# Patient Record
Sex: FEMALE | Race: WHITE | NOT HISPANIC OR LATINO | Employment: OTHER | URBAN - METROPOLITAN AREA
[De-identification: names, ages, dates, MRNs, and addresses within clinical notes are randomized per-mention and may not be internally consistent; named-entity substitution may affect disease eponyms.]

---

## 2017-01-24 ENCOUNTER — APPOINTMENT (OUTPATIENT)
Dept: WOMENS IMAGING | Facility: HOSPITAL | Age: 67
End: 2017-01-24

## 2017-01-24 PROCEDURE — MDREVIEWSP: Performed by: RADIOLOGY

## 2017-01-24 PROCEDURE — G0206 DX MAMMO INCL CAD UNI: HCPCS | Performed by: RADIOLOGY

## 2017-01-24 PROCEDURE — G0279 TOMOSYNTHESIS, MAMMO: HCPCS | Performed by: RADIOLOGY

## 2017-01-24 PROCEDURE — 76641 ULTRASOUND BREAST COMPLETE: CPT | Performed by: RADIOLOGY

## 2017-02-13 ENCOUNTER — OFFICE VISIT (OUTPATIENT)
Dept: INTERNAL MEDICINE | Facility: CLINIC | Age: 67
End: 2017-02-13

## 2017-02-13 VITALS
HEART RATE: 65 BPM | WEIGHT: 245.4 LBS | BODY MASS INDEX: 43.48 KG/M2 | SYSTOLIC BLOOD PRESSURE: 160 MMHG | DIASTOLIC BLOOD PRESSURE: 70 MMHG | HEIGHT: 63 IN | OXYGEN SATURATION: 96 % | TEMPERATURE: 98.1 F

## 2017-02-13 DIAGNOSIS — H66.91 OTITIS, RIGHT: ICD-10-CM

## 2017-02-13 DIAGNOSIS — J01.00 ACUTE NON-RECURRENT MAXILLARY SINUSITIS: Primary | ICD-10-CM

## 2017-02-13 PROBLEM — H66.90 OTITIS: Status: ACTIVE | Noted: 2017-02-13

## 2017-02-13 PROCEDURE — 99213 OFFICE O/P EST LOW 20 MIN: CPT | Performed by: FAMILY MEDICINE

## 2017-02-13 RX ORDER — CEFUROXIME AXETIL 500 MG/1
500 TABLET ORAL 2 TIMES DAILY
Qty: 20 TABLET | Refills: 0 | Status: SHIPPED | OUTPATIENT
Start: 2017-02-13 | End: 2017-02-27

## 2017-02-13 NOTE — PROGRESS NOTES
Subjective   Chastity Salgado is a 67 y.o. female.     Chief Complaint   Patient presents with   • patient has congestion chest/sinus     for about 10 days   • patient is to have her left knee replace   • right ear pain and dizziness when she turns on right side in         History of Present Illness   Patient has had head congestion for 10 days low-grade fever started with clear runny nose, more thickened or face tenderness also left ear painful.  She has not had any benefit with over-the-counter remedies she also felt some dizziness when looking to the left vision changes  The following portions of the patient's history were reviewed and updated as appropriate: allergies, current medications, past family history, past medical history, past social history, past surgical history and problem list.    Review of Systems   Constitutional: Positive for fatigue. Negative for activity change and unexpected weight change.   HENT: Positive for congestion and postnasal drip. Negative for ear pain and sore throat.    Eyes: Negative for pain, discharge and redness.   Respiratory: Positive for cough. Negative for chest tightness, shortness of breath and wheezing.    Cardiovascular: Negative for chest pain and palpitations.   Gastrointestinal: Negative for abdominal pain, diarrhea and vomiting.   Endocrine: Negative.    Genitourinary: Negative.    Musculoskeletal: Negative for joint swelling.   Skin: Negative for color change, rash and wound.   Allergic/Immunologic: Negative.    Neurological: Negative for seizures and syncope.   Psychiatric/Behavioral: Negative.        Objective   Physical Exam   Constitutional: She is oriented to person, place, and time. She appears well-developed and well-nourished.  Non-toxic appearance. No distress.   HENT:   Head: Normocephalic and atraumatic. Hair is normal.   Right Ear: Hearing normal. No drainage, swelling or tenderness. Tympanic membrane is injected and retracted.   Left Ear: Hearing and  external ear normal. No drainage, swelling or tenderness. Tympanic membrane is not retracted.   Nose: Mucosal edema present. No epistaxis. Right sinus exhibits maxillary sinus tenderness. Right sinus exhibits no frontal sinus tenderness. Left sinus exhibits maxillary sinus tenderness. Left sinus exhibits no frontal sinus tenderness.   Mouth/Throat: Uvula is midline and mucous membranes are normal. No oral lesions. No uvula swelling. Posterior oropharyngeal erythema present. No oropharyngeal exudate.   Eyes: Conjunctivae and EOM are normal. Pupils are equal, round, and reactive to light. Right eye exhibits no discharge. Left eye exhibits no discharge. No scleral icterus.   Neck: Normal range of motion. Neck supple.   Cardiovascular: Normal rate, regular rhythm and normal heart sounds.  Exam reveals no gallop.    No murmur heard.  Pulmonary/Chest: Breath sounds normal. No stridor. No respiratory distress. She has no wheezes. She has no rales. She exhibits no tenderness.   Abdominal: Soft. There is no tenderness.   Lymphadenopathy:     She has cervical adenopathy.   Neurological: She is alert and oriented to person, place, and time. She exhibits normal muscle tone.   Skin: Skin is warm and dry. No rash noted. She is not diaphoretic.   Psychiatric: She has a normal mood and affect. Her behavior is normal. Judgment and thought content normal.   Nursing note and vitals reviewed.      Assessment/Plan   Chastity was seen today for patient has congestion chest/sinus, patient is to have her left knee replace and right ear pain and dizziness when she turns on right side in.    Diagnoses and all orders for this visit:    Acute non-recurrent maxillary sinusitis    Otitis, right    Other orders  -     cefuroxime (CEFTIN) 500 MG tablet; Take 1 tablet by mouth 2 (Two) Times a Day.        Follow-up if symptoms persist or as needed

## 2017-02-27 ENCOUNTER — OFFICE VISIT (OUTPATIENT)
Dept: INTERNAL MEDICINE | Facility: CLINIC | Age: 67
End: 2017-02-27

## 2017-02-27 VITALS
HEART RATE: 84 BPM | WEIGHT: 242.6 LBS | SYSTOLIC BLOOD PRESSURE: 128 MMHG | BODY MASS INDEX: 42.98 KG/M2 | TEMPERATURE: 98.5 F | HEIGHT: 63 IN | OXYGEN SATURATION: 95 % | DIASTOLIC BLOOD PRESSURE: 74 MMHG

## 2017-02-27 DIAGNOSIS — E66.9 ADIPOSITY: ICD-10-CM

## 2017-02-27 DIAGNOSIS — R73.9 ELEVATED BLOOD SUGAR: ICD-10-CM

## 2017-02-27 DIAGNOSIS — F33.0 MILD EPISODE OF RECURRENT MAJOR DEPRESSIVE DISORDER (HCC): ICD-10-CM

## 2017-02-27 DIAGNOSIS — I10 BENIGN ESSENTIAL HTN: Primary | ICD-10-CM

## 2017-02-27 PROBLEM — H66.90 OTITIS: Status: RESOLVED | Noted: 2017-02-13 | Resolved: 2017-02-27

## 2017-02-27 PROCEDURE — 99214 OFFICE O/P EST MOD 30 MIN: CPT | Performed by: FAMILY MEDICINE

## 2017-02-27 NOTE — PROGRESS NOTES
Subjective   Chastity Salgado is a 67 y.o. female.     Chief Complaint   Patient presents with   • surgery clearance    she is going to undergo left total knee replacement      History of Present Illness   Comes in for preoperative evaluation to have left knee replaced records were reviewed labs from preoperative blood work CBC Chem-7 normal UA revealed mixed denzel EKG sinus bradycardia  Patient has no acute concerns and she has no acute infection she's feeling fine no fever chills or sweats no urine symptoms's history of hyperglycemia A1c 3 months ago was 6.9  The following portions of the patient's history were reviewed and updated as appropriate: allergies, current medications, past family history, past medical history, past social history, past surgical history and problem list.    Review of Systems   Constitutional: Negative for activity change, appetite change and unexpected weight change.   HENT: Negative for nosebleeds and trouble swallowing.    Eyes: Negative for pain and visual disturbance.   Respiratory: Negative for chest tightness, shortness of breath and wheezing.    Cardiovascular: Negative for chest pain and palpitations.   Gastrointestinal: Negative for abdominal pain and blood in stool.   Endocrine: Negative.    Genitourinary: Negative for difficulty urinating and hematuria.   Musculoskeletal: Negative for joint swelling.   Skin: Negative for color change and rash.   Allergic/Immunologic: Negative.    Neurological: Negative for syncope and speech difficulty.   Hematological: Negative for adenopathy.   Psychiatric/Behavioral: Negative for agitation and confusion.   All other systems reviewed and are negative.      Objective   Physical Exam   Constitutional: She is oriented to person, place, and time. She appears well-developed and well-nourished. No distress.   HENT:   Head: Normocephalic and atraumatic.   Right Ear: External ear normal.   Left Ear: External ear normal.   Eyes: Conjunctivae and EOM  are normal. Pupils are equal, round, and reactive to light. Right eye exhibits no discharge. Left eye exhibits no discharge. No scleral icterus.   Neck: Normal range of motion. Neck supple. No tracheal deviation present. No thyromegaly present.   Cardiovascular: Normal rate, regular rhythm, normal heart sounds, intact distal pulses and normal pulses.  Exam reveals no gallop.    No murmur heard.  Pulmonary/Chest: Effort normal and breath sounds normal. No respiratory distress. She has no wheezes. She has no rales.   Musculoskeletal: Normal range of motion.   Limited range of motion of right shoulder with scar   Neurological: She is alert and oriented to person, place, and time. She exhibits normal muscle tone. Coordination normal.   Skin: Skin is warm. No rash noted. No erythema. No pallor.   Psychiatric: She has a normal mood and affect. Her behavior is normal. Judgment and thought content normal.   Nursing note and vitals reviewed.      Assessment/Plan   Chastity was seen today for surgery clearance.    Diagnoses and all orders for this visit:    Benign essential HTN    Adiposity    Elevated blood sugar  -     Hemoglobin A1c    Mild episode of recurrent major depressive disorder      Follow-up results of A1c presently patient seems to be medically optimized to undergo right knee replacement

## 2017-02-28 LAB — HBA1C MFR BLD: 6.34 % (ref 4.8–5.6)

## 2017-05-23 RX ORDER — LISINOPRIL 30 MG/1
TABLET ORAL
Qty: 30 TABLET | Refills: 2 | Status: SHIPPED | OUTPATIENT
Start: 2017-05-23 | End: 2017-10-11 | Stop reason: SDUPTHER

## 2017-06-12 ENCOUNTER — APPOINTMENT (OUTPATIENT)
Dept: GENERAL RADIOLOGY | Facility: HOSPITAL | Age: 67
End: 2017-06-12

## 2017-06-12 ENCOUNTER — HOSPITAL ENCOUNTER (EMERGENCY)
Facility: HOSPITAL | Age: 67
Discharge: HOME | End: 2017-06-12
Attending: EMERGENCY MEDICINE | Admitting: EMERGENCY MEDICINE

## 2017-06-12 ENCOUNTER — APPOINTMENT (OUTPATIENT)
Dept: CT IMAGING | Facility: HOSPITAL | Age: 67
End: 2017-06-12

## 2017-06-12 VITALS
HEART RATE: 88 BPM | RESPIRATION RATE: 18 BRPM | HEIGHT: 63 IN | DIASTOLIC BLOOD PRESSURE: 97 MMHG | SYSTOLIC BLOOD PRESSURE: 190 MMHG | TEMPERATURE: 99 F | WEIGHT: 240 LBS | OXYGEN SATURATION: 98 % | BODY MASS INDEX: 42.52 KG/M2

## 2017-06-12 DIAGNOSIS — S09.90XA CLOSED HEAD INJURY, INITIAL ENCOUNTER: ICD-10-CM

## 2017-06-12 DIAGNOSIS — S30.0XXA CONTUSION OF SACRUM, INITIAL ENCOUNTER: Primary | ICD-10-CM
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
## 2017-06-12 PROCEDURE — 72220 X-RAY EXAM SACRUM TAILBONE: CPT

## 2017-06-12 PROCEDURE — 99283 EMERGENCY DEPT VISIT LOW MDM: CPT

## 2017-06-12 PROCEDURE — 70450 CT HEAD/BRAIN W/O DYE: CPT

## 2017-06-12 RX ORDER — METAXALONE 800 MG/1
800 TABLET ORAL 3 TIMES DAILY PRN
COMMUNITY
End: 2018-05-07

## 2017-06-12 RX ORDER — HYDROCODONE BITARTRATE AND ACETAMINOPHEN 10; 325 MG/1; MG/1
1 TABLET ORAL EVERY 6 HOURS PRN
Qty: 20 TABLET | Refills: 0 | Status: SHIPPED | OUTPATIENT
Start: 2017-06-12 | End: 2018-08-01 | Stop reason: SDUPTHER

## 2017-06-12 NOTE — DISCHARGE INSTRUCTIONS
Take stool softeners while you are taking pain medication to prevent constipation. Ice your sacrum as needed for pain.

## 2017-06-12 NOTE — ED PROVIDER NOTES
" EMERGENCY DEPARTMENT ENCOUNTER    CHIEF COMPLAINT  Chief Complaint: Fall  History given by: Patient  History limited by: None  Room Number: 27/27  PMD: Seven Dolan MD      HPI:  Pt is a 67 y.o. female who presents with sacral pain and blow to the head status post mechanical fall while sleep walking last night. Pt states that she woke up from sleep walking while falling backwards and that she landed on her buttocks. Pt reports nausea, headache, left ear discomfort, and increased sacral pain while sitting. Pt denies LOC. Pt states she intermittently sleep walks and she states there is a correlation with increases of her chronic pain. Pt states that she took skelaxin x2 yesterday, which is a new medication.    Duration:  1 day  Onset: Sudden  Timing: Intermittent  Location: sacrum  Radiation: none  Quality: \"pain\"  Intensity/Severity: Moderate  Progression: Staying the same  Associated Symptoms: Left ear pain, nausea, HA  Aggravating Factors: Sitting  Alleviating Factors: Standing up  Previous Episodes: Pt reports previous episodes of sleep walking but no other falls.  Treatment before arrival: None    PAST MEDICAL HISTORY  Active Ambulatory Problems     Diagnosis Date Noted   • Allergic rhinitis 04/11/2016   • Benign essential HTN 04/11/2016   • Arthritis of shoulder region, degenerative 04/11/2016   • Mild episode of recurrent major depressive disorder 04/11/2016   • Elevated blood sugar 04/11/2016   • Fatigue 04/11/2016   • Fibrositis 04/11/2016   • Generalized osteoarthritis of hand 04/11/2016   • Cannot sleep 04/11/2016   • Pain in shoulder 04/11/2016   • Adiposity 04/11/2016   • Arthritis or polyarthritis, rheumatoid 04/11/2016   • FOM (frequency of micturition) 04/11/2016   • Avitaminosis D 04/11/2016   • Screening for colon cancer 04/11/2016   • Pharyngoesophageal dysphagia 08/23/2016   • Hyperglycemia 02/27/2017     Resolved Ambulatory Problems     Diagnosis Date Noted   • Lower abdominal pain 04/11/2016 "   • Acute maxillary sinusitis 04/11/2016   • Acute mucoid otitis media of right ear 05/23/2016   • Bronchitis 12/12/2016   • Otitis 02/13/2017     Past Medical History:   Diagnosis Date   • Abscess    • History of dysphagia    • History of dysuria    • History of epistaxis    • Intertrigo    • Nose mucous membrane dryness        PAST SURGICAL HISTORY  Past Surgical History:   Procedure Laterality Date   • HIP BIPOLAR REPLACEMENT     • REPLACEMENT TOTAL KNEE     • SHOULDER SURGERY         FAMILY HISTORY  Family History   Problem Relation Age of Onset   • Arthritis Other    • Hypertension Other    • Heart disease Other        SOCIAL HISTORY  Social History     Social History   • Marital status: Single     Spouse name: N/A   • Number of children: 3   • Years of education: N/A     Occupational History   • disabled      Social History Main Topics   • Smoking status: Never Smoker   • Smokeless tobacco: Never Used   • Alcohol use Yes      Comment: social alcohol use   • Drug use: Not on file   • Sexual activity: Not on file     Other Topics Concern   • Not on file     Social History Narrative       ALLERGIES  Adhesive tape; Diphenhydramine; Latex; Metformin and related; and Shellfish-derived products    REVIEW OF SYSTEMS  Review of Systems   Constitutional: Negative for fever.   HENT: Positive for ear pain (Left). Negative for sore throat.    Eyes: Negative.    Respiratory: Negative for cough and shortness of breath.    Cardiovascular: Negative for chest pain.   Gastrointestinal: Positive for nausea. Negative for abdominal pain, diarrhea and vomiting.   Genitourinary: Negative for dysuria.   Musculoskeletal: Positive for arthralgias (Sacral). Negative for neck pain.   Skin: Negative for rash.   Allergic/Immunologic: Negative.    Neurological: Positive for headaches. Negative for weakness and numbness.   Hematological: Negative.    Psychiatric/Behavioral: Negative.    All other systems reviewed and are  negative.      PHYSICAL EXAM  ED Triage Vitals   Temp Heart Rate Resp BP SpO2   06/12/17 1123 06/12/17 1123 06/12/17 1123 06/12/17 1146 06/12/17 1123   99 °F (37.2 °C) 85 16 165/80 97 %      Temp src Heart Rate Source Patient Position BP Location FiO2 (%)   -- -- 06/12/17 1146 06/12/17 1146 --     Sitting Left arm        Physical Exam   Constitutional: She is oriented to person, place, and time and well-developed, well-nourished, and in no distress. No distress.   HENT:   Head: Normocephalic and atraumatic.   Right Ear: Tympanic membrane normal.   Left Ear: Tympanic membrane normal.   Erythema and induration just anterior to the left ear.   Eyes: EOM are normal. Pupils are equal, round, and reactive to light.   Neck: Normal range of motion. Neck supple.   Cardiovascular: Normal rate, regular rhythm and normal heart sounds.    Pulmonary/Chest: Effort normal and breath sounds normal. No respiratory distress.   Abdominal: Soft. There is no tenderness. There is no rebound and no guarding.   Musculoskeletal: She exhibits no edema.        Right shoulder: She exhibits decreased range of motion (Chronic).        Lumbar back: She exhibits tenderness (Mid line, lower sacrum).   Well healed surgical scar over the anterior left knee.   Neurological: She is alert and oriented to person, place, and time. She has normal sensation and normal strength.   Skin: Skin is warm and dry. No rash noted.   Psychiatric: Mood and affect normal.   Nursing note and vitals reviewed.    RADIOLOGY  XR Sacrum & Coccyx   Final Result      CT Head Without Contrast   Final Result   Unremarkable CT scan of the head except for minimal   bilateral basal ganglia calcification, which is typically of no clinical   significance.       This report was finalized on 6/12/2017 2:44 PM by Dr. Juan Soria MD.             1445 CT head is negative.    1515 Reviewed XR sacrum which showed nothing acute. Independently viewed by me. Interpreted by radiologist.    I  ordered the above noted radiological studies. Interpreted by radiologist.  Reviewed by me in PACS.       PROCEDURES  Procedures      PROGRESS AND CONSULTS  ED Course   1456- Notified pt of her unremarkable CT Head and that I am waiting on the pt's official read of the pt's sacrum XR. Pt agrees with the plan and all questions were addressed.    1513 Rechecked pt. Patient was resting comfortably. Discussed the unremarkable sacrum XR results. Discussed the plan for discharge with the pt. Pt agrees with the plan and all questions were answered.      MEDICAL DECISION MAKING  Results were reviewed/discussed with the patient and they were also made aware of online access. Pt also made aware that follow up with PMD is necessary.     MDM  Number of Diagnoses or Management Options  Closed head injury, initial encounter:   Contusion of sacrum, initial encounter:   Fall, initial encounter:      Amount and/or Complexity of Data Reviewed  Tests in the radiology section of CPT®: ordered and reviewed (CT Head and sacrum XR show nothing acute)  Independent visualization of images, tracings, or specimens: yes    Patient Progress  Patient progress: stable         DIAGNOSIS  Final diagnoses:   Contusion of sacrum, initial encounter   Closed head injury, initial encounter   Fall, initial encounter       DISPOSITION  DISCHARGE    Patient discharged in stable condition.    Reviewed implications of results, diagnosis, meds, responsibility to follow up, warning signs and symptoms of possible worsening, potential complications and reasons to return to ER, including fever, worsening pain or other concerns.    Patient/Family voiced understanding of above instructions.    Discussed plan for discharge, as there is no emergent indication for admission.  Pt/family is agreeable and understands need for follow up and repeat testing.  Pt is aware that discharge does not mean that nothing is wrong but it indicates no emergency is present that requires  admission and they must continue care with follow-up as given below or physician of their choice.     FOLLOW-UP  Seven Dolan MD  [id number] [id number]  [phone]    Call  As needed, If symptoms worsen         Medication List      Changed          HYDROcodone-acetaminophen  MG per tablet   Commonly known as:  NORCO   Take 1 tablet by mouth Every 6 (Six) Hours As Needed for Moderate Pain   (4-6).   What changed:    - how much to take  - how to take this  - when to take this  - reasons to take this         Stop          cyclobenzaprine 10 MG tablet   Commonly known as:  FLEXERIL       gabapentin 300 MG capsule   Commonly known as:  NEURONTIN       tiZANidine 4 MG tablet   Commonly known as:  ZANAFLEX               Latest Documented Vital Signs:  As of 3:44 PM  BP- (!) 190/97 HR- 88 Temp- 99 °F (37.2 °C) (Tympanic) O2 sat- 98%    --  Documentation assistance provided by chris Helms and Walker Love for Dr. Gavin.  Information recorded by the scribe was done at my direction and has been verified and validated by me.       Walker Love  06/12/17 1558       Kelsi Helms  06/12/17 1649       Gregory Gavin MD  06/12/17 1474

## 2017-06-12 NOTE — ED TRIAGE NOTES
Patient reports sleepwalking last night.  Awoke this morning around 0600 and states that she fell backwards in dining room.  States she fell on her coccyx and struck head on dining room table.  No LOC.  Reports nausea and dizziness.

## 2017-06-13 ENCOUNTER — TELEPHONE (OUTPATIENT)
Dept: SOCIAL WORK | Facility: HOSPITAL | Age: 67
End: 2017-06-13

## 2017-08-07 ENCOUNTER — TELEPHONE (OUTPATIENT)
Dept: INTERNAL MEDICINE | Facility: CLINIC | Age: 67
End: 2017-08-07

## 2017-08-07 NOTE — TELEPHONE ENCOUNTER
Patient called stating that she thinks that she had a slight stroke around 3:00am Saturday morning.  She said that her left side went numb.  She took a Neurontin and felt better.  She said that her left side felt funny before she went to bed that night but really didn't think much about this.  She said that she felt tired yesterday and the numbness is gone today.  She wanted to know what she should do.  Please advise.  Patient did not go to the ER because she was babysitting her grandson.

## 2017-08-14 ENCOUNTER — OFFICE VISIT (OUTPATIENT)
Dept: INTERNAL MEDICINE | Facility: CLINIC | Age: 67
End: 2017-08-14

## 2017-08-14 ENCOUNTER — TELEPHONE (OUTPATIENT)
Dept: INTERNAL MEDICINE | Facility: CLINIC | Age: 67
End: 2017-08-14

## 2017-08-14 VITALS
TEMPERATURE: 98.6 F | WEIGHT: 245.1 LBS | OXYGEN SATURATION: 97 % | HEIGHT: 63 IN | BODY MASS INDEX: 43.43 KG/M2 | SYSTOLIC BLOOD PRESSURE: 130 MMHG | DIASTOLIC BLOOD PRESSURE: 72 MMHG | HEART RATE: 96 BPM

## 2017-08-14 DIAGNOSIS — E66.9 ADIPOSITY: Primary | ICD-10-CM

## 2017-08-14 DIAGNOSIS — R42 DIZZINESS: ICD-10-CM

## 2017-08-14 DIAGNOSIS — I10 BENIGN ESSENTIAL HTN: ICD-10-CM

## 2017-08-14 DIAGNOSIS — M79.7 FIBROSITIS: ICD-10-CM

## 2017-08-14 DIAGNOSIS — E11.42 TYPE 2 DIABETES MELLITUS WITH DIABETIC POLYNEUROPATHY, WITHOUT LONG-TERM CURRENT USE OF INSULIN (HCC): ICD-10-CM

## 2017-08-14 DIAGNOSIS — Z00.00 MEDICARE ANNUAL WELLNESS VISIT, SUBSEQUENT: ICD-10-CM

## 2017-08-14 DIAGNOSIS — F41.9 ANXIETY: ICD-10-CM

## 2017-08-14 DIAGNOSIS — E55.9 AVITAMINOSIS D: ICD-10-CM

## 2017-08-14 PROBLEM — E11.49 TYPE 2 DIABETES MELLITUS WITH NEUROLOGIC COMPLICATION: Status: ACTIVE | Noted: 2017-08-14

## 2017-08-14 PROCEDURE — 99214 OFFICE O/P EST MOD 30 MIN: CPT | Performed by: FAMILY MEDICINE

## 2017-08-14 PROCEDURE — G0439 PPPS, SUBSEQ VISIT: HCPCS | Performed by: FAMILY MEDICINE

## 2017-08-14 RX ORDER — DOXYCYCLINE HYCLATE 50 MG/1
1 TABLET, FILM COATED ORAL 2 TIMES DAILY PRN
COMMUNITY
End: 2020-01-28

## 2017-08-14 RX ORDER — AZELAIC ACID 0.15 G/G
1 AEROSOL, FOAM TOPICAL 2 TIMES DAILY
Refills: 6 | COMMUNITY
Start: 2017-07-20 | End: 2019-09-13

## 2017-08-14 RX ORDER — FLUOXETINE HYDROCHLORIDE 20 MG/1
20 CAPSULE ORAL DAILY
Qty: 30 CAPSULE | Refills: 6 | Status: SHIPPED | OUTPATIENT
Start: 2017-08-14 | End: 2018-05-07 | Stop reason: SDUPTHER

## 2017-08-14 RX ORDER — MECLIZINE HYDROCHLORIDE 25 MG/1
25 TABLET ORAL DAILY PRN
Qty: 30 TABLET | Refills: 1 | Status: SHIPPED | OUTPATIENT
Start: 2017-08-14 | End: 2017-10-19 | Stop reason: SDUPTHER

## 2017-08-14 NOTE — PROGRESS NOTES
QUICK REFERENCE INFORMATION:  The ABCs of the Annual Wellness Visit    Subsequent Medicare Wellness Visit    HEALTH RISK ASSESSMENT    1950    Recent Hospitalizations:  Recently treated at the following:  Other: [facility].        Current Medical Providers:  Patient Care Team:  Seven Dolan MD as PCP - General (Family Medicine)  Seven Dolan MD as PCP - Claims Attributed  Lino Cullen MD as Consulting Physician (General Surgery)        Smoking Status:  History   Smoking Status   • Never Smoker   Smokeless Tobacco   • Never Used       Alcohol Consumption:  History   Alcohol Use   • Yes     Comment: social alcohol use       Depression Screen:   PHQ-9 Depression Screening 8/14/2017   Little interest or pleasure in doing things 0   Feeling down, depressed, or hopeless 3   Trouble falling or staying asleep, or sleeping too much 3   Feeling tired or having little energy 3   Poor appetite or overeating 2   Feeling bad about yourself - or that you are a failure or have let yourself or your family down 2   Trouble concentrating on things, such as reading the newspaper or watching television 0   Moving or speaking so slowly that other people could have noticed. Or the opposite - being so fidgety or restless that you have been moving around a lot more than usual 0   Thoughts that you would be better off dead, or of hurting yourself in some way 0   PHQ-9 Total Score 13   If you checked off any problems, how difficult have these problems made it for you to do your work, take care of things at home, or get along with other people? Not difficult at all       Health Habits and Functional and Cognitive Screening:  Functional & Cognitive Status 8/14/2017   Do you have difficulty preparing food and eating? Yes   Do you have difficulty bathing yourself? No   Do you have difficulty getting dressed? Yes   Do you have difficulty using the toilet? No   Do you have difficulty moving around from place to place? Yes   In the  past year have you fallen or experienced a near fall? Yes   Do you need help using the phone?  No   Are you deaf or do you have serious difficulty hearing?  No   Do you need help with transportation? Yes   Do you need help shopping? Yes   Do you need help preparing meals?  Yes   Do you need help with housework?  Yes   Do you need help with laundry? No   Do you need help taking your medications? No   Do you need help managing money? No       Health Habits  Current Diet: Limited Junk Food  Dental Exam: Not up to date  Eye Exam: Not up to date  Exercise (times per week): 7 times per week  Current Exercise Activities Include: Walking      Does the patient have evidence of cognitive impairment? No    Aspirin use counseling: Start ASA 81 mg daily       Recent Lab Results:  CMP:  Lab Results   Component Value Date     (H) 08/23/2016    BUN 15 08/23/2016    CREATININE 0.79 08/23/2016    EGFRIFNONA 73 08/23/2016    EGFRIFAFRI 89 08/23/2016    BCR 19.0 08/23/2016     08/23/2016    K 4.6 08/23/2016    CO2 25.5 08/23/2016    CALCIUM 9.7 08/23/2016    PROTENTOTREF 7.0 04/11/2016    ALBUMIN 4.00 04/11/2016    LABGLOBREF 3.0 04/11/2016    LABIL2 1.3 04/11/2016    BILITOT 0.2 04/11/2016    ALKPHOS 65 04/11/2016    AST 20 04/11/2016    ALT 14 04/11/2016     Lipid Panel:  Lab Results   Component Value Date    TRIG 108 04/11/2016    HDL 54 04/11/2016    VLDL 21.6 04/11/2016     HbA1c:  Lab Results   Component Value Date    HGBA1C 6.34 (H) 02/27/2017       Visual Acuity:  No exam data present    Age-appropriate Screening Schedule:  Refer to the list below for future screening recommendations based on patient's age, sex and/or medical conditions. Orders for these recommended tests are listed in the plan section. The patient has been provided with a written plan.    Health Maintenance   Topic Date Due   • TDAP/TD VACCINES (1 - Tdap) 02/01/1970   • ZOSTER VACCINE  03/28/2016   • PNEUMOCOCCAL VACCINES (65+ LOW/MEDIUM RISK) (1  of 2 - PCV13) 06/05/2016   • DIABETIC FOOT EXAM  08/14/2017   • DIABETIC EYE EXAM  08/14/2017   • URINE MICROALBUMIN  08/14/2017   • HEMOGLOBIN A1C  08/27/2017   • INFLUENZA VACCINE  09/01/2017   • MAMMOGRAM  01/24/2019   • COLONOSCOPY  Excluded        Subjective   History of Present Illness    Chastity Salgado is a 67 y.o. female who presents for an Subsequent Wellness Visit.    The following portions of the patient's history were reviewed and updated as appropriate: allergies, current medications, past family history, past medical history, past social history, past surgical history and problem list.    Outpatient Medications Prior to Visit   Medication Sig Dispense Refill   • celecoxib (CeleBREX) 200 MG capsule Take 1 capsule by mouth 2 (Two) Times a Day.     • cetirizine (ZyrTEC) 10 MG tablet Take 1 tablet by mouth daily as needed.     • cyclobenzaprine (FLEXERIL) 10 MG tablet Take 10 mg by mouth 3 (Three) Times a Day As Needed.     • gabapentin (NEURONTIN) 300 MG capsule Take 600 mg by mouth 3 (Three) Times a Day.     • hydrochlorothiazide (HYDRODIURIL) 12.5 MG tablet Take 1 tablet by mouth daily. 30 tablet 6   • HYDROcodone-acetaminophen (NORCO)  MG per tablet Take 1 tablet by mouth Every 6 (Six) Hours As Needed for Moderate Pain (4-6). 20 tablet 0   • lidocaine (XYLOCAINE) 5 % ointment 1 application 4 (four) times a day as needed for mild pain (1-3).     • lisinopril (PRINIVIL,ZESTRIL) 30 MG tablet TAKE ONE TABLET BY MOUTH DAILY 30 tablet 2   • metaxalone (SKELAXIN) 800 MG tablet Take 800 mg by mouth 3 (Three) Times a Day As Needed for Muscle Spasms.     • Probiotic capsule Take 1 capsule by mouth daily.     • sennosides-docusate sodium (SENOKOT-S) 8.6-50 MG tablet Take 2 tablets by mouth daily.     • meclizine (ANTIVERT) 25 MG tablet Take 25 mg by mouth Daily As Needed.     • FLUoxetine (PROzac) 40 MG capsule TAKE ONE CAPSULE BY MOUTH DAILY 90 capsule 0   • tiZANidine (ZANAFLEX) 4 MG tablet Take 4 mg by  "mouth every 8 (eight) hours as needed.       No facility-administered medications prior to visit.        Patient Active Problem List   Diagnosis   • Allergic rhinitis   • Benign essential HTN   • Arthritis of shoulder region, degenerative   • Mild episode of recurrent major depressive disorder   • Fatigue   • Fibrositis   • Generalized osteoarthritis of hand   • Cannot sleep   • Pain in shoulder   • Adiposity   • Arthritis or polyarthritis, rheumatoid   • FOM (frequency of micturition)   • Avitaminosis D   • Screening for colon cancer   • Pharyngoesophageal dysphagia   • Hyperglycemia   • Type 2 diabetes mellitus with neurologic complication   • Anxiety   • Dizziness       Advance Care Planning:  power of  for healthcare on file, has an advance directive - a copy HAS NOT been provided. Have asked the patient to send this to us to add to record.    Identification of Risk Factors:  Risk factors include: weight , cardiovascular risk, inactivity, chronic pain and depression.    Review of Systems    Compared to one year ago, the patient feels her physical health is the same.  Compared to one year ago, the patient feels her mental health is the same.    Objective     Physical Exam    Vitals:    08/14/17 1030   BP: 130/72   BP Location: Left arm   Patient Position: Sitting   Cuff Size: Large Adult   Pulse: 96   Temp: 98.6 °F (37 °C)   TempSrc: Oral   SpO2: 97%   Weight: 245 lb 1.6 oz (111 kg)   Height: 63\" (160 cm)   PainSc:   8       Body mass index is 43.42 kg/(m^2).  Discussed the patient's BMI with her. The BMI is above average; BMI management plan is completed.    Assessment/Plan   Patient Self-Management and Personalized Health Advice  The patient has been provided with information about: diet, exercise, weight management, prevention of cardiac or vascular disease, fall prevention and mental health concerns and preventive services including:   · Diabetes screening, see lab orders, Fall Risk assessment done, " Pneumococcal vaccine , TdaP vaccine.    Visit Diagnoses:    ICD-10-CM ICD-9-CM   1. Adiposity E66.9 278.02   2. Avitaminosis D E55.9 268.9   3. Type 2 diabetes mellitus with diabetic polyneuropathy, without long-term current use of insulin E11.42 250.60     357.2   4. Benign essential HTN I10 401.1   5. Fibrositis M79.7 729.0   6. Anxiety F41.9 300.00   7. Dizziness R42 780.4   8. Medicare annual wellness visit, subsequent Z00.00 V70.0       Orders Placed This Encounter   Procedures   • Lipid Panel   • TSH   • Comprehensive Metabolic Panel   • Microalbumin / Creatinine Urine Ratio   • Hepatitis C Antibody   • Vitamin D 25 Hydroxy   • Ambulatory Referral to Ophthalmology     Referral Priority:   Routine     Referral Type:   Consultation     Referral Reason:   Specialty Services Required     Requested Specialty:   Ophthalmology     Number of Visits Requested:   1       Outpatient Encounter Prescriptions as of 8/14/2017   Medication Sig Dispense Refill   • celecoxib (CeleBREX) 200 MG capsule Take 1 capsule by mouth 2 (Two) Times a Day.     • cetirizine (ZyrTEC) 10 MG tablet Take 1 tablet by mouth daily as needed.     • cyclobenzaprine (FLEXERIL) 10 MG tablet Take 10 mg by mouth 3 (Three) Times a Day As Needed.     • Doxycycline Hyclate 50 MG tablet Take 1 tablet by mouth 2 (Two) Times a Day.     • gabapentin (NEURONTIN) 300 MG capsule Take 600 mg by mouth 3 (Three) Times a Day.     • hydrochlorothiazide (HYDRODIURIL) 12.5 MG tablet Take 1 tablet by mouth daily. 30 tablet 6   • HYDROcodone-acetaminophen (NORCO)  MG per tablet Take 1 tablet by mouth Every 6 (Six) Hours As Needed for Moderate Pain (4-6). 20 tablet 0   • lidocaine (XYLOCAINE) 5 % ointment 1 application 4 (four) times a day as needed for mild pain (1-3).     • lisinopril (PRINIVIL,ZESTRIL) 30 MG tablet TAKE ONE TABLET BY MOUTH DAILY 30 tablet 2   • meclizine (ANTIVERT) 25 MG tablet Take 1 tablet by mouth Daily As Needed for dizziness. 30 tablet 1   •  metaxalone (SKELAXIN) 800 MG tablet Take 800 mg by mouth 3 (Three) Times a Day As Needed for Muscle Spasms.     • Probiotic capsule Take 1 capsule by mouth daily.     • sennosides-docusate sodium (SENOKOT-S) 8.6-50 MG tablet Take 2 tablets by mouth daily.     • [DISCONTINUED] meclizine (ANTIVERT) 25 MG tablet Take 25 mg by mouth Daily As Needed.     • FINACEA 15 % foam Apply 1 application topically 2 (Two) Times a Day.  6   • FLUoxetine (PROzac) 20 MG capsule Take 1 capsule by mouth Daily. 30 capsule 6   • [DISCONTINUED] FLUoxetine (PROzac) 40 MG capsule TAKE ONE CAPSULE BY MOUTH DAILY 90 capsule 0   • [DISCONTINUED] tiZANidine (ZANAFLEX) 4 MG tablet Take 4 mg by mouth every 8 (eight) hours as needed.       No facility-administered encounter medications on file as of 8/14/2017.        Reviewed use of high risk medication in the elderly: yes  Reviewed for potential of harmful drug interactions in the elderly: yes    Follow Up:  Pharmacy for immunizations follow-up chronic medical problems    An After Visit Summary and PPPS with all of these plans were given to the patient.

## 2017-08-14 NOTE — TELEPHONE ENCOUNTER
Patient forgot to ask at her appointment today if there is anything else that she can try instead of the Senokot.  Please advise.

## 2017-08-15 LAB
25(OH)D3+25(OH)D2 SERPL-MCNC: 14.5 NG/ML (ref 30–100)
ALBUMIN SERPL-MCNC: 3.8 G/DL (ref 3.5–5.2)
ALBUMIN/CREAT UR: 310 MG/G CREAT (ref 0–30)
ALBUMIN/GLOB SERPL: 1.1 G/DL
ALP SERPL-CCNC: 83 U/L (ref 39–117)
ALT SERPL-CCNC: 20 U/L (ref 1–33)
AST SERPL-CCNC: 26 U/L (ref 1–32)
BILIRUB SERPL-MCNC: 0.3 MG/DL (ref 0.1–1.2)
BUN SERPL-MCNC: 11 MG/DL (ref 8–23)
BUN/CREAT SERPL: 17.7 (ref 7–25)
CALCIUM SERPL-MCNC: 9.4 MG/DL (ref 8.6–10.5)
CHLORIDE SERPL-SCNC: 103 MMOL/L (ref 98–107)
CHOLEST SERPL-MCNC: 209 MG/DL (ref 0–200)
CO2 SERPL-SCNC: 24.9 MMOL/L (ref 22–29)
CREAT SERPL-MCNC: 0.62 MG/DL (ref 0.57–1)
CREAT UR-MCNC: 153.5 MG/DL
GLOBULIN SER CALC-MCNC: 3.4 GM/DL
GLUCOSE SERPL-MCNC: 124 MG/DL (ref 65–99)
HCV AB S/CO SERPL IA: <0.1 S/CO RATIO (ref 0–0.9)
HDLC SERPL-MCNC: 56 MG/DL (ref 40–60)
LDLC SERPL CALC-MCNC: 125 MG/DL (ref 0–100)
MICROALBUMIN UR-MCNC: 475.8 UG/ML
POTASSIUM SERPL-SCNC: 4.4 MMOL/L (ref 3.5–5.2)
PROT SERPL-MCNC: 7.2 G/DL (ref 6–8.5)
SODIUM SERPL-SCNC: 142 MMOL/L (ref 136–145)
TRIGL SERPL-MCNC: 139 MG/DL (ref 0–150)
TSH SERPL DL<=0.005 MIU/L-ACNC: 1.85 MIU/ML (ref 0.27–4.2)
VLDLC SERPL CALC-MCNC: 27.8 MG/DL (ref 5–40)

## 2017-08-17 ENCOUNTER — TELEPHONE (OUTPATIENT)
Dept: INTERNAL MEDICINE | Facility: CLINIC | Age: 67
End: 2017-08-17

## 2017-08-17 DIAGNOSIS — E11.42 TYPE 2 DIABETES MELLITUS WITH DIABETIC POLYNEUROPATHY, WITHOUT LONG-TERM CURRENT USE OF INSULIN (HCC): ICD-10-CM

## 2017-08-17 DIAGNOSIS — E78.5 HYPERLIPIDEMIA, UNSPECIFIED HYPERLIPIDEMIA TYPE: Primary | ICD-10-CM

## 2017-08-17 DIAGNOSIS — R80.9 MICROALBUMINURIA: ICD-10-CM

## 2017-08-17 RX ORDER — ATORVASTATIN CALCIUM 10 MG/1
10 TABLET, FILM COATED ORAL DAILY
Qty: 90 TABLET | Refills: 0 | Status: SHIPPED | OUTPATIENT
Start: 2017-08-17 | End: 2019-01-15 | Stop reason: SDDI

## 2017-08-17 NOTE — TELEPHONE ENCOUNTER
----- Message from Seven Dolan MD sent at 8/16/2017  1:36 PM EDT -----  Vitamin D3 5000 units daily.  Lipitor 10 mg daily follow-up fasting lipid profile 3 months with an A1c and 24-hour urine for protein

## 2017-10-11 RX ORDER — HYDROCHLOROTHIAZIDE 12.5 MG/1
TABLET ORAL
Qty: 30 TABLET | Refills: 2 | Status: SHIPPED | OUTPATIENT
Start: 2017-10-11 | End: 2018-11-02 | Stop reason: SDUPTHER

## 2017-10-11 RX ORDER — LISINOPRIL 30 MG/1
TABLET ORAL
Qty: 30 TABLET | Refills: 2 | Status: SHIPPED | OUTPATIENT
Start: 2017-10-11 | End: 2018-01-21 | Stop reason: SDUPTHER

## 2017-10-19 RX ORDER — MECLIZINE HYDROCHLORIDE 25 MG/1
TABLET ORAL
Qty: 30 TABLET | Refills: 0 | Status: SHIPPED | OUTPATIENT
Start: 2017-10-19 | End: 2017-12-27 | Stop reason: SDUPTHER

## 2017-11-10 ENCOUNTER — RESULTS ENCOUNTER (OUTPATIENT)
Dept: INTERNAL MEDICINE | Facility: CLINIC | Age: 67
End: 2017-11-10

## 2017-11-10 DIAGNOSIS — E78.5 HYPERLIPIDEMIA, UNSPECIFIED HYPERLIPIDEMIA TYPE: ICD-10-CM

## 2017-11-10 DIAGNOSIS — R80.9 MICROALBUMINURIA: ICD-10-CM

## 2017-11-10 DIAGNOSIS — E11.42 TYPE 2 DIABETES MELLITUS WITH DIABETIC POLYNEUROPATHY, WITHOUT LONG-TERM CURRENT USE OF INSULIN (HCC): ICD-10-CM

## 2017-11-20 ENCOUNTER — RESULTS ENCOUNTER (OUTPATIENT)
Dept: INTERNAL MEDICINE | Facility: CLINIC | Age: 67
End: 2017-11-20

## 2017-11-20 ENCOUNTER — TELEPHONE (OUTPATIENT)
Dept: INTERNAL MEDICINE | Facility: CLINIC | Age: 67
End: 2017-11-20

## 2017-11-20 DIAGNOSIS — R80.9 PROTEINURIA, UNSPECIFIED TYPE: ICD-10-CM

## 2017-11-20 DIAGNOSIS — E11.42 TYPE 2 DIABETES MELLITUS WITH DIABETIC POLYNEUROPATHY, WITHOUT LONG-TERM CURRENT USE OF INSULIN (HCC): Primary | ICD-10-CM

## 2017-11-20 DIAGNOSIS — E11.42 TYPE 2 DIABETES MELLITUS WITH DIABETIC POLYNEUROPATHY, WITHOUT LONG-TERM CURRENT USE OF INSULIN (HCC): ICD-10-CM

## 2017-11-20 LAB
CHOLEST SERPL-MCNC: 181 MG/DL (ref 0–200)
HBA1C MFR BLD: 6.29 % (ref 4.8–5.6)
HDLC SERPL-MCNC: 59 MG/DL (ref 40–60)
LDLC SERPL CALC-MCNC: 103 MG/DL (ref 0–100)
TRIGL SERPL-MCNC: 93 MG/DL (ref 0–150)
VLDLC SERPL CALC-MCNC: 18.6 MG/DL (ref 5–40)

## 2017-11-20 NOTE — TELEPHONE ENCOUNTER
Patient called stating that her 4 year old grandson possibly has the chicken pox (has an appointment with the pediatrician this afternoon).  Patient wanted to know if she can still get a shingles vaccination - she watches her grandson most of the time). Patient thought that she had the shingles vaccination in 2013 when she had cancer but is not really sure.  Please advise.

## 2017-11-29 ENCOUNTER — TELEPHONE (OUTPATIENT)
Dept: INTERNAL MEDICINE | Facility: CLINIC | Age: 67
End: 2017-11-29

## 2017-11-29 NOTE — TELEPHONE ENCOUNTER
----- Message from Seven Dolan MD sent at 11/28/2017  8:13 AM EST -----  Labs ok cholesterol is improved, A1c is stable, continue present medications

## 2017-11-29 NOTE — TELEPHONE ENCOUNTER
Patient notified and voiced understanding. Patient advised to contact office if they have any questions.

## 2017-12-04 ENCOUNTER — APPOINTMENT (OUTPATIENT)
Dept: WOMENS IMAGING | Facility: HOSPITAL | Age: 67
End: 2017-12-04

## 2017-12-04 PROCEDURE — 77063 BREAST TOMOSYNTHESIS BI: CPT | Performed by: RADIOLOGY

## 2017-12-04 PROCEDURE — G0202 SCR MAMMO BI INCL CAD: HCPCS | Performed by: RADIOLOGY

## 2017-12-04 PROCEDURE — MDREVIEWSP: Performed by: RADIOLOGY

## 2017-12-27 RX ORDER — MECLIZINE HYDROCHLORIDE 25 MG/1
TABLET ORAL
Qty: 30 TABLET | Refills: 0 | Status: SHIPPED | OUTPATIENT
Start: 2017-12-27 | End: 2018-02-06 | Stop reason: SDUPTHER

## 2018-01-22 RX ORDER — LISINOPRIL 30 MG/1
TABLET ORAL
Qty: 30 TABLET | Refills: 0 | Status: SHIPPED | OUTPATIENT
Start: 2018-01-22 | End: 2018-02-26 | Stop reason: SDUPTHER

## 2018-02-06 RX ORDER — MECLIZINE HYDROCHLORIDE 25 MG/1
TABLET ORAL
Qty: 30 TABLET | Refills: 0 | Status: SHIPPED | OUTPATIENT
Start: 2018-02-06 | End: 2018-04-18 | Stop reason: SDUPTHER

## 2018-02-26 RX ORDER — LISINOPRIL 30 MG/1
TABLET ORAL
Qty: 30 TABLET | Refills: 0 | Status: SHIPPED | OUTPATIENT
Start: 2018-02-26 | End: 2018-04-18 | Stop reason: SDUPTHER

## 2018-04-18 RX ORDER — MECLIZINE HYDROCHLORIDE 25 MG/1
TABLET ORAL
Qty: 30 TABLET | Refills: 0 | Status: SHIPPED | OUTPATIENT
Start: 2018-04-18 | End: 2018-05-21 | Stop reason: SDUPTHER

## 2018-04-18 RX ORDER — LISINOPRIL 30 MG/1
TABLET ORAL
Qty: 30 TABLET | Refills: 0 | Status: SHIPPED | OUTPATIENT
Start: 2018-04-18 | End: 2018-05-21 | Stop reason: SDUPTHER

## 2018-05-07 ENCOUNTER — OFFICE VISIT (OUTPATIENT)
Dept: INTERNAL MEDICINE | Facility: CLINIC | Age: 68
End: 2018-05-07

## 2018-05-07 VITALS
WEIGHT: 245 LBS | SYSTOLIC BLOOD PRESSURE: 135 MMHG | HEIGHT: 63 IN | TEMPERATURE: 97.9 F | HEART RATE: 65 BPM | BODY MASS INDEX: 43.41 KG/M2 | OXYGEN SATURATION: 94 % | DIASTOLIC BLOOD PRESSURE: 89 MMHG | RESPIRATION RATE: 18 BRPM

## 2018-05-07 DIAGNOSIS — G89.4 CHRONIC PAIN SYNDROME: ICD-10-CM

## 2018-05-07 DIAGNOSIS — E55.9 AVITAMINOSIS D: ICD-10-CM

## 2018-05-07 DIAGNOSIS — E78.2 MIXED HYPERLIPIDEMIA: ICD-10-CM

## 2018-05-07 DIAGNOSIS — G89.29 CHRONIC RIGHT SHOULDER PAIN: ICD-10-CM

## 2018-05-07 DIAGNOSIS — M25.511 CHRONIC RIGHT SHOULDER PAIN: ICD-10-CM

## 2018-05-07 DIAGNOSIS — Z12.11 SCREENING FOR COLON CANCER: ICD-10-CM

## 2018-05-07 DIAGNOSIS — E11.42 TYPE 2 DIABETES MELLITUS WITH DIABETIC POLYNEUROPATHY, WITHOUT LONG-TERM CURRENT USE OF INSULIN (HCC): Primary | ICD-10-CM

## 2018-05-07 DIAGNOSIS — K59.03 DRUG-INDUCED CONSTIPATION: ICD-10-CM

## 2018-05-07 DIAGNOSIS — I10 BENIGN ESSENTIAL HTN: ICD-10-CM

## 2018-05-07 DIAGNOSIS — IMO0001 CLASS 3 OBESITY DUE TO EXCESS CALORIES WITH SERIOUS COMORBIDITY AND BODY MASS INDEX (BMI) OF 40.0 TO 44.9 IN ADULT: ICD-10-CM

## 2018-05-07 PROBLEM — M17.12 LOCALIZED PRIMARY OSTEOARTHRITIS OF LEFT LOWER LEG: Status: ACTIVE | Noted: 2017-03-07

## 2018-05-07 PROBLEM — E87.8 DISORDER OF ELECTROLYTES: Status: ACTIVE | Noted: 2018-05-07

## 2018-05-07 PROBLEM — F34.1 DYSTHYMIA: Status: ACTIVE | Noted: 2018-05-07

## 2018-05-07 PROBLEM — N39.41 URGE INCONTINENCE: Status: ACTIVE | Noted: 2017-12-04

## 2018-05-07 PROCEDURE — 99215 OFFICE O/P EST HI 40 MIN: CPT | Performed by: FAMILY MEDICINE

## 2018-05-07 RX ORDER — LUBIPROSTONE 24 UG/1
24 CAPSULE ORAL 2 TIMES DAILY WITH MEALS
Qty: 60 CAPSULE | Refills: 3 | Status: SHIPPED | OUTPATIENT
Start: 2018-05-07 | End: 2019-01-15 | Stop reason: SDUPTHER

## 2018-05-07 RX ORDER — TOLTERODINE 4 MG/1
4 CAPSULE, EXTENDED RELEASE ORAL DAILY
COMMUNITY
Start: 2017-12-04 | End: 2019-07-16

## 2018-05-07 RX ORDER — BUPROPION HYDROCHLORIDE 150 MG/1
150 TABLET ORAL DAILY
Qty: 30 TABLET | Refills: 5 | Status: SHIPPED | OUTPATIENT
Start: 2018-05-07 | End: 2019-01-15 | Stop reason: SDUPTHER

## 2018-05-07 RX ORDER — FLUOXETINE 10 MG/1
10 CAPSULE ORAL DAILY
Qty: 30 CAPSULE | Refills: 1 | Status: SHIPPED | OUTPATIENT
Start: 2018-05-07 | End: 2018-06-12 | Stop reason: SDUPTHER

## 2018-05-07 NOTE — PROGRESS NOTES
Chastity Salgado is a 68 y.o. female.      Assessment/Plan   Problem List Items Addressed This Visit        Cardiovascular and Mediastinum    Benign essential HTN    Relevant Orders    Comprehensive Metabolic Panel    Mixed hyperlipidemia    Relevant Orders    Lipid Panel       Digestive    Adiposity    Vitamin D deficiency    Drug-induced constipation    Relevant Orders    Ambulatory Referral to Gastroenterology       Endocrine    Type 2 diabetes mellitus with neurologic complication - Primary    Relevant Orders    Hemoglobin A1c       Nervous and Auditory    Pain in shoulder       Other    Screening for colon cancer    Relevant Orders    Ambulatory Referral For Screening Colonoscopy    Chronic pain    Relevant Orders    Sedimentation rate, automated      Other Visit Diagnoses    None.        is followed by pain management  Follow-up results of blood work and recommended have screening colonoscopy she may improve with the Amitiza for her opioid-induced constipation which hopefully will alleviate her right lower quadrant pain 45 minutes was spent face-to-face with patient with greater than 50% of the time discussing chronic medical problems and coordinating care  Decrease Prozac initiate Wellbutrin    Chief Complaint   Patient presents with   • follow up for hypertension   • follow up for cholesterol   • follow up for diabetes   • follow up for depression     medication not helping any longer   • Abdominal Pain     right lower area, started 7 months ago, worse now     Social History   Substance Use Topics   • Smoking status: Never Smoker   • Smokeless tobacco: Never Used   • Alcohol use Yes      Comment: social alcohol use       History of Present Illness   Symptoms in for follow-up of chronic medical problems of hypertension hyperlipidemia diabetes depression and abdominal pain that has been long-standing for several months of intermittent right lower quadrant she does have history of constipation related to opioids  "her she's never had a colonoscopy there is no black or bloody stool is no fever.  Says long-standing depression and has had increased episodes of events that were creating her to be more depressed mostly deaths in family she is able to have sleep however she is moving his depressed  The following portions of the patient's history were reviewed and updated as appropriate:PMHroutine: Social history , Past Medical History, Surgical history , Allergies, Current Medications, Active Problem List, Family History and Health Maintenance    Review of Systems   Constitutional: Negative.  Negative for activity change, appetite change and unexpected weight change.   HENT: Negative.  Negative for nosebleeds and trouble swallowing.    Eyes: Negative.  Negative for pain and visual disturbance.   Respiratory: Negative for chest tightness, shortness of breath and wheezing.    Cardiovascular: Negative for chest pain and palpitations.   Gastrointestinal: Negative for abdominal pain and blood in stool.   Endocrine: Negative.    Genitourinary: Negative.  Negative for difficulty urinating and hematuria.   Musculoskeletal: Positive for arthralgias, joint swelling and myalgias.   Skin: Negative.  Negative for color change and rash.   Allergic/Immunologic: Negative.    Neurological: Negative.  Negative for syncope and speech difficulty.   Hematological: Negative for adenopathy.   Psychiatric/Behavioral: Positive for dysphoric mood. Negative for agitation and confusion.   All other systems reviewed and are negative.      Objective   Vitals:    05/07/18 1007   BP: 135/89   BP Location: Left arm   Patient Position: Sitting   Cuff Size: Large Adult   Pulse: 65   Resp: 18   Temp: 97.9 °F (36.6 °C)   TempSrc: Oral   SpO2: 94%   Weight: 111 kg (245 lb)   Height: 160 cm (63\")     Body mass index is 43.4 kg/m².  Physical Exam  Reviewed Data:  No visits with results within 1 Month(s) from this visit.   Latest known visit with results is:   Results " Encounter on 11/10/2017   Component Date Value Ref Range Status   • Total Cholesterol 11/20/2017 181  0 - 200 mg/dL Final   • Triglycerides 11/20/2017 93  0 - 150 mg/dL Final   • HDL Cholesterol 11/20/2017 59  40 - 60 mg/dL Final   • VLDL Cholesterol 11/20/2017 18.6  5 - 40 mg/dL Final   • LDL Cholesterol  11/20/2017 103* 0 - 100 mg/dL Final   • Hemoglobin A1C 11/20/2017 6.29* 4.80 - 5.60 % Final    Comment: Hemoglobin A1C Ranges:  Increased Risk for Diabetes  5.7% to 6.4%  Diabetes                     >= 6.5%  Diabetic Goal                < 7.0%

## 2018-05-10 LAB
25(OH)D3+25(OH)D2 SERPL-MCNC: 13.6 NG/ML (ref 30–100)
ALBUMIN SERPL-MCNC: 4.1 G/DL (ref 3.5–5.2)
ALBUMIN/GLOB SERPL: 1.3 G/DL
ALP SERPL-CCNC: 79 U/L (ref 39–117)
ALT SERPL-CCNC: 14 U/L (ref 1–33)
AST SERPL-CCNC: 18 U/L (ref 1–32)
BILIRUB SERPL-MCNC: 0.4 MG/DL (ref 0.1–1.2)
BUN SERPL-MCNC: 24 MG/DL (ref 8–23)
BUN/CREAT SERPL: 30.8 (ref 7–25)
CALCIUM SERPL-MCNC: 9.5 MG/DL (ref 8.6–10.5)
CHLORIDE SERPL-SCNC: 102 MMOL/L (ref 98–107)
CHOLEST SERPL-MCNC: 213 MG/DL (ref 0–200)
CO2 SERPL-SCNC: 26.5 MMOL/L (ref 22–29)
CREAT SERPL-MCNC: 0.78 MG/DL (ref 0.57–1)
ERYTHROCYTE [SEDIMENTATION RATE] IN BLOOD BY WESTERGREN METHOD: 37 MM/HR (ref 0–30)
GFR SERPLBLD CREATININE-BSD FMLA CKD-EPI: 74 ML/MIN/1.73
GFR SERPLBLD CREATININE-BSD FMLA CKD-EPI: 89 ML/MIN/1.73
GLOBULIN SER CALC-MCNC: 3.2 GM/DL
GLUCOSE SERPL-MCNC: 114 MG/DL (ref 65–99)
HBA1C MFR BLD: 6.2 % (ref 4.8–5.6)
HDLC SERPL-MCNC: 66 MG/DL (ref 40–60)
LDLC SERPL CALC-MCNC: 128 MG/DL (ref 0–100)
POTASSIUM SERPL-SCNC: 4.3 MMOL/L (ref 3.5–5.2)
PROT SERPL-MCNC: 7.3 G/DL (ref 6–8.5)
SODIUM SERPL-SCNC: 142 MMOL/L (ref 136–145)
TRIGL SERPL-MCNC: 93 MG/DL (ref 0–150)
VLDLC SERPL CALC-MCNC: 18.6 MG/DL (ref 5–40)

## 2018-05-11 ENCOUNTER — TELEPHONE (OUTPATIENT)
Dept: INTERNAL MEDICINE | Facility: CLINIC | Age: 68
End: 2018-05-11

## 2018-05-11 DIAGNOSIS — R70.0 ELEVATED SEDIMENTATION RATE: Primary | ICD-10-CM

## 2018-05-11 NOTE — TELEPHONE ENCOUNTER
----- Message from Seven Dolan MD sent at 5/11/2018  2:43 PM EDT -----  Start vitamin D 3 5000 units daily recommend consultation with rheumatology with since has arthralgias and elevated sedimentation rate

## 2018-05-21 RX ORDER — MECLIZINE HYDROCHLORIDE 25 MG/1
TABLET ORAL
Qty: 30 TABLET | Refills: 0 | Status: SHIPPED | OUTPATIENT
Start: 2018-05-21 | End: 2018-06-22 | Stop reason: SDUPTHER

## 2018-05-21 RX ORDER — LISINOPRIL 30 MG/1
TABLET ORAL
Qty: 90 TABLET | Refills: 0 | Status: SHIPPED | OUTPATIENT
Start: 2018-05-21 | End: 2018-09-17 | Stop reason: SDUPTHER

## 2018-06-12 ENCOUNTER — OFFICE VISIT (OUTPATIENT)
Dept: INTERNAL MEDICINE | Facility: CLINIC | Age: 68
End: 2018-06-12

## 2018-06-12 VITALS
WEIGHT: 244.4 LBS | DIASTOLIC BLOOD PRESSURE: 80 MMHG | TEMPERATURE: 98.8 F | HEART RATE: 61 BPM | HEIGHT: 63 IN | OXYGEN SATURATION: 97 % | SYSTOLIC BLOOD PRESSURE: 138 MMHG | BODY MASS INDEX: 43.3 KG/M2

## 2018-06-12 DIAGNOSIS — F41.9 ANXIETY: ICD-10-CM

## 2018-06-12 DIAGNOSIS — F34.1 DYSTHYMIA: ICD-10-CM

## 2018-06-12 DIAGNOSIS — I10 BENIGN ESSENTIAL HTN: Primary | ICD-10-CM

## 2018-06-12 PROCEDURE — 99213 OFFICE O/P EST LOW 20 MIN: CPT | Performed by: FAMILY MEDICINE

## 2018-06-12 RX ORDER — FLUOXETINE HYDROCHLORIDE 20 MG/1
20 CAPSULE ORAL DAILY
Qty: 30 CAPSULE | Refills: 6 | Status: SHIPPED | OUTPATIENT
Start: 2018-06-12 | End: 2019-01-15 | Stop reason: SDUPTHER

## 2018-06-12 RX ORDER — CEPHALEXIN 500 MG/1
500 CAPSULE ORAL 4 TIMES DAILY
COMMUNITY
End: 2018-08-01

## 2018-06-12 NOTE — PROGRESS NOTES
Chastity Salgado is a 68 y.o. female.      Assessment/Plan   Problem List Items Addressed This Visit        Cardiovascular and Mediastinum    Benign essential HTN - Primary       Other    Anxiety    Dysthymia    Relevant Medications    FLUoxetine (PROzac) 20 MG capsule           REStart Prozac 20 mg daily monitor blood pressure goals less than 140/90 follow up otherwise as needed or in 6 months      Chief Complaint   Patient presents with   • follow up to constipation   • follow up to depression   Hypertension and anxiety  Social History   Substance Use Topics   • Smoking status: Never Smoker   • Smokeless tobacco: Never Used   • Alcohol use Yes      Comment: social alcohol use       History of Present Illness   Patient follow-up after starting Wellbutrin and weaned off Prozac she likes the effect his gait however she still has some depression but she is not as tired her constipation is much improved with Amitiza and try to continue the same.  Her blood pressure is well-controlled and his been no adverse effects since initiating the bupropion  The following portions of the patient's history were reviewed and updated as appropriate:PMHroutine: Social history , Past Medical History, Surgical history , Allergies, Current Medications, Active Problem List and Health Maintenance    Review of Systems   Constitutional: Negative.  Negative for activity change, appetite change and unexpected weight change.   HENT: Negative.  Negative for nosebleeds and trouble swallowing.    Eyes: Negative.  Negative for pain and visual disturbance.   Respiratory: Negative for chest tightness, shortness of breath and wheezing.    Cardiovascular: Negative for chest pain and palpitations.   Gastrointestinal: Negative for abdominal pain and blood in stool.   Endocrine: Negative.    Genitourinary: Negative.  Negative for difficulty urinating and hematuria.   Musculoskeletal: Positive for arthralgias, joint swelling and myalgias.   Skin: Negative.   "Negative for color change and rash.   Allergic/Immunologic: Negative.    Neurological: Negative.  Negative for syncope and speech difficulty.   Hematological: Negative for adenopathy.   Psychiatric/Behavioral: Positive for dysphoric mood. Negative for agitation and confusion.   All other systems reviewed and are negative.      Objective   Vitals:    06/12/18 1238   BP: 138/80   BP Location: Left arm   Patient Position: Sitting   Cuff Size: Large Adult   Pulse: 61   Temp: 98.8 °F (37.1 °C)   TempSrc: Oral   SpO2: 97%   Weight: 111 kg (244 lb 6.4 oz)   Height: 160 cm (63\")     Body mass index is 43.29 kg/m².  Physical Exam   Constitutional: She is oriented to person, place, and time. She appears well-developed and well-nourished. No distress.   HENT:   Head: Normocephalic and atraumatic.   Right Ear: External ear normal.   Left Ear: External ear normal.   Eyes: Conjunctivae and EOM are normal. Pupils are equal, round, and reactive to light. Right eye exhibits no discharge. Left eye exhibits no discharge. No scleral icterus.   Neck: Normal range of motion. Neck supple. No tracheal deviation present. No thyromegaly present.   Cardiovascular: Normal rate, regular rhythm, normal heart sounds, intact distal pulses and normal pulses.  Exam reveals no gallop.    No murmur heard.  Pulmonary/Chest: Effort normal and breath sounds normal. No respiratory distress. She has no wheezes. She has no rales.   Musculoskeletal: Normal range of motion.   Limited range of motion of right shoulder with scar   Neurological: She is alert and oriented to person, place, and time. She exhibits normal muscle tone. Coordination normal.   Skin: Skin is warm. No rash noted. No erythema. No pallor.   Psychiatric: She has a normal mood and affect. Her behavior is normal. Judgment and thought content normal.   Nursing note and vitals reviewed.    Reviewed Data:  No visits with results within 1 Month(s) from this visit.   Latest known visit with " results is:   Office Visit on 05/07/2018   Component Date Value Ref Range Status   • Total Cholesterol 05/10/2018 213* 0 - 200 mg/dL Final   • Triglycerides 05/10/2018 93  0 - 150 mg/dL Final   • HDL Cholesterol 05/10/2018 66* 40 - 60 mg/dL Final   • VLDL Cholesterol 05/10/2018 18.6  5 - 40 mg/dL Final   • LDL Cholesterol  05/10/2018 128* 0 - 100 mg/dL Final   • Glucose 05/10/2018 114* 65 - 99 mg/dL Final   • BUN 05/10/2018 24* 8 - 23 mg/dL Final   • Creatinine 05/10/2018 0.78  0.57 - 1.00 mg/dL Final   • eGFR Non African Am 05/10/2018 74  >60 mL/min/1.73 Final   • eGFR African Am 05/10/2018 89  >60 mL/min/1.73 Final   • BUN/Creatinine Ratio 05/10/2018 30.8* 7.0 - 25.0 Final   • Sodium 05/10/2018 142  136 - 145 mmol/L Final   • Potassium 05/10/2018 4.3  3.5 - 5.2 mmol/L Final   • Chloride 05/10/2018 102  98 - 107 mmol/L Final   • Total CO2 05/10/2018 26.5  22.0 - 29.0 mmol/L Final   • Calcium 05/10/2018 9.5  8.6 - 10.5 mg/dL Final   • Total Protein 05/10/2018 7.3  6.0 - 8.5 g/dL Final   • Albumin 05/10/2018 4.10  3.50 - 5.20 g/dL Final   • Globulin 05/10/2018 3.2  gm/dL Final   • A/G Ratio 05/10/2018 1.3  g/dL Final   • Total Bilirubin 05/10/2018 0.4  0.1 - 1.2 mg/dL Final   • Alkaline Phosphatase 05/10/2018 79  39 - 117 U/L Final   • AST (SGOT) 05/10/2018 18  1 - 32 U/L Final   • ALT (SGPT) 05/10/2018 14  1 - 33 U/L Final   • Hemoglobin A1C 05/10/2018 6.20* 4.80 - 5.60 % Final    Comment: Hemoglobin A1C Ranges:  Increased Risk for Diabetes  5.7% to 6.4%  Diabetes                     >= 6.5%  Diabetic Goal                < 7.0%     • 25 Hydroxy, Vitamin D 05/10/2018 13.6* 30.0 - 100.0 ng/ml Final    Comment: Reference Range for Total Vitamin D 25(OH)  Deficiency    <20.0 ng/mL  Insufficiency 21-29 ng/mL  Sufficiency    ng/mL  Toxicity      >100 ng/ml        • Sed Rate 05/10/2018 37* 0 - 30 mm/hr Final

## 2018-06-22 RX ORDER — MECLIZINE HYDROCHLORIDE 25 MG/1
TABLET ORAL
Qty: 30 TABLET | Refills: 0 | Status: SHIPPED | OUTPATIENT
Start: 2018-06-22 | End: 2018-07-31 | Stop reason: SDUPTHER

## 2018-06-26 ENCOUNTER — TELEPHONE (OUTPATIENT)
Dept: INTERNAL MEDICINE | Facility: CLINIC | Age: 68
End: 2018-06-26

## 2018-06-26 DIAGNOSIS — M79.642 BILATERAL HAND PAIN: ICD-10-CM

## 2018-06-26 DIAGNOSIS — G89.29 CHRONIC PAIN OF BOTH SHOULDERS: Primary | ICD-10-CM

## 2018-06-26 DIAGNOSIS — M79.641 BILATERAL HAND PAIN: ICD-10-CM

## 2018-06-26 DIAGNOSIS — M25.512 CHRONIC PAIN OF BOTH SHOULDERS: Primary | ICD-10-CM

## 2018-06-26 DIAGNOSIS — M25.551 RIGHT HIP PAIN: ICD-10-CM

## 2018-06-26 DIAGNOSIS — M25.511 CHRONIC PAIN OF BOTH SHOULDERS: Primary | ICD-10-CM

## 2018-06-26 NOTE — TELEPHONE ENCOUNTER
Patient called stating that she is unable to have any pharmacy fill a prescription from [facility].  She did speak to her ortho and she was told that they prefer for her to get her pain medication prescription from her primary care or pain management.  Please advise.  She has left messages for [facility] to call her but they have not responded to her requests.

## 2018-06-26 NOTE — TELEPHONE ENCOUNTER
Patient was being treated at [facility] pain clinic and is needing referral for new pain management.  Also is afraid she is going to go into withdrawl from [facility], can you write until she gets established with pain clinic.

## 2018-06-26 NOTE — TELEPHONE ENCOUNTER
I prefer her to have her pain prescription filled by her orthopedist or pain management recommend consult with [facility]

## 2018-06-26 NOTE — TELEPHONE ENCOUNTER
Patient notified.  Patient is now stating that her ortho will not prescribe her pain medication for her.  Referral put in EPIC for pain management.  Patient wanted to know if Dr. Dolan would prescribe her medication just until she sees pain management.  Please advise.

## 2018-06-27 NOTE — TELEPHONE ENCOUNTER
Patient notified that [facility] is still open for business and she will need to get in touch with them for refills of her pain medication.  She understood and will do this.

## 2018-06-29 ENCOUNTER — TELEPHONE (OUTPATIENT)
Dept: INTERNAL MEDICINE | Facility: CLINIC | Age: 68
End: 2018-06-29

## 2018-07-03 NOTE — TELEPHONE ENCOUNTER
Patient notified.  Patient stated that she has an appointment to see a rheumatologist in August but that she has been seeing an orthopedic for her arthritis but they will not prescribe anything for her pain.  She has not been able to find a pharmacy to fill her prescription from [facility].

## 2018-07-09 NOTE — TELEPHONE ENCOUNTER
Patient called again to let Dr. Dolan know that she has an appointment to see [facility] on 8/6/18.  She has been off of her pain medication for 2 weeks.  She would like to again ask Dr. Dolan to consider prescribing this for her until she can see [facility].  Please advise.

## 2018-07-31 RX ORDER — MECLIZINE HYDROCHLORIDE 25 MG/1
TABLET ORAL
Qty: 30 TABLET | Refills: 0 | Status: SHIPPED | OUTPATIENT
Start: 2018-07-31 | End: 2018-09-25 | Stop reason: SDUPTHER

## 2018-08-01 ENCOUNTER — OFFICE VISIT (OUTPATIENT)
Dept: PAIN MEDICINE | Facility: CLINIC | Age: 68
End: 2018-08-01

## 2018-08-01 VITALS
HEIGHT: 63 IN | DIASTOLIC BLOOD PRESSURE: 99 MMHG | SYSTOLIC BLOOD PRESSURE: 154 MMHG | HEART RATE: 107 BPM | BODY MASS INDEX: 42.38 KG/M2 | TEMPERATURE: 99 F | RESPIRATION RATE: 15 BRPM | WEIGHT: 239.2 LBS | OXYGEN SATURATION: 96 %

## 2018-08-01 DIAGNOSIS — M15.9 GENERALIZED OSTEOARTHRITIS OF HAND: ICD-10-CM

## 2018-08-01 DIAGNOSIS — M06.9 RHEUMATOID ARTHRITIS, INVOLVING UNSPECIFIED SITE, UNSPECIFIED RHEUMATOID FACTOR PRESENCE: ICD-10-CM

## 2018-08-01 DIAGNOSIS — M19.012 OSTEOARTHRITIS OF LEFT SHOULDER, UNSPECIFIED OSTEOARTHRITIS TYPE: ICD-10-CM

## 2018-08-01 DIAGNOSIS — G89.4 CHRONIC PAIN SYNDROME: Primary | ICD-10-CM

## 2018-08-01 PROCEDURE — 80305 DRUG TEST PRSMV DIR OPT OBS: CPT | Performed by: ANESTHESIOLOGY

## 2018-08-01 PROCEDURE — 99203 OFFICE O/P NEW LOW 30 MIN: CPT | Performed by: ANESTHESIOLOGY

## 2018-08-01 RX ORDER — TIZANIDINE 4 MG/1
4 TABLET ORAL NIGHTLY PRN
COMMUNITY
End: 2019-01-15

## 2018-08-01 RX ORDER — ONDANSETRON 4 MG/1
4 TABLET, FILM COATED ORAL EVERY 8 HOURS PRN
COMMUNITY
End: 2019-01-15

## 2018-08-01 RX ORDER — HYDROCODONE BITARTRATE AND ACETAMINOPHEN 10; 325 MG/1; MG/1
1 TABLET ORAL
Qty: 150 TABLET | Refills: 0 | Status: SHIPPED | OUTPATIENT
Start: 2018-08-01 | End: 2018-08-31 | Stop reason: SDUPTHER

## 2018-08-01 NOTE — PROGRESS NOTES
"CHIEF COMPLAINT    New pt c/o chronic pain from osteoarthritis. States without pain medication she is unable to do daily functions, difficulty dressing, and difficulty participating with friends and family. Pain is worse with rain and winter months. States this past winter was very bad. She had inflammation tests and it was elevated and she will be seeing a rheumatologist next week. She was diagnosed with osteoarthritis around 1998. She states he went in and scraped her left hip to see if that would help. It kept getting worse quickly so she had to get a left hip replacement. Since then she has had \"one replacement after another, especially after 2000.\" States her left shoulder needs to be replaced right now but they are putting it off because her other hand is handicapped and she wouldn't be able to take care of herself. She is afraid she will end up with complications like she had after her right shoulder surgery. 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    She started seeing [facility] in September of 2015. They mostly did pain medicine. When her knee was bothering her, she has Synvisc injection. States they did 3 shots in her left knee and \"it was messed up\" and she could hardly walk after that. States they did not document how much Synvisc they put in her knee. She saw them in May and discussed doing some injections in neck but they never did it because they shut down.     Worst area of pain is left shoulder. She can't reach as well and worse sleeping at night, constant pain. Since January she has developed bone spurs rubbing against her clavicle bone. She has been getting cortisone shots in there and her shoulder (missed her last one since she was on abx for her ear. She has been on hydrocodone ever since [facility] but she has been without it since end of May. Has had increased pain since " then.     Subjective   Chastity Salgado is a 68 y.o. female.   She presents to the office for evaluation of osteoarthritis pain. She was referred here by Dr. Seven Dolan.         Arthritis   Presents for initial visit. The disease course has been stable. She complains of pain, stiffness and joint swelling (knees, right hip bursitis). Affected locations include the left shoulder, right shoulder, right wrist, left wrist, right knee, left knee, right ankle, left ankle, right foot, left foot, right toes and left toes (hands). Associated symptoms include diarrhea, fatigue, pain at night and pain while resting. Pertinent negatives include no fever. Her past medical history is significant for rheumatoid arthritis. (No family history of alcoholism or drug abuse) Past treatments include acetaminophen, activity, corticosteroids, exercise, aspirin, an opioid, heat, NSAIDs, rest, OTC med and glucosamine. The treatment provided mild relief. Factors aggravating her arthritis include activity. Compliance with prior treatments has been good. Past compliance problems: none.        PEG Assessment   What number best describes your pain on average in the past week?8  What number best describes how, during the past week, pain has interfered with your enjoyment of life?9  What number best describes how, during the past week, pain has interfered with your general activity?  9    --  The aforementioned information the Chief Complaint section and above subjective data including any HPI data has been personally reviewed and affirmed.  --        Current Outpatient Prescriptions:   •  atorvastatin (LIPITOR) 10 MG tablet, Take 1 tablet by mouth Daily., Disp: 90 tablet, Rfl: 0  •  buPROPion XL (WELLBUTRIN XL) 150 MG 24 hr tablet, Take 1 tablet by mouth Daily., Disp: 30 tablet, Rfl: 5  •  celecoxib (CeleBREX) 200 MG capsule, Take 1 capsule by mouth 2 (Two) Times a Day., Disp: , Rfl:   •  cetirizine (ZyrTEC) 10 MG tablet, Take 1 tablet by mouth  daily as needed., Disp: , Rfl:   •  Cholecalciferol (VITAMIN D3) 5000 units tablet tablet, Take 1 tablet by mouth Daily., Disp: 30 tablet, Rfl:   •  Doxycycline Hyclate 50 MG tablet, Take 1 tablet by mouth 2 (Two) Times a Day As Needed., Disp: , Rfl:   •  FINACEA 15 % foam, Apply 1 application topically 2 (Two) Times a Day., Disp: , Rfl: 6  •  FLUoxetine (PROzac) 20 MG capsule, Take 1 capsule by mouth Daily., Disp: 30 capsule, Rfl: 6  •  hydrochlorothiazide (HYDRODIURIL) 12.5 MG tablet, TAKE ONE TABLET BY MOUTH DAILY, Disp: 30 tablet, Rfl: 2  •  lisinopril (PRINIVIL,ZESTRIL) 30 MG tablet, TAKE ONE TABLET BY MOUTH DAILY, Disp: 90 tablet, Rfl: 0  •  lubiprostone (AMITIZA) 24 MCG capsule, Take 1 capsule by mouth 2 (Two) Times a Day With Meals., Disp: 60 capsule, Rfl: 3  •  meclizine (ANTIVERT) 25 MG tablet, TAKE ONE TABLET BY MOUTH DAILY AS NEEDED FOR DIZZINESS, Disp: 30 tablet, Rfl: 0  •  ondansetron (ZOFRAN) 4 MG tablet, Take 4 mg by mouth Every 8 (Eight) Hours As Needed for Nausea or Vomiting., Disp: , Rfl:   •  Probiotic capsule, Take 1 capsule by mouth daily., Disp: , Rfl:   •  sennosides-docusate sodium (SENOKOT-S) 8.6-50 MG tablet, Take 2 tablets by mouth daily., Disp: , Rfl:   •  tiZANidine (ZANAFLEX) 4 MG tablet, Take 4 mg by mouth At Night As Needed for Muscle Spasms., Disp: , Rfl:   •  tolterodine LA (DETROL LA) 4 MG 24 hr capsule, Take 4 mg by mouth Daily., Disp: , Rfl:   •  HYDROcodone-acetaminophen (NORCO)  MG per tablet, Take 1 tablet by mouth 5 (Five) Times a Day As Needed for Severe Pain ., Disp: 150 tablet, Rfl: 0    The following portions of the patient's history were reviewed and updated as appropriate: allergies, current medications, past family history, past medical history, past social history, past surgical history and problem list.    -------    The following portions of the patient's history were reviewed and updated as appropriate: allergies, current medications, past family history,  past medical history, past social history, past surgical history and problem list.    Allergies   Allergen Reactions   • Adhesive Tape Hallucinations     REDNESS   • Metformin And Related GI Intolerance and Nausea And Vomiting     Loose stool  Loose stool   • Shellfish-Derived Products    • Latex Rash       Current Outpatient Prescriptions on File Prior to Visit   Medication Sig Dispense Refill   • atorvastatin (LIPITOR) 10 MG tablet Take 1 tablet by mouth Daily. 90 tablet 0   • buPROPion XL (WELLBUTRIN XL) 150 MG 24 hr tablet Take 1 tablet by mouth Daily. 30 tablet 5   • celecoxib (CeleBREX) 200 MG capsule Take 1 capsule by mouth 2 (Two) Times a Day.     • cetirizine (ZyrTEC) 10 MG tablet Take 1 tablet by mouth daily as needed.     • Cholecalciferol (VITAMIN D3) 5000 units tablet tablet Take 1 tablet by mouth Daily. 30 tablet    • Doxycycline Hyclate 50 MG tablet Take 1 tablet by mouth 2 (Two) Times a Day As Needed.     • FINACEA 15 % foam Apply 1 application topically 2 (Two) Times a Day.  6   • FLUoxetine (PROzac) 20 MG capsule Take 1 capsule by mouth Daily. 30 capsule 6   • hydrochlorothiazide (HYDRODIURIL) 12.5 MG tablet TAKE ONE TABLET BY MOUTH DAILY 30 tablet 2   • lisinopril (PRINIVIL,ZESTRIL) 30 MG tablet TAKE ONE TABLET BY MOUTH DAILY 90 tablet 0   • lubiprostone (AMITIZA) 24 MCG capsule Take 1 capsule by mouth 2 (Two) Times a Day With Meals. 60 capsule 3   • meclizine (ANTIVERT) 25 MG tablet TAKE ONE TABLET BY MOUTH DAILY AS NEEDED FOR DIZZINESS 30 tablet 0   • Probiotic capsule Take 1 capsule by mouth daily.     • sennosides-docusate sodium (SENOKOT-S) 8.6-50 MG tablet Take 2 tablets by mouth daily.     • tolterodine LA (DETROL LA) 4 MG 24 hr capsule Take 4 mg by mouth Daily.       No current facility-administered medications on file prior to visit.        Patient Active Problem List   Diagnosis   • Allergic rhinitis   • Benign essential HTN   • Arthritis of shoulder region, degenerative   • Mild episode  of recurrent major depressive disorder (CMS/HCC)   • Fatigue   • Fibrositis   • Generalized osteoarthritis of hand   • Cannot sleep   • Pain in shoulder   • Adiposity   • Arthritis or polyarthritis, rheumatoid (CMS/HCC)   • FOM (frequency of micturition)   • Vitamin D deficiency   • Screening for colon cancer   • Pharyngoesophageal dysphagia   • Hyperglycemia   • Type 2 diabetes mellitus with neurologic complication (CMS/HCC)   • Anxiety   • Dizziness   • Abnormal mammogram of left breast   • Breast cancer, right (CMS/[facility])   • Chronic pain   • Disorder of electrolytes   • Overdose of drug   • Sepsis (CMS/HCC)   • Urge incontinence   • Localized primary osteoarthritis of left lower leg   • Mixed hyperlipidemia   • Dysthymia   • Drug-induced constipation       Past Medical History:   Diagnosis Date   • Abscess    • Anxiety    • Cancer (CMS/[facility]) 2013   • Degenerative disc disease, lumbar    • Depression    • DM (diabetes mellitus), type 2 (CMS/HCC)    • Fibromyalgia, primary    • History of dysphagia    • History of dysuria    • History of epistaxis    • Hypertension    • Intertrigo    • MRSA (methicillin resistant Staphylococcus aureus)    • Nose mucous membrane dryness    • Osteoarthritis    • Rosacea        Past Surgical History:   Procedure Laterality Date   • BREAST LUMPECTOMY Right 2013   • HIP BIPOLAR REPLACEMENT Bilateral     left 1998 and right 2007   • REPLACEMENT TOTAL KNEE  2005   • SHOULDER SURGERY Right     2000, 2002, 2010 X2, 2012       Family History   Problem Relation Age of Onset   • Arthritis Other    • Hypertension Other    • Heart disease Other    • Stroke Mother    • Hypertension Mother    • Diabetes Mother    • Arthritis Mother    • Arthritis Father    • Heart disease Sister    • Diabetes Sister    • Parkinsonism Sister    • COPD Brother        Social History     Social History   • Marital status: Single     Spouse name: N/A   • Number of children: 3   • Years of education: N/A     Occupational  History   • disabled      Social History Main Topics   • Smoking status: Never Smoker   • Smokeless tobacco: Never Used   • Alcohol use Yes      Comment: social alcohol use   • Drug use: No   • Sexual activity: Defer     Other Topics Concern   • Not on file     Social History Narrative   • No narrative on file       -------        REVIEW OF PERTINENT MEDICAL DATA    Her prelim UDS is c/w her eKasper...                   -------    Prelim UDS Immunoassay:    THC  (marijuana)  negative  CECIL (cocaine) negative  OPI (opiate) negative  AMP (amphet) negative  MET (methamph) negative  PCP (pcp)  negative  MDMA (ecstacy) negative  BAR (barbituate) negative  BZO (benzodiaz) negative  MTD (methadone) negative  TCA (tricyclic) positive  OXY (oxycodone) negative    GreenTemp warm  Appearance WNL    -------    Jia D.I.R.E. Score: Patient Selection for Chronic Opioid Analgesia   For each factor, rate the patient’s score from 1-3 based on the explanations in the right hand column.       Diagnosis:    3 1 = Benign chronic condition with minimal objective findings or no definite medical diagnosis. Examples: fibromyalgia, migraine headaches, nonspecific back pain.   2 = Slowly progressive condition concordant with moderate pain, or fixed condition with moderate objective findings. Examples: failed back surgery syndrome, back pain with moderate degenerative changes, neuropathic pain.   3 = Advanced condition concordant with severe pain with objective findings. Examples: severe ischemic vascular disease, advanced neuropathy, severe spinal stenosis.    Intractability:    3 1 = Few therapies have been tried and the patient takes a passive role in his/her pain management process.   2 = Most customary treatments have been tried but the patient is not fully engaged in the pain management process, or barriers prevent (insurance, transportation, medical illness).   3 = Patient fully engaged in a spectrum of appropriate treatments but  with inadequate response.    Risk:  (R = Total of P + C + R + S below)    Psychological:     3 1 = Serious personality dysfunction or mental illness interfering with care.   Example: personality disorder, severe affective disorder, significant personality issues.   2 = Personality or mental health interferes moderately. Example: depression or anxiety disorder.   3 = Good communication with clinic. No significant personality dysfunction or mental illness.    Chemical Health:     3 1 = Active or very recent use of illicit drugs, excessive alcohol, or prescription drug abuse.   2 = Chemical coper (uses medications to cope with stress) or history of CD in remission.   3 = No CD history. Not drug-focused or chemically reliant.      Reliability:     3 1 = History of numerous problems: medication misuse, missed appointments, rarely follows through.   2 = Occasional difficulties with compliance, but generally reliable.   3 = Highly reliable patient with meds, appointments & treatment.    Social Support:     2 1 = Life in chaos. Little family support and few close relationships. Loss of most normal life roles.   2 = Reduction in some relationships and life roles.   3 = Supportive family/close relationships. Involved in work or school and no social isolation.    Efficacy score:    3 1 = Poor function or minimal pain relief despite moderate to high doses.   2 = Moderate benefit with function improved in a number of ways (or insufficient info - hasn’t tried opioid yet or very low doses or too short of a trial).   3 = Good improvement in pain and function and quality of life with stable doses over time.           20  Total score = D + I + R + E     Score 7-13: Not a suitable candidate for long-term opioid analgesia   Score 14-21: May be a candidate for long-term opioid analgesia     (Source: [facility] © 2005.)        Review of Systems   Constitutional: Positive for activity change and  "fatigue. Negative for chills and fever.   HENT: Positive for ear pain and sinus pressure.    Eyes: Positive for visual disturbance.   Respiratory: Negative for apnea, cough and shortness of breath.    Gastrointestinal: Positive for constipation and diarrhea. Negative for abdominal pain (after being off norco), nausea and vomiting.   Genitourinary: Negative for difficulty urinating (incontinence).   Musculoskeletal: Positive for arthralgias, arthritis, joint swelling (knees, right hip bursitis) and stiffness.   Neurological: Positive for dizziness (bilateral ear infection), weakness and numbness (left hand pinky and ring finger). Negative for light-headedness.   Psychiatric/Behavioral: Positive for sleep disturbance. Negative for agitation, confusion, hallucinations and suicidal ideas. The patient is nervous/anxious.          Vitals:    08/01/18 1437   BP: 154/99   Pulse: 107   Resp: 15   Temp: 99 °F (37.2 °C)   SpO2: 96%   Weight: 109 kg (239 lb 3.2 oz)   Height: 160 cm (62.99\")   PainSc:   8   PainLoc: Generalized         Objective   Physical Exam   Constitutional: She is oriented to person, place, and time. Vital signs are normal. She appears well-developed and well-nourished.  Non-toxic appearance. No distress.   HENT:   Head: Normocephalic and atraumatic.   Right Ear: Hearing and external ear normal.   Left Ear: Hearing and external ear normal.   Nose: Nose normal.   Eyes: Pupils are equal, round, and reactive to light. Conjunctivae and lids are normal.   Pulmonary/Chest: Effort normal. No respiratory distress.   Abdominal: Normal appearance.   Neurological: She is alert and oriented to person, place, and time. No cranial nerve deficit.   Psychiatric: She has a normal mood and affect. Her behavior is normal.   Nursing note and vitals reviewed.  Severe stigmata of arthritis.  Her shoulder is very tender.  Hands and feet with ulnar/fibular deviation, along with severe degeneration.      Assessment/Plan   Chastity " was seen today for pain.    Diagnoses and all orders for this visit:    Chronic pain syndrome  -     Ambulatory Referral to Psychology  -     POC Urine Drug Screen, Triage  -     Urine Drug Screen Confirmation - Urine, Clean Catch; Future    Osteoarthritis of left shoulder, unspecified osteoarthritis type    Generalized osteoarthritis of hand    Rheumatoid arthritis, involving unspecified site, unspecified rheumatoid factor presence (CMS/[facility])    Other orders  -     HYDROcodone-acetaminophen (NORCO)  MG per tablet; Take 1 tablet by mouth 5 (Five) Times a Day As Needed for Severe Pain .        --- Follow-up 1 month  -- referral to Dr. Hogan for opioid risk assessment   -- UDS per routine      A medication management agreement is signed by the patient and the provider today.  Reviewed with the patient that this contract is specific to controlled substances, specifically pain medication.  Reviewed core of pain medicine is to decrease pain and increase functional level.  Reviewed the medication must be picked up as a written prescription from this office and will not be called into any pharmacy.  Reviewed the use of Sukhdeep within the office setting.  Reviewed causes for potential of discontinuation of opiates,  including but not limited to consideration for diversion, obtaining other controlled substances from other license practitioners, or  inappropriate office behavior.  Patient understands to use a controlled substance as prescribed by physician and to avoid improper use of controlled substances.  Patient will also verbalized understanding that prescriptions to be filled at the same pharmacy  unless office staff is made aware of this.  Patient understands they can be submitted to random urine drug screens and/or random pill counts on any request.  Patient understands they are not to receive early refills.  The patient must produce an official police report for any effort to replace controlled substances that  are lost or stolen.  No use of illegal street drugs while receiving controlled substances from this prescribing provider.  The patient is to take medication as prescribed  and not deviate from the normal schedule without consultation from the provider.  Patient is not to share the medication with others or to take medication with alcohol or other sedatives.  Use caution when driving or operating machinery.  Alert office if the patient becomes pregnant or begins nursing a child.  Reviewed the use of controlled substances recreates a risk for respiratory depression, which may result in serious harm or even death.  Must always keep the prescription in the original container.  Patient is to store controlled substances in a locked cabinet or other secure storage unit that is cool, dry and out of sunlight.  Patient must immediately notify office if any controlled substances is stolen or improperly taken by another individual.  Reviewed risk for physical dependence, tolerance and addiction.    she does have a significant history of significant stigmata of arthritis and demonstrates moderate improvement with multimodal therapy including opioid therapy, which allows her to engage in ADLs and family activities. The continuation of opioid therapy is therefore not contraindicated. We will however respect the March 2016 CDC Guidelines and will plan to avoid overexposure to opioids and avoid dose escalation.    -------  Opioid Risk Assessment / Formalized Substance Abuse Risk Stratification:  Why is a consultation with the pain psychologist needed?    The medical decision making when deciding whether or not to treat chronic painful conditions with prescription pain medications is a complex process.  The decisions are even more complex when considering whether or not there is a medical need for the use of a controlled substance.  Not only is there consideration of the medical or anatomic pathology, but also safety, other medical  "conditions, other medications or supplements used, social factors, psychological factors, and the multimodal or holistic treatment plan that is most reasonable for a given condition.      In our practice, we adhere to the most fundamental value of modern medicine... \"First Do No Harm.\"  There can be significant risks to the use of prescribed controlled substances, including opioid pain medications.  Medications are tools.  All tools can be used properly or improperly.  There are risks to the use of controlled substances.  We will follow best medical practices to the best of our ability, including observance of the [place] statutes as well as national & [facility] guidelines.    The risks and benefits of prescription pain medications must be weighed.  Many factors can put a patient at risk for misuse of pain medication.  If we feel that there are concerns, then we will seek the opinion of our colleague, the pain psychologist, to assist in assist in risk stratification and development of a plan to decrease risk.   (For more information, see Section 4 of [address] 9:260, which is [place] statute.)    If a patient has demonstrated elements of noncompliance, then we are required to seek the opinion of a mental health professional for a substance abuse risk assessment if we want to even consider the continuation of prescription pain medication.  Otherwise, we are required by this law to stop prescribing pain medication altogether, and we will need to discharge the patient from our practice.  (See Section 5 of [address] 9:260 for more information.)    The pain psychologist is a valuable member of our multidisciplinary pain management practice model.  Patients should expect a long discussion with the psychologist to help with understanding about the patient's condition and current treatment.  Also, there may be some psychological testing assessments given.  The information gathered is important to all of us.  Not only do we feel it " is necessary as part of your treatment plan, we also feel that the evaluation is required by our best practice guidelines and by the statutes of the [facility].   -------             GAYE REPORT    As part of the patient's treatment plan, I am prescribing controlled substances. The patient has been made aware of appropriate use of such medications, including potential risk of somnolence, limited ability to drive and/or work safely, and the potential for dependence or overdose. It has also bee made clear that these medications are for use by this patient only, without concomitant use of alcohol or other substances unless prescribed.     Patient has completed prescribing agreement detailing terms of continued prescribing of controlled substances, including monitoring GAYE reports, urine drug screening, and pill counts if necessary. The patient is aware that inappropriate use will results in cessation of prescribing such medications.    GAYE report has been reviewed and scanned into the patient's chart.    As the clinician, I personally reviewed the GAYE from as above while the patient was in the office today.    History and physical exam exhibit continued safe and appropriate use of controlled substances.         EMR Dragon/Transcription disclaimer:   Much of this encounter note is an electronic transcription/translation of spoken language to printed text. The electronic translation of spoken language may permit erroneous, or at times, nonsensical words or phrases to be inadvertently transcribed; Although I have reviewed the note for such errors, some may still exist.

## 2018-08-01 NOTE — PATIENT INSTRUCTIONS
A medication management agreement is signed by the patient and the provider today.  Reviewed with the patient that this contract is specific to controlled substances, specifically pain medication.  Reviewed core of pain medicine is to decrease pain and increase functional level.  Reviewed the medication must be picked up as a written prescription from this office and will not be called into any pharmacy.  Reviewed the use of Sukhdeep within the office setting.  Reviewed causes for potential of discontinuation of opiates,  including but not limited to consideration for diversion, obtaining other controlled substances from other license practitioners, or  inappropriate office behavior.  Patient understands to use a controlled substance as prescribed by physician and to avoid improper use of controlled substances.  Patient will also verbalized understanding that prescriptions to be filled at the same pharmacy  unless office staff is made aware of this.  Patient understands they can be submitted to random urine drug screens and/or random pill counts on any request.  Patient understands they are not to receive early refills.  The patient must produce an official police report for any effort to replace controlled substances that are lost or stolen.  No use of illegal street drugs while receiving controlled substances from this prescribing provider.  The patient is to take medication as prescribed  and not deviate from the normal schedule without consultation from the provider.  Patient is not to share the medication with others or to take medication with alcohol or other sedatives.  Use caution when driving or operating machinery.  Alert office if the patient becomes pregnant or begins nursing a child.  Reviewed the use of controlled substances recreates a risk for respiratory depression, which may result in serious harm or even death.  Must always keep the prescription in the original container.  Patient is to store  "controlled substances in a locked cabinet or other secure storage unit that is cool, dry and out of sunlight.  Patient must immediately notify office if any controlled substances is stolen or improperly taken by another individual.  Reviewed risk for physical dependence, tolerance and addiction.    -------  Opioid Risk Assessment / Formalized Substance Abuse Risk Stratification:  Why is a consultation with the pain psychologist needed?    The medical decision making when deciding whether or not to treat chronic painful conditions with prescription pain medications is a complex process.  The decisions are even more complex when considering whether or not there is a medical need for the use of a controlled substance.  Not only is there consideration of the medical or anatomic pathology, but also safety, other medical conditions, other medications or supplements used, social factors, psychological factors, and the multimodal or holistic treatment plan that is most reasonable for a given condition.      In our practice, we adhere to the most fundamental value of modern medicine... \"First Do No Harm.\"  There can be significant risks to the use of prescribed controlled substances, including opioid pain medications.  Medications are tools.  All tools can be used properly or improperly.  There are risks to the use of controlled substances.  We will follow best medical practices to the best of our ability, including observance of the [place] statutes as well as national & CDC guidelines.    The risks and benefits of prescription pain medications must be weighed.  Many factors can put a patient at risk for misuse of pain medication.  If we feel that there are concerns, then we will seek the opinion of our colleague, the pain psychologist, to assist in assist in risk stratification and development of a plan to decrease risk.   (For more information, see Section 4 of [address] 9:260, which is [place] statute.)    If a patient " has demonstrated elements of noncompliance, then we are required to seek the opinion of a mental health professional for a substance abuse risk assessment if we want to even consider the continuation of prescription pain medication.  Otherwise, we are required by this law to stop prescribing pain medication altogether, and we will need to discharge the patient from our practice.  (See Section 5 of [address] 9:260 for more information.)    The pain psychologist is a valuable member of our multidisciplinary pain management practice model.  Patients should expect a long discussion with the psychologist to help with understanding about the patient's condition and current treatment.  Also, there may be some psychological testing assessments given.  The information gathered is important to all of us.  Not only do we feel it is necessary as part of your treatment plan, we also feel that the evaluation is required by our best practice guidelines and by the statutes of the [facility].   -------

## 2018-08-02 LAB
POC AMPHETAMINES: NEGATIVE
POC BARBITURATES: NEGATIVE
POC BENZODIAZEPHINES: NEGATIVE
POC COCAINE: NEGATIVE
POC METHADONE: NEGATIVE
POC METHAMPHETAMINE SCREEN URINE: NEGATIVE
POC OPIATES: NEGATIVE
POC OXYCODONE: NEGATIVE
POC PHENCYCLIDINE: NEGATIVE
POC PROPOXYPHENE: NEGATIVE
POC THC: NEGATIVE
POC TRICYCLIC ANTIDEPRESSANTS: POSITIVE

## 2018-08-07 ENCOUNTER — RESULTS ENCOUNTER (OUTPATIENT)
Dept: PAIN MEDICINE | Facility: CLINIC | Age: 68
End: 2018-08-07

## 2018-08-07 DIAGNOSIS — G89.4 CHRONIC PAIN SYNDROME: ICD-10-CM

## 2018-08-31 ENCOUNTER — OFFICE VISIT (OUTPATIENT)
Dept: PAIN MEDICINE | Facility: CLINIC | Age: 68
End: 2018-08-31

## 2018-08-31 VITALS
HEIGHT: 63 IN | BODY MASS INDEX: 43.59 KG/M2 | WEIGHT: 246 LBS | TEMPERATURE: 97.9 F | OXYGEN SATURATION: 97 % | HEART RATE: 88 BPM | RESPIRATION RATE: 16 BRPM | SYSTOLIC BLOOD PRESSURE: 163 MMHG | DIASTOLIC BLOOD PRESSURE: 84 MMHG

## 2018-08-31 DIAGNOSIS — Z51.81 ENCOUNTER FOR MONITORING OPIOID MAINTENANCE THERAPY: ICD-10-CM

## 2018-08-31 DIAGNOSIS — M06.9 RHEUMATOID ARTHRITIS INVOLVING MULTIPLE SITES, UNSPECIFIED RHEUMATOID FACTOR PRESENCE: ICD-10-CM

## 2018-08-31 DIAGNOSIS — M19.011 OSTEOARTHRITIS OF BOTH SHOULDERS, UNSPECIFIED OSTEOARTHRITIS TYPE: ICD-10-CM

## 2018-08-31 DIAGNOSIS — Z79.891 ENCOUNTER FOR MONITORING OPIOID MAINTENANCE THERAPY: ICD-10-CM

## 2018-08-31 DIAGNOSIS — G89.4 CHRONIC PAIN SYNDROME: Primary | ICD-10-CM

## 2018-08-31 DIAGNOSIS — M19.012 OSTEOARTHRITIS OF BOTH SHOULDERS, UNSPECIFIED OSTEOARTHRITIS TYPE: ICD-10-CM

## 2018-08-31 DIAGNOSIS — M15.9 GENERALIZED OSTEOARTHRITIS OF HAND: ICD-10-CM

## 2018-08-31 PROCEDURE — 99214 OFFICE O/P EST MOD 30 MIN: CPT | Performed by: NURSE PRACTITIONER

## 2018-08-31 RX ORDER — HYDROCODONE BITARTRATE AND ACETAMINOPHEN 10; 325 MG/1; MG/1
1 TABLET ORAL
Qty: 150 TABLET | Refills: 0 | Status: SHIPPED | OUTPATIENT
Start: 2018-08-31 | End: 2018-09-27 | Stop reason: SDUPTHER

## 2018-09-17 RX ORDER — LISINOPRIL 30 MG/1
TABLET ORAL
Qty: 30 TABLET | Refills: 0 | Status: SHIPPED | OUTPATIENT
Start: 2018-09-17 | End: 2018-11-02 | Stop reason: SDUPTHER

## 2018-09-25 RX ORDER — MECLIZINE HYDROCHLORIDE 25 MG/1
TABLET ORAL
Qty: 30 TABLET | Refills: 0 | Status: SHIPPED | OUTPATIENT
Start: 2018-09-25 | End: 2018-11-02 | Stop reason: SDUPTHER

## 2018-09-27 ENCOUNTER — OFFICE VISIT (OUTPATIENT)
Dept: PAIN MEDICINE | Facility: CLINIC | Age: 68
End: 2018-09-27

## 2018-09-27 VITALS
SYSTOLIC BLOOD PRESSURE: 180 MMHG | RESPIRATION RATE: 16 BRPM | WEIGHT: 235 LBS | DIASTOLIC BLOOD PRESSURE: 94 MMHG | HEIGHT: 63 IN | OXYGEN SATURATION: 97 % | HEART RATE: 80 BPM | TEMPERATURE: 97.9 F | BODY MASS INDEX: 41.64 KG/M2

## 2018-09-27 DIAGNOSIS — M19.012 OSTEOARTHRITIS OF BOTH SHOULDERS, UNSPECIFIED OSTEOARTHRITIS TYPE: ICD-10-CM

## 2018-09-27 DIAGNOSIS — M06.9 RHEUMATOID ARTHRITIS INVOLVING MULTIPLE SITES, UNSPECIFIED RHEUMATOID FACTOR PRESENCE: ICD-10-CM

## 2018-09-27 DIAGNOSIS — Z51.81 ENCOUNTER FOR MONITORING OPIOID MAINTENANCE THERAPY: ICD-10-CM

## 2018-09-27 DIAGNOSIS — G89.4 CHRONIC PAIN SYNDROME: Primary | ICD-10-CM

## 2018-09-27 DIAGNOSIS — M19.011 OSTEOARTHRITIS OF BOTH SHOULDERS, UNSPECIFIED OSTEOARTHRITIS TYPE: ICD-10-CM

## 2018-09-27 DIAGNOSIS — Z79.891 ENCOUNTER FOR MONITORING OPIOID MAINTENANCE THERAPY: ICD-10-CM

## 2018-09-27 PROCEDURE — 99213 OFFICE O/P EST LOW 20 MIN: CPT | Performed by: NURSE PRACTITIONER

## 2018-09-27 RX ORDER — HYDROCODONE BITARTRATE AND ACETAMINOPHEN 10; 325 MG/1; MG/1
1 TABLET ORAL
Qty: 150 TABLET | Refills: 0 | Status: SHIPPED | OUTPATIENT
Start: 2018-09-27 | End: 2018-10-22 | Stop reason: SDUPTHER

## 2018-09-27 NOTE — PROGRESS NOTES
CHIEF COMPLAINT  Pt states her L shoulder is basically unchanged since 8/1/18 office visit,being a consistent 7/10 .  Pt has not had a  L shoulder cortisone injection since 5/18 (per Tu Vásquez) due to having had cellulitis.    Subjective   Chastity Salgado is a 68 y.o. female  who presents to the office for follow-up.She has a history of chronic diffuse joint pain.    Left shoulder acutely flared. It has been over 4 months since last steroid injection with ortho.  Had to resolve cellulitis infection before repeating.  Sees  Monday.      C/o diffuse joint pain (pain worse in bilateral shoulders).  Pain today 7/10 in severity.  Taking Hydrocodone 10/325 5/day, Celebrex 200 mg daily, Tizanidine PRN.  Denies adverse reactions.  Taking Amitiza for constipation.  Reports at least moderate pain reduction with regimen reports that she would not be able to function without it.      Dr. Hogan evaluation 9/5/18      Saw rheumatologist recently, Dr. Lucero. Had ultrasounds of her hands - shows osteoarthritis and molecular arthritis.  Going back in a bout 6 weeks for more lab work.  Switched from Celebrex to Naproxen 500 mg BID.     Patient plans to continue counseling with Dr. Hogan.      Joint Pain   This is a chronic problem. The current episode started more than 1 year ago. The problem occurs daily. The problem has been waxing and waning. Associated symptoms include arthralgias, fatigue and numbness (left hand pinky and ring finger). Pertinent negatives include no abdominal pain (after being off norco), chills, coughing, fever, joint swelling (knees, right hip bursitis), nausea, vomiting or weakness. Exacerbated by: activity. She has tried oral narcotics, acetaminophen, heat and NSAIDs for the symptoms. The treatment provided moderate relief.      PEG Assessment   What number best describes your pain on average in the past week?7  What number best describes how, during the past week, pain has interfered with your  "enjoyment of life?7  What number best describes how, during the past week, pain has interfered with your general activity?  7    The following portions of the patient's history were reviewed and updated as appropriate: allergies, current medications, past family history, past medical history, past social history, past surgical history and problem list.    Review of Systems   Constitutional: Positive for fatigue. Negative for activity change, chills and fever.   HENT: Negative for ear pain (seen by an ENT Dr recent ear infection) and sinus pressure.    Eyes: Negative for visual disturbance.   Respiratory: Negative for apnea, cough and shortness of breath.    Gastrointestinal: Positive for constipation. Negative for abdominal pain (after being off norco), diarrhea, nausea and vomiting.   Genitourinary: Negative for difficulty urinating (incontinence).   Musculoskeletal: Positive for arthralgias. Negative for joint swelling (knees, right hip bursitis).   Neurological: Positive for numbness (left hand pinky and ring finger). Negative for dizziness (bilateral ear infection), weakness and light-headedness.   Psychiatric/Behavioral: Positive for sleep disturbance. Negative for agitation, confusion, hallucinations and suicidal ideas. The patient is nervous/anxious.      Vitals:    09/27/18 1125   BP: 180/94   Pulse: 80   Resp: 16   Temp: 97.9 °F (36.6 °C)   SpO2: 97%   Weight: 107 kg (235 lb)   Height: 160 cm (62.99\")   PainSc: 7  Comment: L shoulder consistent at a\"7\"/10.   PainLoc: Shoulder     Objective   Physical Exam   Constitutional: She is oriented to person, place, and time. She appears well-developed and well-nourished. No distress.   HENT:   Head: Normocephalic and atraumatic.   Right Ear: External ear normal.   Eyes: Conjunctivae and EOM are normal.   Neck: Neck supple.   Cardiovascular: Normal rate.    Pulmonary/Chest: Effort normal. No respiratory distress.   Musculoskeletal:        Right shoulder: She exhibits " decreased range of motion, tenderness and deformity.        Left shoulder: She exhibits decreased range of motion and tenderness.        Right knee: Tenderness found.        Left knee: Tenderness found.        Right hand: She exhibits tenderness and swelling.        Left hand: She exhibits tenderness and swelling.   Neurological: She is alert and oriented to person, place, and time.   Skin: Skin is warm and dry.   Psychiatric: She has a normal mood and affect. Her behavior is normal.   Nursing note and vitals reviewed.    Assessment/Plan   Chastity was seen today for shoulder pain.    Diagnoses and all orders for this visit:    Chronic pain syndrome    Rheumatoid arthritis involving multiple sites, unspecified rheumatoid factor presence (CMS/[facility])    Osteoarthritis of both shoulders, unspecified osteoarthritis type    Encounter for monitoring opioid maintenance therapy    Other orders  -     HYDROcodone-acetaminophen (NORCO)  MG per tablet; Take 1 tablet by mouth 5 (Five) Times a Day As Needed for Severe Pain .      --- Refill Hydrocodone. Patient appears stable with current regimen. No adverse effects. Regarding continuation of opioids, there is no evidence of aberrant behavior or any red flags.  The patient continues with appropriate response to opioid therapy. ADL's remain intact by self.   --- The urine drug screen confirmation from 8/3/18 has been reviewed and the result is appropriate based on patient history and GAYE report   --- Follow up with specialists as scheduled   --- Follow-up 2 months        GAYE REPORT  As part of the patient's treatment plan, I am prescribing controlled substances. The patient has been made aware of appropriate use of such medications, including potential risk of somnolence, limited ability to drive and/or work safely, and the potential for dependence or overdose. It has also bee made clear that these medications are for use by this patient only, without concomitant use of  alcohol or other substances unless prescribed.     Patient has completed prescribing agreement detailing terms of continued prescribing of controlled substances, including monitoring GAYE reports, urine drug screening, and pill counts if necessary. The patient is aware that inappropriate use will results in cessation of prescribing such medications.    GAYE report has been reviewed and scanned into the patient's chart.    As the clinician, I personally reviewed the GAYE from 9/26/2018 while the patient was in the office today.    History and physical exam exhibit continued safe and appropriate use of controlled substances.    EMR Dragon/Transcription disclaimer:   Much of this encounter note is an electronic transcription/translation of spoken language to printed text. The electronic translation of spoken language may permit erroneous, or at times, nonsensical words or phrases to be inadvertently transcribed; Although I have reviewed the note for such errors, some may still exist.

## 2018-10-22 NOTE — TELEPHONE ENCOUNTER
Medication Refill Request    Date of phone call: 10/22/18    Medication being requested: hydro/apap  mg si tab 5 times a day  Qty: 150    Date of last visit: 18    Date of last refill: 18    GAYE up to date?: yes    Next Follow up?: 18    Any new pertinent information? (i.e, new medication allergies, new use of medications, change in patient's health or condition, non-compliance or inconsistency with prescribing agreement?):

## 2018-10-23 RX ORDER — HYDROCODONE BITARTRATE AND ACETAMINOPHEN 10; 325 MG/1; MG/1
1 TABLET ORAL
Qty: 150 TABLET | Refills: 0 | Status: SHIPPED | OUTPATIENT
Start: 2018-10-23 | End: 2018-11-27 | Stop reason: SDUPTHER

## 2018-11-02 RX ORDER — HYDROCHLOROTHIAZIDE 12.5 MG/1
TABLET ORAL
Qty: 30 TABLET | Refills: 0 | Status: SHIPPED | OUTPATIENT
Start: 2018-11-02 | End: 2019-01-15 | Stop reason: SDUPTHER

## 2018-11-02 RX ORDER — MECLIZINE HYDROCHLORIDE 25 MG/1
TABLET ORAL
Qty: 30 TABLET | Refills: 0 | OUTPATIENT
Start: 2018-11-02

## 2018-11-02 RX ORDER — LISINOPRIL 30 MG/1
TABLET ORAL
Qty: 30 TABLET | Refills: 0 | Status: SHIPPED | OUTPATIENT
Start: 2018-11-02 | End: 2018-12-20 | Stop reason: SDUPTHER

## 2018-11-02 RX ORDER — MECLIZINE HYDROCHLORIDE 25 MG/1
TABLET ORAL
Qty: 30 TABLET | Refills: 0 | Status: SHIPPED | OUTPATIENT
Start: 2018-11-02 | End: 2019-01-15 | Stop reason: SDUPTHER

## 2018-11-27 ENCOUNTER — OFFICE VISIT (OUTPATIENT)
Dept: PAIN MEDICINE | Facility: CLINIC | Age: 68
End: 2018-11-27

## 2018-11-27 VITALS
HEIGHT: 63 IN | RESPIRATION RATE: 15 BRPM | HEART RATE: 89 BPM | OXYGEN SATURATION: 92 % | DIASTOLIC BLOOD PRESSURE: 91 MMHG | BODY MASS INDEX: 41.82 KG/M2 | TEMPERATURE: 98.9 F | WEIGHT: 236 LBS | SYSTOLIC BLOOD PRESSURE: 151 MMHG

## 2018-11-27 DIAGNOSIS — Z79.891 ENCOUNTER FOR MONITORING OPIOID MAINTENANCE THERAPY: ICD-10-CM

## 2018-11-27 DIAGNOSIS — G89.4 CHRONIC PAIN SYNDROME: Primary | ICD-10-CM

## 2018-11-27 DIAGNOSIS — Z51.81 ENCOUNTER FOR MONITORING OPIOID MAINTENANCE THERAPY: ICD-10-CM

## 2018-11-27 DIAGNOSIS — M25.50 ARTHRALGIA, UNSPECIFIED JOINT: ICD-10-CM

## 2018-11-27 PROCEDURE — 96372 THER/PROPH/DIAG INJ SC/IM: CPT | Performed by: NURSE PRACTITIONER

## 2018-11-27 PROCEDURE — 99213 OFFICE O/P EST LOW 20 MIN: CPT | Performed by: NURSE PRACTITIONER

## 2018-11-27 RX ORDER — HYDROCODONE BITARTRATE AND ACETAMINOPHEN 10; 325 MG/1; MG/1
1 TABLET ORAL
Qty: 150 TABLET | Refills: 0 | Status: SHIPPED | OUTPATIENT
Start: 2018-11-27 | End: 2018-12-24 | Stop reason: SDUPTHER

## 2018-11-27 RX ORDER — KETOROLAC TROMETHAMINE 30 MG/ML
30 INJECTION, SOLUTION INTRAMUSCULAR; INTRAVENOUS ONCE
Status: COMPLETED | OUTPATIENT
Start: 2018-11-27 | End: 2018-11-27

## 2018-11-27 RX ADMIN — KETOROLAC TROMETHAMINE 30 MG: 30 INJECTION, SOLUTION INTRAMUSCULAR; INTRAVENOUS at 10:54

## 2018-11-27 NOTE — PROGRESS NOTES
CHIEF COMPLAINT  F/U left shoulder pain. Pt states her left shoulder pain has increased in the last week, and she has been doing things that she knows irritates it (such as cooking for thanksgiving).     Subjective   Chastity Salgado is a 68 y.o. female  who presents to the office for follow-up.She has a history of joint pain, rheumatoid arthritis.    C/o diffuse joint pain (pain worse in left shoulders).  Pain today 7/10 in severity.  Taking Hydrocodone 10/325 5/day, Celebrex 200 mg daily, Tizanidine PRN.  Denies adverse reactions.  Taking Amitiza for constipation.  Reports at least moderate pain reduction with regimen reports that she would not be able to function without it.      Rheumatologist - Dr. Lucero.  Saw recently, told not rheumatoid.  All OA, she has been released.       Counseling - Dr. Edison Vásquez - ortho.  Left shoulder injections every 90 days.  History of multiple right shoulder surgeries.      Joint Pain   This is a chronic problem. The current episode started more than 1 year ago. The problem occurs daily. The problem has been waxing and waning. Associated symptoms include arthralgias, fatigue and numbness (left hand pinky and ring finger). Pertinent negatives include no abdominal pain (after being off norco), chills, coughing, fever, headaches, joint swelling (knees, right hip bursitis), nausea, vomiting or weakness. Exacerbated by: activity. She has tried oral narcotics, acetaminophen, heat and NSAIDs for the symptoms. The treatment provided moderate relief.      PEG Assessment   What number best describes your pain on average in the past week?8  What number best describes how, during the past week, pain has interfered with your enjoyment of life?7  What number best describes how, during the past week, pain has interfered with your general activity?  7    The following portions of the patient's history were reviewed and updated as appropriate: allergies, current medications, past family  "history, past medical history, past social history, past surgical history and problem list.    Review of Systems   Constitutional: Positive for fatigue. Negative for activity change, chills and fever.   HENT: Negative for ear pain (seen by an ENT Dr recent ear infection) and sinus pressure.    Eyes: Negative for visual disturbance.   Respiratory: Negative for apnea, cough and shortness of breath.    Gastrointestinal: Positive for constipation. Negative for abdominal pain (after being off norco), diarrhea, nausea and vomiting.   Genitourinary: Negative for difficulty urinating (incontinence).   Musculoskeletal: Positive for arthralgias. Negative for joint swelling (knees, right hip bursitis).   Neurological: Positive for numbness (left hand pinky and ring finger). Negative for dizziness (bilateral ear infection), weakness, light-headedness and headaches.   Psychiatric/Behavioral: Positive for sleep disturbance. Negative for agitation, confusion, hallucinations and suicidal ideas. The patient is nervous/anxious.      Vitals:    11/27/18 1025   BP: 151/91   Pulse: 89   Resp: 15   Temp: 98.9 °F (37.2 °C)   SpO2: 92%   Weight: 107 kg (236 lb)   Height: 160 cm (62.99\")   PainSc:   7   PainLoc: Shoulder     Objective   Physical Exam   Constitutional: She is oriented to person, place, and time. She appears well-developed and well-nourished. No distress.   HENT:   Head: Normocephalic and atraumatic.   Right Ear: External ear normal.   Eyes: Conjunctivae and EOM are normal.   Neck: Neck supple.   Cardiovascular: Normal rate.   Pulmonary/Chest: Effort normal. No respiratory distress.   Musculoskeletal:        Right shoulder: She exhibits decreased range of motion, tenderness and deformity.        Left shoulder: She exhibits decreased range of motion and tenderness.        Right knee: Tenderness found.        Left knee: Tenderness found.        Right hand: She exhibits tenderness and swelling.        Left hand: She exhibits " tenderness and swelling.   Neurological: She is alert and oriented to person, place, and time. She has normal strength. No sensory deficit. Coordination and gait normal.   Skin: Skin is warm and dry.   Psychiatric: She has a normal mood and affect. Her behavior is normal.   Nursing note and vitals reviewed.    Assessment/Plan   Chastity was seen today for shoulder pain.    Diagnoses and all orders for this visit:    Chronic pain syndrome  -     ketorolac (TORADOL) injection 30 mg; Inject 1 mL into the appropriate muscle as directed by prescriber 1 (One) Time.    Arthralgia, unspecified joint  -     ketorolac (TORADOL) injection 30 mg; Inject 1 mL into the appropriate muscle as directed by prescriber 1 (One) Time.    Encounter for monitoring opioid maintenance therapy    Other orders  -     HYDROcodone-acetaminophen (NORCO)  MG per tablet; Take 1 tablet by mouth 5 (Five) Times a Day As Needed for Severe Pain .      --- Refill Hydrocodone. Patient appears stable with current regimen. No adverse effects. Regarding continuation of opioids, there is no evidence of aberrant behavior or any red flags.  The patient continues with appropriate response to opioid therapy. ADL's remain intact by self.   --- The urine drug screen confirmation from 8/3/18 has been reviewed and the result is appropriate based on patient history and GAYE report  --- Toradol 30 mg IM. Hold oral NSAID X 24 hours.    --- Dispensed samples of Lidocaine patches and Flector patches. She will let me know what helps more.    --- Follow-up 2 months          GAYE REPORT  As part of the patient's treatment plan, I am prescribing controlled substances. The patient has been made aware of appropriate use of such medications, including potential risk of somnolence, limited ability to drive and/or work safely, and the potential for dependence or overdose. It has also bee made clear that these medications are for use by this patient only, without  concomitant use of alcohol or other substances unless prescribed.     Patient has completed prescribing agreement detailing terms of continued prescribing of controlled substances, including monitoring GAYE reports, urine drug screening, and pill counts if necessary. The patient is aware that inappropriate use will results in cessation of prescribing such medications.    GAYE report has been reviewed and scanned into the patient's chart.    As the clinician, I personally reviewed the GAYE from 11/26/2018 while the patient was in the office today.    History and physical exam exhibit continued safe and appropriate use of controlled substances.    EMR Dragon/Transcription disclaimer:   Much of this encounter note is an electronic transcription/translation of spoken language to printed text. The electronic translation of spoken language may permit erroneous, or at times, nonsensical words or phrases to be inadvertently transcribed; Although I have reviewed the note for such errors, some may still exist.

## 2018-11-28 ENCOUNTER — TELEPHONE (OUTPATIENT)
Dept: PAIN MEDICINE | Facility: CLINIC | Age: 68
End: 2018-11-28

## 2018-11-28 NOTE — TELEPHONE ENCOUNTER
Pt called and wanted to thank you for ordering the toradol injection yesterday in office. She states it has helped so much in relieving her pain and she appreciates the care you gave her.

## 2018-12-10 ENCOUNTER — APPOINTMENT (OUTPATIENT)
Dept: WOMENS IMAGING | Facility: HOSPITAL | Age: 68
End: 2018-12-10

## 2018-12-10 PROCEDURE — 77063 BREAST TOMOSYNTHESIS BI: CPT | Performed by: RADIOLOGY

## 2018-12-10 PROCEDURE — 77067 SCR MAMMO BI INCL CAD: CPT | Performed by: RADIOLOGY

## 2018-12-20 RX ORDER — LISINOPRIL 30 MG/1
30 TABLET ORAL DAILY
Qty: 30 TABLET | Refills: 0 | Status: SHIPPED | OUTPATIENT
Start: 2018-12-20 | End: 2019-01-15 | Stop reason: SDUPTHER

## 2018-12-24 NOTE — TELEPHONE ENCOUNTER
Medication Refill Request    Date of phone call: 18    Medication being requested: Hydrocodone-apap 10 sixday  Qty: 150    Date of last visit: 18    Date of last refill: 18    GAYE up to date?: 18    Next Follow up?: 19    Any new pertinent information? (i.e, new medication allergies, new use of medications, change in patient's health or condition, non-compliance or inconsistency with prescribing agreement?): n/a

## 2018-12-27 RX ORDER — HYDROCODONE BITARTRATE AND ACETAMINOPHEN 10; 325 MG/1; MG/1
1 TABLET ORAL
Qty: 150 TABLET | Refills: 0 | Status: SHIPPED | OUTPATIENT
Start: 2018-12-27 | End: 2019-01-24 | Stop reason: SDUPTHER

## 2019-01-15 ENCOUNTER — OFFICE VISIT (OUTPATIENT)
Dept: INTERNAL MEDICINE | Facility: CLINIC | Age: 69
End: 2019-01-15

## 2019-01-15 VITALS
SYSTOLIC BLOOD PRESSURE: 150 MMHG | HEART RATE: 64 BPM | WEIGHT: 237.5 LBS | HEIGHT: 63 IN | RESPIRATION RATE: 18 BRPM | OXYGEN SATURATION: 98 % | DIASTOLIC BLOOD PRESSURE: 72 MMHG | BODY MASS INDEX: 42.08 KG/M2

## 2019-01-15 DIAGNOSIS — E78.2 MIXED HYPERLIPIDEMIA: ICD-10-CM

## 2019-01-15 DIAGNOSIS — R42 DIZZINESS: ICD-10-CM

## 2019-01-15 DIAGNOSIS — E11.59 TYPE 2 DIABETES MELLITUS WITH OTHER CIRCULATORY COMPLICATION, WITHOUT LONG-TERM CURRENT USE OF INSULIN (HCC): ICD-10-CM

## 2019-01-15 DIAGNOSIS — I10 BENIGN ESSENTIAL HTN: Primary | ICD-10-CM

## 2019-01-15 DIAGNOSIS — K59.03 DRUG-INDUCED CONSTIPATION: ICD-10-CM

## 2019-01-15 DIAGNOSIS — F41.9 ANXIETY: ICD-10-CM

## 2019-01-15 DIAGNOSIS — E66.01 CLASS 3 SEVERE OBESITY DUE TO EXCESS CALORIES WITH SERIOUS COMORBIDITY AND BODY MASS INDEX (BMI) OF 40.0 TO 44.9 IN ADULT (HCC): ICD-10-CM

## 2019-01-15 PROCEDURE — 99214 OFFICE O/P EST MOD 30 MIN: CPT | Performed by: FAMILY MEDICINE

## 2019-01-15 RX ORDER — HYDROCHLOROTHIAZIDE 12.5 MG/1
12.5 TABLET ORAL DAILY
Qty: 90 TABLET | Refills: 2 | Status: SHIPPED | OUTPATIENT
Start: 2019-01-15 | End: 2021-07-30

## 2019-01-15 RX ORDER — BUPROPION HYDROCHLORIDE 150 MG/1
150 TABLET ORAL DAILY
Qty: 90 TABLET | Refills: 2 | Status: SHIPPED | OUTPATIENT
Start: 2019-01-15 | End: 2019-09-13

## 2019-01-15 RX ORDER — METAXALONE 800 MG/1
800 TABLET ORAL 3 TIMES DAILY
COMMUNITY
End: 2019-01-15

## 2019-01-15 RX ORDER — MECLIZINE HYDROCHLORIDE 25 MG/1
25 TABLET ORAL DAILY PRN
Qty: 30 TABLET | Refills: 0 | Status: SHIPPED | OUTPATIENT
Start: 2019-01-15 | End: 2019-11-06

## 2019-01-15 RX ORDER — LUBIPROSTONE 24 UG/1
24 CAPSULE ORAL 2 TIMES DAILY WITH MEALS
Qty: 180 CAPSULE | Refills: 3 | Status: SHIPPED | OUTPATIENT
Start: 2019-01-15 | End: 2019-09-10 | Stop reason: SDUPTHER

## 2019-01-15 RX ORDER — LISINOPRIL 30 MG/1
30 TABLET ORAL DAILY
Qty: 90 TABLET | Refills: 2 | Status: SHIPPED | OUTPATIENT
Start: 2019-01-15 | End: 2019-11-25 | Stop reason: SDUPTHER

## 2019-01-15 RX ORDER — FLUOXETINE HYDROCHLORIDE 20 MG/1
20 CAPSULE ORAL DAILY
Qty: 90 CAPSULE | Refills: 2 | Status: SHIPPED | OUTPATIENT
Start: 2019-01-15 | End: 2019-09-13

## 2019-01-15 NOTE — PROGRESS NOTES
Chastity Salgado is a 68 y.o. female.      Assessment/Plan   Problem List Items Addressed This Visit        Cardiovascular and Mediastinum    Benign essential HTN - Primary    Relevant Medications    hydrochlorothiazide (HYDRODIURIL) 12.5 MG tablet    lisinopril (PRINIVIL,ZESTRIL) 30 MG tablet    Other Relevant Orders    Comprehensive Metabolic Panel    Lipid Panel    Microalbumin / Creatinine Urine Ratio - Urine, Clean Catch    Hemoglobin A1c    Mixed hyperlipidemia    Relevant Orders    Lipid Panel       Digestive    Adiposity    Drug-induced constipation       Other    Anxiety    Dizziness      Other Visit Diagnoses     Type 2 diabetes mellitus with other circulatory complication, without long-term current use of insulin (CMS/Formerly [facility])        Relevant Orders    Microalbumin / Creatinine Urine Ratio - Urine, Clean Catch    Hemoglobin A1c           Follow-up results of blood work from them and her chronic medical problems recommend weight loss with calorie reduction diet  Volatile otherwise as needed or in 6 months sooner if elevated A1c.  Immunizations recommended for Prevnar well as influenza vaccine she was vaccine through her local pharmacy    Chief Complaint   Patient presents with   • Follow-up     for anxiety   • Follow-up     for hypertension   Hyperlipidemia obesity drug-induced constipation anxiety dizziness  Social History     Tobacco Use   • Smoking status: Never Smoker   • Smokeless tobacco: Never Used   Substance Use Topics   • Alcohol use: Yes     Comment: social alcohol use   • Drug use: No       History of Present Illness   Patient follows up for chronic medical problems hypertension hyperlipidemia obesity constipation anxiety dizziness she's been doing fairly well with present treatment plan she has no unwanted side effects other than difficulty raising her arms which are chronic in nature and is followed by orthopedics intermittently like refill on her medications he's been working well for her her  "blood pressure is well-controlled no chest pain tenderness about increased fatigue she's having difficult losing weight and has not had a recent A1c regarding her hyperglycemia  The following portions of the patient's history were reviewed and updated as appropriate:PMHroutine: Social history , Past Medical History, Surgical history , Allergies, Current Medications, Active Problem List, Family History and Health Maintenance    Review of Systems   Constitutional: Negative.  Negative for activity change, appetite change and unexpected weight change.   HENT: Negative.  Negative for nosebleeds and trouble swallowing.    Eyes: Negative.  Negative for pain and visual disturbance.   Respiratory: Negative for chest tightness, shortness of breath and wheezing.    Cardiovascular: Negative for chest pain and palpitations.   Gastrointestinal: Negative for abdominal pain and blood in stool.   Endocrine: Negative.    Genitourinary: Negative.  Negative for difficulty urinating and hematuria.   Musculoskeletal: Positive for arthralgias, joint swelling and myalgias.   Skin: Negative.  Negative for color change and rash.   Allergic/Immunologic: Negative.    Neurological: Negative.  Negative for syncope and speech difficulty.   Hematological: Negative for adenopathy.   Psychiatric/Behavioral: Positive for dysphoric mood. Negative for agitation and confusion.   All other systems reviewed and are negative.      Objective   Vitals:    01/15/19 1540   BP: 150/72   BP Location: Left arm   Patient Position: Sitting   Cuff Size: Large Adult   Pulse: 64   Resp: 18   SpO2: 98%   Weight: 108 kg (237 lb 8 oz)   Height: 160 cm (62.99\")     Body mass index is 42.08 kg/m².  Physical Exam   Constitutional: She is oriented to person, place, and time. She appears well-developed and well-nourished. No distress.   HENT:   Head: Normocephalic and atraumatic.   Right Ear: External ear normal.   Left Ear: External ear normal.   Eyes: Conjunctivae and EOM " are normal. Pupils are equal, round, and reactive to light. Right eye exhibits no discharge. Left eye exhibits no discharge. No scleral icterus.   Neck: Normal range of motion. Neck supple. No tracheal deviation present. No thyromegaly present.   Cardiovascular: Normal rate, regular rhythm, normal heart sounds, intact distal pulses and normal pulses. Exam reveals no gallop.   No murmur heard.  Pulmonary/Chest: Effort normal and breath sounds normal. No respiratory distress. She has no wheezes. She has no rales.   Musculoskeletal: Normal range of motion.   Limited range of motion of right shoulder with scar    Chastity had a diabetic foot exam performed today.   During the foot exam she had a monofilament test performed.    Neurological Sensory Findings -  Unaltered sharp/dull right ankle/foot discrimination and unaltered sharp/dull left ankle/foot discrimination. No right ankle/foot altered proprioception and no left ankle/foot altered proprioception  Vascular Status -  Her right foot exhibits normal foot vasculature  and no edema. Her left foot exhibits normal foot vasculature  and no edema.  Skin Integrity  -  Her right foot skin is intact.Her left foot skin is intact..  Neurological: She is alert and oriented to person, place, and time. She exhibits normal muscle tone. Coordination normal.   Skin: Skin is warm. No rash noted. No erythema. No pallor.   Psychiatric: She has a normal mood and affect. Her behavior is normal. Judgment and thought content normal.   Nursing note and vitals reviewed.    Reviewed Data:  No visits with results within 1 Month(s) from this visit.   Latest known visit with results is:   Office Visit on 08/01/2018   Component Date Value Ref Range Status   • Methamphetaine Screen, Urine 08/02/2018 Negative  Negative Final   • POC Amphetamines 08/02/2018 Negative  Negative Final   • Barbiturates Screen 08/02/2018 Negative  Negative Final   • Benzodiazepine Screen 08/02/2018 Negative  Negative  Final   • Cocaine Screen 08/02/2018 Negative  Negative Final   • Methadone Screen 08/02/2018 Negative  Negative Final   • Opiate Screen 08/02/2018 Negative  Negative Final   • Oxycodone, Screen 08/02/2018 Negative  Negative Final   • Phencyclidine (PCP) Screen 08/02/2018 Negative  Negative Final   • Propoxyphene Screen 08/02/2018 Negative  Negative Final   • THC, Screen 08/02/2018 Negative  Negative Final   • Tricyclic Antidepressants Screen 08/02/2018 Positive* Negative Final

## 2019-01-24 ENCOUNTER — OFFICE VISIT (OUTPATIENT)
Dept: PAIN MEDICINE | Facility: CLINIC | Age: 69
End: 2019-01-24

## 2019-01-24 ENCOUNTER — CLINICAL SUPPORT (OUTPATIENT)
Dept: INTERNAL MEDICINE | Facility: CLINIC | Age: 69
End: 2019-01-24

## 2019-01-24 VITALS
OXYGEN SATURATION: 100 % | RESPIRATION RATE: 16 BRPM | SYSTOLIC BLOOD PRESSURE: 139 MMHG | HEART RATE: 103 BPM | WEIGHT: 230 LBS | HEIGHT: 63 IN | BODY MASS INDEX: 40.75 KG/M2 | DIASTOLIC BLOOD PRESSURE: 90 MMHG

## 2019-01-24 DIAGNOSIS — M12.9 ARTHRITIS, MULTIPLE JOINT INVOLVEMENT: ICD-10-CM

## 2019-01-24 DIAGNOSIS — Z79.891 ENCOUNTER FOR MONITORING OPIOID MAINTENANCE THERAPY: ICD-10-CM

## 2019-01-24 DIAGNOSIS — Z23 NEED FOR INFLUENZA VACCINATION: Primary | ICD-10-CM

## 2019-01-24 DIAGNOSIS — G89.4 CHRONIC PAIN SYNDROME: Primary | ICD-10-CM

## 2019-01-24 DIAGNOSIS — M54.2 CERVICALGIA: ICD-10-CM

## 2019-01-24 DIAGNOSIS — Z51.81 ENCOUNTER FOR MONITORING OPIOID MAINTENANCE THERAPY: ICD-10-CM

## 2019-01-24 PROCEDURE — 99214 OFFICE O/P EST MOD 30 MIN: CPT | Performed by: NURSE PRACTITIONER

## 2019-01-24 PROCEDURE — 90662 IIV NO PRSV INCREASED AG IM: CPT | Performed by: FAMILY MEDICINE

## 2019-01-24 PROCEDURE — G0008 ADMIN INFLUENZA VIRUS VAC: HCPCS | Performed by: FAMILY MEDICINE

## 2019-01-24 RX ORDER — HYDROCODONE BITARTRATE AND ACETAMINOPHEN 10; 325 MG/1; MG/1
1 TABLET ORAL
Qty: 150 TABLET | Refills: 0 | Status: SHIPPED | OUTPATIENT
Start: 2019-01-24 | End: 2019-02-20 | Stop reason: SDUPTHER

## 2019-01-24 NOTE — PROGRESS NOTES
"CHIEF COMPLAINT  Pt had 1 week of \"good relief\" of L shoulder pain following 11/27/18 toradol I.M. 30 mg.    Subjective   Chastity Salgado is a 68 y.o. female  who presents to the office for follow-up.She has a history of chronic diffuse joint pain.    C/o diffuse joint pain (pain worse in left shoulder).  Pain today 7/10 in severity.  Taking Hydrocodone 10/325 5/day, Celebrex 200 mg daily, Tizanidine PRN.  Denies adverse reactions.  Taking Amitiza for constipation.  Reports at least moderate pain reduction with regimen reports that she would not be able to function without it.       Rheumatologist - Dr. Lucero.  Saw recently, told not rheumatoid.  All OA, she has been released.  Switched from Celebrex to Naproxen without benefit.       Counseling - Dr. Edison Vásquez - ortho.  Left shoulder injections every 90 days.  History of multiple right shoulder surgeries.  Last injection 1/14/19 - this has helped.      Neck pain worsening.  Numbness left hand.      Joint Pain   This is a chronic problem. The current episode started more than 1 year ago. The problem occurs daily. The problem has been waxing and waning. Associated symptoms include arthralgias, fatigue, neck pain and numbness (left hand pinky and ring finger). Pertinent negatives include no abdominal pain (after being off norco), chills, coughing, fever, headaches, joint swelling (knees, right hip bursitis), nausea, vomiting or weakness. Exacerbated by: activity. She has tried oral narcotics, acetaminophen, heat and NSAIDs for the symptoms. The treatment provided moderate relief.   Neck Pain    This is a chronic problem. The problem occurs daily. The problem has been gradually worsening. The pain is present in the anterior neck, left side and right side. The quality of the pain is described as aching and burning. The pain is at a severity of 7/10. The pain is moderate. Exacerbated by: movement of neck, use of arms/shoulders. Associated symptoms include " "numbness (left hand pinky and ring finger). Pertinent negatives include no fever, headaches or weakness. She has tried NSAIDs, oral narcotics, muscle relaxants and neck support for the symptoms. The treatment provided moderate relief.      PEG Assessment   What number best describes your pain on average in the past week?7  What number best describes how, during the past week, pain has interfered with your enjoyment of life?8  What number best describes how, during the past week, pain has interfered with your general activity?  7    The following portions of the patient's history were reviewed and updated as appropriate: allergies, current medications, past family history, past medical history, past social history, past surgical history and problem list.    Review of Systems   Constitutional: Positive for fatigue. Negative for activity change, chills and fever.   HENT: Negative for ear pain (seen by an ENT Dr recent ear infection) and sinus pressure.    Eyes: Negative for visual disturbance.   Respiratory: Negative for apnea, cough and shortness of breath.    Gastrointestinal: Positive for constipation (improved). Negative for abdominal pain (after being off norco), diarrhea, nausea and vomiting.   Genitourinary: Negative for difficulty urinating (incontinence).   Musculoskeletal: Positive for arthralgias and neck pain. Negative for joint swelling (knees, right hip bursitis).   Neurological: Positive for numbness (left hand pinky and ring finger). Negative for dizziness (bilateral ear infection), weakness, light-headedness and headaches.   Psychiatric/Behavioral: Positive for sleep disturbance. Negative for agitation, confusion, hallucinations and suicidal ideas. The patient is nervous/anxious.      Vitals:    01/24/19 1027   BP: 139/90   Pulse: 103   Resp: 16   SpO2: 100%   Weight: 104 kg (230 lb)   Height: 160 cm (62.99\")   PainSc:   7   PainLoc: Shoulder  Comment: L shoulder ranges from 7-9/10     Objective "   Physical Exam   Constitutional: She is oriented to person, place, and time. She appears well-developed and well-nourished. No distress.   HENT:   Head: Normocephalic and atraumatic.   Eyes: Conjunctivae and EOM are normal.   Neck: Neck supple.   Cardiovascular: Normal rate.   Pulmonary/Chest: Effort normal. No respiratory distress.   Musculoskeletal:        Right shoulder: She exhibits decreased range of motion, tenderness and deformity.        Left shoulder: She exhibits decreased range of motion and tenderness.        Right knee: Tenderness found.        Left knee: Tenderness found.        Cervical back: She exhibits tenderness and spasm.        Right hand: She exhibits tenderness and swelling.        Left hand: She exhibits tenderness and swelling.   Neurological: She is alert and oriented to person, place, and time. She has normal strength. No sensory deficit. Coordination and gait normal.   Skin: Skin is warm and dry.   Psychiatric: She has a normal mood and affect. Her behavior is normal.   Nursing note and vitals reviewed.    Assessment/Plan   Chastity was seen today for shoulder pain.    Diagnoses and all orders for this visit:    Chronic pain syndrome    Arthritis, multiple joint involvement    Encounter for monitoring opioid maintenance therapy    Cervicalgia  -     MRI Cervical Spine Without Contrast; Future    Other orders  -     diclofenac (VOLTAREN) 50 MG EC tablet; Take 1 tablet by mouth 2 (Two) Times a Day.  -     HYDROcodone-acetaminophen (NORCO)  MG per tablet; Take 1 tablet by mouth 5 (Five) Times a Day As Needed for Severe Pain .      --- Refill Hydrocodone. Patient appears stable with current regimen. No adverse effects. Regarding continuation of opioids, there is no evidence of aberrant behavior or any red flags.  The patient continues with appropriate response to opioid therapy. ADL's remain intact by self.   --- The urine drug screen confirmation from 8/3/18 has been reviewed and the  result is appropriate based on patient history and GAYE report  --- Switch NSAID's, trial Diclofenac. Discussed medication with the patient.  Included in this discussion was the potential for side effects and adverse events.  Patient verbalized understanding and wished to proceed.  Prescription will be sent to pharmacy.  --- Trial compounded pain cream   --- MRI cervical spine - worsening neck pain, interested in interventional options   --- Follow-up 2 months          GAYE REPORT  As part of the patient's treatment plan, I am prescribing controlled substances. The patient has been made aware of appropriate use of such medications, including potential risk of somnolence, limited ability to drive and/or work safely, and the potential for dependence or overdose. It has also bee made clear that these medications are for use by this patient only, without concomitant use of alcohol or other substances unless prescribed.     Patient has completed prescribing agreement detailing terms of continued prescribing of controlled substances, including monitoring GAYE reports, urine drug screening, and pill counts if necessary. The patient is aware that inappropriate use will results in cessation of prescribing such medications.    GAYE report has been reviewed and scanned into the patient's chart.    As the clinician, I personally reviewed the GAYE from 1/18/2019 while the patient was in the office today.    History and physical exam exhibit continued safe and appropriate use of controlled substances.    EMR Dragon/Transcription disclaimer:   Much of this encounter note is an electronic transcription/translation of spoken language to printed text. The electronic translation of spoken language may permit erroneous, or at times, nonsensical words or phrases to be inadvertently transcribed; Although I have reviewed the note for such errors, some may still exist.

## 2019-01-30 LAB
ALBUMIN SERPL-MCNC: 3.8 G/DL (ref 3.6–4.8)
ALBUMIN/CREAT UR: 373 MG/G CREAT (ref 0–30)
ALBUMIN/GLOB SERPL: 1.2 {RATIO} (ref 1.2–2.2)
ALP SERPL-CCNC: 69 IU/L (ref 39–117)
ALT SERPL-CCNC: 12 IU/L (ref 0–32)
AST SERPL-CCNC: 13 IU/L (ref 0–40)
BILIRUB SERPL-MCNC: <0.2 MG/DL (ref 0–1.2)
BUN SERPL-MCNC: 13 MG/DL (ref 8–27)
BUN/CREAT SERPL: 20 (ref 12–28)
CALCIUM SERPL-MCNC: 8.9 MG/DL (ref 8.7–10.3)
CHLORIDE SERPL-SCNC: 102 MMOL/L (ref 96–106)
CHOLEST SERPL-MCNC: 171 MG/DL (ref 100–199)
CO2 SERPL-SCNC: 27 MMOL/L (ref 20–29)
CREAT SERPL-MCNC: 0.66 MG/DL (ref 0.57–1)
CREAT UR-MCNC: 168.2 MG/DL
GLOBULIN SER CALC-MCNC: 3.3 G/DL (ref 1.5–4.5)
GLUCOSE SERPL-MCNC: 117 MG/DL (ref 65–99)
HBA1C MFR BLD: 6.4 % (ref 4.8–5.6)
HDLC SERPL-MCNC: 48 MG/DL
LDLC SERPL CALC-MCNC: 99 MG/DL (ref 0–99)
MICROALBUMIN UR-MCNC: 627.4 UG/ML
POTASSIUM SERPL-SCNC: 5 MMOL/L (ref 3.5–5.2)
PROT SERPL-MCNC: 7.1 G/DL (ref 6–8.5)
SODIUM SERPL-SCNC: 141 MMOL/L (ref 134–144)
TRIGL SERPL-MCNC: 122 MG/DL (ref 0–149)
VLDLC SERPL CALC-MCNC: 24 MG/DL (ref 5–40)

## 2019-02-01 ENCOUNTER — TELEPHONE (OUTPATIENT)
Dept: INTERNAL MEDICINE | Facility: CLINIC | Age: 69
End: 2019-02-01

## 2019-02-01 DIAGNOSIS — R80.9 PROTEINURIA, UNSPECIFIED TYPE: Primary | ICD-10-CM

## 2019-02-20 NOTE — TELEPHONE ENCOUNTER
Medication Refill Request    Date of phone call: 2-15-19    Medication being requested: Hydrocodone-apap  mg sig: Take 1 tablet by mouth 5 times a day as needed for severe pain  Qty: 150 tablets    Date of last visit: 1-24-19    Date of last refill: 1-26-19    GAYE up to date?: 1-18-19    Next Follow up?: 3-21-19    Any new pertinent information? (i.e, new medication allergies, new use of medications, change in patient's health or condition, non-compliance or inconsistency with prescribing agreement?): n/a

## 2019-02-21 RX ORDER — HYDROCODONE BITARTRATE AND ACETAMINOPHEN 10; 325 MG/1; MG/1
1 TABLET ORAL
Qty: 150 TABLET | Refills: 0 | Status: SHIPPED | OUTPATIENT
Start: 2019-02-21 | End: 2019-03-21 | Stop reason: SDUPTHER

## 2019-03-21 ENCOUNTER — OFFICE VISIT (OUTPATIENT)
Dept: PAIN MEDICINE | Facility: CLINIC | Age: 69
End: 2019-03-21

## 2019-03-21 ENCOUNTER — RESULTS ENCOUNTER (OUTPATIENT)
Dept: PAIN MEDICINE | Facility: CLINIC | Age: 69
End: 2019-03-21

## 2019-03-21 VITALS
WEIGHT: 236.4 LBS | SYSTOLIC BLOOD PRESSURE: 160 MMHG | TEMPERATURE: 98.2 F | BODY MASS INDEX: 41.89 KG/M2 | HEART RATE: 76 BPM | OXYGEN SATURATION: 95 % | DIASTOLIC BLOOD PRESSURE: 85 MMHG | HEIGHT: 63 IN | RESPIRATION RATE: 16 BRPM

## 2019-03-21 DIAGNOSIS — M54.2 CERVICALGIA: ICD-10-CM

## 2019-03-21 DIAGNOSIS — Z79.891 ENCOUNTER FOR MONITORING OPIOID MAINTENANCE THERAPY: ICD-10-CM

## 2019-03-21 DIAGNOSIS — Z51.81 ENCOUNTER FOR MONITORING OPIOID MAINTENANCE THERAPY: ICD-10-CM

## 2019-03-21 DIAGNOSIS — M12.9 ARTHRITIS, MULTIPLE JOINT INVOLVEMENT: ICD-10-CM

## 2019-03-21 DIAGNOSIS — G89.29 OTHER CHRONIC PAIN: Primary | ICD-10-CM

## 2019-03-21 DIAGNOSIS — G89.29 OTHER CHRONIC PAIN: ICD-10-CM

## 2019-03-21 LAB
POC AMPHETAMINES: NEGATIVE
POC BARBITURATES: NEGATIVE
POC BENZODIAZEPHINES: NEGATIVE
POC COCAINE: NEGATIVE
POC METHADONE: NEGATIVE
POC METHAMPHETAMINE SCREEN URINE: NEGATIVE
POC OPIATES: POSITIVE
POC OXYCODONE: NEGATIVE
POC PHENCYCLIDINE: NEGATIVE
POC PROPOXYPHENE: NEGATIVE
POC THC: NEGATIVE
POC TRICYCLIC ANTIDEPRESSANTS: NEGATIVE

## 2019-03-21 PROCEDURE — 80305 DRUG TEST PRSMV DIR OPT OBS: CPT | Performed by: NURSE PRACTITIONER

## 2019-03-21 PROCEDURE — 96372 THER/PROPH/DIAG INJ SC/IM: CPT | Performed by: NURSE PRACTITIONER

## 2019-03-21 PROCEDURE — 99214 OFFICE O/P EST MOD 30 MIN: CPT | Performed by: NURSE PRACTITIONER

## 2019-03-21 RX ORDER — KETOROLAC TROMETHAMINE 30 MG/ML
30 INJECTION, SOLUTION INTRAMUSCULAR; INTRAVENOUS ONCE
Status: COMPLETED | OUTPATIENT
Start: 2019-03-21 | End: 2019-03-21

## 2019-03-21 RX ORDER — HYDROCODONE BITARTRATE AND ACETAMINOPHEN 10; 325 MG/1; MG/1
1 TABLET ORAL
Qty: 150 TABLET | Refills: 0 | Status: SHIPPED | OUTPATIENT
Start: 2019-03-21 | End: 2019-04-16 | Stop reason: SDUPTHER

## 2019-03-21 RX ORDER — DIAZEPAM 5 MG/1
TABLET ORAL
Qty: 2 TABLET | Refills: 0 | Status: SHIPPED | OUTPATIENT
Start: 2019-03-21 | End: 2019-09-13

## 2019-03-21 RX ADMIN — KETOROLAC TROMETHAMINE 30 MG: 30 INJECTION, SOLUTION INTRAMUSCULAR; INTRAVENOUS at 11:13

## 2019-03-21 NOTE — PROGRESS NOTES
CHIEF COMPLAINT  F/U shoulder pain. Patient states that continues to have bilateral shoulder pain. She is also c/o left hand 2nd digit pain and right knee pain.     Subjective   Chastity Salgado is a 69 y.o. female  who presents to the office for follow-up.She has a history of chronic joint pain as well as neck pain.    C/o diffuse joint pain (pain worse in left shoulder).  Pain today 7/10 in severity.  Taking Hydrocodone 10/325 5/day, Celebrex 200 mg daily, Tizanidine PRN.  Denies adverse reactions.  Taking Amitiza for constipation.  Reports at least moderate pain reduction with regimen reports that she would not be able to function without it.       Rheumatologist - Dr. Lucero.  Saw recently, told not rheumatoid.  All OA, she has been released.  Switched from Celebrex to Naproxen without benefit.       Counseling - Dr. Edison Vásquez - ortho.  Left shoulder injections every 90 days.  History of multiple right shoulder surgeries.  Last injection 1/14/19 - this has helped.       Neck pain worsening.  Numbness left hand. Declined MRI once it was authorized --- says she does not know if she can do it, asks about a CT scan.  Says she is claustrophobic.      Joint Pain   This is a chronic problem. The current episode started more than 1 year ago. The problem occurs daily. The problem has been waxing and waning. Associated symptoms include arthralgias, chest pain, fatigue, neck pain, numbness (left hand pinky and ring finger) and weakness. Pertinent negatives include no abdominal pain (after being off norco), chills, coughing, fever, headaches, joint swelling (knees, right hip bursitis), nausea or vomiting. Exacerbated by: activity. She has tried oral narcotics, acetaminophen, heat and NSAIDs for the symptoms. The treatment provided moderate relief.   Neck Pain    This is a chronic problem. The problem occurs daily. The problem has been gradually worsening. The pain is present in the anterior neck, left side and  right side. The quality of the pain is described as aching and burning. The pain is at a severity of 7/10. The pain is moderate. Exacerbated by: movement of neck, use of arms/shoulders. Associated symptoms include chest pain, numbness (left hand pinky and ring finger) and weakness. Pertinent negatives include no fever or headaches. She has tried NSAIDs, oral narcotics, muscle relaxants and neck support for the symptoms. The treatment provided moderate relief.     PEG Assessment   What number best describes your pain on average in the past week?8  What number best describes how, during the past week, pain has interfered with your enjoyment of life?9  What number best describes how, during the past week, pain has interfered with your general activity?  9    The following portions of the patient's history were reviewed and updated as appropriate: allergies, current medications, past family history, past medical history, past social history, past surgical history and problem list.    Review of Systems   Constitutional: Positive for activity change (decreased) and fatigue. Negative for chills and fever.   HENT: Negative for ear pain (seen by an ENT Dr recent ear infection) and sinus pressure.    Eyes: Negative for visual disturbance.   Respiratory: Negative for apnea, cough and shortness of breath.    Cardiovascular: Positive for chest pain.   Gastrointestinal: Positive for constipation (improved). Negative for abdominal pain (after being off norco), diarrhea, nausea and vomiting.   Genitourinary: Negative for difficulty urinating (incontinence).   Musculoskeletal: Positive for arthralgias and neck pain. Negative for joint swelling (knees, right hip bursitis).   Neurological: Positive for dizziness (bilateral ear infection), weakness and numbness (left hand pinky and ring finger). Negative for light-headedness and headaches.   Psychiatric/Behavioral: Positive for sleep disturbance. Negative for agitation, confusion,  "hallucinations and suicidal ideas. The patient is nervous/anxious.      Vitals:    03/21/19 1034   BP: 160/85   Pulse: 76   Resp: 16   Temp: 98.2 °F (36.8 °C)   SpO2: 95%   Weight: 107 kg (236 lb 6.4 oz)   Height: 160 cm (62.99\")   PainSc:   7   PainLoc: Shoulder  Comment: bilateral     Objective   Physical Exam   Constitutional: She is oriented to person, place, and time. She appears well-developed and well-nourished. No distress.   HENT:   Head: Normocephalic and atraumatic.   Eyes: Conjunctivae and EOM are normal.   Neck: Neck supple.   Cardiovascular: Normal rate.   Pulmonary/Chest: Effort normal. No respiratory distress.   Musculoskeletal:        Right shoulder: She exhibits decreased range of motion, tenderness and deformity.        Left shoulder: She exhibits decreased range of motion and tenderness.        Right knee: Tenderness found.        Left knee: Tenderness found.        Cervical back: She exhibits tenderness and spasm.        Right hand: She exhibits tenderness and swelling.        Left hand: She exhibits tenderness and swelling.   Neurological: She is alert and oriented to person, place, and time. She has normal strength. No sensory deficit. Coordination and gait normal.   Skin: Skin is warm and dry.   Psychiatric: She has a normal mood and affect. Her behavior is normal.   Nursing note and vitals reviewed.    Assessment/Plan   Chastity was seen today for shoulder pain.    Diagnoses and all orders for this visit:    Other chronic pain  -     ketorolac (TORADOL) injection 30 mg    Arthritis, multiple joint involvement  -     ketorolac (TORADOL) injection 30 mg    Cervicalgia    Encounter for monitoring opioid maintenance therapy    Other orders  -     diazePAM (VALIUM) 5 MG tablet; Take one 30-45 minutes before MRI, can repeat X 1 for anxiety for MRI  -     HYDROcodone-acetaminophen (NORCO)  MG per tablet; Take 1 tablet by mouth 5 (Five) Times a Day As Needed for Severe Pain .      --- Refill " Hydrocodone. Patient appears stable with current regimen. No adverse effects. Regarding continuation of opioids, there is no evidence of aberrant behavior or any red flags.  The patient continues with appropriate response to opioid therapy. ADL's remain intact by self.   --- Routine UDS in office today as part of monitoring requirements for controlled substances.  The specimen was viewed and the immunoassay result reviewed and is +OPI.  This specimen will be sent to [facility] for confirmation.     --- Needs to re-schedule cervical MRI - will send over valium to take for procedure  --- Toradol 30 mg IM today  --- F/u with ortho as scheduled regarding shoulders/knees   --- Follow-up 2 months          GAYE REPORT    As part of the patient's treatment plan, I am prescribing controlled substances. The patient has been made aware of appropriate use of such medications, including potential risk of somnolence, limited ability to drive and/or work safely, and the potential for dependence or overdose. It has also bee made clear that these medications are for use by this patient only, without concomitant use of alcohol or other substances unless prescribed.     Patient has completed prescribing agreement detailing terms of continued prescribing of controlled substances, including monitoring GAYE reports, urine drug screening, and pill counts if necessary. The patient is aware that inappropriate use will results in cessation of prescribing such medications.    GAYE report has been reviewed and scanned into the patient's chart.    As the clinician, I personally reviewed the GAYE from 3/18/19 while the patient was in the office today.    History and physical exam exhibit continued safe and appropriate use of controlled substances.    EMR Dragon/Transcription disclaimer:   Much of this encounter note is an electronic transcription/translation of spoken language to printed text. The electronic translation of  spoken language may permit erroneous, or at times, nonsensical words or phrases to be inadvertently transcribed; Although I have reviewed the note for such errors, some may still exist.

## 2019-04-16 NOTE — TELEPHONE ENCOUNTER
Medication Refill Request    Date of phone call: 19    Medication being requested: norco 10/325 mg si tab po 5 times a day prn   Qty: 150    Date of last visit: 3/21/19    Date of last refill: 3/26/19    GAYE up to date?: yes    Next Follow up?: 19    Any new pertinent information? (i.e, new medication allergies, new use of medications, change in patient's health or condition, non-compliance or inconsistency with prescribing agreement?):

## 2019-04-17 RX ORDER — HYDROCODONE BITARTRATE AND ACETAMINOPHEN 10; 325 MG/1; MG/1
1 TABLET ORAL
Qty: 150 TABLET | Refills: 0 | Status: SHIPPED | OUTPATIENT
Start: 2019-04-17 | End: 2019-05-21 | Stop reason: SDUPTHER

## 2019-05-21 ENCOUNTER — OFFICE VISIT (OUTPATIENT)
Dept: PAIN MEDICINE | Facility: CLINIC | Age: 69
End: 2019-05-21

## 2019-05-21 VITALS
SYSTOLIC BLOOD PRESSURE: 165 MMHG | HEART RATE: 81 BPM | BODY MASS INDEX: 42.52 KG/M2 | OXYGEN SATURATION: 96 % | DIASTOLIC BLOOD PRESSURE: 94 MMHG | HEIGHT: 63 IN | TEMPERATURE: 99.4 F | WEIGHT: 240 LBS | RESPIRATION RATE: 20 BRPM

## 2019-05-21 DIAGNOSIS — M54.2 NECK PAIN: ICD-10-CM

## 2019-05-21 DIAGNOSIS — Z79.891 ENCOUNTER FOR MONITORING OPIOID MAINTENANCE THERAPY: ICD-10-CM

## 2019-05-21 DIAGNOSIS — G89.4 CHRONIC PAIN SYNDROME: Primary | ICD-10-CM

## 2019-05-21 DIAGNOSIS — Z51.81 ENCOUNTER FOR MONITORING OPIOID MAINTENANCE THERAPY: ICD-10-CM

## 2019-05-21 DIAGNOSIS — M12.9 ARTHRITIS, MULTIPLE JOINT INVOLVEMENT: ICD-10-CM

## 2019-05-21 PROCEDURE — 99214 OFFICE O/P EST MOD 30 MIN: CPT | Performed by: NURSE PRACTITIONER

## 2019-05-21 RX ORDER — CELECOXIB 200 MG/1
200 CAPSULE ORAL DAILY
COMMUNITY
Start: 2019-04-24 | End: 2022-03-02

## 2019-05-21 RX ORDER — TOLTERODINE 4 MG/1
CAPSULE, EXTENDED RELEASE ORAL
COMMUNITY
Start: 2019-05-01 | End: 2019-07-16

## 2019-05-21 RX ORDER — HYDROCODONE BITARTRATE AND ACETAMINOPHEN 10; 325 MG/1; MG/1
1 TABLET ORAL
Qty: 150 TABLET | Refills: 0 | Status: SHIPPED | OUTPATIENT
Start: 2019-05-21 | End: 2019-06-14 | Stop reason: SDUPTHER

## 2019-05-21 RX ORDER — DOXYCYCLINE 100 MG/1
100 TABLET ORAL
COMMUNITY
Start: 2019-05-01 | End: 2019-07-16

## 2019-05-21 NOTE — PROGRESS NOTES
CHIEF COMPLAINT  PAIN    Subjective   Chastity Salgado is a 69 y.o. female  who presents to the office for follow-up.She has a history of diffuse joint pain, neck pain.    C/o diffuse joint pain (pain worse in left shoulder).  Pain today 7/10 in severity.  Taking Hydrocodone 10/325 5/day, Celebrex 200 mg daily, Tizanidine PRN.  Denies adverse reactions.  Taking Amitiza for constipation.  Reports at least moderate pain reduction with regimen reports that she would not be able to function without it.       Rheumatologist - Dr. Lucero.  Saw recently, told not rheumatoid.  All OA, she has been released.  Switched from Celebrex to Naproxen without benefit.       Counseling - Dr. Edison Vásquez - ortho.  Left shoulder injections every 90 days.  History of multiple right shoulder surgeries.  Last injection 1/14/19 - this has helped.       Neck pain worsening.  Numbness left hand. Declined MRI once it was authorized --- says she does not know if she can do it, asks about a CT scan.  Says she is claustrophobic.  Sent in Valium but has still not done. Says she is working with Dr. Hogan on her panic attacks, has a goal to proceed with MRI by August.      Joint Pain   This is a chronic problem. The current episode started more than 1 year ago. The problem occurs daily. The problem has been waxing and waning. Associated symptoms include arthralgias, chest pain, fatigue, neck pain, numbness (left hand pinky and ring finger) and weakness. Pertinent negatives include no abdominal pain (after being off norco), chills, coughing, fever, headaches, joint swelling (knees, right hip bursitis), nausea or vomiting. Exacerbated by: activity. She has tried oral narcotics, acetaminophen, heat and NSAIDs for the symptoms. The treatment provided moderate relief.   Neck Pain    This is a chronic problem. The problem occurs daily. The problem has been gradually worsening. The pain is present in the anterior neck, left side and right  "side. The quality of the pain is described as aching and burning. The pain is at a severity of 7/10. The pain is moderate. Exacerbated by: movement of neck, use of arms/shoulders. Associated symptoms include chest pain, numbness (left hand pinky and ring finger) and weakness. Pertinent negatives include no fever or headaches. She has tried NSAIDs, oral narcotics, muscle relaxants and neck support for the symptoms. The treatment provided moderate relief.      The following portions of the patient's history were reviewed and updated as appropriate: allergies, current medications, past family history, past medical history, past social history, past surgical history and problem list.    Review of Systems   Constitutional: Positive for fatigue. Negative for chills and fever.   Respiratory: Negative for cough.    Cardiovascular: Positive for chest pain.   Gastrointestinal: Negative for abdominal pain (after being off norco), nausea and vomiting.   Musculoskeletal: Positive for arthralgias and neck pain. Negative for joint swelling (knees, right hip bursitis).   Neurological: Positive for weakness and numbness (left hand pinky and ring finger). Negative for headaches.     Vitals:    05/21/19 1105   BP: 165/94  Comment: pt forgot to take bp med last night   Pulse: 81   Resp: 20   Temp: 99.4 °F (37.4 °C)   SpO2: 96%   Weight: 109 kg (240 lb)   Height: 160 cm (62.99\")   PainSc:   7   PainLoc: Shoulder  Comment: bilat     Objective   Physical Exam   Constitutional: She is oriented to person, place, and time. She appears well-developed and well-nourished. No distress.   HENT:   Head: Normocephalic and atraumatic.   Eyes: Conjunctivae and EOM are normal.   Neck: Neck supple.   Cardiovascular: Normal rate.   Pulmonary/Chest: Effort normal. No respiratory distress.   Musculoskeletal:        Right shoulder: She exhibits decreased range of motion, tenderness and deformity.        Left shoulder: She exhibits decreased range of motion " and tenderness.        Right knee: Tenderness found.        Left knee: Tenderness found.        Cervical back: She exhibits tenderness and spasm.        Right hand: She exhibits tenderness and swelling.        Left hand: She exhibits tenderness and swelling.   Neurological: She is alert and oriented to person, place, and time. She has normal strength. No sensory deficit. Coordination and gait normal.   Skin: Skin is warm and dry.   Psychiatric: She has a normal mood and affect. Her behavior is normal.   Nursing note and vitals reviewed.    Assessment/Plan   Chastity was seen today for shoulder pain.    Diagnoses and all orders for this visit:    Chronic pain syndrome    Arthritis, multiple joint involvement    Neck pain    Encounter for monitoring opioid maintenance therapy      --- Refill Hydrocodone. Patient appears stable with current regimen. No adverse effects. Regarding continuation of opioids, there is no evidence of aberrant behavior or any red flags.  The patient continues with appropriate response to opioid therapy. ADL's remain intact by self.   --- The urine drug screen confirmation from 3/21/19 has been reviewed and the result is appropriate based on patient history and GAYE report  --- Follow-up 2 months          GAYE REPORT    As part of the patient's treatment plan, I am prescribing controlled substances. The patient has been made aware of appropriate use of such medications, including potential risk of somnolence, limited ability to drive and/or work safely, and the potential for dependence or overdose. It has also bee made clear that these medications are for use by this patient only, without concomitant use of alcohol or other substances unless prescribed.     Patient has completed prescribing agreement detailing terms of continued prescribing of controlled substances, including monitoring GAYE reports, urine drug screening, and pill counts if necessary. The patient is aware that  inappropriate use will results in cessation of prescribing such medications.    GAYE report has been reviewed and scanned into the patient's chart.    As the clinician, I personally reviewed the GAYE from 5/20/19 while the patient was in the office today.    History and physical exam exhibit continued safe and appropriate use of controlled substances.      EMR Dragon/Transcription disclaimer:   Much of this encounter note is an electronic transcription/translation of spoken language to printed text. The electronic translation of spoken language may permit erroneous, or at times, nonsensical words or phrases to be inadvertently transcribed; Although I have reviewed the note for such errors, some may still exist.

## 2019-05-21 NOTE — PROGRESS NOTES
"CHIEF COMPLAINT    F/u bilat shoulder pain. A couple weeks ago got steroid injection from Dr. Tu Vásquez for shoulder pain, helped for 2 weeks then pain returned.     Subjective   Chastity Salgado is a 69 y.o. female  who presents to the office for follow-up.She has a history of back pain .      History of Present Illness     PEG Assessment   What number best describes your pain on average in the past week?7  What number best describes how, during the past week, pain has interfered with your enjoyment of life?8  What number best describes how, during the past week, pain has interfered with your general activity?  7      {Common H&P Review Areas:43266}    Review of Systems   Constitutional: Positive for activity change (decreased) and fatigue. Negative for chills and fever.   HENT: Ear pain: seen by an ENT Dr recent ear infection.    Eyes: Negative for visual disturbance.   Respiratory: Negative for apnea, cough and shortness of breath.    Cardiovascular: Negative for chest pain.   Gastrointestinal: Positive for constipation (improved). Negative for abdominal pain (after being off norco), diarrhea, nausea and vomiting.   Genitourinary: Negative for difficulty urinating (incontinence).   Musculoskeletal: Positive for arthralgias (bilat shoulders) and neck pain (occ). Negative for joint swelling (knees, right hip bursitis).   Neurological: Positive for dizziness (bilateral ear infection), weakness and numbness (left hand pinky and ring finger). Negative for light-headedness and headaches.   Psychiatric/Behavioral: Positive for sleep disturbance. Negative for agitation, confusion, hallucinations and suicidal ideas. The patient is nervous/anxious.        Vitals:    05/21/19 1105   BP: 165/94  Comment: pt forgot to take bp med last night   Pulse: 81   Resp: 20   Temp: 99.4 °F (37.4 °C)   SpO2: 96%   Weight: 109 kg (240 lb)   Height: 160 cm (62.99\")   PainSc:   7   PainLoc: Shoulder  Comment: bilat         Objective "   Physical Exam        Assessment/Plan   {Assess/PlanSmartLinks:10058}    --- Follow-up ***               GAYE REPORT    As part of the patient's treatment plan, I am prescribing controlled substances. The patient has been made aware of appropriate use of such medications, including potential risk of somnolence, limited ability to drive and/or work safely, and the potential for dependence or overdose. It has also bee made clear that these medications are for use by this patient only, without concomitant use of alcohol or other substances unless prescribed.     Patient has completed prescribing agreement detailing terms of continued prescribing of controlled substances, including monitoring GAYE reports, urine drug screening, and pill counts if necessary. The patient is aware that inappropriate use will results in cessation of prescribing such medications.    GAYE report has been reviewed and scanned into the patient's chart.    As the clinician, I personally reviewed the GAYE from *** while the patient was in the office today.    History and physical exam exhibit continued safe and appropriate use of controlled substances.      EMR Dragon/Transcription disclaimer:   Much of this encounter note is an electronic transcription/translation of spoken language to printed text. The electronic translation of spoken language may permit erroneous, or at times, nonsensical words or phrases to be inadvertently transcribed; Although I have reviewed the note for such errors, some may still exist.

## 2019-06-06 ENCOUNTER — CLINICAL SUPPORT (OUTPATIENT)
Dept: PAIN MEDICINE | Facility: CLINIC | Age: 69
End: 2019-06-06

## 2019-06-06 DIAGNOSIS — G89.29 OTHER CHRONIC PAIN: Primary | ICD-10-CM

## 2019-06-06 PROCEDURE — 96372 THER/PROPH/DIAG INJ SC/IM: CPT | Performed by: NURSE PRACTITIONER

## 2019-06-06 RX ORDER — KETOROLAC TROMETHAMINE 30 MG/ML
30 INJECTION, SOLUTION INTRAMUSCULAR; INTRAVENOUS ONCE
Status: COMPLETED | OUTPATIENT
Start: 2019-06-06 | End: 2019-06-06

## 2019-06-06 RX ADMIN — KETOROLAC TROMETHAMINE 30 MG: 30 INJECTION, SOLUTION INTRAMUSCULAR; INTRAVENOUS at 11:11

## 2019-06-14 RX ORDER — HYDROCODONE BITARTRATE AND ACETAMINOPHEN 10; 325 MG/1; MG/1
1 TABLET ORAL
Qty: 150 TABLET | Refills: 0 | Status: SHIPPED | OUTPATIENT
Start: 2019-06-14 | End: 2019-07-16 | Stop reason: SDUPTHER

## 2019-06-14 NOTE — TELEPHONE ENCOUNTER
Medication Refill Request    Date of phone call: 6/13/2019    Medication being requested:Norco 10-325mg  sig: Take 1 tablet by mouth 5 (five) times a day as needed for Severe pain  Qty: 150    Date of last visit:5/21/2019    Date of last refill:5/21/2019    GAYE up to date?: yes    Next Follow up?:7/16/2019     Any new pertinent information? (i.e, new medication allergies, new use of medications, change in patient's health or condition, non-compliance or inconsistency with prescribing agreement?):

## 2019-07-16 ENCOUNTER — OFFICE VISIT (OUTPATIENT)
Dept: PAIN MEDICINE | Facility: CLINIC | Age: 69
End: 2019-07-16

## 2019-07-16 VITALS
WEIGHT: 237 LBS | DIASTOLIC BLOOD PRESSURE: 72 MMHG | BODY MASS INDEX: 41.99 KG/M2 | OXYGEN SATURATION: 100 % | HEART RATE: 99 BPM | TEMPERATURE: 97.9 F | RESPIRATION RATE: 18 BRPM | SYSTOLIC BLOOD PRESSURE: 103 MMHG | HEIGHT: 63 IN

## 2019-07-16 DIAGNOSIS — G89.4 CHRONIC PAIN SYNDROME: Primary | ICD-10-CM

## 2019-07-16 DIAGNOSIS — Z79.891 ENCOUNTER FOR MONITORING OPIOID MAINTENANCE THERAPY: ICD-10-CM

## 2019-07-16 DIAGNOSIS — M12.9 ARTHRITIS, MULTIPLE JOINT INVOLVEMENT: ICD-10-CM

## 2019-07-16 DIAGNOSIS — M54.2 NECK PAIN: ICD-10-CM

## 2019-07-16 DIAGNOSIS — Z51.81 ENCOUNTER FOR MONITORING OPIOID MAINTENANCE THERAPY: ICD-10-CM

## 2019-07-16 PROCEDURE — 96372 THER/PROPH/DIAG INJ SC/IM: CPT | Performed by: NURSE PRACTITIONER

## 2019-07-16 PROCEDURE — 99214 OFFICE O/P EST MOD 30 MIN: CPT | Performed by: NURSE PRACTITIONER

## 2019-07-16 RX ORDER — KETOROLAC TROMETHAMINE 30 MG/ML
30 INJECTION, SOLUTION INTRAMUSCULAR; INTRAVENOUS ONCE
Status: COMPLETED | OUTPATIENT
Start: 2019-07-16 | End: 2019-07-16

## 2019-07-16 RX ORDER — HYDROCODONE BITARTRATE AND ACETAMINOPHEN 10; 325 MG/1; MG/1
1 TABLET ORAL
Qty: 150 TABLET | Refills: 0 | Status: SHIPPED | OUTPATIENT
Start: 2019-07-16 | End: 2019-08-14 | Stop reason: SDUPTHER

## 2019-07-16 RX ORDER — MIRABEGRON 25 MG/1
25 TABLET, FILM COATED, EXTENDED RELEASE ORAL DAILY
COMMUNITY
Start: 2019-07-15 | End: 2019-11-06

## 2019-07-16 RX ADMIN — KETOROLAC TROMETHAMINE 30 MG: 30 INJECTION, SOLUTION INTRAMUSCULAR; INTRAVENOUS at 11:23

## 2019-07-16 NOTE — PROGRESS NOTES
CHIEF COMPLAINT  Follow-up for shoulder pain. Ms. Salgado states that her pain is unchanged.    Subjective   Chastity Salgado is a 69 y.o. female  who presents to the office for follow-up.She has a history of neck pain, joint pain.    C/o diffuse joint pain (pain worse in left shoulder).  Pain today 7/10 in severity.  Taking Hydrocodone 10/325 5/day, Celebrex 200 mg daily, Tizanidine PRN.  Denies adverse reactions.  Taking Amitiza for constipation.  Reports at least moderate pain reduction with regimen reports that she would not be able to function without it.       Rheumatologist - Dr. Lucero.  Saw recently, told not rheumatoid.  All OA, she has been released.  Switched from Celebrex to Naproxen without benefit.       Counseling - Dr. Edison Vásquez - ortho.  Left shoulder injections every 90 days.  History of multiple right shoulder surgeries.  Also has given injections in right foot and right hip which she hurt after a fall, also came in to office for Toradol shot 6 weeks ago which she said also helped.       Neck pain worsening.  Numbness left hand. Declined MRI once it was authorized --- says she does not know if she can do it, asks about a CT scan.  Says she is claustrophobic.  Sent in Valium but has still not done. Says she is working with Dr. Hogan on her panic attacks, has a goal to proceed with MRI by August.     Joint Pain   This is a chronic problem. The current episode started more than 1 year ago. The problem occurs daily. The problem has been waxing and waning. Associated symptoms include arthralgias, fatigue, neck pain and numbness (2 fingers on left hand). Pertinent negatives include no abdominal pain (after being off norco), chest pain, chills, congestion, coughing, fever, headaches, joint swelling (knees, right hip bursitis), nausea, vomiting or weakness. Exacerbated by: activity. She has tried oral narcotics, acetaminophen, heat and NSAIDs for the symptoms. The treatment provided moderate  relief.   Neck Pain    This is a chronic problem. The problem occurs daily. The problem has been waxing and waning. The pain is present in the anterior neck, left side and right side. The quality of the pain is described as aching and burning. The pain is moderate. Exacerbated by: movement of neck, use of arms/shoulders. Associated symptoms include numbness (2 fingers on left hand). Pertinent negatives include no chest pain, fever, headaches or weakness. She has tried NSAIDs, oral narcotics, muscle relaxants and neck support for the symptoms. The treatment provided moderate relief.     PEG Assessment   What number best describes your pain on average in the past week?7  What number best describes how, during the past week, pain has interfered with your enjoyment of life?7  What number best describes how, during the past week, pain has interfered with your general activity?  8    The following portions of the patient's history were reviewed and updated as appropriate: allergies, current medications, past family history, past medical history, past social history, past surgical history and problem list.    Review of Systems   Constitutional: Positive for fatigue. Negative for chills and fever.   HENT: Negative for congestion.    Eyes: Negative for visual disturbance.   Respiratory: Negative for cough, shortness of breath and wheezing.    Cardiovascular: Positive for leg swelling. Negative for chest pain and palpitations.   Gastrointestinal: Positive for constipation. Negative for abdominal pain (after being off norco), diarrhea, nausea and vomiting.   Genitourinary: Negative for difficulty urinating.   Musculoskeletal: Positive for arthralgias and neck pain. Negative for joint swelling (knees, right hip bursitis).   Neurological: Positive for numbness (2 fingers on left hand). Negative for weakness and headaches.   Psychiatric/Behavioral: Positive for sleep disturbance. Negative for suicidal ideas. The patient is  "nervous/anxious.      Vitals:    07/16/19 1054   BP: 103/72   Pulse: 99   Resp: 18   Temp: 97.9 °F (36.6 °C)   SpO2: 100%   Weight: 108 kg (237 lb)   Height: 160 cm (62.99\")      Objective   Physical Exam   Constitutional: She is oriented to person, place, and time. She appears well-developed and well-nourished. No distress.   HENT:   Head: Normocephalic and atraumatic.   Eyes: Conjunctivae and EOM are normal.   Neck: Neck supple.   Cardiovascular: Normal rate.   Pulmonary/Chest: Effort normal. No respiratory distress.   Musculoskeletal:        Right shoulder: She exhibits decreased range of motion, tenderness and deformity.        Left shoulder: She exhibits decreased range of motion and tenderness.        Right knee: Tenderness found.        Left knee: Tenderness found.        Cervical back: She exhibits tenderness and spasm.        Right hand: She exhibits tenderness and swelling.        Left hand: She exhibits tenderness and swelling.   Neurological: She is alert and oriented to person, place, and time. She has normal strength. No sensory deficit. Coordination and gait normal.   Skin: Skin is warm and dry.   Psychiatric: She has a normal mood and affect. Her behavior is normal.   Nursing note and vitals reviewed.    Assessment/Plan   Chastity was seen today for shoulder pain.    Diagnoses and all orders for this visit:    Chronic pain syndrome  -     ketorolac (TORADOL) injection 30 mg    Arthritis, multiple joint involvement    Neck pain    Encounter for monitoring opioid maintenance therapy    Other orders  -     HYDROcodone-acetaminophen (NORCO)  MG per tablet; Take 1 tablet by mouth 5 (Five) Times a Day As Needed for Severe Pain . dnf 7/22/19      --- Refill Hydrocodone. Patient appears stable with current regimen. No adverse effects. Regarding continuation of opioids, there is no evidence of aberrant behavior or any red flags.  The patient continues with appropriate response to opioid therapy. ADL's " remain intact by self.   --- The urine drug screen confirmation from 3/21/19 has been reviewed and the result is appropriate based on patient history and GAYE report  --- Toradol 30 mg IM   --- Follow-up 2 months          GAYE REPORT  As part of the patient's treatment plan, I am prescribing controlled substances. The patient has been made aware of appropriate use of such medications, including potential risk of somnolence, limited ability to drive and/or work safely, and the potential for dependence or overdose. It has also bee made clear that these medications are for use by this patient only, without concomitant use of alcohol or other substances unless prescribed.     Patient has completed prescribing agreement detailing terms of continued prescribing of controlled substances, including monitoring GAYE reports, urine drug screening, and pill counts if necessary. The patient is aware that inappropriate use will results in cessation of prescribing such medications.    GAYE report has been reviewed and scanned into the patient's chart.    As the clinician, I personally reviewed the GAYE from 7/16/19 while the patient was in the office today.    History and physical exam exhibit continued safe and appropriate use of controlled substances.    EMR Dragon/Transcription disclaimer:   Much of this encounter note is an electronic transcription/translation of spoken language to printed text. The electronic translation of spoken language may permit erroneous, or at times, nonsensical words or phrases to be inadvertently transcribed; Although I have reviewed the note for such errors, some may still exist.

## 2019-08-14 RX ORDER — HYDROCODONE BITARTRATE AND ACETAMINOPHEN 10; 325 MG/1; MG/1
1 TABLET ORAL
Qty: 150 TABLET | Refills: 0 | Status: SHIPPED | OUTPATIENT
Start: 2019-08-14 | End: 2019-09-10 | Stop reason: SDUPTHER

## 2019-08-14 NOTE — TELEPHONE ENCOUNTER
Medication Refill Request    Date of phone call: 19    Medication being requested: norco 10/325mg si tab po 5 times a day prn   Qty: 150    Date of last visit: 19    Date of last refill: 19    GAYE up to date?: yes    Next Follow up?: 9/10/19    Any new pertinent information? (i.e, new medication allergies, new use of medications, change in patient's health or condition, non-compliance or inconsistency with prescribing agreement?):

## 2019-09-10 ENCOUNTER — OFFICE VISIT (OUTPATIENT)
Dept: PAIN MEDICINE | Facility: CLINIC | Age: 69
End: 2019-09-10

## 2019-09-10 ENCOUNTER — TELEPHONE (OUTPATIENT)
Dept: PAIN MEDICINE | Facility: CLINIC | Age: 69
End: 2019-09-10

## 2019-09-10 VITALS
TEMPERATURE: 99 F | HEIGHT: 63 IN | BODY MASS INDEX: 44.16 KG/M2 | WEIGHT: 249.2 LBS | HEART RATE: 63 BPM | SYSTOLIC BLOOD PRESSURE: 142 MMHG | OXYGEN SATURATION: 98 % | DIASTOLIC BLOOD PRESSURE: 68 MMHG | RESPIRATION RATE: 18 BRPM

## 2019-09-10 DIAGNOSIS — Z51.81 ENCOUNTER FOR MONITORING OPIOID MAINTENANCE THERAPY: ICD-10-CM

## 2019-09-10 DIAGNOSIS — Z79.891 ENCOUNTER FOR MONITORING OPIOID MAINTENANCE THERAPY: ICD-10-CM

## 2019-09-10 DIAGNOSIS — M54.2 NECK PAIN: ICD-10-CM

## 2019-09-10 DIAGNOSIS — G89.4 CHRONIC PAIN SYNDROME: Primary | ICD-10-CM

## 2019-09-10 DIAGNOSIS — M12.9 ARTHRITIS, MULTIPLE JOINT INVOLVEMENT: ICD-10-CM

## 2019-09-10 LAB
POC AMPHETAMINES: NEGATIVE
POC BARBITURATES: NEGATIVE
POC BENZODIAZEPHINES: NEGATIVE
POC COCAINE: NEGATIVE
POC METHADONE: NEGATIVE
POC METHAMPHETAMINE SCREEN URINE: NEGATIVE
POC OPIATES: POSITIVE
POC OXYCODONE: POSITIVE
POC PHENCYCLIDINE: NEGATIVE
POC PROPOXYPHENE: NEGATIVE
POC THC: NEGATIVE
POC TRICYCLIC ANTIDEPRESSANTS: NEGATIVE

## 2019-09-10 PROCEDURE — 99214 OFFICE O/P EST MOD 30 MIN: CPT | Performed by: NURSE PRACTITIONER

## 2019-09-10 PROCEDURE — 80305 DRUG TEST PRSMV DIR OPT OBS: CPT | Performed by: NURSE PRACTITIONER

## 2019-09-10 RX ORDER — CLINDAMYCIN HYDROCHLORIDE 300 MG/1
300 CAPSULE ORAL EVERY 6 HOURS
COMMUNITY
Start: 2019-09-05 | End: 2019-09-15

## 2019-09-10 RX ORDER — HYDROCODONE BITARTRATE AND ACETAMINOPHEN 10; 325 MG/1; MG/1
1 TABLET ORAL
Qty: 150 TABLET | Refills: 0 | Status: SHIPPED | OUTPATIENT
Start: 2019-09-10 | End: 2019-10-14 | Stop reason: SDUPTHER

## 2019-09-10 RX ORDER — LUBIPROSTONE 24 UG/1
24 CAPSULE ORAL 2 TIMES DAILY WITH MEALS
Qty: 180 CAPSULE | Refills: 3 | Status: SHIPPED | OUTPATIENT
Start: 2019-09-10 | End: 2019-09-13

## 2019-09-10 NOTE — TELEPHONE ENCOUNTER
Patient called and stated that when she got home she realized that her insurance  denied the amitiza initially and after a prior authorization the co pay was too expensive. She asked if there was a different kind of medication.

## 2019-09-13 ENCOUNTER — TELEPHONE (OUTPATIENT)
Dept: PAIN MEDICINE | Facility: CLINIC | Age: 69
End: 2019-09-13

## 2019-09-13 ENCOUNTER — APPOINTMENT (OUTPATIENT)
Dept: LAB | Facility: HOSPITAL | Age: 69
End: 2019-09-13

## 2019-09-13 ENCOUNTER — OFFICE VISIT (OUTPATIENT)
Dept: INTERNAL MEDICINE | Facility: CLINIC | Age: 69
End: 2019-09-13

## 2019-09-13 VITALS
DIASTOLIC BLOOD PRESSURE: 66 MMHG | SYSTOLIC BLOOD PRESSURE: 106 MMHG | BODY MASS INDEX: 43.61 KG/M2 | WEIGHT: 246.1 LBS | HEART RATE: 73 BPM | OXYGEN SATURATION: 94 % | TEMPERATURE: 98.5 F

## 2019-09-13 DIAGNOSIS — I10 BENIGN ESSENTIAL HTN: ICD-10-CM

## 2019-09-13 DIAGNOSIS — N30.01 ACUTE CYSTITIS WITH HEMATURIA: ICD-10-CM

## 2019-09-13 DIAGNOSIS — R35.0 URINARY FREQUENCY: Primary | ICD-10-CM

## 2019-09-13 DIAGNOSIS — N95.1 MENOPAUSAL SYMPTOMS: ICD-10-CM

## 2019-09-13 DIAGNOSIS — E66.01 CLASS 3 SEVERE OBESITY DUE TO EXCESS CALORIES WITH SERIOUS COMORBIDITY AND BODY MASS INDEX (BMI) OF 40.0 TO 44.9 IN ADULT (HCC): ICD-10-CM

## 2019-09-13 DIAGNOSIS — R50.9 FEVER, UNSPECIFIED FEVER CAUSE: ICD-10-CM

## 2019-09-13 LAB
ALBUMIN SERPL-MCNC: 4.2 G/DL (ref 3.5–5.2)
ALBUMIN/GLOB SERPL: 1.1 G/DL
ALP SERPL-CCNC: 73 U/L (ref 39–117)
ALT SERPL W P-5'-P-CCNC: 14 U/L (ref 1–33)
ANION GAP SERPL CALCULATED.3IONS-SCNC: 12.3 MMOL/L (ref 5–15)
AST SERPL-CCNC: 20 U/L (ref 1–32)
BASOPHILS # BLD AUTO: 0.03 10*3/MM3 (ref 0–0.2)
BASOPHILS NFR BLD AUTO: 0.5 % (ref 0–1.5)
BILIRUB BLD-MCNC: NEGATIVE MG/DL
BILIRUB SERPL-MCNC: 0.3 MG/DL (ref 0.2–1.2)
BUN BLD-MCNC: 13 MG/DL (ref 8–23)
BUN/CREAT SERPL: 20 (ref 7–25)
CALCIUM SPEC-SCNC: 9.6 MG/DL (ref 8.6–10.5)
CHLORIDE SERPL-SCNC: 99 MMOL/L (ref 98–107)
CLARITY, POC: CLEAR
CO2 SERPL-SCNC: 26.7 MMOL/L (ref 22–29)
COLOR UR: YELLOW
CREAT BLD-MCNC: 0.65 MG/DL (ref 0.57–1)
DEPRECATED RDW RBC AUTO: 46.9 FL (ref 37–54)
EOSINOPHIL # BLD AUTO: 0.2 10*3/MM3 (ref 0–0.4)
EOSINOPHIL NFR BLD AUTO: 3.2 % (ref 0.3–6.2)
ERYTHROCYTE [DISTWIDTH] IN BLOOD BY AUTOMATED COUNT: 13.1 % (ref 12.3–15.4)
GFR SERPL CREATININE-BSD FRML MDRD: 91 ML/MIN/1.73
GLOBULIN UR ELPH-MCNC: 3.8 GM/DL
GLUCOSE BLD-MCNC: 96 MG/DL (ref 65–99)
GLUCOSE UR STRIP-MCNC: NEGATIVE MG/DL
HCT VFR BLD AUTO: 39.9 % (ref 34–46.6)
HGB BLD-MCNC: 12.7 G/DL (ref 12–15.9)
IMM GRANULOCYTES # BLD AUTO: 0.01 10*3/MM3 (ref 0–0.05)
IMM GRANULOCYTES NFR BLD AUTO: 0.2 % (ref 0–0.5)
KETONES UR QL: NEGATIVE
LEUKOCYTE EST, POC: NEGATIVE
LYMPHOCYTES # BLD AUTO: 1.92 10*3/MM3 (ref 0.7–3.1)
LYMPHOCYTES NFR BLD AUTO: 31 % (ref 19.6–45.3)
MCH RBC QN AUTO: 31 PG (ref 26.6–33)
MCHC RBC AUTO-ENTMCNC: 31.8 G/DL (ref 31.5–35.7)
MCV RBC AUTO: 97.3 FL (ref 79–97)
MONOCYTES # BLD AUTO: 0.4 10*3/MM3 (ref 0.1–0.9)
MONOCYTES NFR BLD AUTO: 6.5 % (ref 5–12)
NEUTROPHILS # BLD AUTO: 3.63 10*3/MM3 (ref 1.7–7)
NEUTROPHILS NFR BLD AUTO: 58.6 % (ref 42.7–76)
NITRITE UR-MCNC: NEGATIVE MG/ML
NRBC BLD AUTO-RTO: 0 /100 WBC (ref 0–0.2)
PH UR: 6 [PH] (ref 5–8)
PLATELET # BLD AUTO: 218 10*3/MM3 (ref 140–450)
PMV BLD AUTO: 9.3 FL (ref 6–12)
POTASSIUM BLD-SCNC: 3.9 MMOL/L (ref 3.5–5.2)
PROT SERPL-MCNC: 8 G/DL (ref 6–8.5)
PROT UR STRIP-MCNC: ABNORMAL MG/DL
RBC # BLD AUTO: 4.1 10*6/MM3 (ref 3.77–5.28)
RBC # UR STRIP: ABNORMAL /UL
SODIUM BLD-SCNC: 138 MMOL/L (ref 136–145)
SP GR UR: 1.03 (ref 1–1.03)
UROBILINOGEN UR QL: NORMAL
WBC NRBC COR # BLD: 6.19 10*3/MM3 (ref 3.4–10.8)

## 2019-09-13 PROCEDURE — 36415 COLL VENOUS BLD VENIPUNCTURE: CPT | Performed by: FAMILY MEDICINE

## 2019-09-13 PROCEDURE — G0439 PPPS, SUBSEQ VISIT: HCPCS | Performed by: FAMILY MEDICINE

## 2019-09-13 PROCEDURE — 96160 PT-FOCUSED HLTH RISK ASSMT: CPT | Performed by: FAMILY MEDICINE

## 2019-09-13 PROCEDURE — 81003 URINALYSIS AUTO W/O SCOPE: CPT | Performed by: FAMILY MEDICINE

## 2019-09-13 PROCEDURE — 85025 COMPLETE CBC W/AUTO DIFF WBC: CPT | Performed by: FAMILY MEDICINE

## 2019-09-13 PROCEDURE — 80053 COMPREHEN METABOLIC PANEL: CPT | Performed by: FAMILY MEDICINE

## 2019-09-13 PROCEDURE — 99214 OFFICE O/P EST MOD 30 MIN: CPT | Performed by: FAMILY MEDICINE

## 2019-09-13 RX ORDER — VENLAFAXINE HYDROCHLORIDE 75 MG/1
75 CAPSULE, EXTENDED RELEASE ORAL DAILY
Qty: 30 CAPSULE | Refills: 6 | Status: SHIPPED | OUTPATIENT
Start: 2019-09-20 | End: 2020-08-25

## 2019-09-13 RX ORDER — ONDANSETRON 4 MG/1
4 TABLET, FILM COATED ORAL EVERY 8 HOURS PRN
Qty: 21 TABLET | Refills: 0 | Status: SHIPPED | OUTPATIENT
Start: 2019-09-13 | End: 2019-11-06

## 2019-09-13 RX ORDER — VENLAFAXINE HYDROCHLORIDE 37.5 MG/1
37.5 CAPSULE, EXTENDED RELEASE ORAL DAILY
Qty: 7 CAPSULE | Refills: 0 | Status: SHIPPED | OUTPATIENT
Start: 2019-09-13 | End: 2019-11-06

## 2019-09-15 ENCOUNTER — RESULTS ENCOUNTER (OUTPATIENT)
Dept: PAIN MEDICINE | Facility: CLINIC | Age: 69
End: 2019-09-15

## 2019-09-15 DIAGNOSIS — M54.2 NECK PAIN: ICD-10-CM

## 2019-09-15 DIAGNOSIS — M12.9 ARTHRITIS, MULTIPLE JOINT INVOLVEMENT: ICD-10-CM

## 2019-09-15 DIAGNOSIS — Z51.81 ENCOUNTER FOR MONITORING OPIOID MAINTENANCE THERAPY: ICD-10-CM

## 2019-09-15 DIAGNOSIS — Z79.891 ENCOUNTER FOR MONITORING OPIOID MAINTENANCE THERAPY: ICD-10-CM

## 2019-09-15 DIAGNOSIS — G89.4 CHRONIC PAIN SYNDROME: ICD-10-CM

## 2019-09-15 LAB
BACTERIA UR CULT: NORMAL
BACTERIA UR CULT: NORMAL

## 2019-09-24 PROBLEM — N95.1 MENOPAUSAL SYMPTOMS: Status: ACTIVE | Noted: 2019-09-24

## 2019-10-08 ENCOUNTER — TELEPHONE (OUTPATIENT)
Dept: PAIN MEDICINE | Facility: CLINIC | Age: 69
End: 2019-10-08

## 2019-10-08 NOTE — TELEPHONE ENCOUNTER
Pt called in today c/o increased left shoulder pain and stiffness, she would like a toradol injection tomorrow. Her last toradol inj was 7/16/19. Please advise. Thank you.

## 2019-10-09 ENCOUNTER — CLINICAL SUPPORT (OUTPATIENT)
Dept: PAIN MEDICINE | Facility: CLINIC | Age: 69
End: 2019-10-09

## 2019-10-09 DIAGNOSIS — G89.29 OTHER CHRONIC PAIN: Primary | ICD-10-CM

## 2019-10-09 PROCEDURE — 96372 THER/PROPH/DIAG INJ SC/IM: CPT | Performed by: NURSE PRACTITIONER

## 2019-10-09 RX ORDER — KETOROLAC TROMETHAMINE 30 MG/ML
30 INJECTION, SOLUTION INTRAMUSCULAR; INTRAVENOUS ONCE
Status: COMPLETED | OUTPATIENT
Start: 2019-10-09 | End: 2019-10-09

## 2019-10-09 RX ADMIN — KETOROLAC TROMETHAMINE 30 MG: 30 INJECTION, SOLUTION INTRAMUSCULAR; INTRAVENOUS at 13:21

## 2019-10-14 NOTE — TELEPHONE ENCOUNTER
Medication Refill Request    Date of phone call: 10/14/19    Medication being requested: Norco  mg   si tab po five times a day prn  Qty: 150    Date of last visit: 9/10/19    Date of last refill: 19    GAYE up to date?: yes    Next Follow up?: 19    Any new pertinent information? (i.e, new medication allergies, new use of medications, change in patient's health or condition, non-compliance or inconsistency with prescribing agreement?):

## 2019-10-15 RX ORDER — HYDROCODONE BITARTRATE AND ACETAMINOPHEN 10; 325 MG/1; MG/1
1 TABLET ORAL
Qty: 150 TABLET | Refills: 0 | Status: SHIPPED | OUTPATIENT
Start: 2019-10-15 | End: 2019-11-14 | Stop reason: SDUPTHER

## 2019-11-06 ENCOUNTER — OFFICE VISIT (OUTPATIENT)
Dept: PAIN MEDICINE | Facility: CLINIC | Age: 69
End: 2019-11-06

## 2019-11-06 VITALS
WEIGHT: 248.8 LBS | HEIGHT: 63 IN | DIASTOLIC BLOOD PRESSURE: 83 MMHG | TEMPERATURE: 97.9 F | HEART RATE: 87 BPM | BODY MASS INDEX: 44.08 KG/M2 | OXYGEN SATURATION: 96 % | SYSTOLIC BLOOD PRESSURE: 128 MMHG | RESPIRATION RATE: 18 BRPM

## 2019-11-06 DIAGNOSIS — G89.4 CHRONIC PAIN SYNDROME: Primary | ICD-10-CM

## 2019-11-06 DIAGNOSIS — M54.2 NECK PAIN: ICD-10-CM

## 2019-11-06 DIAGNOSIS — Z51.81 ENCOUNTER FOR MONITORING OPIOID MAINTENANCE THERAPY: ICD-10-CM

## 2019-11-06 DIAGNOSIS — Z79.891 ENCOUNTER FOR MONITORING OPIOID MAINTENANCE THERAPY: ICD-10-CM

## 2019-11-06 DIAGNOSIS — M12.9 ARTHRITIS, MULTIPLE JOINT INVOLVEMENT: ICD-10-CM

## 2019-11-06 PROCEDURE — 99214 OFFICE O/P EST MOD 30 MIN: CPT | Performed by: NURSE PRACTITIONER

## 2019-11-06 PROCEDURE — 96372 THER/PROPH/DIAG INJ SC/IM: CPT | Performed by: NURSE PRACTITIONER

## 2019-11-06 RX ORDER — CYCLOBENZAPRINE HCL 10 MG
10 TABLET ORAL NIGHTLY PRN
Qty: 30 TABLET | Refills: 1 | Status: SHIPPED | OUTPATIENT
Start: 2019-11-06 | End: 2020-01-15 | Stop reason: SDUPTHER

## 2019-11-06 RX ORDER — KETOROLAC TROMETHAMINE 30 MG/ML
30 INJECTION, SOLUTION INTRAMUSCULAR; INTRAVENOUS ONCE
Status: COMPLETED | OUTPATIENT
Start: 2019-11-06 | End: 2019-11-06

## 2019-11-06 RX ORDER — FLUTICASONE PROPIONATE 50 MCG
2 SPRAY, SUSPENSION (ML) NASAL DAILY
COMMUNITY
Start: 2019-10-15 | End: 2019-11-12

## 2019-11-06 RX ADMIN — KETOROLAC TROMETHAMINE 30 MG: 30 INJECTION, SOLUTION INTRAMUSCULAR; INTRAVENOUS at 13:49

## 2019-11-06 NOTE — PROGRESS NOTES
CHIEF COMPLAINT  Follow-up for shoulder pain. Ms. Salgado states that her shoulder pain has gotten worse since her last appt.    Subjective   Chastity Salgado is a 69 y.o. female  who presents to the office for follow-up.She has a history of joint pain.    C/o diffuse joint pain (pain worse in left shoulder).  Pain today 8/10 in severity.  Taking Hydrocodone 10/325 5/day, Celebrex 200 mg daily.  Denies adverse reactions.  Taking Amitiza for constipation.  Reports at least moderate pain reduction with regimen reports that she would not be able to function without it.  Worsening muscle spasms at night, asks about hs muscle relaxant.       Rheumatologist - Dr. Lucero.  Saw recently, told not rheumatoid.  All OA, she has been released.      Counseling - Dr. Edison Vásquez - ortho.  Left shoulder injections every 90 days.  History of multiple right shoulder surgeries.  Also has given injections in right foot and right hip which she hurt after a fall, also came in to office for Toradol shot 6 weeks ago which she said also helped.       Neck pain worsening.  Numbness left hand. Declined MRI once it was authorized --- says she does not know if she can do it, asks about a CT scan.  Says she is claustrophobic.  Sent in Valium but has still not done. Says she is working with Dr. Hogan on her panic attacks.     Toradol IM every few months really help her overall inflammation.      Joint Pain   This is a chronic problem. The current episode started more than 1 year ago. The problem occurs daily. The problem has been waxing and waning. Associated symptoms include arthralgias (left shoulder), fatigue, neck pain and numbness (left hand). Pertinent negatives include no abdominal pain (after being off norco), chest pain, chills, congestion, coughing, fever, headaches, joint swelling (knees, right hip bursitis), nausea, vomiting or weakness. Exacerbated by: activity. She has tried oral narcotics, acetaminophen, heat and  NSAIDs for the symptoms. The treatment provided moderate relief.   Neck Pain    This is a chronic problem. The problem occurs daily. The problem has been waxing and waning. The pain is present in the anterior neck, left side and right side. The quality of the pain is described as aching and burning. The pain is at a severity of 8/10. The pain is moderate. Exacerbated by: movement of neck, use of arms/shoulders. Associated symptoms include numbness (left hand). Pertinent negatives include no chest pain, fever, headaches or weakness. She has tried NSAIDs, oral narcotics, muscle relaxants and neck support for the symptoms. The treatment provided moderate relief.      PEG Assessment   What number best describes your pain on average in the past week?8  What number best describes how, during the past week, pain has interfered with your enjoyment of life?9  What number best describes how, during the past week, pain has interfered with your general activity?  9    The following portions of the patient's history were reviewed and updated as appropriate: allergies, current medications, past family history, past medical history, past social history, past surgical history and problem list.    Review of Systems   Constitutional: Positive for fatigue. Negative for chills and fever.   HENT: Negative for congestion.    Eyes: Negative for visual disturbance.   Respiratory: Negative for cough, shortness of breath and wheezing.    Cardiovascular: Negative.  Negative for chest pain.   Gastrointestinal: Negative for abdominal pain (after being off norco), constipation, diarrhea, nausea and vomiting.   Genitourinary: Negative for difficulty urinating.   Musculoskeletal: Positive for arthralgias (left shoulder) and neck pain. Negative for back pain and joint swelling (knees, right hip bursitis).   Neurological: Positive for numbness (left hand). Negative for weakness and headaches.   Psychiatric/Behavioral: Positive for sleep disturbance.  "Negative for suicidal ideas. The patient is not nervous/anxious.      Vitals:    11/06/19 1309   BP: 128/83   Pulse: 87   Resp: 18   Temp: 97.9 °F (36.6 °C)   SpO2: 96%   Weight: 113 kg (248 lb 12.8 oz)   Height: 160 cm (62.99\")   PainSc:   8   PainLoc: Shoulder     Objective   Physical Exam   Constitutional: She is oriented to person, place, and time. She appears well-developed and well-nourished. No distress.   HENT:   Head: Normocephalic and atraumatic.   Eyes: Conjunctivae and EOM are normal.   Neck: Neck supple.   Cardiovascular: Normal rate.   Pulmonary/Chest: Effort normal. No respiratory distress.   Musculoskeletal:        Right shoulder: She exhibits decreased range of motion, tenderness and deformity.        Left shoulder: She exhibits decreased range of motion and tenderness.        Right knee: Tenderness found.        Left knee: Tenderness found.        Cervical back: She exhibits tenderness and spasm.        Right hand: She exhibits tenderness and swelling.        Left hand: She exhibits tenderness and swelling.   Neurological: She is alert and oriented to person, place, and time. She has normal strength. No sensory deficit. Coordination and gait normal.   Skin: Skin is warm and dry.   Psychiatric: She has a normal mood and affect. Her behavior is normal.   Nursing note and vitals reviewed.    Assessment/Plan   Chastity was seen today for shoulder pain.    Diagnoses and all orders for this visit:    Chronic pain syndrome  -     ketorolac (TORADOL) injection 30 mg    Arthritis, multiple joint involvement    Neck pain  -     TENS (Transcutaneous Electrical Nerve Stimulator)    Encounter for monitoring opioid maintenance therapy    Other orders  -     cyclobenzaprine (FLEXERIL) 10 MG tablet; Take 1 tablet by mouth At Night As Needed for Muscle Spasms.      --- Refill Hydrocodone. Patient appears stable with current regimen. No adverse effects. Regarding continuation of opioids, there is no evidence of " aberrant behavior or any red flags.  The patient continues with appropriate response to opioid therapy. ADL's remain intact by self.   --- The urine drug screen confirmation from 9/10/19 has been reviewed and the result is appropriate based on patient history and GAYE report  --- Trial Flexeril hs. Discussed medication with the patient.  Included in this discussion was the potential for side effects and adverse events.  Patient verbalized understanding and wished to proceed.  Prescription will be sent to pharmacy.  --- TENS unit   --- Follow-up 2 months          GAYE REPORT  As part of the patient's treatment plan, I am prescribing controlled substances. The patient has been made aware of appropriate use of such medications, including potential risk of somnolence, limited ability to drive and/or work safely, and the potential for dependence or overdose. It has also bee made clear that these medications are for use by this patient only, without concomitant use of alcohol or other substances unless prescribed.     Patient has completed prescribing agreement detailing terms of continued prescribing of controlled substances, including monitoring GAYE reports, urine drug screening, and pill counts if necessary. The patient is aware that inappropriate use will results in cessation of prescribing such medications.    GAYE report has been reviewed and scanned into the patient's chart.    As the clinician, I personally reviewed the GAYE from 11/6/19 while the patient was in the office today.    History and physical exam exhibit continued safe and appropriate use of controlled substances.    EMR Dragon/Transcription disclaimer:   Much of this encounter note is an electronic transcription/translation of spoken language to printed text. The electronic translation of spoken language may permit erroneous, or at times, nonsensical words or phrases to be inadvertently transcribed; Although I have reviewed the note for  such errors, some may still exist.

## 2019-11-14 RX ORDER — HYDROCODONE BITARTRATE AND ACETAMINOPHEN 10; 325 MG/1; MG/1
1 TABLET ORAL
Qty: 150 TABLET | Refills: 0 | Status: SHIPPED | OUTPATIENT
Start: 2019-11-14 | End: 2019-12-12 | Stop reason: SDUPTHER

## 2019-11-14 NOTE — TELEPHONE ENCOUNTER
Medication Refill Request    Date of phone call: 19    Medication being requested: Norco  mg   si tab po five times a day  Qty: 150    Date of last visit: 19    Date of last refill: 10/20/19    GAYE up to date?: yes    Next Follow up?: 1/3/20    Any new pertinent information? (i.e, new medication allergies, new use of medications, change in patient's health or condition, non-compliance or inconsistency with prescribing agreement?):

## 2019-11-25 RX ORDER — LISINOPRIL 30 MG/1
TABLET ORAL
Qty: 90 TABLET | Refills: 1 | Status: SHIPPED | OUTPATIENT
Start: 2019-11-25 | End: 2020-05-04

## 2019-12-12 RX ORDER — HYDROCODONE BITARTRATE AND ACETAMINOPHEN 10; 325 MG/1; MG/1
1 TABLET ORAL
Qty: 150 TABLET | Refills: 0 | Status: SHIPPED | OUTPATIENT
Start: 2019-12-12 | End: 2020-01-15 | Stop reason: SDUPTHER

## 2019-12-12 NOTE — TELEPHONE ENCOUNTER
Medication Refill Request    Date of phone call: 19    Medication being requested: Norco  mg   si tab po Five times a day Prn   Qty: 150    Date of last visit: 19    Date of last refill: 19    GAYE up to date?: yes    Next Follow up?: 11/3/20    Any new pertinent information? (i.e, new medication allergies, new use of medications, change in patient's health or condition, non-compliance or inconsistency with prescribing agreement?):

## 2019-12-20 ENCOUNTER — TELEPHONE (OUTPATIENT)
Dept: INTERNAL MEDICINE | Facility: CLINIC | Age: 69
End: 2019-12-20

## 2019-12-20 RX ORDER — ONDANSETRON 4 MG/1
4 TABLET, FILM COATED ORAL EVERY 8 HOURS PRN
Qty: 10 TABLET | Refills: 0 | Status: SHIPPED | OUTPATIENT
Start: 2019-12-20 | End: 2020-01-28

## 2019-12-20 NOTE — TELEPHONE ENCOUNTER
Patient called stating that she has some kind of viral infection that has been going through her house.  Where her stomach is very upset, she is nauseated.  She feels like she will throw up.She is unable to eat anything.  She does not have a temperature and has a really bad headache.  She said that her daughter had the same thing a few days ago. She wanted to know since she has tried over the counter medication for her nausea that hasn't helped - if Dr. Dolan would prescribe something for her nausea.  Please send prescription to [facility]. Please advise.

## 2019-12-20 NOTE — TELEPHONE ENCOUNTER
Stop eating clear liquid diet 24 hours no milk no dairy 1 week Zofran 4 mg 1 every 8 hours for nausea #10

## 2020-01-15 ENCOUNTER — OFFICE VISIT (OUTPATIENT)
Dept: PAIN MEDICINE | Facility: CLINIC | Age: 70
End: 2020-01-15

## 2020-01-15 VITALS
DIASTOLIC BLOOD PRESSURE: 79 MMHG | HEART RATE: 81 BPM | SYSTOLIC BLOOD PRESSURE: 123 MMHG | OXYGEN SATURATION: 98 % | RESPIRATION RATE: 18 BRPM | BODY MASS INDEX: 42.59 KG/M2 | HEIGHT: 63 IN | WEIGHT: 240.4 LBS | TEMPERATURE: 98.6 F

## 2020-01-15 DIAGNOSIS — M19.011 OSTEOARTHRITIS OF BOTH SHOULDERS, UNSPECIFIED OSTEOARTHRITIS TYPE: ICD-10-CM

## 2020-01-15 DIAGNOSIS — Z51.81 ENCOUNTER FOR MONITORING OPIOID MAINTENANCE THERAPY: ICD-10-CM

## 2020-01-15 DIAGNOSIS — G89.4 CHRONIC PAIN SYNDROME: Primary | ICD-10-CM

## 2020-01-15 DIAGNOSIS — M54.2 NECK PAIN: ICD-10-CM

## 2020-01-15 DIAGNOSIS — M19.012 OSTEOARTHRITIS OF BOTH SHOULDERS, UNSPECIFIED OSTEOARTHRITIS TYPE: ICD-10-CM

## 2020-01-15 DIAGNOSIS — Z79.891 ENCOUNTER FOR MONITORING OPIOID MAINTENANCE THERAPY: ICD-10-CM

## 2020-01-15 PROCEDURE — 96372 THER/PROPH/DIAG INJ SC/IM: CPT | Performed by: NURSE PRACTITIONER

## 2020-01-15 PROCEDURE — 99214 OFFICE O/P EST MOD 30 MIN: CPT | Performed by: NURSE PRACTITIONER

## 2020-01-15 RX ORDER — HYDROCODONE BITARTRATE AND ACETAMINOPHEN 10; 325 MG/1; MG/1
1 TABLET ORAL
Qty: 150 TABLET | Refills: 0 | Status: SHIPPED | OUTPATIENT
Start: 2020-01-15 | End: 2020-02-10 | Stop reason: SDUPTHER

## 2020-01-15 RX ORDER — KETOROLAC TROMETHAMINE 30 MG/ML
30 INJECTION, SOLUTION INTRAMUSCULAR; INTRAVENOUS ONCE
Status: COMPLETED | OUTPATIENT
Start: 2020-01-15 | End: 2020-01-15

## 2020-01-15 RX ORDER — CYCLOBENZAPRINE HCL 10 MG
10 TABLET ORAL NIGHTLY PRN
Qty: 30 TABLET | Refills: 1 | Status: SHIPPED | OUTPATIENT
Start: 2020-01-15 | End: 2020-03-25

## 2020-01-15 RX ADMIN — KETOROLAC TROMETHAMINE 30 MG: 30 INJECTION, SOLUTION INTRAMUSCULAR; INTRAVENOUS at 15:11

## 2020-01-15 NOTE — PROGRESS NOTES
CHIEF COMPLAINT  Follow-up for shoulder pain. Ms. Salgado states that her pain is unchanged.    Subjective   Chastity Salgado is a 69 y.o. female  who presents to the office for follow-up.She has a history of shoulder pain, neck pain.    C/o diffuse joint pain (pain worse in left shoulder).  Pain today 8/10 in severity.  Taking Hydrocodone 10/325 5/day, Celebrex 200 mg daily.  Denies adverse reactions.  Taking Amitiza for constipation.  Reports at least moderate pain reduction with regimen reports that she would not be able to function without it.  Worsening muscle spasms at night, asks about hs muscle relaxant.       Rheumatologist - Dr. Lucero.  Saw recently, told not rheumatoid.  All OA, she has been released.      Counseling - Dr. Edison Vásquez - ortho.  Left shoulder injections every 90 days.  History of multiple right shoulder surgeries.  Also has given injections in right foot and right hip which she hurt after a fall, also came in to office for Toradol shot 6 weeks ago which she said also helped.       Neck pain worsening.  Numbness left hand. Declined MRI once it was authorized --- says she does not know if she can do it, asks about a CT scan.  Says she is claustrophobic.  Sent in Valium but has still not done. Says she is working with Dr. Hogan on her panic attacks.      Toradol IM every few months really help her overall inflammation.  reporting acute flare today.      Scheduled for complete hysterectomy and bladder sling 2/27/2020.     Joint Pain   This is a chronic problem. The current episode started more than 1 year ago. The problem occurs daily. The problem has been waxing and waning. Associated symptoms include arthralgias (bilateral shoulder), fatigue, neck pain, numbness and weakness (right arm). Pertinent negatives include no abdominal pain (after being off norco), chest pain, chills, congestion, coughing, fever, headaches, joint swelling (knees, right hip bursitis), nausea or  vomiting. Exacerbated by: activity. She has tried oral narcotics, acetaminophen, heat and NSAIDs for the symptoms. The treatment provided moderate relief.   Neck Pain    This is a chronic problem. The problem occurs daily. The problem has been waxing and waning. The pain is present in the anterior neck, left side and right side. The quality of the pain is described as aching and burning. The pain is at a severity of 8/10. The pain is moderate. Exacerbated by: movement of neck, use of arms/shoulders. Associated symptoms include numbness and weakness (right arm). Pertinent negatives include no chest pain, fever or headaches. She has tried NSAIDs, oral narcotics, muscle relaxants and neck support for the symptoms. The treatment provided moderate relief.     PEG Assessment   What number best describes your pain on average in the past week?8  What number best describes how, during the past week, pain has interfered with your enjoyment of life?8  What number best describes how, during the past week, pain has interfered with your general activity?  8    The following portions of the patient's history were reviewed and updated as appropriate: allergies, current medications, past family history, past medical history, past social history, past surgical history and problem list.    Review of Systems   Constitutional: Positive for fatigue. Negative for chills and fever.   HENT: Negative for congestion.    Eyes: Positive for visual disturbance.   Respiratory: Negative for cough, shortness of breath and wheezing.    Cardiovascular: Positive for leg swelling. Negative for chest pain and palpitations.   Gastrointestinal: Positive for constipation. Negative for abdominal pain (after being off norco), diarrhea, nausea and vomiting.   Genitourinary: Negative for difficulty urinating.   Musculoskeletal: Positive for arthralgias (bilateral shoulder) and neck pain. Negative for joint swelling (knees, right hip bursitis).   Neurological:  "Positive for weakness (right arm) and numbness. Negative for headaches.   Psychiatric/Behavioral: Positive for sleep disturbance. Negative for suicidal ideas. The patient is nervous/anxious.      Vitals:    01/15/20 1430   BP: 123/79   Pulse: 81   Resp: 18   Temp: 98.6 °F (37 °C)   SpO2: 98%   Weight: 109 kg (240 lb 6.4 oz)   Height: 160 cm (62.99\")   PainSc:   8   PainLoc: Shoulder     Objective   Physical Exam   Constitutional: She is oriented to person, place, and time. She appears well-developed and well-nourished. No distress.   HENT:   Head: Normocephalic and atraumatic.   Eyes: Conjunctivae and EOM are normal.   Neck: Neck supple.   Cardiovascular: Normal rate.   Pulmonary/Chest: Effort normal. No respiratory distress.   Musculoskeletal:        Right shoulder: She exhibits decreased range of motion, tenderness and deformity.        Left shoulder: She exhibits decreased range of motion and tenderness.        Right knee: Tenderness found.        Left knee: Tenderness found.        Cervical back: She exhibits tenderness and spasm.        Right hand: She exhibits tenderness and swelling.        Left hand: She exhibits tenderness and swelling.   Neurological: She is alert and oriented to person, place, and time. She has normal strength. No sensory deficit. Coordination and gait normal.   Skin: Skin is warm and dry.   Psychiatric: She has a normal mood and affect. Her behavior is normal.   Nursing note and vitals reviewed.    Assessment/Plan   Chastity was seen today for shoulder pain.    Diagnoses and all orders for this visit:    Chronic pain syndrome  -     ketorolac (TORADOL) injection 30 mg    Osteoarthritis of both shoulders, unspecified osteoarthritis type    Neck pain    Encounter for monitoring opioid maintenance therapy    Other orders  -     HYDROcodone-acetaminophen (NORCO)  MG per tablet; Take 1 tablet by mouth 5 (Five) Times a Day As Needed for Severe Pain . dnf 1/17/2020  -     cyclobenzaprine " (FLEXERIL) 10 MG tablet; Take 1 tablet by mouth At Night As Needed for Muscle Spasms.      --- Ok for acute post op pain management per surgeon  --- Refill Hydrocodone. Patient appears stable with current regimen. No adverse effects. Regarding continuation of opioids, there is no evidence of aberrant behavior or any red flags.  The patient continues with appropriate response to opioid therapy. ADL's remain intact by self.   --- The urine drug screen confirmation from 9/10/19 has been reviewed and the result is appropriate based on patient history and GAYE report  --- Compounded pain cream  --- Toradol IM today   --- Follow-up 2 months        GAYE REPORT  As part of the patient's treatment plan, I am prescribing controlled substances. The patient has been made aware of appropriate use of such medications, including potential risk of somnolence, limited ability to drive and/or work safely, and the potential for dependence or overdose. It has also bee made clear that these medications are for use by this patient only, without concomitant use of alcohol or other substances unless prescribed.     Patient has completed prescribing agreement detailing terms of continued prescribing of controlled substances, including monitoring GAYE reports, urine drug screening, and pill counts if necessary. The patient is aware that inappropriate use will results in cessation of prescribing such medications.    GAYE report has been reviewed and scanned into the patient's chart.    As the clinician, I personally reviewed the GAYE from 1/15/2020 while the patient was in the office today.    History and physical exam exhibit continued safe and appropriate use of controlled substances.    EMR Dragon/Transcription disclaimer:   Much of this encounter note is an electronic transcription/translation of spoken language to printed text. The electronic translation of spoken language may permit erroneous, or at times, nonsensical words or  phrases to be inadvertently transcribed; Although I have reviewed the note for such errors, some may still exist.

## 2020-01-15 NOTE — PATIENT INSTRUCTIONS
Cubital Tunnel Syndrome    Cubital tunnel syndrome is a condition that causes pain and weakness of the forearm and hand. It happens when one of the nerves that runs along the inside of the elbow joint (ulnar nerve) becomes irritated. This condition is usually caused by repeated arm motions that are done during sports or work-related activities.  What are the causes?  This condition may be caused by:  · Increased pressure on the ulnar nerve at the elbow, arm, or forearm. This can result from:  ? Irritation caused by repeated elbow bending.  ? Poorly healed elbow fractures.  ? Tumors in the elbow. These are usually noncancerous (benign).  ? Scar tissue that develops in the elbow after an injury.  ? Bony growths (spurs) near the ulnar nerve.  · Stretching of the nerve due to loose elbow ligaments.  · Trauma to the nerve at the elbow.  What increases the risk?  The following factors may make you more likely to develop this condition:  · Doing manual labor that requires frequent bending of the elbow.  · Playing sports that include repeated or strenuous throwing motions, such as baseball.  · Playing contact sports, such as football or lacrosse.  · Not warming up properly before activities.  · Having diabetes.  · Having an underactive thyroid (hypothyroidism).  What are the signs or symptoms?  Symptoms of this condition include:  · Clumsiness or weakness of the hand.  · Tenderness of the inner elbow.  · Aching or soreness of the inner elbow, forearm, or fingers, especially the little finger or the ring finger.  · Increased pain when forcing the elbow to bend.  · Reduced control when throwing objects.  · Tingling, numbness, or a burning feeling inside the forearm or in part of the hand or fingers, especially the little finger or the ring finger.  · Sharp pains that shoot from the elbow down to the wrist and hand.  · The inability to  or pinch hard.  How is this diagnosed?  This condition is diagnosed based on:  · Your  symptoms and medical history. Your health care provider will also ask for details about any injury.  · A physical exam.  You may also have tests, including:  · Electromyogram (EMG). This test measures electrical signals sent by your nerves into the muscles.  · Nerve conduction study. This test measures how well electrical signals pass through your nerves.  · Imaging tests, such as X-rays, ultrasound, and MRI. These tests check for possible causes of your condition.  How is this treated?  This condition may be treated by:  · Stopping the activities that are causing your symptoms to get worse.  · Icing and taking medicines to reduce pain and swelling.  · Wearing a splint to prevent your elbow from bending, or wearing an elbow pad where the ulnar nerve is closest to the skin.  · Working with a physical therapist in less severe cases. This may help to:  ? Decrease your symptoms.  ? Improve the strength and range of motion of your elbow, forearm, and hand.  If these treatments do not help, surgery may be needed.  Follow these instructions at home:  If you have a splint:  · Wear the splint as told by your health care provider. Remove it only as told by your health care provider.  · Loosen the splint if your fingers tingle, become numb, or turn cold and blue.  · Keep the splint clean.  · If the splint is not waterproof:  ? Do not let it get wet.  ? Cover it with a watertight covering when you take a bath or shower.  Managing pain, stiffness, and swelling    · If directed, put ice on the injured area:  ? Put ice in a plastic bag.  ? Place a towel between your skin and the bag.  ? Leave the ice on for 20 minutes, 2-3 times a day.  · Move your fingers often to avoid stiffness and to lessen swelling.  · Raise (elevate) the injured area above the level of your heart while you are sitting or lying down.  General instructions  · Take over-the-counter and prescription medicines only as told by your health care provider.  · Do any  exercise or physical therapy as told by your health care provider.  · Do not drive or use heavy machinery while taking prescription pain medicine.  · If you were given an elbow pad, wear it as told by your health care provider.  · Keep all follow-up visits as told by your health care provider. This is important.  Contact a health care provider if:  · Your symptoms get worse.  · Your symptoms do not get better with treatment.  · You have new pain.  · Your hand on the injured side feels numb or cold.  Summary  · Cubital tunnel syndrome is a condition that causes pain and weakness of the forearm and hand.  · You are more likely to develop this condition if you do work or play sports that involve repeated arm movements.  · This condition is often treated by stopping repetitive activities, applying ice, and using anti-inflammatory medicines.  · In rare cases, surgery may be needed.  This information is not intended to replace advice given to you by your health care provider. Make sure you discuss any questions you have with your health care provider.  Document Released: 12/18/2006 Document Revised: 05/06/2019 Document Reviewed: 05/06/2019  [employer] Interactive Patient Education © 2019 [employer]

## 2020-01-21 ENCOUNTER — PRIOR AUTHORIZATION (OUTPATIENT)
Dept: PAIN MEDICINE | Facility: CLINIC | Age: 70
End: 2020-01-21

## 2020-01-21 NOTE — TELEPHONE ENCOUNTER
Chastity Salgado (Milian: [id number]) - [id number]  Cyclobenzaprine HCl 10MG tablets  Status: PA Response - Approved  Created: January 15th, 2020 [phone]  Sent: January 17th, 2020  Open  Archive

## 2020-01-28 ENCOUNTER — OFFICE VISIT (OUTPATIENT)
Dept: INTERNAL MEDICINE | Facility: CLINIC | Age: 70
End: 2020-01-28

## 2020-01-28 VITALS
HEART RATE: 90 BPM | SYSTOLIC BLOOD PRESSURE: 122 MMHG | DIASTOLIC BLOOD PRESSURE: 76 MMHG | TEMPERATURE: 98.4 F | WEIGHT: 242.6 LBS | HEIGHT: 63 IN | OXYGEN SATURATION: 98 % | BODY MASS INDEX: 42.98 KG/M2

## 2020-01-28 DIAGNOSIS — H53.9 VISION CHANGES: Primary | ICD-10-CM

## 2020-01-28 DIAGNOSIS — L03.115 CELLULITIS OF RIGHT LOWER EXTREMITY: ICD-10-CM

## 2020-01-28 PROCEDURE — 99213 OFFICE O/P EST LOW 20 MIN: CPT | Performed by: FAMILY MEDICINE

## 2020-01-28 RX ORDER — METRONIDAZOLE 500 MG/1
TABLET ORAL
COMMUNITY
Start: 2020-01-16 | End: 2020-08-25

## 2020-01-28 RX ORDER — CEPHALEXIN 500 MG/1
500 CAPSULE ORAL 2 TIMES DAILY
Qty: 20 CAPSULE | Refills: 0 | Status: SHIPPED | OUTPATIENT
Start: 2020-01-28 | End: 2020-08-25

## 2020-01-28 NOTE — PROGRESS NOTES
"Chastity Salgado is a 69 y.o. female.      Assessment/Plan   Problem List Items Addressed This Visit        Other    Vision changes - Primary    Relevant Orders    Ambulatory Referral to Ophthalmology    Cellulitis of right lower extremity         Keflex    Return if symptoms worsen or fail to improve, for Recheck.      Chief Complaint   Patient presents with   • bilateral leg swelling/reddness     Social History     Tobacco Use   • Smoking status: Never Smoker   • Smokeless tobacco: Never Used   Substance Use Topics   • Alcohol use: Yes     Comment: social alcohol use   • Drug use: No       History of Present Illness   Acute problem visit patient developed some redness in the anterior right lower leg for the last 2 days no specific trauma or injury no fever some chronic intermittent low back pain with some radiating quality also into the right leg  The following portions of the patient's history were reviewed and updated as appropriate:PMHroutine: Social history , Allergies, Current Medications, Active Problem List and Health Maintenance    Review of Systems   Constitutional: Negative.    HENT: Negative.    Eyes: Positive for visual disturbance.   Respiratory: Negative.    Gastrointestinal: Negative.    Musculoskeletal: Positive for arthralgias.   Hematological: Negative.        Objective   Vitals:    01/28/20 1437   BP: 122/76   BP Location: Left arm   Patient Position: Sitting   Cuff Size: Large Adult   Pulse: 90   Temp: 98.4 °F (36.9 °C)   TempSrc: Oral   SpO2: 98%   Weight: 110 kg (242 lb 9.6 oz)   Height: 160 cm (62.99\")     Body mass index is 42.99 kg/m².  Physical Exam   Constitutional: She appears well-developed and well-nourished.   HENT:   Head: Normocephalic and atraumatic.   Cardiovascular: Normal rate.   Pulmonary/Chest: Effort normal and breath sounds normal.   Abdominal: Soft. There is no tenderness.   Musculoskeletal: She exhibits tenderness. She exhibits no edema.   Anterior lower leg erythema no " specific open wound size approximately 8 x 8 cm   Psychiatric: She has a normal mood and affect. Her behavior is normal. Judgment and thought content normal.   Nursing note and vitals reviewed.    Reviewed Data:  No visits with results within 1 Month(s) from this visit.   Latest known visit with results is:   Office Visit on 09/13/2019   Component Date Value Ref Range Status   • Color 09/13/2019 Yellow  Yellow, Straw, Dark Yellow, Sheyla Final   • Clarity, UA 09/13/2019 Clear  Clear Final   • Specific Gravity  09/13/2019 1.030  1.005 - 1.030 Final   • pH, Urine 09/13/2019 6.0  5.0 - 8.0 Final   • Leukocytes 09/13/2019 Negative  Negative Final   • Nitrite, UA 09/13/2019 Negative  Negative Final   • Protein, POC 09/13/2019 3+* Negative mg/dL Final   • Glucose, UA 09/13/2019 Negative  Negative, 1000 mg/dL (3+) mg/dL Final   • Ketones, UA 09/13/2019 Negative  Negative Final   • Urobilinogen, UA 09/13/2019 Normal  Normal Final   • Bilirubin 09/13/2019 Negative  Negative Final   • Blood, UA 09/13/2019 3+* Negative Final   • Glucose 09/13/2019 96  65 - 99 mg/dL Final   • BUN 09/13/2019 13  8 - 23 mg/dL Final   • Creatinine 09/13/2019 0.65  0.57 - 1.00 mg/dL Final   • Sodium 09/13/2019 138  136 - 145 mmol/L Final   • Potassium 09/13/2019 3.9  3.5 - 5.2 mmol/L Final   • Chloride 09/13/2019 99  98 - 107 mmol/L Final   • CO2 09/13/2019 26.7  22.0 - 29.0 mmol/L Final   • Calcium 09/13/2019 9.6  8.6 - 10.5 mg/dL Final   • Total Protein 09/13/2019 8.0  6.0 - 8.5 g/dL Final   • Albumin 09/13/2019 4.20  3.50 - 5.20 g/dL Final   • ALT (SGPT) 09/13/2019 14  1 - 33 U/L Final   • AST (SGOT) 09/13/2019 20  1 - 32 U/L Final   • Alkaline Phosphatase 09/13/2019 73  39 - 117 U/L Final   • Total Bilirubin 09/13/2019 0.3  0.2 - 1.2 mg/dL Final   • eGFR Non African Amer 09/13/2019 91  >60 mL/min/1.73 Final   • Globulin 09/13/2019 3.8  gm/dL Final   • A/G Ratio 09/13/2019 1.1  g/dL Final   • BUN/Creatinine Ratio 09/13/2019 20.0  7.0 - 25.0 Final    • Anion Gap 09/13/2019 12.3  5.0 - 15.0 mmol/L Final   • WBC 09/13/2019 6.19  3.40 - 10.80 10*3/mm3 Final   • RBC 09/13/2019 4.10  3.77 - 5.28 10*6/mm3 Final   • Hemoglobin 09/13/2019 12.7  12.0 - 15.9 g/dL Final   • Hematocrit 09/13/2019 39.9  34.0 - 46.6 % Final   • MCV 09/13/2019 97.3* 79.0 - 97.0 fL Final   • MCH 09/13/2019 31.0  26.6 - 33.0 pg Final   • MCHC 09/13/2019 31.8  31.5 - 35.7 g/dL Final   • RDW 09/13/2019 13.1  12.3 - 15.4 % Final   • RDW-SD 09/13/2019 46.9  37.0 - 54.0 fl Final   • MPV 09/13/2019 9.3  6.0 - 12.0 fL Final   • Platelets 09/13/2019 218  140 - 450 10*3/mm3 Final   • Neutrophil % 09/13/2019 58.6  42.7 - 76.0 % Final   • Lymphocyte % 09/13/2019 31.0  19.6 - 45.3 % Final   • Monocyte % 09/13/2019 6.5  5.0 - 12.0 % Final   • Eosinophil % 09/13/2019 3.2  0.3 - 6.2 % Final   • Basophil % 09/13/2019 0.5  0.0 - 1.5 % Final   • Immature Grans % 09/13/2019 0.2  0.0 - 0.5 % Final   • Neutrophils, Absolute 09/13/2019 3.63  1.70 - 7.00 10*3/mm3 Final   • Lymphocytes, Absolute 09/13/2019 1.92  0.70 - 3.10 10*3/mm3 Final   • Monocytes, Absolute 09/13/2019 0.40  0.10 - 0.90 10*3/mm3 Final   • Eosinophils, Absolute 09/13/2019 0.20  0.00 - 0.40 10*3/mm3 Final   • Basophils, Absolute 09/13/2019 0.03  0.00 - 0.20 10*3/mm3 Final   • Immature Grans, Absolute 09/13/2019 0.01  0.00 - 0.05 10*3/mm3 Final   • nRBC 09/13/2019 0.0  0.0 - 0.2 /100 WBC Final   • Urine Culture 09/13/2019 Final report   Final   • Result 1 09/13/2019 Comment   Final    Comment: Culture shows less than 10,000 colony forming units of bacteria per  milliliter of urine. This colony count is not generally considered  to be clinically significant.

## 2020-02-10 NOTE — TELEPHONE ENCOUNTER
Medication Refill Request    Date of phone call: 2/10/20    Medication being requested: norco 10/325mg si tab po 5 times a day prn   Qty: 150    Date of last visit: 1/15/20    Date of last refill: 20    GAYE up to date?: yes    Next Follow up?: 3/11/20    Any new pertinent information? (i.e, new medication allergies, new use of medications, change in patient's health or condition, non-compliance or inconsistency with prescribing agreement?):

## 2020-02-14 RX ORDER — HYDROCODONE BITARTRATE AND ACETAMINOPHEN 10; 325 MG/1; MG/1
1 TABLET ORAL
Qty: 150 TABLET | Refills: 0 | Status: SHIPPED | OUTPATIENT
Start: 2020-02-14 | End: 2020-03-11 | Stop reason: SDUPTHER

## 2020-03-11 ENCOUNTER — OFFICE VISIT (OUTPATIENT)
Dept: PAIN MEDICINE | Facility: CLINIC | Age: 70
End: 2020-03-11

## 2020-03-11 VITALS
OXYGEN SATURATION: 96 % | DIASTOLIC BLOOD PRESSURE: 87 MMHG | HEART RATE: 80 BPM | RESPIRATION RATE: 20 BRPM | SYSTOLIC BLOOD PRESSURE: 150 MMHG | HEIGHT: 63 IN | WEIGHT: 238.2 LBS | BODY MASS INDEX: 42.21 KG/M2 | TEMPERATURE: 98.5 F

## 2020-03-11 DIAGNOSIS — Z51.81 ENCOUNTER FOR MONITORING OPIOID MAINTENANCE THERAPY: ICD-10-CM

## 2020-03-11 DIAGNOSIS — M19.012 OSTEOARTHRITIS OF BOTH SHOULDERS, UNSPECIFIED OSTEOARTHRITIS TYPE: ICD-10-CM

## 2020-03-11 DIAGNOSIS — G89.4 CHRONIC PAIN SYNDROME: Primary | ICD-10-CM

## 2020-03-11 DIAGNOSIS — M19.011 OSTEOARTHRITIS OF BOTH SHOULDERS, UNSPECIFIED OSTEOARTHRITIS TYPE: ICD-10-CM

## 2020-03-11 DIAGNOSIS — G89.29 CHRONIC RIGHT SHOULDER PAIN: ICD-10-CM

## 2020-03-11 DIAGNOSIS — M25.511 CHRONIC RIGHT SHOULDER PAIN: ICD-10-CM

## 2020-03-11 DIAGNOSIS — Z79.891 ENCOUNTER FOR MONITORING OPIOID MAINTENANCE THERAPY: ICD-10-CM

## 2020-03-11 PROCEDURE — 99214 OFFICE O/P EST MOD 30 MIN: CPT | Performed by: NURSE PRACTITIONER

## 2020-03-11 RX ORDER — FLUTICASONE PROPIONATE 50 MCG
1 SPRAY, SUSPENSION (ML) NASAL
COMMUNITY
Start: 2019-10-15

## 2020-03-11 RX ORDER — VENLAFAXINE HYDROCHLORIDE 75 MG/1
75 CAPSULE, EXTENDED RELEASE ORAL DAILY
COMMUNITY
Start: 2019-09-20 | End: 2020-08-25

## 2020-03-11 RX ORDER — HYDROCODONE BITARTRATE AND ACETAMINOPHEN 10; 325 MG/1; MG/1
1 TABLET ORAL
Qty: 150 TABLET | Refills: 0 | Status: SHIPPED | OUTPATIENT
Start: 2020-03-11 | End: 2020-04-08 | Stop reason: SDUPTHER

## 2020-03-11 RX ORDER — IBUPROFEN 800 MG/1
TABLET ORAL
COMMUNITY
Start: 2020-02-28 | End: 2020-08-25

## 2020-03-11 NOTE — PROGRESS NOTES
"CHIEF COMPLAINT  F/u left shoulder pain. Pt sts pain has improved since 2/19/20. Pt sts she had a cortisone inj in left shoulder on 2/19/20 by Dr. Tu Vásquez ortho.   Initial evaluation by Kate GLEZ  Subjective   Chastity Salgado is a 70 y.o. female  who presents to the office for follow-up.She has a history of chronic shoulder pain. Reports her pain is improved since last office visit. Had a left shoulder injection with Dr Tu Vásquez on 2-19-20. \"he will do replacement whenever I am ready.\" Reports she is putting this off because her right arm has significant decreased ROM and she worries she would unable to be performed toileting, self-care. Also history of MRSA infections.     She is 2 weeks post-op from hysterectomy and bladder sling with Dr Faria and Dr Kumar.  Is still having issues with stamina.    Complains of pain in her joints, especially her left shoulder(this is improved since cortisone injection). Today her pain is 6/10VAS. Pain increases with activity, household chores; pain decreases with medication and rest. Continues with Hydrocodone 10/325 5/day, Flexeril 10 mg PRN. Had to be off Celebrex for 2 weeks pre-op. Has re-started this after surgery.   Denies any side effects from the regimen except constipation. Takes stool softeners OTC with positive results. The regimen helps decrease her pain by 50-70%. ADL's by self.     Rheumatologist - Dr. Lucero.  Saw recently, told not rheumatoid.  All OA, she has been released.      Counseling - Dr. Hogan. Has not seen since December due to other medical issues. Wants to return, but finances are a concern.       Dr. Vásquez - ortho.  Left shoulder injections every 90 days.  History of multiple right shoulder surgeries.  Also has given injections in right foot and right hip which she hurt after a fall.  Joint Pain   This is a chronic problem. The current episode started more than 1 year ago. The problem occurs daily. The problem has been waxing and " waning (left shoulder pain improved since last office visit). Associated symptoms include arthralgias (left shoulder), fatigue, joint swelling (bilat shoulders), neck pain (occ), numbness (left hand fingers, left arm) and weakness (left shoulder). Pertinent negatives include no abdominal pain (after being off norco), chest pain, chills, congestion, coughing, fever, headaches, nausea or vomiting. Exacerbated by: activity. She has tried oral narcotics, acetaminophen, heat and NSAIDs for the symptoms. The treatment provided moderate relief.   Neck Pain    This is a chronic problem. The problem occurs daily. The problem has been waxing and waning. The pain is present in the anterior neck, left side and right side. The quality of the pain is described as aching and burning. The pain is at a severity of 7/10. The pain is moderate. Exacerbated by: movement of neck, use of arms/shoulders. Associated symptoms include numbness (left hand fingers, left arm) and weakness (left shoulder). Pertinent negatives include no chest pain, fever or headaches. She has tried NSAIDs, oral narcotics, muscle relaxants and neck support for the symptoms. The treatment provided moderate relief.      PEG Assessment   What number best describes your pain on average in the past week?7  What number best describes how, during the past week, pain has interfered with your enjoyment of life?7  What number best describes how, during the past week, pain has interfered with your general activity?  7    The following portions of the patient's history were reviewed and updated as appropriate: allergies, current medications, past family history, past medical history, past social history, past surgical history and problem list.    Review of Systems   Constitutional: Positive for activity change (dec) and fatigue. Negative for chills and fever.   HENT: Negative for congestion.    Eyes: Positive for visual disturbance (pt sts seeing spots and lights sometimes).  "  Respiratory: Negative for cough, shortness of breath and wheezing.    Cardiovascular: Negative for chest pain, palpitations and leg swelling.   Gastrointestinal: Positive for constipation (occ recent surgery). Negative for abdominal pain (after being off norco), diarrhea, nausea and vomiting.   Genitourinary: Negative for difficulty urinating.   Musculoskeletal: Positive for arthralgias (left shoulder), joint swelling (bilat shoulders) and neck pain (occ). Negative for neck stiffness.   Neurological: Positive for weakness (left shoulder) and numbness (left hand fingers, left arm). Negative for dizziness, light-headedness and headaches.   Psychiatric/Behavioral: Positive for sleep disturbance (occ). Negative for agitation, self-injury and suicidal ideas. The patient is not nervous/anxious.        Vitals:    03/11/20 1127   BP: 150/87  Comment: pt sts did not have her bp meds today   Pulse: 80   Resp: 20   Temp: 98.5 °F (36.9 °C)   SpO2: 96%   Weight: 108 kg (238 lb 3.2 oz)   Height: 160 cm (62.99\")   PainSc:   6   PainLoc: Shoulder  Comment: left     Objective   Physical Exam   Constitutional: She is oriented to person, place, and time. Vital signs are normal. She appears well-developed and well-nourished. She is cooperative.   HENT:   Head: Normocephalic and atraumatic.   Nose: Nose normal.   Eyes: Conjunctivae and lids are normal.   Neck: Trachea normal. Neck supple. Decreased range of motion present.   Cardiovascular: Normal rate.   Pulmonary/Chest: Effort normal.   Abdominal: Normal appearance.   Musculoskeletal:        Right shoulder: She exhibits tenderness.        Left shoulder: She exhibits tenderness.   Widespread OA changes with no acute synovitis  Guarding RUE   Neurological: She is alert and oriented to person, place, and time. Gait normal.   Reflex Scores:       Bicep reflexes are 0 on the right side and 1+ on the left side.       Brachioradialis reflexes are 0 on the right side and 1+ on the left " side.       Patellar reflexes are 1+ on the right side and 1+ on the left side.  Skin: Skin is warm, dry and intact.   Psychiatric: She has a normal mood and affect. Her speech is normal and behavior is normal. Judgment and thought content normal. Cognition and memory are normal.   Nursing note and vitals reviewed.      Assessment/Plan   Chastity was seen today for shoulder pain.    Diagnoses and all orders for this visit:    Chronic pain syndrome    Chronic right shoulder pain    Osteoarthritis of both shoulders, unspecified osteoarthritis type    Encounter for monitoring opioid maintenance therapy    Other orders  -     HYDROcodone-acetaminophen (NORCO)  MG per tablet; Take 1 tablet by mouth 5 (Five) Times a Day As Needed for Severe Pain . dnf 3-15-20      --- Routine oral drug screen in office today as part of monitoring requirements for controlled substances.  Patient unable to void. This specimen will be sent to [facility] laboratory for confirmation.  Concerned for recent surgery and medications given while there. Last dose of Hydrocodone was last night at approximately 0300.   --- Refill Hydrocodone with DNF> Patient appears stable with current regimen. No adverse effects. Regarding continuation of opioids, there is no evidence of aberrant behavior or any red flags.  The patient continues with appropriate response to opioid therapy. ADL's remain intact by self.   --- Follow-up 2 months or sooner if needed.     GAYE REPORT    As part of the patient's treatment plan, I am prescribing controlled substances. The patient has been made aware of appropriate use of such medications, including potential risk of somnolence, limited ability to drive and/or work safely, and the potential for dependence or overdose. It has also bee made clear that these medications are for use by this patient only, without concomitant use of alcohol or other substances unless prescribed.     Patient has completed prescribing  agreement detailing terms of continued prescribing of controlled substances, including monitoring GAYE reports, urine drug screening, and pill counts if necessary. The patient is aware that inappropriate use will results in cessation of prescribing such medications.    GAYE report has been reviewed and scanned into the patient's chart.    As the clinician, I personally reviewed the GAYE from 3-10-20 while the patient was in the office today.    History and physical exam exhibit continued safe and appropriate use of controlled substances.      EMR Dragon/Transcription disclaimer:   Much of this encounter note is an electronic transcription/translation of spoken language to printed text. The electronic translation of spoken language may permit erroneous, or at times, nonsensical words or phrases to be inadvertently transcribed; Although I have reviewed the note for such errors, some may still exist.

## 2020-03-25 RX ORDER — CYCLOBENZAPRINE HCL 10 MG
TABLET ORAL
Qty: 30 TABLET | Refills: 2 | Status: SHIPPED | OUTPATIENT
Start: 2020-03-25 | End: 2020-07-09 | Stop reason: SDUPTHER

## 2020-04-08 RX ORDER — HYDROCODONE BITARTRATE AND ACETAMINOPHEN 10; 325 MG/1; MG/1
1 TABLET ORAL
Qty: 150 TABLET | Refills: 0 | Status: SHIPPED | OUTPATIENT
Start: 2020-04-08 | End: 2020-05-12 | Stop reason: SDUPTHER

## 2020-04-08 NOTE — TELEPHONE ENCOUNTER
Medication Refill Request    Date of phone call: 20    Medication being requested: norco 10/325mg si tab po 5 times a day prn   Qty: 150    Date of last visit: 3/11/20    Date of last refill: 3/15/20    GAYE up to date?: yes    Next Follow up?: 20    Any new pertinent information? (i.e, new medication allergies, new use of medications, change in patient's health or condition, non-compliance or inconsistency with prescribing agreement?):

## 2020-05-04 RX ORDER — LISINOPRIL 30 MG/1
TABLET ORAL
Qty: 90 TABLET | Refills: 0 | Status: SHIPPED | OUTPATIENT
Start: 2020-05-04 | End: 2020-08-10

## 2020-05-05 ENCOUNTER — TELEPHONE (OUTPATIENT)
Dept: INTERNAL MEDICINE | Facility: CLINIC | Age: 70
End: 2020-05-05

## 2020-05-12 ENCOUNTER — TELEMEDICINE (OUTPATIENT)
Dept: PAIN MEDICINE | Facility: CLINIC | Age: 70
End: 2020-05-12

## 2020-05-12 DIAGNOSIS — G89.4 CHRONIC PAIN SYNDROME: Primary | ICD-10-CM

## 2020-05-12 DIAGNOSIS — M19.012 OSTEOARTHRITIS OF BOTH SHOULDERS, UNSPECIFIED OSTEOARTHRITIS TYPE: ICD-10-CM

## 2020-05-12 DIAGNOSIS — M19.011 OSTEOARTHRITIS OF BOTH SHOULDERS, UNSPECIFIED OSTEOARTHRITIS TYPE: ICD-10-CM

## 2020-05-12 DIAGNOSIS — M12.9 ARTHRITIS, MULTIPLE JOINT INVOLVEMENT: ICD-10-CM

## 2020-05-12 DIAGNOSIS — M54.2 NECK PAIN: ICD-10-CM

## 2020-05-12 DIAGNOSIS — Z51.81 ENCOUNTER FOR MONITORING OPIOID MAINTENANCE THERAPY: ICD-10-CM

## 2020-05-12 DIAGNOSIS — Z79.891 ENCOUNTER FOR MONITORING OPIOID MAINTENANCE THERAPY: ICD-10-CM

## 2020-05-12 PROCEDURE — 99214 OFFICE O/P EST MOD 30 MIN: CPT | Performed by: NURSE PRACTITIONER

## 2020-05-12 RX ORDER — HYDROCODONE BITARTRATE AND ACETAMINOPHEN 10; 325 MG/1; MG/1
1 TABLET ORAL
Qty: 150 TABLET | Refills: 0 | Status: SHIPPED | OUTPATIENT
Start: 2020-05-12 | End: 2020-06-08 | Stop reason: SDUPTHER

## 2020-05-12 NOTE — PROGRESS NOTES
"TELEMEDICINE - VIDEO VISIT    You have chosen to receive care through a telehealth visit.  Do you consent to use a video/audio connection for your medical care today? Yes      CHIEF COMPLAINT  Shoulder and neck pain    Subjective   Chastity Salgado is a 70 y.o. female  who presents for a video visit follow-up.She has a history of chronic shoulder and neck pain. Reports her pain is WORSE since last office visit. Reports her pain is worse but she associates this with weather changes. HAs been having increased pain in right shoulder. Seeing orthopedist on 5-20-20 for planned left shoulder injection, but also wants xray of right shoulder. \"I also think i'm bad these last 2 weeks because the last cortisone injection wore off.\"     Complains of pain in her neck and shoulders(right worse than left), joints. Today her pain is 7-8/10VAS.  Describes her pain as Continuous aching and throbbing.  Continues with Hydrocodone 10/325 5/day, Flexeril 10 mg PRN.Also has re-started Celebrex 200 mg daily since surgery.   Denies any side effects from the regimen except constipation. Takes stool softeners OTC with positive results. The regimen helps decrease her pain by 25-50%. ADL's by self.     Had a left shoulder injection with Dr Tu Vásquez on 2-19-20.  Also history of MRSA infections.     Rheumatologist - Dr. Lucero.  Saw recently, told not rheumatoid.  All OA, she has been released.      Counseling - Dr. Hogan. Has not seen since December due to other medical issues. Wants to return, but finances are a concern.       Dr. Vásquez - ortho.  Left shoulder injections every 90 days.  History of multiple right shoulder surgeries.  Also has given injections in right foot and right hip which she hurt after a fall.  Joint Pain   This is a chronic problem. The current episode started more than 1 year ago. The problem occurs daily. The problem has been waxing and waning (shoulder pain worse since last office visit). Associated symptoms " include arthralgias (left shoulder), fatigue, joint swelling (bilat shoulders), neck pain (occ), numbness (left hand fingers, left arm) and weakness (left shoulder). Pertinent negatives include no abdominal pain (after being off norco), chest pain, chills, congestion, coughing, fever, headaches, nausea or vomiting. Exacerbated by: activity. She has tried oral narcotics, acetaminophen, heat and NSAIDs for the symptoms. The treatment provided moderate relief.   Neck Pain    This is a chronic problem. The problem occurs daily. The problem has been waxing and waning (unchanged from last office visit). The pain is present in the anterior neck, left side and right side. The quality of the pain is described as aching and burning. The pain is at a severity of 7/10. The pain is moderate. Exacerbated by: movement of neck, use of arms/shoulders. Associated symptoms include numbness (left hand fingers, left arm) and weakness (left shoulder). Pertinent negatives include no chest pain, fever or headaches. She has tried NSAIDs, oral narcotics, muscle relaxants and neck support for the symptoms. The treatment provided moderate relief.        The following portions of the patient's history were reviewed and updated as appropriate: allergies, current medications, past family history, past medical history, past social history, past surgical history and problem list.    Review of Systems   Constitutional: Positive for fatigue. Negative for chills and fever.   HENT: Negative for congestion.    Respiratory: Negative for cough.    Cardiovascular: Negative for chest pain.   Gastrointestinal: Negative for abdominal pain (after being off norco), nausea and vomiting.   Musculoskeletal: Positive for arthralgias (left shoulder), joint swelling (bilat shoulders) and neck pain (occ).   Neurological: Positive for weakness (left shoulder) and numbness (left hand fingers, left arm). Negative for headaches.   Psychiatric/Behavioral: Positive for  sleep disturbance. The patient is nervous/anxious.      There were no vitals filed for this visit.    Objective   Physical Exam   Constitutional: She is oriented to person, place, and time.   Pulmonary/Chest: Effort normal.   Neurological: She is alert and oriented to person, place, and time.   Psychiatric: She has a normal mood and affect. Her speech is normal and behavior is normal. Judgment and thought content normal. Cognition and memory are normal.       Assessment/Plan   Diagnoses and all orders for this visit:    Chronic pain syndrome    Arthritis, multiple joint involvement    Osteoarthritis of both shoulders, unspecified osteoarthritis type    Encounter for monitoring opioid maintenance therapy    Neck pain    Other orders  -     HYDROcodone-acetaminophen (NORCO)  MG per tablet; Take 1 tablet by mouth 5 (Five) Times a Day As Needed for Severe Pain . dnf 5-14-20      ----------------    Our practice is offering alternative &/or electronic methods to continue to follow our patients while at the same time further the efforts toward social distancing, in accordance with our organizational policies, professional societies' guidance, and gubernatorial mandates.  I support the Healthy at Home campaign and in this visit I have counseled the patient on our needs to limit in-person office visits and physical encounters with medical facilities whenever possible.  I have also educated the patient on the medical necessities of maintaining social distancing while we continue to function during this crisis period.      The patient was counseled on the need to consider telehealth options. The patient was offered the opportunity for a Video Visit using [employer]. The patient agreed to a Video Visit. The patient was counseled on the need for a check-in visit. The patient was educated about our efforts to comply with monitoring standards when prescribing potent medications.    TIME:  Total Time:  12 minutes. Topics  discussed are outlined in the Assessment/Plan section of the note.    ----------------    --- The oral drug screen confirmation from 3-11-20 has been reviewed and the result is APPROPRIATE based on patient history and GAYE report  --- Refill Hydrocodone. Patient appears stable with current regimen. No adverse effects. Regarding continuation of opioids, there is no evidence of aberrant behavior or any red flags.  The patient continues with appropriate response to opioid therapy. ADL's remain intact by self.   --- Toradol injection as needed.  --- Continue with other specialists as planned.  --- Follow-up 2 months or sooner if needed.    Unable to complete visit using a video connection to the patient. A phone visit was used to complete this visits. Total time of discussion was 12 minutes. Unable to establish video connection but talked through [facility] [place].         GAYE REPORT    As part of the patient's treatment plan, I am prescribing controlled substances. The patient has been made aware of appropriate use of such medications, including potential risk of somnolence, limited ability to drive and/or work safely, and the potential for dependence or overdose. It has also been made clear that these medications are for use by this patient only, without concomitant use of alcohol or other substances unless prescribed.     Patient has completed prescribing agreement detailing terms of continued prescribing of controlled substances, including monitoring GAYE reports, urine drug screening, and pill counts if necessary. The patient is aware that inappropriate use will results in cessation of prescribing such medications.    GAYE report has been reviewed and scanned into the patient's chart.    As the clinician, I personally reviewed the GAYE from 5-11-20 while the patient was on the telemedicine visit today.    History and physical exam exhibit continued safe and appropriate use of controlled  substances.    -------    EMR Dragon/Transcription disclaimer:   Much of this encounter note is an electronic transcription/translation of spoken language to printed text. The electronic translation of spoken language may permit erroneous, or at times, nonsensical words or phrases to be inadvertently transcribed; Although I have reviewed the note for such errors, some may still exist.

## 2020-05-15 ENCOUNTER — CLINICAL SUPPORT (OUTPATIENT)
Dept: PAIN MEDICINE | Facility: CLINIC | Age: 70
End: 2020-05-15

## 2020-05-15 VITALS — TEMPERATURE: 98.2 F

## 2020-05-15 DIAGNOSIS — G89.4 CHRONIC PAIN SYNDROME: Primary | ICD-10-CM

## 2020-05-15 DIAGNOSIS — M12.9 ARTHRITIS, MULTIPLE JOINT INVOLVEMENT: ICD-10-CM

## 2020-05-15 PROCEDURE — 96372 THER/PROPH/DIAG INJ SC/IM: CPT | Performed by: NURSE PRACTITIONER

## 2020-05-15 RX ORDER — KETOROLAC TROMETHAMINE 30 MG/ML
30 INJECTION, SOLUTION INTRAMUSCULAR; INTRAVENOUS ONCE
Status: COMPLETED | OUTPATIENT
Start: 2020-05-15 | End: 2020-05-15

## 2020-05-15 RX ADMIN — KETOROLAC TROMETHAMINE 30 MG: 30 INJECTION, SOLUTION INTRAMUSCULAR; INTRAVENOUS at 10:56

## 2020-06-08 NOTE — TELEPHONE ENCOUNTER
Medication Refill Request    Date of phone call: 20    Medication being requested: norco 10/325mg si tab po 5 times a day prn   Qty: 150    Date of last visit: 20    Date of last refill: 20    GAYE up to date?: yes    Next Follow up?: 20    Any new pertinent information? (i.e, new medication allergies, new use of medications, change in patient's health or condition, non-compliance or inconsistency with prescribing agreement?):

## 2020-06-11 RX ORDER — HYDROCODONE BITARTRATE AND ACETAMINOPHEN 10; 325 MG/1; MG/1
1 TABLET ORAL
Qty: 150 TABLET | Refills: 0 | Status: SHIPPED | OUTPATIENT
Start: 2020-06-11 | End: 2020-07-09 | Stop reason: SDUPTHER

## 2020-07-09 ENCOUNTER — TELEMEDICINE (OUTPATIENT)
Dept: PAIN MEDICINE | Facility: CLINIC | Age: 70
End: 2020-07-09

## 2020-07-09 DIAGNOSIS — Z79.891 ENCOUNTER FOR MONITORING OPIOID MAINTENANCE THERAPY: ICD-10-CM

## 2020-07-09 DIAGNOSIS — K59.03 THERAPEUTIC OPIOID INDUCED CONSTIPATION: ICD-10-CM

## 2020-07-09 DIAGNOSIS — Z51.81 ENCOUNTER FOR MONITORING OPIOID MAINTENANCE THERAPY: ICD-10-CM

## 2020-07-09 DIAGNOSIS — M12.9 ARTHRITIS, MULTIPLE JOINT INVOLVEMENT: ICD-10-CM

## 2020-07-09 DIAGNOSIS — T40.2X5A THERAPEUTIC OPIOID INDUCED CONSTIPATION: ICD-10-CM

## 2020-07-09 DIAGNOSIS — M54.2 NECK PAIN: ICD-10-CM

## 2020-07-09 DIAGNOSIS — G89.4 CHRONIC PAIN SYNDROME: Primary | ICD-10-CM

## 2020-07-09 PROCEDURE — 99214 OFFICE O/P EST MOD 30 MIN: CPT | Performed by: NURSE PRACTITIONER

## 2020-07-09 RX ORDER — HYDROCODONE BITARTRATE AND ACETAMINOPHEN 10; 325 MG/1; MG/1
1 TABLET ORAL
Qty: 150 TABLET | Refills: 0 | Status: SHIPPED | OUTPATIENT
Start: 2020-07-09 | End: 2020-08-10 | Stop reason: SDUPTHER

## 2020-07-09 RX ORDER — CYCLOBENZAPRINE HCL 10 MG
10 TABLET ORAL NIGHTLY PRN
Qty: 30 TABLET | Refills: 2 | Status: SHIPPED | OUTPATIENT
Start: 2020-07-09 | End: 2020-11-05 | Stop reason: SDUPTHER

## 2020-07-09 NOTE — PROGRESS NOTES
TELEMEDICINE - VIDEO VISIT  You have chosen to receive care through a telehealth visit.  Do you consent to use a video/audio connection for your medical care today? Yes    CHIEF COMPLAINT  Joint pain    Subjective   Chastity Salgado is a 70 y.o. female  who presents for a video visit follow-up.She has a history of joint pain, neck pain.    C/o diffuse joint pain (pain worse in left shoulder, most severe in the anterior shoulder area).  Pain today 8/10 in severity.  Taking Hydrocodone 10/325 5/day, Celebrex 200 mg daily.  Denies adverse reactions.  Reports at least moderate pain reduction with regimen reports that she would not be able to function without it.  constipation reported, stool softener helping minimally, states that Movantik did not help.      She reports that she is becoming more irritable with her pain, worsening over the past few months, has been very short tempered.  Says its so bad she is considering moving forward with left shoulder surgery.      Rheumatologist - Dr. Lucero.  Saw recently, told not rheumatoid.  All OA, she has been released.     Counseling - Dr. Edison Vásquez - ortho.  Left shoulder injections every 90 days. History of multiple right shoulder surgeries.  Last injection was two months ago.      Neck pain worsening but has previously declined MRI due to claustrophobia.      Joint Pain   This is a chronic problem. The current episode started more than 1 year ago. The problem occurs daily. The problem has been waxing and waning. Associated symptoms include arthralgias (bilateral shoulder), fatigue, neck pain, numbness and weakness (right arm). Pertinent negatives include no abdominal pain (after being off norco), chest pain, chills, congestion, coughing, fever, headaches, joint swelling (knees, right hip bursitis), nausea or vomiting. Exacerbated by: activity. She has tried oral narcotics, acetaminophen, heat and NSAIDs for the symptoms. The treatment provided moderate relief.    Neck Pain    This is a chronic problem. The problem occurs daily. The problem has been waxing and waning. The pain is present in the anterior neck, left side and right side. The quality of the pain is described as aching and burning. The pain is at a severity of 7/10. The pain is moderate. Exacerbated by: movement of neck, use of arms/shoulders. Associated symptoms include numbness and weakness (right arm). Pertinent negatives include no chest pain, fever or headaches. She has tried NSAIDs, oral narcotics, muscle relaxants and neck support for the symptoms. The treatment provided moderate relief.     The following portions of the patient's history were reviewed and updated as appropriate: allergies, current medications, past family history, past medical history, past social history, past surgical history and problem list.    Review of Systems   Constitutional: Positive for fatigue. Negative for chills and fever.   HENT: Negative for congestion.    Respiratory: Negative for cough.    Cardiovascular: Negative for chest pain.   Gastrointestinal: Negative for abdominal pain (after being off norco), nausea and vomiting.   Musculoskeletal: Positive for arthralgias (bilateral shoulder) and neck pain. Negative for joint swelling (knees, right hip bursitis).   Neurological: Positive for weakness (right arm) and numbness. Negative for headaches.   Psychiatric/Behavioral: Negative for self-injury and suicidal ideas.       Vitals:    07/09/20 1009   PainSc:   7   PainLoc: Shoulder  Comment: diffuse pain       Objective   Physical Exam   the ability to perform a routine physical exam is extremely limited. The patient seems alert and is oriented appropriately.   On this phone call there is not any evidence of respiratory distress.   The patient seems of normal mood.   The remainder of a routine physical exam is deferred.      Assessment/Plan   Diagnoses and all orders for this visit:    Chronic pain syndrome    Arthritis,  multiple joint involvement    Encounter for monitoring opioid maintenance therapy    Neck pain    Therapeutic opioid induced constipation    Other orders  -     Methylnaltrexone Bromide (Relistor) 150 MG tablet; Take 450 mg by mouth Daily As Needed (opioid induced constipation).  -     cyclobenzaprine (FLEXERIL) 10 MG tablet; Take 1 tablet by mouth At Night As Needed for Muscle Spasms.  -     HYDROcodone-acetaminophen (NORCO)  MG per tablet; Take 1 tablet by mouth 5 (Five) Times a Day As Needed for Severe Pain . dnf 7-13-20      ----------------    Our practice is offering alternative &/or electronic methods to continue to follow our patients while at the same time further the efforts toward social distancing, in accordance with our organizational policies, professional societies' guidance, and gubernatorial mandates.  I support the Healthy at Home campaign and in this visit I have counseled the patient on our needs to limit in-person office visits and physical encounters with medical facilities whenever possible.  I have also educated the patient on the medical necessities of maintaining social distancing while we continue to function during this crisis period.      The patient was counseled on the need to consider telehealth options. The patient was offered the opportunity for a Video Visit using myEDmatch. The patient agreed to a Video Visit. The patient was educated about our efforts to comply with monitoring standards when prescribing potent medications.    ----------------  Unable to complete visit using a video connection to the patient. Made multiple attempts to connect with video.  Continued with Zoom audio for the duration of the visit. Total time of discussion was 21 minutes.    --- Consider nerve blocks for left shoulder pain   --- Could discuss with PCP consideration of switching to Cymbalta.    --- Trial Relistor for OIC.  Discussed medication with the patient.  Included in this discussion was the  potential for side effects and adverse events.  Patient verbalized understanding and wished to proceed.  Prescription will be sent to pharmacy.  --- Refill Hydrocodone (dated). Patient appears stable with current regimen. No adverse effects. Regarding continuation of opioids, there is no evidence of aberrant behavior or any red flags.  The patient continues with appropriate response to opioid therapy. ADL's remain intact by self.   --- The urine drug screen confirmation from 3/11/2020 has been reviewed and the result is appropriate based on patient history and GAYE report  --- Follow-up 2 months      GAYE REPORT  As part of the patient's treatment plan, I am prescribing controlled substances. The patient has been made aware of appropriate use of such medications, including potential risk of somnolence, limited ability to drive and/or work safely, and the potential for dependence or overdose. It has also been made clear that these medications are for use by this patient only, without concomitant use of alcohol or other substances unless prescribed.     Patient has completed prescribing agreement detailing terms of continued prescribing of controlled substances, including monitoring GAYE reports, urine drug screening, and pill counts if necessary. The patient is aware that inappropriate use will results in cessation of prescribing such medications.    GAYE report has been reviewed and scanned into the patient's chart.    As the clinician, I personally reviewed the GAYE from 7/9/2020 while the patient was on the telemedicine visit today.    History and physical exam exhibit continued safe and appropriate use of controlled substances.  -------  EMR Dragon/Transcription disclaimer:   Much of this encounter note is an electronic transcription/translation of spoken language to printed text. The electronic translation of spoken language may permit erroneous, or at times, nonsensical words or phrases to be  inadvertently transcribed; Although I have reviewed the note for such errors, some may still exist.

## 2020-07-23 ENCOUNTER — TELEPHONE (OUTPATIENT)
Dept: PAIN MEDICINE | Facility: CLINIC | Age: 70
End: 2020-07-23

## 2020-07-23 DIAGNOSIS — M25.512 CHRONIC LEFT SHOULDER PAIN: Primary | ICD-10-CM

## 2020-07-23 DIAGNOSIS — G89.29 CHRONIC LEFT SHOULDER PAIN: Primary | ICD-10-CM

## 2020-07-23 NOTE — TELEPHONE ENCOUNTER
Yes I discussed this plan with Dr. Padgett and he agrees that she would be an appropriate candidate for suprascapular nerve blocks. I will order these as a series of two 2-4 weeks apart. Also I think it might be left shoulder. Could you please clarify?

## 2020-07-23 NOTE — TELEPHONE ENCOUNTER
Spoke with pt today she sts it should be Left shoulder. Pt sts her right shoulder was replaced and is fine right now. Pt sts she will be on vacation until 8-14 and would need to schedule injection after 8-14-20. Please advise. Thank you.

## 2020-07-23 NOTE — TELEPHONE ENCOUNTER
Pt called yesterday requesting to have nerve blocks for right shoulder pain as discussed at last ov. Could you please place order for pt? Please advise. Thank you.

## 2020-07-30 ENCOUNTER — TELEPHONE (OUTPATIENT)
Dept: PAIN MEDICINE | Facility: CLINIC | Age: 70
End: 2020-07-30

## 2020-07-30 DIAGNOSIS — G89.4 CHRONIC PAIN SYNDROME: Primary | ICD-10-CM

## 2020-07-30 RX ORDER — KETOROLAC TROMETHAMINE 30 MG/ML
30 INJECTION, SOLUTION INTRAMUSCULAR; INTRAVENOUS ONCE
Status: COMPLETED | OUTPATIENT
Start: 2020-07-30 | End: 2020-07-31

## 2020-07-30 NOTE — TELEPHONE ENCOUNTER
Yes she can. I have placed the order.  Make sure to education when she comes in to hold oral NSAID's for at least 24 hours after Toradol injection. Thanks.

## 2020-07-30 NOTE — TELEPHONE ENCOUNTER
Pt called requesting to come in tomorrow for a toradol inj for increased left shoulder and right knee pain. Pt auth for left shoulder suprasacpular nerve block x 2, 2-4 weeks apart was faxed yesterday to insurance. Can she have a toradol shot while we wait on auth? Please advise. Thank you.

## 2020-07-31 ENCOUNTER — CLINICAL SUPPORT (OUTPATIENT)
Dept: PAIN MEDICINE | Facility: CLINIC | Age: 70
End: 2020-07-31

## 2020-07-31 PROCEDURE — 96372 THER/PROPH/DIAG INJ SC/IM: CPT | Performed by: NURSE PRACTITIONER

## 2020-07-31 RX ADMIN — KETOROLAC TROMETHAMINE 30 MG: 30 INJECTION, SOLUTION INTRAMUSCULAR; INTRAVENOUS at 11:01

## 2020-08-10 RX ORDER — LISINOPRIL 30 MG/1
TABLET ORAL
Qty: 90 TABLET | Refills: 0 | Status: SHIPPED | OUTPATIENT
Start: 2020-08-10 | End: 2020-11-24 | Stop reason: SDUPTHER

## 2020-08-10 NOTE — TELEPHONE ENCOUNTER
Medication Refill Request    Date of phone call: 8/10/20    Medication being requested: Norco  mg   si tab po Five times a day prn   Qty: 150    Date of last visit: 20    Date of last refill: 20    GAYE up to date?: yes    Next Follow up?: 20    Any new pertinent information? (i.e, new medication allergies, new use of medications, change in patient's health or condition, non-compliance or inconsistency with prescribing agreement?):

## 2020-08-11 RX ORDER — HYDROCODONE BITARTRATE AND ACETAMINOPHEN 10; 325 MG/1; MG/1
1 TABLET ORAL
Qty: 150 TABLET | Refills: 0 | Status: SHIPPED | OUTPATIENT
Start: 2020-08-11 | End: 2020-09-08 | Stop reason: SDUPTHER

## 2020-08-25 ENCOUNTER — OFFICE VISIT (OUTPATIENT)
Dept: FAMILY MEDICINE CLINIC | Facility: CLINIC | Age: 70
End: 2020-08-25

## 2020-08-25 ENCOUNTER — RESULTS ENCOUNTER (OUTPATIENT)
Dept: FAMILY MEDICINE CLINIC | Facility: CLINIC | Age: 70
End: 2020-08-25

## 2020-08-25 VITALS
HEART RATE: 81 BPM | SYSTOLIC BLOOD PRESSURE: 138 MMHG | OXYGEN SATURATION: 99 % | BODY MASS INDEX: 41.82 KG/M2 | WEIGHT: 236 LBS | TEMPERATURE: 97.1 F | DIASTOLIC BLOOD PRESSURE: 78 MMHG | RESPIRATION RATE: 16 BRPM | HEIGHT: 63 IN

## 2020-08-25 DIAGNOSIS — Z12.31 ENCOUNTER FOR SCREENING MAMMOGRAM FOR MALIGNANT NEOPLASM OF BREAST: ICD-10-CM

## 2020-08-25 DIAGNOSIS — Z13.29 SCREENING FOR THYROID DISORDER: ICD-10-CM

## 2020-08-25 DIAGNOSIS — E78.2 MIXED HYPERLIPIDEMIA: ICD-10-CM

## 2020-08-25 DIAGNOSIS — F33.0 MILD EPISODE OF RECURRENT MAJOR DEPRESSIVE DISORDER (HCC): ICD-10-CM

## 2020-08-25 DIAGNOSIS — E66.01 CLASS 3 SEVERE OBESITY DUE TO EXCESS CALORIES WITH SERIOUS COMORBIDITY AND BODY MASS INDEX (BMI) OF 40.0 TO 44.9 IN ADULT (HCC): ICD-10-CM

## 2020-08-25 DIAGNOSIS — R73.09 ELEVATED GLUCOSE: ICD-10-CM

## 2020-08-25 DIAGNOSIS — E55.9 VITAMIN D DEFICIENCY: ICD-10-CM

## 2020-08-25 DIAGNOSIS — E11.59 TYPE 2 DIABETES MELLITUS WITH OTHER CIRCULATORY COMPLICATION, WITHOUT LONG-TERM CURRENT USE OF INSULIN (HCC): ICD-10-CM

## 2020-08-25 DIAGNOSIS — Z12.39 SCREENING FOR BREAST CANCER: ICD-10-CM

## 2020-08-25 DIAGNOSIS — I10 BENIGN ESSENTIAL HTN: Primary | ICD-10-CM

## 2020-08-25 PROBLEM — E66.813 CLASS 3 SEVERE OBESITY DUE TO EXCESS CALORIES WITH SERIOUS COMORBIDITY AND BODY MASS INDEX (BMI) OF 40.0 TO 44.9 IN ADULT: Status: ACTIVE | Noted: 2020-08-25

## 2020-08-25 PROCEDURE — 99214 OFFICE O/P EST MOD 30 MIN: CPT | Performed by: NURSE PRACTITIONER

## 2020-08-25 RX ORDER — DULOXETIN HYDROCHLORIDE 30 MG/1
CAPSULE, DELAYED RELEASE ORAL
Qty: 60 CAPSULE | Refills: 1 | Status: SHIPPED | OUTPATIENT
Start: 2020-08-25 | End: 2020-11-18

## 2020-08-25 NOTE — PROGRESS NOTES
"Subjective   Chastity Salgado is a 70 y.o. female   who presents for   Chief Complaint   Patient presents with   • Hypertension     new patient- hx of HTN      Ortho: Dr. Vásquez, previous right shoulder replacement, left OA left arm, receiving steroid injections  Pain management: chronic pain, bilateral shoulder  Hx breast cancer: mammo UTD 2013, stage 1  oncologist    Off depression/anxiety medications, previous taking effexor, off for past 8-9 months, was recommended to try cymbalta by pain management for pain/fibromyalgia as well as benefit depression and anxiety.     /78   Pulse 81   Temp 97.1 °F (36.2 °C)   Resp 16   Ht 160 cm (62.99\")   Wt 107 kg (236 lb)   SpO2 99%   BMI 41.82 kg/m²       History of Present Illness   Depression   Visit Type: initial  Onset of symptoms: more than 6 months ago  Progression since onset: gradually worsening  Patient presents with the following symptoms: anhedonia, depressed mood, excessive worry, fatigue, feelings of hopelessness, feelings of worthlessness, hypersomnia, insomnia, nervousness/anxiety and panic (new for her in past few months).  Patient is not experiencing: palpitations, shortness of breath, suicidal ideas, suicidal planning, thoughts of death, weight gain and weight loss.  Frequency of symptoms: most days   Severity: moderate   Sleep quality: fair  Nighttime awakenings: occasional  Risk factors: previous episode of depression  Patient has a history of: anxiety/panic attacks  Treatment tried: SSRI  Compliance with treatment: variable  Improvement on treatment: moderate      Hypertension   This is a chronic problem. The current episode started more than 1 year ago. The problem is unchanged. The problem is controlled. Associated symptoms include anxiety. Pertinent negatives include no blurred vision, chest pain, headaches, malaise/fatigue, neck pain, orthopnea, palpitations, peripheral edema, PND, shortness of breath or sweats. There are no associated " agents to hypertension. Past treatments include ACE inhibitors and diuretics. Current antihypertension treatment includes ACE inhibitors and diuretics. The current treatment provides moderate improvement.       The following portions of the patient's history were reviewed and updated as appropriate: allergies, current medications, past family history, past medical history, past social history, past surgical history and problem list.    Review of Systems  Review of Systems   Constitutional: Positive for fatigue. Negative for activity change, malaise/fatigue, weight gain and weight loss.   HENT: Negative.    Eyes: Negative.  Negative for blurred vision.   Respiratory: Negative.  Negative for shortness of breath.    Cardiovascular: Negative.  Negative for chest pain, palpitations, orthopnea and PND.   Gastrointestinal: Negative.    Endocrine: Negative.    Genitourinary: Negative.    Musculoskeletal: Positive for arthralgias (chronic) and back pain (chronic). Negative for neck pain.   Skin: Negative.    Allergic/Immunologic: Negative.    Neurological: Negative.  Negative for headaches.   Hematological: Negative.    Psychiatric/Behavioral: Negative for suicidal ideas. The patient is nervous/anxious and has insomnia.        Objective   Physical Exam  Physical Exam   Constitutional: She is oriented to person, place, and time. She appears well-developed and well-nourished. No distress.   HENT:   Head: Normocephalic and atraumatic.   Right Ear: Tympanic membrane, external ear and ear canal normal.   Left Ear: Tympanic membrane, external ear and ear canal normal.   Neck: Neck supple.   Cardiovascular: Normal rate, regular rhythm and normal heart sounds. Exam reveals no gallop and no friction rub.   No murmur heard.  Pulmonary/Chest: Effort normal and breath sounds normal. No respiratory distress. She has no wheezes. She has no rales.   Abdominal: Soft. Bowel sounds are normal. She exhibits no distension. There is no  tenderness.   Neurological: She is alert and oriented to person, place, and time.   Skin: Skin is warm and dry. She is not diaphoretic.   Psychiatric: She has a normal mood and affect.         Assessment/Plan   Chastity was seen today for hypertension.    Diagnoses and all orders for this visit:    Benign essential HTN  -     CBC & Differential; Future  -     Comprehensive Metabolic Panel; Future    Mixed hyperlipidemia  -     CBC & Differential; Future  -     Comprehensive Metabolic Panel; Future  -     Lipid Panel With LDL / HDL Ratio; Future    Elevated glucose  -     Hemoglobin A1c; Future  -     Cancel: Microalbumin / Creatinine Urine Ratio - Urine, Clean Catch    Screening for thyroid disorder  -     TSH Rfx On Abnormal To Free T4; Future    Screening for breast cancer  -     Mammo screening digital tomosynthesis bilateral w CAD; Future    Encounter for screening mammogram for malignant neoplasm of breast   -     Mammo screening digital tomosynthesis bilateral w CAD; Future    Vitamin D deficiency  -     Vitamin D 25 hydroxy; Future    Mild episode of recurrent major depressive disorder (CMS/HCC)    Type 2 diabetes mellitus with other circulatory complication, without long-term current use of insulin (CMS/[facility])  -     Microalbumin / Creatinine Urine Ratio - Urine, Clean Catch; Future    Class 3 severe obesity due to excess calories with serious comorbidity and body mass index (BMI) of 40.0 to 44.9 in adult (CMS/HCC)    Other orders  -     DULoxetine (Cymbalta) 30 MG capsule; Take 1 cap po daily x 7 days, then increase to 2 cap daily    will start cymbalta, 30 mg and gradually increase to 60 mg, follow up in 8 weeks, sooner if needed  Consider neuro consult, family hx of dementia/parkinson's, noticing memory loss, recommend starting antidepressant as depression can worsen symptoms  Agrees with recommendation  Labs in future, nonfasting today, will return in next few weeks  Borderline diabetic, not currently on  medication, will recheck with labs  bp is controlled today  Time spent 30 minutes  Follow up in 8 weeks

## 2020-08-30 ENCOUNTER — RESULTS ENCOUNTER (OUTPATIENT)
Dept: FAMILY MEDICINE CLINIC | Facility: CLINIC | Age: 70
End: 2020-08-30

## 2020-08-30 DIAGNOSIS — E55.9 VITAMIN D DEFICIENCY: ICD-10-CM

## 2020-08-31 ENCOUNTER — RESULTS ENCOUNTER (OUTPATIENT)
Dept: FAMILY MEDICINE CLINIC | Facility: CLINIC | Age: 70
End: 2020-08-31

## 2020-08-31 DIAGNOSIS — R73.09 ELEVATED GLUCOSE: ICD-10-CM

## 2020-08-31 DIAGNOSIS — Z13.29 SCREENING FOR THYROID DISORDER: ICD-10-CM

## 2020-08-31 DIAGNOSIS — E78.2 MIXED HYPERLIPIDEMIA: ICD-10-CM

## 2020-08-31 DIAGNOSIS — I10 BENIGN ESSENTIAL HTN: ICD-10-CM

## 2020-09-08 ENCOUNTER — OFFICE VISIT (OUTPATIENT)
Dept: PAIN MEDICINE | Facility: CLINIC | Age: 70
End: 2020-09-08

## 2020-09-08 VITALS
BODY MASS INDEX: 41.41 KG/M2 | HEIGHT: 63 IN | DIASTOLIC BLOOD PRESSURE: 80 MMHG | HEART RATE: 85 BPM | RESPIRATION RATE: 18 BRPM | OXYGEN SATURATION: 94 % | SYSTOLIC BLOOD PRESSURE: 127 MMHG | WEIGHT: 233.7 LBS | TEMPERATURE: 98 F

## 2020-09-08 DIAGNOSIS — Z51.81 ENCOUNTER FOR MONITORING OPIOID MAINTENANCE THERAPY: ICD-10-CM

## 2020-09-08 DIAGNOSIS — M25.512 CHRONIC LEFT SHOULDER PAIN: ICD-10-CM

## 2020-09-08 DIAGNOSIS — G89.29 CHRONIC LEFT SHOULDER PAIN: ICD-10-CM

## 2020-09-08 DIAGNOSIS — Z79.891 ENCOUNTER FOR MONITORING OPIOID MAINTENANCE THERAPY: ICD-10-CM

## 2020-09-08 DIAGNOSIS — G89.4 CHRONIC PAIN SYNDROME: Primary | ICD-10-CM

## 2020-09-08 DIAGNOSIS — M54.2 NECK PAIN: ICD-10-CM

## 2020-09-08 DIAGNOSIS — M12.9 ARTHRITIS, MULTIPLE JOINT INVOLVEMENT: ICD-10-CM

## 2020-09-08 PROCEDURE — 99214 OFFICE O/P EST MOD 30 MIN: CPT | Performed by: NURSE PRACTITIONER

## 2020-09-08 PROCEDURE — 80305 DRUG TEST PRSMV DIR OPT OBS: CPT | Performed by: NURSE PRACTITIONER

## 2020-09-08 RX ORDER — HYDROCODONE BITARTRATE AND ACETAMINOPHEN 10; 325 MG/1; MG/1
1 TABLET ORAL
Qty: 150 TABLET | Refills: 0 | Status: SHIPPED | OUTPATIENT
Start: 2020-09-08 | End: 2020-10-05 | Stop reason: SDUPTHER

## 2020-09-08 NOTE — PROGRESS NOTES
CHIEF COMPLAINT  Follow-up for neck and shoulder pain.    Subjective   Chastity Salgado is a 70 y.o. female  who presents for follow-up.  She has a history of joint pain.    C/o diffuse joint pain (pain worse in left shoulder, most severe in the anterior shoulder area).  Pain today 7/10 in severity.  Taking Hydrocodone 10/325 5/day, Celebrex 200 mg daily, Started Cymbalta 60 mg daily last week.  Denies adverse reactions.  Reports at least moderate pain reduction with regimen reports that she would not be able to function without it.  constipation reported, stable with Relistor.       Worsening left shoulder pain. Wishes to avoid surgery due to lack of function already following right shoulder surgery.  Scheduled for left suprascapular nerve block 9/22/2020 with Dr. Padgett      Rheumatologist - Dr. Lucreo.  Saw recently, told not rheumatoid.  All OA, she has been released.      Counseling - Dr. Edison Vásquez - ortho.  Left shoulder injections every 90 days. History of multiple right shoulder surgeries.  Last injection was two months ago.       Neck pain worsening but has previously declined MRI due to claustrophobia.      Joint Pain   This is a chronic problem. The current episode started more than 1 year ago. The problem occurs daily. The problem has been waxing and waning. Associated symptoms include arthralgias (bilateral shoulders), neck pain and numbness. Pertinent negatives include no abdominal pain (after being off norco), chest pain, chills, congestion, coughing, fatigue, fever, headaches, joint swelling (knees, right hip bursitis), nausea, vomiting or weakness. Exacerbated by: activity. She has tried oral narcotics, acetaminophen, heat and NSAIDs for the symptoms. The treatment provided moderate relief.   Neck Pain    This is a chronic problem. The problem occurs daily. The problem has been waxing and waning. The pain is present in the anterior neck, left side and right side. The quality of the  pain is described as aching and burning. The pain is at a severity of 7/10. The pain is moderate. Exacerbated by: movement of neck, use of arms/shoulders. Associated symptoms include numbness. Pertinent negatives include no chest pain, fever, headaches or weakness. She has tried NSAIDs, oral narcotics, muscle relaxants and neck support for the symptoms. The treatment provided moderate relief.      PEG Assessment   What number best describes your pain on average in the past week?7  What number best describes how, during the past week, pain has interfered with your enjoyment of life?8  What number best describes how, during the past week, pain has interfered with your general activity?  8    The following portions of the patient's history were reviewed and updated as appropriate: allergies, current medications, past family history, past medical history, past social history, past surgical history and problem list.    Review of Systems   Constitutional: Negative for chills, fatigue and fever.   HENT: Negative for congestion.    Eyes: Positive for visual disturbance.   Respiratory: Negative for cough, shortness of breath and wheezing.    Cardiovascular: Negative.  Negative for chest pain.   Gastrointestinal: Positive for constipation. Negative for abdominal pain (after being off norco), diarrhea, nausea and vomiting.   Genitourinary: Negative for difficulty urinating.   Musculoskeletal: Positive for arthralgias (bilateral shoulders) and neck pain. Negative for joint swelling (knees, right hip bursitis).   Neurological: Positive for numbness. Negative for weakness and headaches.   Psychiatric/Behavioral: Positive for sleep disturbance. Negative for suicidal ideas. The patient is nervous/anxious.      I have reviewed and confirmed the accuracy of the ROS as documented by the MA/OR[place]O/RN NEWTON Jordan    Vitals:    09/08/20 1136   BP: 127/80   Pulse: 85   Resp: 18   Temp: 98 °F (36.7 °C)   SpO2: 94%   Weight: 106 kg  "(233 lb 11.2 oz)   Height: 160 cm (62.99\")   PainSc:   7   PainLoc: Shoulder     Objective   Physical Exam   Constitutional: She is oriented to person, place, and time. She appears well-developed and well-nourished. No distress.   HENT:   Head: Normocephalic and atraumatic.   Eyes: Conjunctivae and EOM are normal.   Neck: Neck supple.   Cardiovascular: Normal rate.   Pulmonary/Chest: Effort normal. No respiratory distress.   Musculoskeletal:        Right shoulder: She exhibits decreased range of motion, tenderness and deformity.        Left shoulder: She exhibits decreased range of motion and tenderness.        Right knee: Tenderness found.        Left knee: Tenderness found.        Cervical back: She exhibits tenderness and spasm.        Right hand: She exhibits tenderness and swelling.        Left hand: She exhibits tenderness and swelling.   Neurological: She is alert and oriented to person, place, and time. She has normal strength. No sensory deficit. Coordination and gait normal.   Skin: Skin is warm and dry.   Psychiatric: She has a normal mood and affect. Her behavior is normal.   Nursing note and vitals reviewed.    Assessment/Plan   Chastity was seen today for neck pain and shoulder pain.    Diagnoses and all orders for this visit:    Chronic pain syndrome    Chronic left shoulder pain    Arthritis, multiple joint involvement    Neck pain    Encounter for monitoring opioid maintenance therapy      --- Proceed with left suprascapular nerve block as scheduled. Reviewed the procedure at length with the patient.  Included in the review was expectations, complications, risk and benefits.The procedure was described in detail and the risks, benefits and alternatives were discussed with the patient (including but not limited to: bleeding, infection, nerve damage, worsening of pain, inability to perform injection, paralysis, seizures, and death) who agreed to proceed.  Discussed the potential for sedation if " warranted/wanted.  The procedure will plan to be performed at [facility] with fluoroscopic guidance(unless ultrasound is indicated) and could potentially have steroids and contrast dye used. Questions were answered and in a way the patient could understand.  Patient verbalized understanding and wishes to proceed.  This intervention will be ordered.  Discussed with patient that all procedures are part of a multimodal plan of care and include either formal PT or a home exercise program.  Patient has no evidence of coagulopathy or current infection.  --- Refill Hydrocodone. Patient appears stable with current regimen. No adverse effects. Regarding continuation of opioids, there is no evidence of aberrant behavior or any red flags.  The patient continues with appropriate response to opioid therapy. ADL's remain intact by self.   --- Routine UDS in office today as part of monitoring requirements for controlled substances.  The specimen was viewed and the immunoassay result reviewed and is +OPI.  This specimen will be sent to [facility] laboratory for confirmation.     --- Follow-up 2 months/sooner if needed       GAYE REPORT  As part of the patient's treatment plan, I am prescribing controlled substances. The patient has been made aware of appropriate use of such medications, including potential risk of somnolence, limited ability to drive and/or work safely, and the potential for dependence or overdose. It has also bee made clear that these medications are for use by this patient only, without concomitant use of alcohol or other substances unless prescribed.     Patient has completed prescribing agreement detailing terms of continued prescribing of controlled substances, including monitoring GAYE reports, urine drug screening, and pill counts if necessary. The patient is aware that inappropriate use will results in cessation of prescribing such medications.    GAYE report has been reviewed  and scanned into the patient's chart.    As the clinician, I personally reviewed the GAYE from 9/4/2020 while the patient was in the office today.    History and physical exam exhibit continued safe and appropriate use of controlled substances.    EMR Dragon/Transcription disclaimer:   Much of this encounter note is an electronic transcription/translation of spoken language to printed text. The electronic translation of spoken language may permit erroneous, or at times, nonsensical words or phrases to be inadvertently transcribed; Although I have reviewed the note for such errors, some may still exist.

## 2020-09-14 ENCOUNTER — RESULTS ENCOUNTER (OUTPATIENT)
Dept: PAIN MEDICINE | Facility: CLINIC | Age: 70
End: 2020-09-14

## 2020-09-14 DIAGNOSIS — M12.9 ARTHRITIS, MULTIPLE JOINT INVOLVEMENT: ICD-10-CM

## 2020-09-14 DIAGNOSIS — M25.512 CHRONIC LEFT SHOULDER PAIN: ICD-10-CM

## 2020-09-14 DIAGNOSIS — G89.29 CHRONIC LEFT SHOULDER PAIN: ICD-10-CM

## 2020-09-14 DIAGNOSIS — Z79.891 ENCOUNTER FOR MONITORING OPIOID MAINTENANCE THERAPY: ICD-10-CM

## 2020-09-14 DIAGNOSIS — G89.4 CHRONIC PAIN SYNDROME: ICD-10-CM

## 2020-09-14 DIAGNOSIS — M54.2 NECK PAIN: ICD-10-CM

## 2020-09-14 DIAGNOSIS — Z51.81 ENCOUNTER FOR MONITORING OPIOID MAINTENANCE THERAPY: ICD-10-CM

## 2020-09-18 ENCOUNTER — LAB REQUISITION (OUTPATIENT)
Dept: LAB | Facility: HOSPITAL | Age: 70
End: 2020-09-18

## 2020-09-18 DIAGNOSIS — Z00.00 ENCOUNTER FOR GENERAL ADULT MEDICAL EXAMINATION WITHOUT ABNORMAL FINDINGS: ICD-10-CM

## 2020-09-19 PROCEDURE — U0004 COV-19 TEST NON-CDC HGH THRU: HCPCS | Performed by: ANESTHESIOLOGY

## 2020-09-21 LAB — SARS-COV-2 RNA RESP QL NAA+PROBE: NOT DETECTED

## 2020-09-22 ENCOUNTER — DOCUMENTATION (OUTPATIENT)
Dept: PAIN MEDICINE | Facility: CLINIC | Age: 70
End: 2020-09-22

## 2020-09-22 ENCOUNTER — OUTSIDE FACILITY SERVICE (OUTPATIENT)
Dept: PAIN MEDICINE | Facility: CLINIC | Age: 70
End: 2020-09-22

## 2020-09-22 PROCEDURE — 64418 NJX AA&/STRD SPRSCAP NRV: CPT | Performed by: ANESTHESIOLOGY

## 2020-09-22 NOTE — PROGRESS NOTES
left suprascapular nerve block/steroid injection - posterior approach  [facility]    PREOPERATIVE DIAGNOSIS:     Chronic left shoulder pain  POSTOPERATIVE DIAGNOSIS:   Same as pre-op    PROCEDURE:  [id number]- left suprascapular nerve block/steroid injection, with fluoroscopic and ultrasound guidance     PRE-PROCEDURE DISCUSSION WITH PATIENT:    Risks and complications were discussed with the patient prior to starting the procedure (including but not limited to infection, bleeding, injury, paralysis, and death) and informed consent was obtained.      SURGEON:  John Padgett MD    REASON FOR PROCEDURE:  Left shoulder pain is significant and chronic.    SEDATION:  Patient declined administration of moderate sedation    ANESTHETIC:  Marcaine 0.5%  STEROID:   Methylprednisolone (DEPO MEDROL) 40mg/ml    DESCRIPTON OF PROCEDURE:  After obtaining informed consent, IV access was obtained in the preoperative area.  The patient was transported to the operative suite and placed in supine position.  EKG, blood pressure, and pulse oximetry were monitored.  Any and all sedation given was administered by the RN under my direct supervision and guidance.   The hip area was prepped in a wide diameter with Chloraprep and draped in a sterile fashion.     Under fluoroscopy guidance the suprascapular notch was located. Superficial skin and subcutaneous tissue was anesthetized with 1% Lidocaine.  A  22-gauge spinal needle was inserted under fluoroscopic guidance and strict aseptic technique, coming into contact with the scapula. Needle was walked off the scapula superiorly and deep until entering the suprascapular notch.   After confirming the position of the needle with fluoroscopy and ultrasound, 1-3 mL of Omnipaque was injected and after seeing appropriate spread into the notch and posterior to the scapula, a total of 5mL of injectate including the above listed local anesthetic and steroid was injected.  Vital signs  remained stable.  The onset of analgesia was noted.    ESTIMATED BLOOD LOSS:  minimal  SPECIMENS:  None    COMPLICATIONS:   No complications were noted. and There was no indication of vascular uptake on live injection of contrast dye.    TOLERANCE & DISCHARGE CONDITION:    The patient tolerated the procedure well.  The patient was transported to the recovery area without difficulties.  The patient was discharged to home under the care of a chaperone and in satisfactory condition.    PLAN OF CARE:  1. The patient was given our standard instruction sheet.  2. The patient will Return to clinic 6 wks  3. The patient will resume all medications as per the medication reconciliation sheet.

## 2020-10-05 NOTE — TELEPHONE ENCOUNTER
Medication Refill Request    Date of phone call: 10/5/2020    Medication being requested: Hydrocodone-apap 10/325 sixday  Qty: 150    Date of last visit: 2020    Date of last refill: 2020    GAYE up to date?: 2020    Next Follow up?: 2020    Any new pertinent information? (i.e, new medication allergies, new use of medications, change in patient's health or condition, non-compliance or inconsistency with prescribing agreement?): n/a

## 2020-10-06 RX ORDER — HYDROCODONE BITARTRATE AND ACETAMINOPHEN 10; 325 MG/1; MG/1
1 TABLET ORAL
Qty: 150 TABLET | Refills: 0 | Status: SHIPPED | OUTPATIENT
Start: 2020-10-06 | End: 2020-11-05 | Stop reason: SDUPTHER

## 2020-10-30 PROBLEM — Z90.710 S/P LAPAROSCOPIC HYSTERECTOMY: Status: ACTIVE | Noted: 2020-02-27

## 2020-10-30 PROBLEM — R87.613 HIGH GRADE SQUAMOUS INTRAEPITHELIAL CERVICAL DYSPLASIA: Status: ACTIVE | Noted: 2019-09-04

## 2020-11-03 ENCOUNTER — OFFICE VISIT (OUTPATIENT)
Dept: FAMILY MEDICINE CLINIC | Facility: CLINIC | Age: 70
End: 2020-11-03

## 2020-11-03 VITALS
WEIGHT: 239 LBS | HEIGHT: 63 IN | BODY MASS INDEX: 42.35 KG/M2 | DIASTOLIC BLOOD PRESSURE: 74 MMHG | RESPIRATION RATE: 16 BRPM | OXYGEN SATURATION: 97 % | TEMPERATURE: 98 F | SYSTOLIC BLOOD PRESSURE: 148 MMHG | HEART RATE: 96 BPM

## 2020-11-03 DIAGNOSIS — J32.0 LEFT MAXILLARY SINUSITIS: Primary | ICD-10-CM

## 2020-11-03 DIAGNOSIS — H65.03 NON-RECURRENT ACUTE SEROUS OTITIS MEDIA OF BOTH EARS: ICD-10-CM

## 2020-11-03 PROCEDURE — 90694 VACC AIIV4 NO PRSRV 0.5ML IM: CPT | Performed by: NURSE PRACTITIONER

## 2020-11-03 PROCEDURE — 99213 OFFICE O/P EST LOW 20 MIN: CPT | Performed by: NURSE PRACTITIONER

## 2020-11-03 PROCEDURE — G0008 ADMIN INFLUENZA VIRUS VAC: HCPCS | Performed by: NURSE PRACTITIONER

## 2020-11-03 RX ORDER — DOXYCYCLINE 100 MG/1
100 CAPSULE ORAL EVERY 12 HOURS SCHEDULED
Qty: 28 CAPSULE | Refills: 0 | Status: ON HOLD | OUTPATIENT
Start: 2020-11-03 | End: 2021-02-25

## 2020-11-03 NOTE — PROGRESS NOTES
"Subjective   Chastity Salgado is a 70 y.o. female   who presents for   Chief Complaint   Patient presents with   • Sinus Problem     x3wks   • Dizziness   • Headache     One month history of dizziness, intermittently  Ear congestion, mild ear pain, mild  Headache, sinus pain  Headache has been resolved in past week  Mild post nasal drainage  Denies sneezing or runny nose      /74   Pulse 96   Temp 98 °F (36.7 °C)   Resp 16   Ht 160 cm (63\")   Wt 108 kg (239 lb)   SpO2 97%   BMI 42.34 kg/m²       History of Present Illness   Dizziness  This is a new problem. The current episode started 1 to 4 weeks ago. The problem occurs intermittently. The problem has been waxing and waning. Associated symptoms include headaches, swollen glands and vertigo. Pertinent negatives include no abdominal pain, anorexia, arthralgias, change in bowel habit, chest pain, chills, congestion, coughing, diaphoresis, fatigue, fever, joint swelling, myalgias, nausea, neck pain, numbness, rash, sore throat, urinary symptoms, visual change, vomiting or weakness. Exacerbated by: movement. She has tried nothing for the symptoms. The treatment provided no relief.   Earache   There is pain in both (Left > right) ears. This is a new problem. The current episode started 1 to 4 weeks ago. The problem occurs hourly. The problem has been waxing and waning. There has been no fever. The pain is mild. Associated symptoms include headaches. Pertinent negatives include no abdominal pain, coughing, diarrhea, ear discharge, hearing loss, neck pain, rash, rhinorrhea, sore throat or vomiting. She has tried nothing for the symptoms. The treatment provided no relief. There is no history of a chronic ear infection, hearing loss or a tympanostomy tube.       The following portions of the patient's history were reviewed and updated as appropriate: allergies, current medications, past family history, past medical history, past social history, past surgical " history and problem list.    Review of Systems  Review of Systems   Constitutional: Negative for chills, diaphoresis, fatigue and fever.   HENT: Positive for ear pain. Negative for congestion, ear discharge, hearing loss, rhinorrhea and sore throat.    Respiratory: Negative for cough.    Cardiovascular: Negative for chest pain.   Gastrointestinal: Negative for abdominal pain, anorexia, change in bowel habit, diarrhea, nausea and vomiting.   Musculoskeletal: Negative for arthralgias, joint swelling, myalgias and neck pain.   Skin: Negative for rash.   Neurological: Positive for dizziness, vertigo and headaches. Negative for weakness and numbness.       Objective   Physical Exam  Physical Exam  Vitals signs reviewed.   Constitutional:       Appearance: Normal appearance.   HENT:      Head: Normocephalic and atraumatic.      Right Ear: Ear canal normal.      Left Ear: Ear canal normal.      Ears:      Comments: Right TM Central erythema, nonbulging  Left TM mid ear effusion, no erythema       Nose:      Right Sinus: No maxillary sinus tenderness or frontal sinus tenderness.      Left Sinus: Maxillary sinus tenderness present. No frontal sinus tenderness.      Mouth/Throat:      Mouth: Mucous membranes are moist.      Pharynx: Oropharynx is clear.      Tonsils: No tonsillar exudate or tonsillar abscesses.   Cardiovascular:      Rate and Rhythm: Normal rate and regular rhythm.      Heart sounds: No murmur. No friction rub. No gallop.    Pulmonary:      Effort: Pulmonary effort is normal. No respiratory distress.      Breath sounds: Normal breath sounds. No wheezing, rhonchi or rales.   Lymphadenopathy:      Cervical: Cervical adenopathy (left sided) present.   Neurological:      General: No focal deficit present.      Mental Status: She is alert and oriented to person, place, and time.   Psychiatric:         Mood and Affect: Mood normal.           Assessment/Plan   Diagnoses and all orders for this visit:    1. Left  maxillary sinusitis (Primary)    2. Non-recurrent acute serous otitis media of both ears    Other orders  -     doxycycline (MONODOX) 100 MG capsule; Take 1 capsule by mouth Every 12 (Twelve) Hours.  Dispense: 28 capsule; Refill: 0  -     Fluad Quad 65+ yrs ([id number])    start doxycycline as prescribed, discussed antibiotic will also work for facial acne, instructed use for 14 days to help resolve skin issues as well  Previously prescribed by derm and tolerated well  Flu shot today  Follow up for no improvement or worsening symptoms  Doing well on cymbalta, was concerned dizziness was related to medication, however tolerated well and has not missed doses  Advised pt dizziness usually occurs with missed dose or when starting or stopping medication and verbalized understanding  Will continue cymbalta at current dose, new rx sent for 60 mg cap  Follow up as previously scheduled

## 2020-11-04 LAB
ALBUMIN SERPL-MCNC: 4 G/DL (ref 3.5–5.2)
ALBUMIN/GLOB SERPL: 1.4 G/DL
ALP SERPL-CCNC: 90 U/L (ref 39–117)
ALT SERPL-CCNC: 18 U/L (ref 1–33)
AST SERPL-CCNC: 25 U/L (ref 1–32)
BASOPHILS # BLD AUTO: 0.03 10*3/MM3 (ref 0–0.2)
BASOPHILS NFR BLD AUTO: 0.8 % (ref 0–1.5)
BILIRUB SERPL-MCNC: 0.4 MG/DL (ref 0–1.2)
BUN SERPL-MCNC: 11 MG/DL (ref 8–23)
BUN/CREAT SERPL: 17.5 (ref 7–25)
CALCIUM SERPL-MCNC: 8.6 MG/DL (ref 8.6–10.5)
CHLORIDE SERPL-SCNC: 105 MMOL/L (ref 98–107)
CHOLEST SERPL-MCNC: 196 MG/DL (ref 0–200)
CO2 SERPL-SCNC: 29.8 MMOL/L (ref 22–29)
CREAT SERPL-MCNC: 0.63 MG/DL (ref 0.57–1)
EOSINOPHIL # BLD AUTO: 0.1 10*3/MM3 (ref 0–0.4)
EOSINOPHIL NFR BLD AUTO: 2.8 % (ref 0.3–6.2)
ERYTHROCYTE [DISTWIDTH] IN BLOOD BY AUTOMATED COUNT: 13.6 % (ref 12.3–15.4)
GLOBULIN SER CALC-MCNC: 2.9 GM/DL
GLUCOSE SERPL-MCNC: 112 MG/DL (ref 65–99)
HBA1C MFR BLD: 6.9 % (ref 4.8–5.6)
HCT VFR BLD AUTO: 32.7 % (ref 34–46.6)
HDLC SERPL-MCNC: 52 MG/DL (ref 40–60)
HGB BLD-MCNC: 11.3 G/DL (ref 12–15.9)
IMM GRANULOCYTES # BLD AUTO: 0.01 10*3/MM3 (ref 0–0.05)
IMM GRANULOCYTES NFR BLD AUTO: 0.3 % (ref 0–0.5)
LDLC SERPL CALC-MCNC: 120 MG/DL (ref 0–100)
LDLC/HDLC SERPL: 2.24 {RATIO}
LYMPHOCYTES # BLD AUTO: 1.27 10*3/MM3 (ref 0.7–3.1)
LYMPHOCYTES NFR BLD AUTO: 35 % (ref 19.6–45.3)
MCH RBC QN AUTO: 31.3 PG (ref 26.6–33)
MCHC RBC AUTO-ENTMCNC: 34.6 G/DL (ref 31.5–35.7)
MCV RBC AUTO: 90.6 FL (ref 79–97)
MONOCYTES # BLD AUTO: 0.32 10*3/MM3 (ref 0.1–0.9)
MONOCYTES NFR BLD AUTO: 8.8 % (ref 5–12)
NEUTROPHILS # BLD AUTO: 1.9 10*3/MM3 (ref 1.7–7)
NEUTROPHILS NFR BLD AUTO: 52.3 % (ref 42.7–76)
NRBC BLD AUTO-RTO: 0 /100 WBC (ref 0–0.2)
PLATELET # BLD AUTO: 217 10*3/MM3 (ref 140–450)
POTASSIUM SERPL-SCNC: 4.3 MMOL/L (ref 3.5–5.2)
PROT SERPL-MCNC: 6.9 G/DL (ref 6–8.5)
RBC # BLD AUTO: 3.61 10*6/MM3 (ref 3.77–5.28)
SODIUM SERPL-SCNC: 143 MMOL/L (ref 136–145)
TRIGL SERPL-MCNC: 138 MG/DL (ref 0–150)
TSH SERPL DL<=0.005 MIU/L-ACNC: 2.08 UIU/ML (ref 0.27–4.2)
VLDLC SERPL CALC-MCNC: 24 MG/DL (ref 5–40)
WBC # BLD AUTO: 3.63 10*3/MM3 (ref 3.4–10.8)

## 2020-11-05 ENCOUNTER — OFFICE VISIT (OUTPATIENT)
Dept: PAIN MEDICINE | Facility: CLINIC | Age: 70
End: 2020-11-05

## 2020-11-05 VITALS
WEIGHT: 233 LBS | OXYGEN SATURATION: 95 % | SYSTOLIC BLOOD PRESSURE: 155 MMHG | HEART RATE: 97 BPM | HEIGHT: 63 IN | BODY MASS INDEX: 41.29 KG/M2 | TEMPERATURE: 98.1 F | DIASTOLIC BLOOD PRESSURE: 97 MMHG | RESPIRATION RATE: 18 BRPM

## 2020-11-05 DIAGNOSIS — Z79.891 ENCOUNTER FOR MONITORING OPIOID MAINTENANCE THERAPY: ICD-10-CM

## 2020-11-05 DIAGNOSIS — M54.2 NECK PAIN: ICD-10-CM

## 2020-11-05 DIAGNOSIS — M25.512 CHRONIC LEFT SHOULDER PAIN: ICD-10-CM

## 2020-11-05 DIAGNOSIS — G89.29 CHRONIC LEFT SHOULDER PAIN: ICD-10-CM

## 2020-11-05 DIAGNOSIS — Z51.81 ENCOUNTER FOR MONITORING OPIOID MAINTENANCE THERAPY: ICD-10-CM

## 2020-11-05 DIAGNOSIS — G89.4 CHRONIC PAIN SYNDROME: Primary | ICD-10-CM

## 2020-11-05 DIAGNOSIS — M12.9 ARTHRITIS, MULTIPLE JOINT INVOLVEMENT: ICD-10-CM

## 2020-11-05 PROCEDURE — 99214 OFFICE O/P EST MOD 30 MIN: CPT | Performed by: NURSE PRACTITIONER

## 2020-11-05 RX ORDER — CYCLOBENZAPRINE HCL 10 MG
10 TABLET ORAL NIGHTLY PRN
Qty: 30 TABLET | Refills: 2 | Status: SHIPPED | OUTPATIENT
Start: 2020-11-05 | End: 2021-02-17

## 2020-11-05 RX ORDER — HYDROCODONE BITARTRATE AND ACETAMINOPHEN 10; 325 MG/1; MG/1
1 TABLET ORAL
Qty: 150 TABLET | Refills: 0 | Status: SHIPPED | OUTPATIENT
Start: 2020-11-05 | End: 2020-12-04 | Stop reason: SDUPTHER

## 2020-11-05 NOTE — PROGRESS NOTES
CHIEF COMPLAINT  Neck and shoulder pain is unchanged since last visit    Subjective   Chastity Salgado is a 70 y.o. female  who presents to the office for follow-up of procedure.  She completed a left suprascapular nerve block/steroid injection - posterior approach   on  9/22/2020 performed by Dr. Padgett for management of shoulder pain. Patient reports significant relief from the procedure with improved range of motion.  Helped for about a month, reports was at least 70% improved during this time.  Says she could actually drive and perform some household chores much more comfortably.      C/o diffuse joint pain (pain worse in left shoulder, most severe in the anterior shoulder area).  Pain today 7/10 in severity.  Taking Hydrocodone 10/325 5/day, Celebrex 200 mg daily, Started Cymbalta 60 mg daily last week.  Denies adverse reactions.  Reports at least moderate pain reduction with regimen reports that she would not be able to function without it.  constipation reported, stable with Relistor.      Rheumatologist - Dr. Lucero.  Saw recently, told not rheumatoid.  All OA, she has been released.     Counseling - Dr. Edison Vásquez - ortho.  Left shoulder injections every 90 days. History of multiple right shoulder surgeries.  Last injection was two months ago.       Neck pain worsening but has previously declined MRI due to claustrophobia.      Joint Pain  This is a chronic problem. The current episode started more than 1 year ago. The problem occurs daily. The problem has been waxing and waning. Associated symptoms include arthralgias (bilateral shoulders), neck pain, numbness and weakness. Pertinent negatives include no abdominal pain (after being off norco), chest pain, chills, congestion, coughing, fatigue, fever, headaches, joint swelling (knees, right hip bursitis), nausea or vomiting. Exacerbated by: activity. She has tried oral narcotics, acetaminophen, heat and NSAIDs for the symptoms. The treatment  provided moderate relief.   Neck Pain   This is a chronic problem. The problem occurs daily. The problem has been waxing and waning. The pain is present in the anterior neck, left side and right side. The quality of the pain is described as aching and burning. The pain is at a severity of 7/10. The pain is moderate. Exacerbated by: movement of neck, use of arms/shoulders. Associated symptoms include numbness and weakness. Pertinent negatives include no chest pain, fever or headaches. She has tried NSAIDs, oral narcotics, muscle relaxants and neck support for the symptoms. The treatment provided moderate relief.     PEG Assessment   What number best describes your pain on average in the past week?6  What number best describes how, during the past week, pain has interfered with your enjoyment of life?7  What number best describes how, during the past week, pain has interfered with your general activity?  7    The following portions of the patient's history were reviewed and updated as appropriate: allergies, current medications, past family history, past medical history, past social history, past surgical history and problem list.    Review of Systems   Constitutional: Negative for chills, fatigue and fever.   HENT: Negative for congestion.    Respiratory: Negative for cough and shortness of breath.    Cardiovascular: Negative for chest pain.   Gastrointestinal: Positive for constipation. Negative for abdominal pain (after being off norco), diarrhea, nausea, rectal pain and vomiting.   Genitourinary: Negative for difficulty urinating, dyspareunia and dysuria.   Musculoskeletal: Positive for arthralgias (bilateral shoulders) and neck pain. Negative for joint swelling (knees, right hip bursitis).        Shoulder pain   Neurological: Positive for dizziness, weakness and numbness. Negative for light-headedness and headaches.   Psychiatric/Behavioral: Positive for sleep disturbance. Negative for confusion, hallucinations,  "self-injury and suicidal ideas. The patient is not nervous/anxious.    All other systems reviewed and are negative.    I have reviewed and confirmed the accuracy of the ROS as documented by the MA/LPN/RN NEWTON Jordan    Vitals:    11/05/20 1530   BP: 155/97   Pulse: 97   Resp: 18   Temp: 98.1 °F (36.7 °C)   SpO2: 95%   Weight: 106 kg (233 lb)   Height: 160 cm (63\")   PainSc:   7   PainLoc: Neck     Objective   Physical Exam  Vitals signs and nursing note reviewed.   Constitutional:       General: She is not in acute distress.     Appearance: Normal appearance. She is not ill-appearing.   HENT:      Head: Atraumatic.   Eyes:      Conjunctiva/sclera: Conjunctivae normal.   Cardiovascular:      Rate and Rhythm: Normal rate.   Pulmonary:      Effort: Pulmonary effort is normal. No respiratory distress.   Musculoskeletal:      Right shoulder: She exhibits decreased range of motion and tenderness.      Left shoulder: She exhibits decreased range of motion and tenderness.   Skin:     General: Skin is warm and dry.   Neurological:      Mental Status: She is alert and oriented to person, place, and time.      Motor: No weakness.      Gait: Gait abnormal (slowed).   Psychiatric:         Mood and Affect: Mood normal.         Behavior: Behavior normal.       Assessment/Plan   Diagnoses and all orders for this visit:    1. Chronic pain syndrome (Primary)    2. Arthritis, multiple joint involvement    3. Chronic left shoulder pain  -     Case Request    4. Neck pain    5. Encounter for monitoring opioid maintenance therapy    Other orders  -     HYDROcodone-acetaminophen (NORCO)  MG per tablet; Take 1 tablet by mouth 5 (Five) Times a Day As Needed for Severe Pain . dnf 11/9/2020  Dispense: 150 tablet; Refill: 0  -     cyclobenzaprine (FLEXERIL) 10 MG tablet; Take 1 tablet by mouth At Night As Needed for Muscle Spasms.  Dispense: 30 tablet; Refill: 2      --- left suprascapular nerve block   --- Refill " Hydrocodone.Patient appears stable with current regimen. No adverse effects. Regarding continuation of opioids, there is no evidence of aberrant behavior or any red flags.  The patient continues with appropriate response to opioid therapy. ADL's remain intact by self.    --- The urine drug screen confirmation from 9/8/2020 has been reviewed and the result is appropriate based on patient history and GAYE report  --- Follow-up 2 months        GAYE REPORT  As part of the patient's treatment plan, I am prescribing controlled substances. The patient has been made aware of appropriate use of such medications, including potential risk of somnolence, limited ability to drive and/or work safely, and the potential for dependence or overdose. It has also bee made clear that these medications are for use by this patient only, without concomitant use of alcohol or other substances unless prescribed.     Patient has completed prescribing agreement detailing terms of continued prescribing of controlled substances, including monitoring GAYE reports, urine drug screening, and pill counts if necessary. The patient is aware that inappropriate use will results in cessation of prescribing such medications.    GAYE report has been reviewed and scanned into the patient's chart.    As the clinician, I personally reviewed the GAYE from 11/5/2020 while the patient was in the office today.    History and physical exam exhibit continued safe and appropriate use of controlled substances.    EMR Dragon/Transcription disclaimer:   Much of this encounter note is an electronic transcription/translation of spoken language to printed text. The electronic translation of spoken language may permit erroneous, or at times, nonsensical words or phrases to be inadvertently transcribed; Although I have reviewed the note for such errors, some may still exist.

## 2020-11-10 ENCOUNTER — TELEPHONE (OUTPATIENT)
Dept: FAMILY MEDICINE CLINIC | Facility: CLINIC | Age: 70
End: 2020-11-10

## 2020-11-18 RX ORDER — DULOXETIN HYDROCHLORIDE 60 MG/1
60 CAPSULE, DELAYED RELEASE ORAL DAILY
Qty: 90 CAPSULE | Refills: 1 | Status: SHIPPED | OUTPATIENT
Start: 2020-11-18 | End: 2021-08-23

## 2020-11-24 NOTE — TELEPHONE ENCOUNTER
Caller: Chastity Salgado    Relationship: Self    Best call back number: [phone]   Medication needed:   Requested Prescriptions     Pending Prescriptions Disp Refills   • lisinopril (PRINIVIL,ZESTRIL) 30 MG tablet 90 tablet 0     Sig: Take 1 tablet by mouth Daily.       When do you need the refill by: TODAY    What details did the patient provide when requesting the medication:  PATIENT IS OUT OF MEDICATION  Does the patient have less than a 3 day supply:  [x] Yes  [] No    What is the patient's preferred pharmacy: [facility] [address] & [place] - [phone] [facility] [phone] FX

## 2020-11-25 RX ORDER — LISINOPRIL 30 MG/1
30 TABLET ORAL DAILY
Qty: 30 TABLET | Refills: 11 | Status: SHIPPED | OUTPATIENT
Start: 2020-11-25 | End: 2021-03-09

## 2020-12-04 RX ORDER — HYDROCODONE BITARTRATE AND ACETAMINOPHEN 10; 325 MG/1; MG/1
1 TABLET ORAL
Qty: 150 TABLET | Refills: 0 | Status: SHIPPED | OUTPATIENT
Start: 2020-12-04 | End: 2021-01-07 | Stop reason: SDUPTHER

## 2020-12-04 NOTE — TELEPHONE ENCOUNTER
Medication Refill Request    Date of phone call: 20    Medication being requested: Norco  mg si tab po five times a day, prn  Qty: 150    Date of last visit: 20    Date of last refill:  DNF 20    GAYE up to date?: no    Next Follow up?: 20    Any new pertinent information? (i.e, new medication allergies, new use of medications, change in patient's health or condition, non-compliance or inconsistency with prescribing agreement?):

## 2021-01-07 ENCOUNTER — OFFICE VISIT (OUTPATIENT)
Dept: PAIN MEDICINE | Facility: CLINIC | Age: 71
End: 2021-01-07

## 2021-01-07 VITALS
TEMPERATURE: 97 F | BODY MASS INDEX: 40.57 KG/M2 | RESPIRATION RATE: 18 BRPM | OXYGEN SATURATION: 97 % | WEIGHT: 229 LBS | SYSTOLIC BLOOD PRESSURE: 145 MMHG | DIASTOLIC BLOOD PRESSURE: 88 MMHG | HEART RATE: 99 BPM | HEIGHT: 63 IN

## 2021-01-07 DIAGNOSIS — G89.4 CHRONIC PAIN SYNDROME: Primary | ICD-10-CM

## 2021-01-07 DIAGNOSIS — G89.29 CHRONIC LEFT SHOULDER PAIN: ICD-10-CM

## 2021-01-07 DIAGNOSIS — Z51.81 ENCOUNTER FOR MONITORING OPIOID MAINTENANCE THERAPY: ICD-10-CM

## 2021-01-07 DIAGNOSIS — M25.512 CHRONIC LEFT SHOULDER PAIN: ICD-10-CM

## 2021-01-07 DIAGNOSIS — M12.9 ARTHRITIS, MULTIPLE JOINT INVOLVEMENT: ICD-10-CM

## 2021-01-07 DIAGNOSIS — M54.2 NECK PAIN: ICD-10-CM

## 2021-01-07 DIAGNOSIS — Z79.891 ENCOUNTER FOR MONITORING OPIOID MAINTENANCE THERAPY: ICD-10-CM

## 2021-01-07 PROCEDURE — 99214 OFFICE O/P EST MOD 30 MIN: CPT | Performed by: NURSE PRACTITIONER

## 2021-01-07 PROCEDURE — 96372 THER/PROPH/DIAG INJ SC/IM: CPT | Performed by: NURSE PRACTITIONER

## 2021-01-07 RX ORDER — HYDROCODONE BITARTRATE AND ACETAMINOPHEN 10; 325 MG/1; MG/1
1 TABLET ORAL
Qty: 150 TABLET | Refills: 0 | Status: SHIPPED | OUTPATIENT
Start: 2021-01-07 | End: 2021-01-29 | Stop reason: SDUPTHER

## 2021-01-07 RX ORDER — KETOROLAC TROMETHAMINE 30 MG/ML
30 INJECTION, SOLUTION INTRAMUSCULAR; INTRAVENOUS ONCE
Status: COMPLETED | OUTPATIENT
Start: 2021-01-07 | End: 2021-01-07

## 2021-01-07 RX ADMIN — KETOROLAC TROMETHAMINE 30 MG: 30 INJECTION, SOLUTION INTRAMUSCULAR; INTRAVENOUS at 12:05

## 2021-01-07 NOTE — PROGRESS NOTES
CHIEF COMPLAINT  Neck and shoulder pain has increased since last visit    Subjective   Chastity Salgado is a 70 y.o. female  who presents for follow-up.  She has a history of neck and shoulder pain.    She completed a left suprascapular nerve block/steroid injection - posterior approach   on  9/22/2020 performed by Dr. Padgett for management of shoulder pain. Patient reports significant relief from the procedure with improved range of motion.  Helped for about a month, reports was at least 70% improved during this time.  Says she could actually drive and perform some household chores much more comfortably.  ordered repeat last visit, cancelled due to it being cost prohibitive.  Now has new insurance.         C/o diffuse joint pain.  Pain today 8/10 in severity.  Taking Hydrocodone 10/325 5/day, Celebrex 200 mg daily, Started Cymbalta 60 mg daily last week.  Denies adverse reactions.  Reports at least moderate pain reduction with regimen reports that she would not be able to function without it.  constipation reported, stable with Relistor.       Rheumatologist - Dr. Lucero.  Saw recently, told not rheumatoid.  All OA, she has been released.      Counseling - Dr. Edison Vásquez - ortho.  Left shoulder injections every 90 days. History of multiple right shoulder surgeries.  Last injection was two months ago.       Neck pain worsening but has previously declined MRI due to claustrophobia.      Patient remained masked during entire encounter. No cough present. I donned a mask and eye protection throughout entire visit. Prior to donning mask and eye protection, hand hygiene was performed, as well as when it was doffed.  I was closer than 6 feet, but not for an extended period of time. No obvious exposure to any bodily fluids.    Joint Pain  This is a chronic problem. The current episode started more than 1 year ago. The problem occurs daily. The problem has been waxing and waning. Associated symptoms include  arthralgias (bilateral shoulders) and neck pain. Pertinent negatives include no abdominal pain (after being off norco), chest pain, chills, congestion, coughing, fatigue, fever, headaches, joint swelling (knees, right hip bursitis), nausea, numbness, vomiting or weakness. Exacerbated by: activity. She has tried oral narcotics, acetaminophen, heat and NSAIDs for the symptoms. The treatment provided moderate relief.   Neck Pain   This is a chronic problem. The problem occurs daily. The problem has been waxing and waning. The pain is present in the anterior neck, left side and right side. The quality of the pain is described as aching and burning. The pain is at a severity of 8/10. The pain is moderate. Exacerbated by: movement of neck, use of arms/shoulders. Pertinent negatives include no chest pain, fever, headaches, numbness or weakness. She has tried NSAIDs, oral narcotics, muscle relaxants and neck support for the symptoms. The treatment provided moderate relief.      PEG Assessment   What number best describes your pain on average in the past week?4  What number best describes how, during the past week, pain has interfered with your enjoyment of life?5  What number best describes how, during the past week, pain has interfered with your general activity?  4    The following portions of the patient's history were reviewed and updated as appropriate: allergies, current medications, past family history, past medical history, past social history, past surgical history and problem list.    Review of Systems   Constitutional: Negative for chills, fatigue and fever.   HENT: Negative for congestion.    Respiratory: Negative for cough and shortness of breath.    Cardiovascular: Negative for chest pain.   Gastrointestinal: Negative for abdominal pain (after being off norco), constipation, diarrhea, nausea and vomiting.   Genitourinary: Negative for difficulty urinating, dyspareunia and dysuria.   Musculoskeletal: Positive  "for arthralgias (bilateral shoulders) and neck pain. Negative for joint swelling (knees, right hip bursitis).   Neurological: Negative for dizziness, weakness, light-headedness, numbness and headaches.   Psychiatric/Behavioral: Negative for confusion, hallucinations, self-injury, sleep disturbance and suicidal ideas. The patient is not nervous/anxious.    All other systems reviewed and are negative.    I have reviewed and confirmed the accuracy of the ROS as documented by the MA/LPN/RN NEWTON Jordan    Vitals:    01/07/21 1110   BP: 145/88   Pulse: 99   Resp: 18   Temp: 97 °F (36.1 °C)   SpO2: 97%   Weight: 104 kg (229 lb)   Height: 160 cm (63\")   PainSc:   8   PainLoc: Neck     Objective   Physical Exam  Vitals signs and nursing note reviewed.   Constitutional:       General: She is not in acute distress.     Appearance: Normal appearance. She is not ill-appearing.   HENT:      Head: Atraumatic.   Eyes:      Conjunctiva/sclera: Conjunctivae normal.   Cardiovascular:      Rate and Rhythm: Normal rate.   Pulmonary:      Effort: Pulmonary effort is normal. No respiratory distress.   Musculoskeletal:      Right shoulder: She exhibits decreased range of motion and tenderness.      Left shoulder: She exhibits decreased range of motion and tenderness.   Skin:     General: Skin is warm and dry.   Neurological:      Mental Status: She is alert and oriented to person, place, and time.      Motor: No weakness.      Gait: Gait abnormal (slowed).   Psychiatric:         Mood and Affect: Mood normal.         Behavior: Behavior normal.       Assessment/Plan   Diagnoses and all orders for this visit:    1. Chronic pain syndrome (Primary)  -     ketorolac (TORADOL) injection 30 mg    2. Chronic left shoulder pain  -     Case Request    3. Neck pain    4. Arthritis, multiple joint involvement    5. Encounter for monitoring opioid maintenance therapy    Other orders  -     HYDROcodone-acetaminophen (NORCO)  MG per " tablet; Take 1 tablet by mouth 5 (Five) Times a Day As Needed for Severe Pain . dnf 12/8/2020  Dispense: 150 tablet; Refill: 0      --- Repeat left suprascapular nerve block  --- Refill Hydrocodone. Patient appears stable with current regimen. No adverse effects. Regarding continuation of opioids, there is no evidence of aberrant behavior or any red flags.  The patient continues with appropriate response to opioid therapy. ADL's remain intact by self.   --- The urine drug screen confirmation from 9/8/2020 has been reviewed and the result is appropriate based on patient history and GAYE report  --- The patient signed an updated copy of the controlled substance agreement on 1/7/2021  --- Toradol 30 mg IM for acute painful flare today   --- Follow-up 2 months        GAYE REPORT  As part of the patient's treatment plan, I am prescribing controlled substances. The patient has been made aware of appropriate use of such medications, including potential risk of somnolence, limited ability to drive and/or work safely, and the potential for dependence or overdose. It has also bee made clear that these medications are for use by this patient only, without concomitant use of alcohol or other substances unless prescribed.     Patient has completed prescribing agreement detailing terms of continued prescribing of controlled substances, including monitoring GAYE reports, urine drug screening, and pill counts if necessary. The patient is aware that inappropriate use will results in cessation of prescribing such medications.    GAYE report has been reviewed and scanned into the patient's chart.    As the clinician, I personally reviewed the GAYE from 1/7/2020 while the patient was in the office today.    History and physical exam exhibit continued safe and appropriate use of controlled substances.    EMR Dragon/Transcription disclaimer:   Much of this encounter note is an electronic transcription/translation of spoken  language to printed text. The electronic translation of spoken language may permit erroneous, or at times, nonsensical words or phrases to be inadvertently transcribed; Although I have reviewed the note for such errors, some may still exist.

## 2021-01-14 ENCOUNTER — OFFICE VISIT (OUTPATIENT)
Dept: FAMILY MEDICINE CLINIC | Facility: CLINIC | Age: 71
End: 2021-01-14

## 2021-01-14 VITALS
SYSTOLIC BLOOD PRESSURE: 157 MMHG | DIASTOLIC BLOOD PRESSURE: 71 MMHG | RESPIRATION RATE: 20 BRPM | OXYGEN SATURATION: 98 % | HEART RATE: 86 BPM

## 2021-01-14 DIAGNOSIS — J40 BRONCHITIS: Primary | ICD-10-CM

## 2021-01-14 DIAGNOSIS — R06.02 SHORTNESS OF BREATH: ICD-10-CM

## 2021-01-14 PROCEDURE — 99213 OFFICE O/P EST LOW 20 MIN: CPT | Performed by: NURSE PRACTITIONER

## 2021-01-14 RX ORDER — PREDNISONE 20 MG/1
20 TABLET ORAL 2 TIMES DAILY
Qty: 10 TABLET | Refills: 0 | Status: ON HOLD | OUTPATIENT
Start: 2021-01-14 | End: 2021-02-25

## 2021-01-14 RX ORDER — ALBUTEROL SULFATE 90 UG/1
2 AEROSOL, METERED RESPIRATORY (INHALATION) EVERY 4 HOURS PRN
Qty: 18 G | Refills: 0 | Status: SHIPPED | OUTPATIENT
Start: 2021-01-14 | End: 2021-07-30

## 2021-01-14 RX ORDER — AZITHROMYCIN 250 MG/1
TABLET, FILM COATED ORAL
Qty: 6 TABLET | Refills: 0 | Status: ON HOLD | OUTPATIENT
Start: 2021-01-14 | End: 2021-02-25

## 2021-01-14 NOTE — PROGRESS NOTES
Chief Complaint  Cough and Shortness of Breath    Subjective          Chastity Salgado presents to [facility] for   Chastity presents with one week of acute shortness of breath and cough. Cough is mostly dry, but occasionally productive with thick yellow/clear sputum. She was tested at  last week, covid was negative. Denies fever or chills. Shortness of breath is worse with exertion, but will get short of breath at night as well. No loss of taste or smell. She does have a history of bronchitis. Denies n/v/d. She does report sores in mouth, that are gradually improving.       Cough  This is a new problem. The current episode started in the past 7 days. The problem has been gradually worsening. The cough is productive of sputum. Associated symptoms include nasal congestion, postnasal drip, shortness of breath and wheezing. Pertinent negatives include no chest pain, chills, ear congestion, ear pain, fever, headaches, heartburn, hemoptysis, myalgias, rash, rhinorrhea, sore throat, sweats or weight loss. The symptoms are aggravated by lying down (exertion). She has tried nothing for the symptoms. The treatment provided no relief. Her past medical history is significant for bronchitis.   Shortness of Breath  This is a new problem. The current episode started in the past 7 days. The problem occurs daily. The problem has been waxing and waning. Associated symptoms include sputum production and wheezing. Pertinent negatives include no abdominal pain, chest pain, claudication, coryza, ear pain, fever, headaches, hemoptysis, leg pain, leg swelling, neck pain, orthopnea, PND, rash, rhinorrhea, sore throat, swollen glands, syncope or vomiting. The symptoms are aggravated by any activity and lying flat. The patient has no known risk factors for DVT/PE. She has tried nothing for the symptoms. The treatment provided no relief.       Objective   Vital Signs:   There were no vitals taken for this  visit.    Physical Exam  Constitutional:       Appearance: Normal appearance. She is obese. She is ill-appearing.   Cardiovascular:      Rate and Rhythm: Normal rate and regular rhythm.      Heart sounds: No murmur. No friction rub. No gallop.    Pulmonary:      Effort: Tachypnea present. No respiratory distress.      Breath sounds: No stridor. Wheezing present. No rhonchi or rales.   Neurological:      Mental Status: She is alert.        Result Review :     Covid Tests    Common Labsle 1/8/21   COVID19 Not Detected      Comments are available for some flowsheets but are not being displayed.                     Assessment and Plan    Problem List Items Addressed This Visit     None      Visit Diagnoses     Bronchitis    -  Primary    Relevant Medications    albuterol sulfate  (90 Base) MCG/ACT inhaler    Other Relevant Orders    COVID-19,[facility] ROUTINE, NP/OP SWAB IN TRANSPORT MEDIA OR ESWAB 72 HR TAT - Swab, Nasopharynx    Shortness of breath        Relevant Orders    COVID-19,[facility] ROUTINE, NP/OP SWAB IN TRANSPORT MEDIA OR ESWAB 72 HR TAT - Swab, Nasopharynx          Follow Up   No follow-ups on file.  Patient was given instructions and counseling regarding her condition or for health maintenance advice. Please see specific information pulled into the AVS if appropriate.     Symptoms are consistent with bronchitis. Start zithromax, prednisone and albuterol as discussed. Increase fluids. For acute shortness of breath unrelieved by medication, proceed to ER. Given acute onset of shortness of breath, will obtain covid test, first test was negative, however tested on day 1 of symptoms.   Agreeable to retest.   Discussed nebulizer, however will hold at current time due to respiratory droplets, discussed with patient oral steroid and albuterol should help with tightness and shortness of breath.   Encouraged to gargle with salt water, oral sores are healing, eat mild soft food

## 2021-01-15 LAB — SARS-COV-2 RNA RESP QL NAA+PROBE: NOT DETECTED

## 2021-01-15 RX ORDER — ALBUTEROL SULFATE 2.5 MG/3ML
2.5 SOLUTION RESPIRATORY (INHALATION) EVERY 4 HOURS PRN
Qty: 50 VIAL | Refills: 0 | Status: SHIPPED | OUTPATIENT
Start: 2021-01-15 | End: 2021-07-30

## 2021-02-02 RX ORDER — HYDROCODONE BITARTRATE AND ACETAMINOPHEN 10; 325 MG/1; MG/1
1 TABLET ORAL
Qty: 150 TABLET | Refills: 0 | Status: SHIPPED | OUTPATIENT
Start: 2021-02-02 | End: 2021-03-09 | Stop reason: SDUPTHER

## 2021-02-17 RX ORDER — CYCLOBENZAPRINE HCL 10 MG
TABLET ORAL
Qty: 30 TABLET | Refills: 1 | Status: SHIPPED | OUTPATIENT
Start: 2021-02-17 | End: 2021-04-22

## 2021-02-19 ENCOUNTER — TRANSCRIBE ORDERS (OUTPATIENT)
Dept: SURGERY | Facility: SURGERY CENTER | Age: 71
End: 2021-02-19

## 2021-02-19 DIAGNOSIS — M25.519 PAIN IN SHOULDER: Primary | ICD-10-CM

## 2021-02-19 PROBLEM — M25.512 CHRONIC LEFT SHOULDER PAIN: Status: ACTIVE | Noted: 2021-02-19

## 2021-02-19 PROBLEM — G89.29 CHRONIC LEFT SHOULDER PAIN: Status: ACTIVE | Noted: 2021-02-19

## 2021-02-22 ENCOUNTER — TRANSCRIBE ORDERS (OUTPATIENT)
Dept: LAB | Facility: SURGERY CENTER | Age: 71
End: 2021-02-22

## 2021-02-22 DIAGNOSIS — Z01.818 OTHER SPECIFIED PRE-OPERATIVE EXAMINATION: Primary | ICD-10-CM

## 2021-02-23 ENCOUNTER — LAB (OUTPATIENT)
Dept: LAB | Facility: SURGERY CENTER | Age: 71
End: 2021-02-23

## 2021-02-23 DIAGNOSIS — Z01.818 OTHER SPECIFIED PRE-OPERATIVE EXAMINATION: ICD-10-CM

## 2021-02-23 LAB — SARS-COV-2 ORF1AB RESP QL NAA+PROBE: NOT DETECTED

## 2021-02-23 PROCEDURE — U0005 INFEC AGEN DETEC AMPLI PROBE: HCPCS | Performed by: SURGERY

## 2021-02-23 PROCEDURE — C9803 HOPD COVID-19 SPEC COLLECT: HCPCS

## 2021-02-23 PROCEDURE — U0004 COV-19 TEST NON-CDC HGH THRU: HCPCS | Performed by: SURGERY

## 2021-02-24 ENCOUNTER — TELEPHONE (OUTPATIENT)
Dept: PAIN MEDICINE | Facility: CLINIC | Age: 71
End: 2021-02-24

## 2021-02-24 NOTE — TELEPHONE ENCOUNTER
"Called pt back. Informed her of above message. Pt responded, \"I don't think you understand, you're thinking I just want to get out of getting a shot tomorrow.\" I explained to pt I literally wrote down everything she told me from our previous conversation and sent a message to Mary Ann GLEZ. I told pt I completely understood what she was asking. I asked Mary Ann for her recommendation and called pt back. Pt sts she will decide in the morning if she wants inj and call to cancel if she decides to. Pt sts \"this is not a blood clot, I take full responsibility for not going to the ER. \" Please advise. Thank you. "

## 2021-02-24 NOTE — TELEPHONE ENCOUNTER
Pt called today stg she is having a severe flare of pain in her right arm and shoulder. She sts her right arm is swollen down to her right hand, difficulty wearing clothing due to swelling, shirts fit tight. Pt sts you are aware of her swelling in her right arm at times, as she has had a history of bad flares and swelling in the past. She is unsure of getting her left suprascapular nerve block scheduled for tomorrow. She sts she already called and spoke with Catherine about wanting to cancel procedure, but wants your recommendation about her severe flare on her right arm, shoulder and hand. She wants to know should she proceed with inj tomorrow or should she cancel? Pt also wants to know if she cancels tomorrow, can she come in to the office for a toradol inj for her severe flare? Please advise. Thank you.

## 2021-02-24 NOTE — TELEPHONE ENCOUNTER
I am most concerned about the swelling.  If she is truly experiencing that significant of an amount of swelling then she would need to be evaluated acutely (ED) to rule out a blood clot or other medical condition.  If this is truly just a bad flare then I would absolutely recommend proceeding with the block, this would be the best thing for her shoulder pain.

## 2021-02-25 ENCOUNTER — HOSPITAL ENCOUNTER (OUTPATIENT)
Facility: SURGERY CENTER | Age: 71
Setting detail: HOSPITAL OUTPATIENT SURGERY
Discharge: HOME | End: 2021-02-25
Attending: ANESTHESIOLOGY | Admitting: ANESTHESIOLOGY

## 2021-02-25 ENCOUNTER — HOSPITAL ENCOUNTER (OUTPATIENT)
Dept: GENERAL RADIOLOGY | Facility: SURGERY CENTER | Age: 71
Setting detail: HOSPITAL OUTPATIENT SURGERY
End: 2021-02-25

## 2021-02-25 VITALS
HEIGHT: 63 IN | OXYGEN SATURATION: 99 % | TEMPERATURE: 98.2 F | WEIGHT: 229 LBS | RESPIRATION RATE: 16 BRPM | DIASTOLIC BLOOD PRESSURE: 90 MMHG | SYSTOLIC BLOOD PRESSURE: 180 MMHG | HEART RATE: 77 BPM | BODY MASS INDEX: 40.57 KG/M2

## 2021-02-25 DIAGNOSIS — M25.519 PAIN IN SHOULDER: ICD-10-CM

## 2021-02-25 PROCEDURE — 64418 NJX AA&/STRD SPRSCAP NRV: CPT | Performed by: ANESTHESIOLOGY

## 2021-02-25 PROCEDURE — 3E0T33Z INTRODUCTION OF ANTI-INFLAMMATORY INTO PERIPHERAL NERVES AND PLEXI, PERCUTANEOUS APPROACH: ICD-10-PCS | Performed by: ANESTHESIOLOGY

## 2021-02-25 PROCEDURE — S0260 H&P FOR SURGERY: HCPCS | Performed by: ANESTHESIOLOGY

## 2021-02-25 PROCEDURE — 25010000002 METHYLPREDNISOLONE PER 40 MG: Performed by: ANESTHESIOLOGY

## 2021-02-25 PROCEDURE — 0 IOHEXOL 300 MG/ML SOLUTION 10 ML VIAL: Performed by: ANESTHESIOLOGY

## 2021-02-25 RX ORDER — SODIUM CHLORIDE 0.9 % (FLUSH) 0.9 %
10 SYRINGE (ML) INJECTION AS NEEDED
Status: DISCONTINUED | OUTPATIENT
Start: 2021-02-25 | End: 2021-02-25 | Stop reason: HOSPADM

## 2021-02-25 RX ORDER — SODIUM CHLORIDE, SODIUM LACTATE, POTASSIUM CHLORIDE, CALCIUM CHLORIDE 600; 310; 30; 20 MG/100ML; MG/100ML; MG/100ML; MG/100ML
9 INJECTION, SOLUTION INTRAVENOUS CONTINUOUS PRN
Status: DISCONTINUED | OUTPATIENT
Start: 2021-02-25 | End: 2021-02-25 | Stop reason: HOSPADM

## 2021-03-02 DIAGNOSIS — Z23 IMMUNIZATION DUE: ICD-10-CM

## 2021-03-05 ENCOUNTER — TELEPHONE (OUTPATIENT)
Dept: PAIN MEDICINE | Facility: CLINIC | Age: 71
End: 2021-03-05

## 2021-03-05 NOTE — TELEPHONE ENCOUNTER
Caller: REGINE OLIVER    Relationship:  SELF    Best call back number: [phone]    Medication needed: HYDROCODONE 10-325MG    When do you need the refill by: PATIENT HAS ENOUGH UNTIL NEXT APPT 3-9-2021 WITH BENNETT SINGLETON    What details did the patient provide when requesting the medication:      Does the patient have less than a 3 day supply:  [] Yes  [x] No    What is the patient's preferred pharmacy:      [facility] [address] & [place] - [phone] [facility] [phone] FX

## 2021-03-09 ENCOUNTER — OFFICE VISIT (OUTPATIENT)
Dept: FAMILY MEDICINE CLINIC | Facility: CLINIC | Age: 71
End: 2021-03-09

## 2021-03-09 ENCOUNTER — OFFICE VISIT (OUTPATIENT)
Dept: PAIN MEDICINE | Facility: CLINIC | Age: 71
End: 2021-03-09

## 2021-03-09 VITALS
SYSTOLIC BLOOD PRESSURE: 157 MMHG | TEMPERATURE: 96.9 F | DIASTOLIC BLOOD PRESSURE: 87 MMHG | BODY MASS INDEX: 40.68 KG/M2 | HEIGHT: 63 IN | OXYGEN SATURATION: 98 % | HEART RATE: 80 BPM | WEIGHT: 229.6 LBS | RESPIRATION RATE: 16 BRPM

## 2021-03-09 VITALS
TEMPERATURE: 96.2 F | OXYGEN SATURATION: 98 % | HEIGHT: 63 IN | SYSTOLIC BLOOD PRESSURE: 144 MMHG | WEIGHT: 229.6 LBS | BODY MASS INDEX: 40.68 KG/M2 | RESPIRATION RATE: 18 BRPM | HEART RATE: 90 BPM | DIASTOLIC BLOOD PRESSURE: 96 MMHG

## 2021-03-09 DIAGNOSIS — G89.4 CHRONIC PAIN SYNDROME: Primary | ICD-10-CM

## 2021-03-09 DIAGNOSIS — Z79.891 ENCOUNTER FOR MONITORING OPIOID MAINTENANCE THERAPY: ICD-10-CM

## 2021-03-09 DIAGNOSIS — R10.11 RUQ PAIN: ICD-10-CM

## 2021-03-09 DIAGNOSIS — M25.512 CHRONIC LEFT SHOULDER PAIN: ICD-10-CM

## 2021-03-09 DIAGNOSIS — M12.9 ARTHRITIS, MULTIPLE JOINT INVOLVEMENT: ICD-10-CM

## 2021-03-09 DIAGNOSIS — M54.2 NECK PAIN: ICD-10-CM

## 2021-03-09 DIAGNOSIS — E11.59 TYPE 2 DIABETES MELLITUS WITH OTHER CIRCULATORY COMPLICATION, WITHOUT LONG-TERM CURRENT USE OF INSULIN (HCC): ICD-10-CM

## 2021-03-09 DIAGNOSIS — I10 ESSENTIAL HYPERTENSION: ICD-10-CM

## 2021-03-09 DIAGNOSIS — R14.3 FLATULENCE: ICD-10-CM

## 2021-03-09 DIAGNOSIS — G89.29 CHRONIC LEFT SHOULDER PAIN: ICD-10-CM

## 2021-03-09 DIAGNOSIS — D64.9 ANEMIA, UNSPECIFIED TYPE: ICD-10-CM

## 2021-03-09 DIAGNOSIS — Z82.0 FAMILY HISTORY OF PARKINSON DISEASE: Primary | ICD-10-CM

## 2021-03-09 DIAGNOSIS — R25.1 TREMOR: ICD-10-CM

## 2021-03-09 DIAGNOSIS — Z51.81 ENCOUNTER FOR MONITORING OPIOID MAINTENANCE THERAPY: ICD-10-CM

## 2021-03-09 PROCEDURE — 99214 OFFICE O/P EST MOD 30 MIN: CPT | Performed by: NURSE PRACTITIONER

## 2021-03-09 RX ORDER — HYDROCODONE BITARTRATE AND ACETAMINOPHEN 10; 325 MG/1; MG/1
1 TABLET ORAL
Qty: 150 TABLET | Refills: 0 | Status: SHIPPED | OUTPATIENT
Start: 2021-03-09 | End: 2021-04-05 | Stop reason: SDUPTHER

## 2021-03-09 RX ORDER — LISINOPRIL 40 MG/1
40 TABLET ORAL DAILY
Qty: 90 TABLET | Refills: 0 | Status: SHIPPED | OUTPATIENT
Start: 2021-03-09 | End: 2021-06-22

## 2021-03-09 NOTE — PROGRESS NOTES
CHIEF COMPLAINT  F/U NECK AND SHOULDER PAIN    Subjective   Chastity Salgado is a 71 y.o. female  who presents to the office for follow-up of procedure.  She completed a left suprascapular nerve block   on  2/25/2021 performed by Dr. Padgett for management of left shoulder pain. Patient reports 40-50% relief from the procedure. Seems to be improving her range of motion and ability to use the arm.      She completed a left suprascapular nerve block/steroid injection - posterior approach   on  9/22/2020 performed by Dr. Padgett for management of shoulder pain. Patient reports significant relief from the procedure with improved range of motion.  Helped for about a month, reports was at least 70% improved during this time.  Says she could actually drive and perform some household chores much more comfortably.        C/o diffuse joint pain.  Pain today 7/10 in severity.  Taking Hydrocodone 10/325 5/day, Celebrex 200 mg daily, Started Cymbalta 60 mg daily last week.  Denies adverse reactions.  Reports at least moderate pain reduction with regimen reports that she would not be able to function without it.  constipation reported, stable with Relistor.       Rheumatologist - Dr. Lucero.  Saw recently, told not rheumatoid.  All OA, she has been released     Counseling - Dr. Edison Vásquez - ortho.  Left shoulder injections every 90 days. History of multiple right shoulder surgeries.  Last injection was two months ago.       Neck pain worsening but has previously declined MRI due to claustrophobia.      Patient remained masked during entire encounter. No cough present. I donned a mask and eye protection throughout entire visit. Prior to donning mask and eye protection, hand hygiene was performed, as well as when it was doffed.  I was closer than 6 feet, but not for an extended period of time. No obvious exposure to any bodily fluids.    Joint Pain  This is a chronic problem. The current episode started more than 1 year  ago. The problem occurs daily. The problem has been waxing and waning. Associated symptoms include abdominal pain, arthralgias (bilateral shoulders), fatigue, neck pain, numbness and weakness. Pertinent negatives include no chest pain, chills, congestion, coughing, fever, headaches, joint swelling (knees, right hip bursitis), nausea or vomiting. Exacerbated by: activity. She has tried oral narcotics, acetaminophen, heat and NSAIDs for the symptoms. The treatment provided moderate relief.   Neck Pain   This is a chronic problem. The problem occurs daily. The problem has been waxing and waning. The pain is present in the anterior neck, left side and right side. The quality of the pain is described as aching and burning. The pain is at a severity of 7/10. The pain is moderate. Exacerbated by: movement of neck, use of arms/shoulders. Associated symptoms include numbness and weakness. Pertinent negatives include no chest pain, fever or headaches. She has tried NSAIDs, oral narcotics, muscle relaxants and neck support for the symptoms. The treatment provided moderate relief.      PEG Assessment   What number best describes your pain on average in the past week?8  What number best describes how, during the past week, pain has interfered with your enjoyment of life?7  What number best describes how, during the past week, pain has interfered with your general activity?  7    The following portions of the patient's history were reviewed and updated as appropriate: allergies, current medications, past family history, past medical history, past social history, past surgical history and problem list.    Review of Systems   Constitutional: Positive for fatigue. Negative for activity change, chills and fever.   HENT: Negative for congestion.    Respiratory: Negative for cough, chest tightness and shortness of breath.    Cardiovascular: Negative for chest pain.   Gastrointestinal: Positive for abdominal pain and constipation.  "Negative for diarrhea, nausea and vomiting.   Genitourinary: Negative for difficulty urinating, dyspareunia and dysuria.   Musculoskeletal: Positive for arthralgias (bilateral shoulders) and neck pain. Negative for back pain and joint swelling (knees, right hip bursitis).   Neurological: Positive for weakness and numbness. Negative for dizziness, light-headedness and headaches.   Psychiatric/Behavioral: Positive for sleep disturbance. Negative for agitation. The patient is not nervous/anxious.      I have reviewed and confirmed the accuracy of the ROS as documented by the MA/LPN/RN NEWTON Jordan     Vitals:    03/09/21 0939   BP: 157/87   Pulse: 80   Resp: 16   Temp: 96.9 °F (36.1 °C)   SpO2: 98%   Weight: 104 kg (229 lb 9.6 oz)   Height: 160 cm (63\")   PainSc:   7   PainLoc: Shoulder     Objective   Physical Exam  Vitals and nursing note reviewed.   Constitutional:       General: She is not in acute distress.     Appearance: Normal appearance. She is not ill-appearing.   Cardiovascular:      Rate and Rhythm: Normal rate.   Pulmonary:      Effort: Pulmonary effort is normal. No respiratory distress.   Musculoskeletal:      Right shoulder: Tenderness present. Decreased range of motion.      Left shoulder: Tenderness present. Decreased range of motion.   Skin:     General: Skin is warm and dry.   Neurological:      Mental Status: She is alert and oriented to person, place, and time.      Motor: No weakness.      Gait: Gait abnormal (slowed).   Psychiatric:         Mood and Affect: Mood normal.         Behavior: Behavior normal.       Assessment/Plan   Diagnoses and all orders for this visit:    1. Chronic pain syndrome (Primary)    2. Chronic left shoulder pain    3. Arthritis, multiple joint involvement    4. Neck pain    5. Encounter for monitoring opioid maintenance therapy    Other orders  -     HYDROcodone-acetaminophen (NORCO)  MG per tablet; Take 1 tablet by mouth 5 (Five) Times a Day As Needed " for Severe Pain .  Dispense: 150 tablet; Refill: 0      --- Consider suprascapular RFA, she will watch/wait to see how long benefit from nerve block lasts  --- Consider PT  --- Refill Hydrocodone. Patient appears stable with current regimen. No adverse effects. Regarding continuation of opioids, there is no evidence of aberrant behavior or any red flags.  The patient continues with appropriate response to opioid therapy. ADL's remain intact by self.   --- The urine drug screen confirmation from 9/8/2020 has been reviewed and the result is appropriate based on patient history and GAYE report  --- The patient signed an updated copy of the controlled substance agreement on 1/7/2021  --- Routine ODT in office today as part of monitoring requirements for controlled substances.  The specimen was viewed and the immunoassay result reviewed and is NOT AVAILABLE - ORAL SPECIMEN.  This specimen will be sent to [facility] laboratory for confirmation.     --- Follow-up 2 months        GAYE REPORT  As part of the patient's treatment plan, I am prescribing controlled substances. The patient has been made aware of appropriate use of such medications, including potential risk of somnolence, limited ability to drive and/or work safely, and the potential for dependence or overdose. It has also bee made clear that these medications are for use by this patient only, without concomitant use of alcohol or other substances unless prescribed.     Patient has completed prescribing agreement detailing terms of continued prescribing of controlled substances, including monitoring GAYE reports, urine drug screening, and pill counts if necessary. The patient is aware that inappropriate use will results in cessation of prescribing such medications.    GAYE report has been reviewed and scanned into the patient's chart.    As the clinician, I personally reviewed the GAYE from 3/9/2021 while the patient was in the office today.    History  and physical exam exhibit continued safe and appropriate use of controlled substances.    EMR Dragon/Transcription disclaimer:   Much of this encounter note is an electronic transcription/translation of spoken language to printed text. The electronic translation of spoken language may permit erroneous, or at times, nonsensical words or phrases to be inadvertently transcribed; Although I have reviewed the note for such errors, some may still exist.

## 2021-03-09 NOTE — PROGRESS NOTES
Chief Complaint  tremers (within the last 6 mo)    Subjective          Chastity Salgado presents to [facility]  Breathing is improved  Appears to breathe heavier  Denies shortness of breath but was pointed out by daughters that her breathing was worse  Oxygen is 98%  Has used the inhaler prn  Weight is decreasing, states louder breathing was occurring prior to bronchitis    Increased gas, regardless of meal, abdomen gets bloated, over one year history  Will also get right sided rib pain, feels like it swells, sitting for prolonged periods will increase symptoms  Reports arthritis all over body  Occasional spasm in esophagus when swallowing food, feels like food is stuck, she is choking, takes time to resolve, occurs with meat, certain episode vomited and resolved. Gas regardless of diet. States its there if she eats or doesn't eat. Ate a grilled cheese sandwich, very nauseated after eating, states used a different kind of cheese. Denies change in bowel. Denies belching, increased flatulence. Tried simethicone without improvement. Cut back on carbonated beverages to one daily and no change. Denies straw use.    Mom and sister with hx parkinsons disease, hands are starting to shake, daughters have pointed this out. Intermittent, none today  Sister passed away. No hx of neuro work up.  Tremors seem to occur at rest, occasionally with movement, walk has changed at times, feels like balance is decreased.     bp takes medication at night, in am        Abdominal Pain  This is a chronic problem. The current episode started more than 1 year ago. The onset quality is gradual. The problem occurs intermittently. The problem has been waxing and waning. The pain is located in the RUQ. The pain is moderate. The quality of the pain is aching. The abdominal pain does not radiate. Associated symptoms include flatus, nausea and vomiting (x 1 related to choking episode). Pertinent negatives include no  "anorexia, arthralgias, belching, constipation, diarrhea, dysuria, fever, frequency, headaches, hematochezia, hematuria, melena, myalgias or weight loss. The pain is aggravated by eating. The pain is relieved by nothing. She has tried nothing for the symptoms.   Shaking  This is a chronic problem. The current episode started more than 1 month ago. The problem occurs intermittently. The problem has been waxing and waning. Associated symptoms include abdominal pain, nausea and vomiting (x 1 related to choking episode). Pertinent negatives include no anorexia, arthralgias, change in bowel habit, chest pain, chills, congestion, coughing, diaphoresis, fatigue, fever, headaches, joint swelling, myalgias, neck pain, numbness, rash, sore throat, swollen glands, urinary symptoms, vertigo, visual change or weakness. Nothing aggravates the symptoms. She has tried nothing for the symptoms. The treatment provided no relief.       Objective   Vital Signs:   /96   Pulse 90   Temp 96.2 °F (35.7 °C) (Temporal)   Resp 18   Ht 160 cm (63\")   Wt 104 kg (229 lb 9.6 oz)   SpO2 98%   BMI 40.67 kg/m²     Physical Exam  Vitals reviewed.   Constitutional:       Appearance: Normal appearance. She is obese.   HENT:      Head: Normocephalic and atraumatic.      Right Ear: Tympanic membrane and ear canal normal.      Left Ear: Tympanic membrane and ear canal normal.      Nose: Nose normal.      Mouth/Throat:      Mouth: Mucous membranes are moist.   Eyes:      Conjunctiva/sclera: Conjunctivae normal.      Pupils: Pupils are equal, round, and reactive to light.   Cardiovascular:      Rate and Rhythm: Normal rate and regular rhythm.      Heart sounds: No murmur. No friction rub. No gallop.    Pulmonary:      Effort: Pulmonary effort is normal. No respiratory distress.      Breath sounds: Normal breath sounds. No stridor. No wheezing, rhonchi or rales.   Abdominal:      General: Bowel sounds are normal. There is no distension.      " Palpations: Abdomen is soft. There is no mass.      Tenderness: There is abdominal tenderness (RUQ). There is no guarding or rebound.      Hernia: No hernia is present.   Neurological:      General: No focal deficit present.      Mental Status: She is alert and oriented to person, place, and time.      Motor: No weakness.      Coordination: Coordination normal.      Gait: Gait normal.      Deep Tendon Reflexes: Reflexes normal.   Psychiatric:         Mood and Affect: Mood normal.        Result Review :                 Assessment and Plan    Diagnoses and all orders for this visit:    1. Family history of Parkinson disease (Primary)  -     Ambulatory Referral to Neurology    2. Tremor  -     Ambulatory Referral to Neurology    3. RUQ pain  -     US Gallbladder; Future    4. Flatulence  -     US Gallbladder; Future    5. Essential hypertension  -     Comprehensive metabolic panel    6. Anemia, unspecified type  -     CBC & Differential  -     Iron and TIBC    7. Type 2 diabetes mellitus with other circulatory complication, without long-term current use of insulin (CMS/[facility])  -     Hemoglobin A1c    Other orders  -     lisinopril (PRINIVIL,ZESTRIL) 40 MG tablet; Take 1 tablet by mouth Daily.  Dispense: 90 tablet; Refill: 0        Follow Up   No follow-ups on file.  Patient was given instructions and counseling regarding her condition or for health maintenance advice. Please see specific information pulled into the AVS if appropriate.     Tremor: not present currently, given family history of parkinsons in mother and sister, will refer to neuro for further evaluation and treatment    RUQ pain: has gallbladder, will order US of gallbladder and await results, has rib pain as well on right side and increased flatulence.  Has choking episodes, would like pt to see gastro in future, in meantime, small bites of meats, chew thoroughly before swallow    Hypertension, increased today, will increase lisinopril to 40 mg daily,  follow up in one month and consider additional bp medication to lower, would consider CCB, reports no improvement with diuretic    Labs today, hx of anemia, will proceed with iron and cbc as well as A1C and CMP

## 2021-03-10 DIAGNOSIS — E11.59 TYPE 2 DIABETES MELLITUS WITH OTHER CIRCULATORY COMPLICATION, WITHOUT LONG-TERM CURRENT USE OF INSULIN (HCC): Primary | ICD-10-CM

## 2021-03-10 LAB
ALBUMIN SERPL-MCNC: 4.3 G/DL (ref 3.5–5.2)
ALBUMIN/GLOB SERPL: 1.3 G/DL
ALP SERPL-CCNC: 86 U/L (ref 39–117)
ALT SERPL-CCNC: 11 U/L (ref 1–33)
AST SERPL-CCNC: 16 U/L (ref 1–32)
BASOPHILS # BLD AUTO: 0.08 10*3/MM3 (ref 0–0.2)
BASOPHILS NFR BLD AUTO: 1.1 % (ref 0–1.5)
BILIRUB SERPL-MCNC: 0.6 MG/DL (ref 0–1.2)
BUN SERPL-MCNC: 13 MG/DL (ref 8–23)
BUN/CREAT SERPL: 20.6 (ref 7–25)
CALCIUM SERPL-MCNC: 9.4 MG/DL (ref 8.6–10.5)
CHLORIDE SERPL-SCNC: 103 MMOL/L (ref 98–107)
CO2 SERPL-SCNC: 28.5 MMOL/L (ref 22–29)
CREAT SERPL-MCNC: 0.63 MG/DL (ref 0.57–1)
EOSINOPHIL # BLD AUTO: 0.12 10*3/MM3 (ref 0–0.4)
EOSINOPHIL NFR BLD AUTO: 1.6 % (ref 0.3–6.2)
ERYTHROCYTE [DISTWIDTH] IN BLOOD BY AUTOMATED COUNT: 12.9 % (ref 12.3–15.4)
GLOBULIN SER CALC-MCNC: 3.3 GM/DL
GLUCOSE SERPL-MCNC: 208 MG/DL (ref 65–99)
HBA1C MFR BLD: 8.2 % (ref 4.8–5.6)
HCT VFR BLD AUTO: 41.5 % (ref 34–46.6)
HGB BLD-MCNC: 14.5 G/DL (ref 12–15.9)
IMM GRANULOCYTES # BLD AUTO: 0.02 10*3/MM3 (ref 0–0.05)
IMM GRANULOCYTES NFR BLD AUTO: 0.3 % (ref 0–0.5)
IRON SATN MFR SERPL: 24 % (ref 20–50)
IRON SERPL-MCNC: 105 MCG/DL (ref 37–145)
LYMPHOCYTES # BLD AUTO: 2.32 10*3/MM3 (ref 0.7–3.1)
LYMPHOCYTES NFR BLD AUTO: 30.8 % (ref 19.6–45.3)
MCH RBC QN AUTO: 32.2 PG (ref 26.6–33)
MCHC RBC AUTO-ENTMCNC: 34.9 G/DL (ref 31.5–35.7)
MCV RBC AUTO: 92 FL (ref 79–97)
MONOCYTES # BLD AUTO: 0.55 10*3/MM3 (ref 0.1–0.9)
MONOCYTES NFR BLD AUTO: 7.3 % (ref 5–12)
NEUTROPHILS # BLD AUTO: 4.44 10*3/MM3 (ref 1.7–7)
NEUTROPHILS NFR BLD AUTO: 58.9 % (ref 42.7–76)
NRBC BLD AUTO-RTO: 0 /100 WBC (ref 0–0.2)
PLATELET # BLD AUTO: 236 10*3/MM3 (ref 140–450)
POTASSIUM SERPL-SCNC: 4.3 MMOL/L (ref 3.5–5.2)
PROT SERPL-MCNC: 7.6 G/DL (ref 6–8.5)
RBC # BLD AUTO: 4.51 10*6/MM3 (ref 3.77–5.28)
SODIUM SERPL-SCNC: 141 MMOL/L (ref 136–145)
TIBC SERPL-MCNC: 434 MCG/DL
UIBC SERPL-MCNC: 329 MCG/DL (ref 112–346)
WBC # BLD AUTO: 7.53 10*3/MM3 (ref 3.4–10.8)

## 2021-03-10 RX ORDER — BLOOD-GLUCOSE METER
KIT MISCELLANEOUS
Qty: 1 EACH | Refills: 0 | Status: SHIPPED | OUTPATIENT
Start: 2021-03-10

## 2021-03-10 RX ORDER — METFORMIN HYDROCHLORIDE 500 MG/1
500 TABLET, EXTENDED RELEASE ORAL
Qty: 30 TABLET | Refills: 2 | OUTPATIENT
Start: 2021-03-10 | End: 2021-07-30

## 2021-03-10 RX ORDER — BLOOD SUGAR DIAGNOSTIC
STRIP MISCELLANEOUS
Qty: 100 EACH | Refills: 12 | Status: SHIPPED | OUTPATIENT
Start: 2021-03-10 | End: 2021-04-22

## 2021-03-10 RX ORDER — LANCETS 28 GAUGE
EACH MISCELLANEOUS
Qty: 100 EACH | Refills: 12 | Status: SHIPPED | OUTPATIENT
Start: 2021-03-10

## 2021-03-15 ENCOUNTER — TELEPHONE (OUTPATIENT)
Dept: FAMILY MEDICINE CLINIC | Facility: CLINIC | Age: 71
End: 2021-03-15

## 2021-03-15 NOTE — TELEPHONE ENCOUNTER
PATIENT CALLED AND STATED THAT SHE HAD AN A1C CHECK RECENTLY AND THINKS IT COULD BE INACCURATE. PATIENT RECENTLY HAD A NERVE BLOCK DONE WITH PAIN MANAGEMENT AND WAS GIVEN A DOUBLE DOSE OF THE STEROID. PATIENT THINKS THAT COULD BE WHY HER A1C WAS HIGH       PATIENT STATED SHE HAD AN AGGRESSIVE GALLBLADDER ATTACK  OVER THE WEEKEND AND WOULD LIKE TO KNOW IF THERE IS ANYTHING SHE NEEDS TO DO TO CALM IT DOWN BEFORE THE US SHE HAS ON Friday    PLEASE ADVISE     [phone]

## 2021-03-16 RX ORDER — METHYLNALTREXONE BROMIDE 150 MG/1
TABLET ORAL
Qty: 90 TABLET | Refills: 5 | Status: SHIPPED | OUTPATIENT
Start: 2021-03-16 | End: 2021-07-01 | Stop reason: ALTCHOICE

## 2021-03-16 NOTE — TELEPHONE ENCOUNTER
Eat a very bland diet, no fried/fatty foods or foods that seem to be her trigger. She can call scheduling to see if they have an earlier appointment.     Steroids can cause the A1C to increase, however it would only increase those sugars over a few weeks generally, whereas the A1C looks at the sugar over a 3-4 period.

## 2021-03-19 ENCOUNTER — HOSPITAL ENCOUNTER (OUTPATIENT)
Dept: ULTRASOUND IMAGING | Facility: HOSPITAL | Age: 71
Discharge: HOME | End: 2021-03-19
Admitting: NURSE PRACTITIONER

## 2021-03-19 DIAGNOSIS — R14.3 FLATULENCE: ICD-10-CM

## 2021-03-19 DIAGNOSIS — R10.11 RUQ PAIN: ICD-10-CM

## 2021-03-19 PROCEDURE — 76705 ECHO EXAM OF ABDOMEN: CPT

## 2021-03-24 ENCOUNTER — TELEPHONE (OUTPATIENT)
Dept: FAMILY MEDICINE CLINIC | Facility: CLINIC | Age: 71
End: 2021-03-24

## 2021-03-24 NOTE — TELEPHONE ENCOUNTER
Caller: Chastity Salgado    Relationship: Self    Best call back number:[phone] (H)    Caller requesting test results: Patient     What test was performed: Ultrasound    When was the test performed: 3/19/21    Where was the test performed:[facility]     Additional notes: Patient called in and wanted to know the status of her Ultrasound. Please call patient and advise.

## 2021-04-05 ENCOUNTER — TELEPHONE (OUTPATIENT)
Dept: PAIN MEDICINE | Facility: CLINIC | Age: 71
End: 2021-04-05

## 2021-04-05 NOTE — TELEPHONE ENCOUNTER
Caller: PATIENT    Reason for call: REFILL      Rx: HYDROcodone-acetaminophen (NORCO)  MG per tablet      Pharmacy:  [facility] [address] - [id number] [place] [address] [address] - [phone]  - [phone]    [address] [facility] [address]   Phone:  [phone]  Fax:  [phone]      PLEASE ADVISE,     Caller# [phone]

## 2021-04-05 NOTE — TELEPHONE ENCOUNTER
Medication Refill Request    Date of phone call: 21    Medication being requested: norco 10/325mg si tab po 5 times a day prn   Qty: 150    Date of last visit: 3/9/21    Date of last refill:     GAYE up to date?:     Next Follow up?: 21    Any new pertinent information? (i.e, new medication allergies, new use of medications, change in patient's health or condition, non-compliance or inconsistency with prescribing agreement?):

## 2021-04-06 RX ORDER — HYDROCODONE BITARTRATE AND ACETAMINOPHEN 10; 325 MG/1; MG/1
1 TABLET ORAL
Qty: 150 TABLET | Refills: 0 | Status: SHIPPED | OUTPATIENT
Start: 2021-04-06 | End: 2021-05-06 | Stop reason: SDUPTHER

## 2021-04-15 ENCOUNTER — OFFICE VISIT (OUTPATIENT)
Dept: FAMILY MEDICINE CLINIC | Facility: CLINIC | Age: 71
End: 2021-04-15

## 2021-04-15 VITALS
HEART RATE: 102 BPM | TEMPERATURE: 96.8 F | RESPIRATION RATE: 16 BRPM | HEIGHT: 63 IN | SYSTOLIC BLOOD PRESSURE: 130 MMHG | BODY MASS INDEX: 42.03 KG/M2 | WEIGHT: 237.2 LBS | DIASTOLIC BLOOD PRESSURE: 76 MMHG | OXYGEN SATURATION: 98 %

## 2021-04-15 DIAGNOSIS — E66.01 MORBID OBESITY WITH BODY MASS INDEX OF 40.0-44.9 IN ADULT (HCC): ICD-10-CM

## 2021-04-15 DIAGNOSIS — E11.59 TYPE 2 DIABETES MELLITUS WITH OTHER CIRCULATORY COMPLICATION, WITHOUT LONG-TERM CURRENT USE OF INSULIN (HCC): ICD-10-CM

## 2021-04-15 DIAGNOSIS — I10 ESSENTIAL HYPERTENSION: ICD-10-CM

## 2021-04-15 DIAGNOSIS — F41.9 ANXIETY: ICD-10-CM

## 2021-04-15 DIAGNOSIS — R07.89 ATYPICAL CHEST PAIN: Primary | ICD-10-CM

## 2021-04-15 PROCEDURE — 99214 OFFICE O/P EST MOD 30 MIN: CPT | Performed by: NURSE PRACTITIONER

## 2021-04-15 PROCEDURE — 93000 ELECTROCARDIOGRAM COMPLETE: CPT | Performed by: NURSE PRACTITIONER

## 2021-04-15 RX ORDER — BUSPIRONE HYDROCHLORIDE 5 MG/1
5 TABLET ORAL 2 TIMES DAILY
Qty: 60 TABLET | Refills: 3 | Status: SHIPPED | OUTPATIENT
Start: 2021-04-15

## 2021-04-15 NOTE — PROGRESS NOTES
Chief Complaint  Diabetes    Subjective          Chastity Salgado presents to [facility]  Chastity presents for one month follow up hypertension and new onset diabetes. Her blood pressure is much better today at 130/76. She does report intermittent sharp chest pain in the left lateral region. She is concerned as this has been occurring more frequently. She does have anxiety, but denies the feeling of anxiety or panic attacks. She is currently prescribed lisinopril. She has a bad shoulder on the left side which will cause radicular pain, but is unsure if this is the same type of pain.This has woken her from sleep previously, has considered ER follow up. It will last as long as 30 minutes, waxing and waning. It usually occurs at rest, cannot recall a time when she was exerting herself. It has occurred while driving, watching tv or resting.    She does also report increased anxiety and depression. She is prescribed cymbalta 60 mg. She is seeing a psychologist who states she is having increased depression and anxiety, but feels it is more uncontrolled anxiety. She is not currently prescribed medication for the anxiety.        Chest Pain   This is a recurrent problem. The current episode started more than 1 month ago. The onset quality is sudden. The problem occurs intermittently. The problem has been waxing and waning. The pain is present in the lateral region. The pain is moderate. The quality of the pain is described as sharp. The pain does not radiate. Pertinent negatives include no abdominal pain, back pain, claudication, cough, diaphoresis, dizziness, exertional chest pressure, fever, headaches, hemoptysis, irregular heartbeat, leg pain, lower extremity edema, malaise/fatigue, nausea, near-syncope, numbness, orthopnea, palpitations, PND, shortness of breath, sputum production, syncope, vomiting or weakness. Risk factors include lack of exercise, obesity, post-menopausal and sedentary  "lifestyle.   Her past medical history is significant for anxiety/panic attacks, diabetes and hypertension.   Diabetes  She presents for her follow-up diabetic visit. She has type 2 diabetes mellitus. Her disease course has been improving. Hypoglycemia symptoms include nervousness/anxiousness. Pertinent negatives for hypoglycemia include no dizziness or headaches. Associated symptoms include chest pain. Pertinent negatives for diabetes include no weakness. Symptoms are stable. Risk factors for coronary artery disease include diabetes mellitus, hypertension, obesity, post-menopausal and sedentary lifestyle. Current diabetic treatment includes oral agent (monotherapy). She is compliant with treatment most of the time.   Hypertension  Associated symptoms include anxiety and chest pain. Pertinent negatives include no headaches, malaise/fatigue, orthopnea, palpitations, PND or shortness of breath. Risk factors for coronary artery disease include diabetes mellitus, obesity, post-menopausal state and sedentary lifestyle. Past treatments include ACE inhibitors and diuretics. Current antihypertension treatment includes ACE inhibitors and diuretics. The current treatment provides moderate improvement. Compliance problems include diet and exercise.    Anxiety  Presents for follow-up visit. Symptoms include chest pain, nervous/anxious behavior and panic. Patient reports no dizziness, nausea, palpitations or shortness of breath. Symptoms occur most days. The severity of symptoms is moderate. The quality of sleep is good. Nighttime awakenings: occasional.     Her past medical history is significant for anxiety/panic attacks. Compliance with medications is %.       Objective   Vital Signs:   /76   Pulse 102   Temp 96.8 °F (36 °C) (Temporal)   Resp 16   Ht 160 cm (63\")   Wt 108 kg (237 lb 3.2 oz)   SpO2 98%   BMI 42.02 kg/m²     Physical Exam  Constitutional:       General: She is not in acute distress.     " Appearance: Normal appearance. She is well-developed. She is obese. She is not diaphoretic.   Cardiovascular:      Rate and Rhythm: Normal rate and regular rhythm.      Heart sounds: Normal heart sounds. No murmur heard.   No friction rub. No gallop.    Pulmonary:      Effort: Pulmonary effort is normal. No respiratory distress.      Breath sounds: Normal breath sounds. No wheezing or rales.   Abdominal:      General: Bowel sounds are normal. There is no distension.      Palpations: Abdomen is soft.      Tenderness: There is no abdominal tenderness.   Musculoskeletal:      Cervical back: Neck supple.   Skin:     General: Skin is warm and dry.   Neurological:      Mental Status: She is alert and oriented to person, place, and time.   Psychiatric:         Mood and Affect: Mood normal.        Result Review :            ECG 12 Lead    Date/Time: 4/15/2021 6:14 PM  Performed by: Marilyn Turner APRN  Authorized by: Marilyn Turner APRN   Previous ECG: no previous ECG available  Rhythm: sinus rhythm  Rate: normal  BPM: 75  QRS axis: normal  Other findings: left ventricular hypertrophy    Clinical impression: abnormal EKG  Comments: See scanned document  Atypical chest pain  htn              Assessment and Plan    Diagnoses and all orders for this visit:    1. Atypical chest pain (Primary)  -     Ambulatory Referral to Cardiology    2. Anxiety    3. Type 2 diabetes mellitus with other circulatory complication, without long-term current use of insulin (CMS/[facility])    4. Essential hypertension    5. Morbid obesity with body mass index of 40.0-44.9 in adult (CMS/[facility])    Other orders  -     busPIRone (BUSPAR) 5 MG tablet; Take 1 tablet by mouth 2 (two) times a day.  Dispense: 60 tablet; Refill: 3  -     ECG 12 Lead        Follow Up   Return in about 3 months (around 7/15/2021).  Patient was given instructions and counseling regarding her condition or for health maintenance advice. Please see specific  information pulled into the AVS if appropriate.     bp is improved, having chest pain, ekg is remarkably unchanged per reading at [facility], however unable to pull image  Will refer to cardiology for work up given risk factors including diabetes, htn and morbid obesity    Anxiety: currently taking cymbalta, do not feel increase in dose will improve outcome, add buspar as directed, instructed to start with one tablet daily x 7 days, then ok to increase to twice daily if needed    Diabetes, tolerating metformin, unable to go to diabetic education at this time, will try in the summer, also discussed establishing with dietitian and she would like to wait on that as well, recommend follow up in 3 months to monitor effectiveness of metformin, verbalized understanding.

## 2021-04-22 ENCOUNTER — OFFICE VISIT (OUTPATIENT)
Dept: NEUROLOGY | Facility: CLINIC | Age: 71
End: 2021-04-22

## 2021-04-22 ENCOUNTER — TELEPHONE (OUTPATIENT)
Dept: FAMILY MEDICINE CLINIC | Facility: CLINIC | Age: 71
End: 2021-04-22

## 2021-04-22 VITALS
HEART RATE: 75 BPM | OXYGEN SATURATION: 97 % | HEIGHT: 63 IN | DIASTOLIC BLOOD PRESSURE: 82 MMHG | SYSTOLIC BLOOD PRESSURE: 126 MMHG | BODY MASS INDEX: 41.64 KG/M2 | WEIGHT: 235 LBS

## 2021-04-22 DIAGNOSIS — G25.0 ESSENTIAL TREMOR: Primary | ICD-10-CM

## 2021-04-22 DIAGNOSIS — E11.59 TYPE 2 DIABETES MELLITUS WITH OTHER CIRCULATORY COMPLICATION, WITHOUT LONG-TERM CURRENT USE OF INSULIN (HCC): Primary | ICD-10-CM

## 2021-04-22 PROCEDURE — 99203 OFFICE O/P NEW LOW 30 MIN: CPT | Performed by: PSYCHIATRY & NEUROLOGY

## 2021-04-22 RX ORDER — CYCLOBENZAPRINE HCL 10 MG
TABLET ORAL
Qty: 30 TABLET | Refills: 0 | Status: SHIPPED | OUTPATIENT
Start: 2021-04-22 | End: 2021-06-03

## 2021-04-22 RX ORDER — BLOOD-GLUCOSE METER
KIT MISCELLANEOUS
Qty: 100 EACH | Refills: 12 | Status: SHIPPED | OUTPATIENT
Start: 2021-04-22

## 2021-04-22 NOTE — TELEPHONE ENCOUNTER
PATIENT CALLING IN REGARDS TO REQUEST TO SPEAK TO [facility]. PATIENT REQUESTING FREESTYLE LYTE STRIPS . HER CURRENT STRIPS IS NOT COMPATIBLE WITH HER GLUCOSE MONITOR. PLEASE ADVISE, THANK YOU!

## 2021-04-22 NOTE — ASSESSMENT & PLAN NOTE
71 year old woman with what appears to be essential tremors on examination.  I did not notice any resting tremor on her visit today but did notice a very mild fast frequency essential tremor in her right>left hands with action.  She does not have any physical examination findings of Parkinson's disease on my evaluation today.  She has had normal lab work including CMP and TSH.  I discussed the diagnosis of essential tremor.  Advised patient to reduce or eliminate caffeine which can make these tremors worse.  Anxiety can also certainly worsen her tremors and advised her to do mindful meditation and yoga to help her relax which can also be helpful.  She can also use weighted utensils if needed in the future which can also help.  We can consider starting medication in the future if her tremors progress or get worse.  I will provide her with patient education information on essential tremors today.  Answered all of her questions today.  Will follow up in 1 year to reevaluate and sooner if needed.

## 2021-04-22 NOTE — PROGRESS NOTES
Chief Complaint  Tremors (began about a year ago, bilateral hands, family hx of parkinsons, takes muscle relaxer for shoulders)    Subjective          Chastity Salgado presents to [facility]UR[facility] for   HISTORY OF PRESENT ILLNESS:    Chastity Salgado is a 71 year old right handed woman who presents to neurology clinic for initial evaluation and treatment of tremors.  She reports her tremors starting about a year ago.  Her daughter started noticing the tremors.  She has noticed the tremors both at rest and with activity.  She has noticed them more in her right hand.  There is family history of Parkinson's disease in her mother and sister but she tells me her tremors are not like her sisters or her mothers.  She has noticed that anxiety makes her tremors worse.  Sometimes when she eats she notices them and noticed it when holding utensils.  She drinks a cup of coffee and a coke daily.  She drinks alcohol socially.  She has never smoked.  She has had normal lab work including CMP and TSH.  She tells me the tremors affect her quality of life on a scale 1-10 at 1/10 and are not impacting her life or ADLs.  She has normal sense of smell.  She has not had any recent falls and previous fall was due to slipping on the kitchen floor.  She denies any changes in her voice.  She denies any stiffness out of proportion to her arthritis.      Past Medical History:   Diagnosis Date   • Abscess    • Anxiety    • Arthritis     bilateral feet   • Cancer (CMS/[facility]) 2013   • Cellulitis of face    • Degenerative disc disease, lumbar    • Depression    • DM (diabetes mellitus), type 2 (CMS/[facility])    • Ear infection    • Fibromyalgia, primary    • History of dysphagia    • History of dysuria    • History of epistaxis    • Hypertension    • Intertrigo    • MRSA (methicillin resistant Staphylococcus aureus)     2015   • Nose mucous membrane dryness    • Osteoarthritis    • Rosacea         Family History   Problem Relation Age  of Onset   • Arthritis Other    • Hypertension Other    • Heart disease Other    • Stroke Mother    • Hypertension Mother    • Diabetes Mother    • Arthritis Mother    • Parkinsonism Mother    • Arthritis Father    • Heart disease Sister    • Diabetes Sister    • Parkinsonism Sister    • COPD Brother         Social History     Socioeconomic History   • Marital status: Single     Spouse name: Not on file   • Number of children: 3   • Years of education: Not on file   • Highest education level: Not on file   Tobacco Use   • Smoking status: Never Smoker   • Smokeless tobacco: Never Used   Substance and Sexual Activity   • Alcohol use: Yes     Comment: social alcohol use   • Drug use: No   • Sexual activity: Defer        I have personally reviewed the ROS as stated below.     Review of Systems   Constitutional: Positive for fatigue. Negative for activity change and appetite change.   HENT: Positive for postnasal drip and sinus pressure. Negative for trouble swallowing and voice change.    Eyes: Positive for redness, itching and visual disturbance.   Respiratory: Negative for choking, shortness of breath and stridor.    Cardiovascular: Positive for chest pain and leg swelling. Negative for palpitations.   Gastrointestinal: Positive for abdominal distention and constipation. Negative for nausea.   Endocrine: Positive for cold intolerance. Negative for heat intolerance.   Genitourinary: Negative for difficulty urinating, flank pain, frequency and urgency.   Musculoskeletal: Positive for back pain, joint swelling, myalgias, neck pain and neck stiffness.   Allergic/Immunologic: Positive for environmental allergies.   Neurological: Positive for tremors, weakness, numbness and headache.   Psychiatric/Behavioral: Negative for agitation, behavioral problems, decreased concentration, dysphoric mood, hallucinations, self-injury, sleep disturbance, suicidal ideas, negative for hyperactivity, depressed mood and stress. The patient  "is not nervous/anxious.         Objective   Vital Signs:   /82 (BP Location: Left arm, Patient Position: Sitting)   Pulse 75   Ht 160 cm (63\")   Wt 107 kg (235 lb)   SpO2 97%   BMI 41.63 kg/m²       PHYSICAL EXAM:    General   Mental Status - Alert. General Appearance - Well developed, Well groomed, Oriented and Cooperative. Orientation - Oriented X3.       Head and Neck  Head - normocephalic, atraumatic with no lesions or palpable masses.  Neck    Global Assessment - supple.       Eye   Sclera/Conjunctiva - Bilateral - Normal.    ENMT  Mouth and Throat   Oral Cavity/Oropharynx: Oropharynx - the soft palate,uvula and tongue are normal in appearance.    Chest and Lung Exam   Chest - lung clear to auscultation bilaterally.    Cardiovascular   Cardiovascular examination reveals  - normal heart sounds, regular rate and rhythm.    Neurologic   Mental Status: Speech - Normal. Cognitive function - appropriate fund of knowledge. No impairment of attention, Impairment of concentration, impairment of long term memory or impairment of short term memory.  Cranial Nerves:   II Optic: Visual acuity - Left - Normal. Right - Normal. Visual fields - Normal (to confrontation).  III Oculomotor: Pupillary constriction - Left - Normal. Right - Normal.  VII Facial: - Normal Bilaterally.  VIII Acoustic - Bilateral - Hearing normal and (Hearing tested by finger rub).   IX Glossopharyngeal / X Vagus - Normal.  XI Accessory: Trapezius - Bilateral - Normal. Sternocleidomastoid - Bilateral - Normal.  XII Hypoglossal - Bilateral - Normal.  Eye Movements: - Normal Bilaterally.  Sensory:   Light Touch: Intact - Globally.  Motor:   Bulk and Contour: - Normal.  Tone: - Normal.  Tremor: Very mild fast frequency tremor R>L hands.    Strength: 5/5 normal muscle strength - All Muscles. She does have trouble raising the right arm with reduced range of motion due to arthritis and frozen shoulder.     General Assessment of Reflexes: - deep " tendon reflexes are normal. Coordination - No Impairment of finger-to-nose or Impairment of rapid alternating movements. Gait - Normal.       Result Review :                 Assessment and Plan    Problem List Items Addressed This Visit        Neuro    Essential tremor - Primary    Current Assessment & Plan     71 year old woman with what appears to be essential tremors on examination.  I did not notice any resting tremor on her visit today but did notice a very mild fast frequency essential tremor in her right>left hands with action.  She does not have any physical examination findings of Parkinson's disease on my evaluation today.  She has had normal lab work including CMP and TSH.  I discussed the diagnosis of essential tremor.  Advised patient to reduce or eliminate caffeine which can make these tremors worse.  Anxiety can also certainly worsen her tremors and advised her to do mindful meditation and yoga to help her relax which can also be helpful.  She can also use weighted utensils if needed in the future which can also help.  We can consider starting medication in the future if her tremors progress or get worse.  I will provide her with patient education information on essential tremors today.  Answered all of her questions today.  Will follow up in 1 year to reevaluate and sooner if needed.                 I spent 31 minutes caring for Chastity on this date of service. This time includes time spent by me in the following activities:preparing for the visit, reviewing tests, obtaining and/or reviewing a separately obtained history, performing a medically appropriate examination and/or evaluation , counseling and educating the patient/family/caregiver, documenting information in the medical record and care coordination    Follow Up   Return in about 1 year (around 4/22/2022).  Patient was given instructions and counseling regarding her condition or for health maintenance advice. Please see specific information  pulled into the AVS if appropriate.

## 2021-05-06 ENCOUNTER — PREP FOR SURGERY (OUTPATIENT)
Dept: SURGERY | Facility: SURGERY CENTER | Age: 71
End: 2021-05-06

## 2021-05-06 ENCOUNTER — OFFICE VISIT (OUTPATIENT)
Dept: PAIN MEDICINE | Facility: CLINIC | Age: 71
End: 2021-05-06

## 2021-05-06 VITALS
OXYGEN SATURATION: 97 % | SYSTOLIC BLOOD PRESSURE: 143 MMHG | TEMPERATURE: 96.2 F | HEART RATE: 90 BPM | WEIGHT: 232 LBS | RESPIRATION RATE: 18 BRPM | BODY MASS INDEX: 41.11 KG/M2 | HEIGHT: 63 IN | DIASTOLIC BLOOD PRESSURE: 88 MMHG

## 2021-05-06 DIAGNOSIS — M12.9 ARTHRITIS, MULTIPLE JOINT INVOLVEMENT: ICD-10-CM

## 2021-05-06 DIAGNOSIS — M25.512 CHRONIC LEFT SHOULDER PAIN: Primary | ICD-10-CM

## 2021-05-06 DIAGNOSIS — G89.4 CHRONIC PAIN SYNDROME: Primary | ICD-10-CM

## 2021-05-06 DIAGNOSIS — G89.29 CHRONIC LEFT SHOULDER PAIN: Primary | ICD-10-CM

## 2021-05-06 DIAGNOSIS — M19.011 OSTEOARTHRITIS OF BOTH SHOULDERS, UNSPECIFIED OSTEOARTHRITIS TYPE: ICD-10-CM

## 2021-05-06 DIAGNOSIS — M19.012 OSTEOARTHRITIS OF BOTH SHOULDERS, UNSPECIFIED OSTEOARTHRITIS TYPE: ICD-10-CM

## 2021-05-06 DIAGNOSIS — Z51.81 ENCOUNTER FOR MONITORING OPIOID MAINTENANCE THERAPY: ICD-10-CM

## 2021-05-06 DIAGNOSIS — M54.2 NECK PAIN: ICD-10-CM

## 2021-05-06 DIAGNOSIS — Z79.891 ENCOUNTER FOR MONITORING OPIOID MAINTENANCE THERAPY: ICD-10-CM

## 2021-05-06 PROCEDURE — 99214 OFFICE O/P EST MOD 30 MIN: CPT | Performed by: NURSE PRACTITIONER

## 2021-05-06 PROCEDURE — 96372 THER/PROPH/DIAG INJ SC/IM: CPT | Performed by: NURSE PRACTITIONER

## 2021-05-06 RX ORDER — KETOROLAC TROMETHAMINE 30 MG/ML
30 INJECTION, SOLUTION INTRAMUSCULAR; INTRAVENOUS ONCE
Status: COMPLETED | OUTPATIENT
Start: 2021-05-06 | End: 2021-05-06

## 2021-05-06 RX ORDER — SODIUM CHLORIDE 0.9 % (FLUSH) 0.9 %
10 SYRINGE (ML) INJECTION EVERY 12 HOURS SCHEDULED
Status: CANCELLED | OUTPATIENT
Start: 2021-05-06

## 2021-05-06 RX ORDER — SODIUM CHLORIDE 0.9 % (FLUSH) 0.9 %
10 SYRINGE (ML) INJECTION AS NEEDED
Status: CANCELLED | OUTPATIENT
Start: 2021-05-06

## 2021-05-06 RX ORDER — HYDROCODONE BITARTRATE AND ACETAMINOPHEN 10; 325 MG/1; MG/1
1 TABLET ORAL
Qty: 150 TABLET | Refills: 0 | Status: SHIPPED | OUTPATIENT
Start: 2021-05-06 | End: 2021-06-01 | Stop reason: SDUPTHER

## 2021-05-06 RX ADMIN — KETOROLAC TROMETHAMINE 30 MG: 30 INJECTION, SOLUTION INTRAMUSCULAR; INTRAVENOUS at 10:48

## 2021-05-06 NOTE — PATIENT INSTRUCTIONS
--------  Education about Radiofrequency Lesioning:    The local anesthetic nerve blockade was intended for diagnostic purposes, with the intent of offering the patient Radiofrequency thermal rhizotomy if the blockade was diagnostically effective.  The diagnostic blockade is necessary to determine the likelihood that RF therapy could be efficacious in providing long term relief to the patient.  As indicated above, diagnostic efficacy was established.      In the RF procedure, needles are placed to the same area in the same fashion, and after testing, the needle tips are heated to thermally treat the nerves, blocking the nerves by in essence damaging the nerves with the heat treatment.      Medically, a successful RF procedure should provide a patient with 50% pain relief or more for at least 6 months.  We estimate a likelihood of about an 80% chance that medical success will be realized, and this is based on the positive diagnostic blockade.  We discussed & educated once again that not all patients have a medically successful result, and the patient voices understanding.  However, our clinical experience suggests that successful patients receive relief more in the range of 12 months on average.  (We also discussed that a fortunate minority of patients receive therapeutic success from the blockades alone, and may not require RF ablation.  If a patient receives more than 8 weeks of relief from the block, then occasional repeat block for therapeutic purposes is a very reasonable alternative therapy.  This course of therapy is consistent with our LCDs according to our CMS  in the area, and therefore other insurance providers should follow accordingly.  We will monitor our patients to screen for these therapeutic responders and will offer RF therapy only when necessary.  However, in this clinical scenario, this therapeutic result was not realized, and therefore Radiofrequency Lesioning is medically  necessary.)      We discussed that RF are not without risks.  Guidelines regarding anticoagulant use & neuraxial procedures will be respected.  Patients that are ill or otherwise may be at risk for sepsis will not be good candidates for needle punctures of any type.  This patient will not be offered these therapies if there is an increased risk.   We discussed that there is a risk of postprocedural pain and also a risk of worsening of clinical picture with these procedures as with any invasive procedure.  All invasive procedures have risks including but not limited to bleeding, infection, injury, nerve injury, paralysis, coma, death, lack of pain relief, and worsening of clinical picture.      In this education session, all of these topics were covered and the patient voiced understanding.    ---------

## 2021-05-06 NOTE — PROGRESS NOTES
CHIEF COMPLAINT  Neck and shoulder pain has increased since last visit    Subjective   Chastity Salgado is a 71 y.o. female  who presents for follow-up.  She has a history of neck and shoulder pain.    left suprascapular nerve block   on  2/25/2021 - 50% relief for 1 month   left suprascapular nerve block/steroid injection - posterior approach   on  9/22/2020 - 70% relief for 1-2 months     C/o diffuse joint pain.  Pain today 8/10 in severity (today both shoulders are bothering her).  Taking Hydrocodone 10/325 5/day, Celebrex 200 mg daily, Started Cymbalta 60 mg daily last week.  Denies adverse reactions.  Reports at least moderate pain reduction with regimen reports that she would not be able to function without it.  constipation reported, stable with Relistor.       Rheumatologist - Dr. Lucero.  Saw recently, told not rheumatoid.  All OA, she has been released    Counseling - Dr. Edison Vásquez - ortho.  Left shoulder injections every 90 days. History of multiple right shoulder surgeries.  Says right shoulder worse recently, will f/u with Dr. Vásquez in a week or two.       Neck pain worsening but has previously declined MRI due to claustrophobia    Patient remained masked during entire encounter. No cough present. I donned a mask and eye protection throughout entire visit. Prior to donning mask and eye protection, hand hygiene was performed, as well as when it was doffed.  I was closer than 6 feet, but not for an extended period of time. No obvious exposure to any bodily fluids.    Joint Pain  This is a chronic problem. The current episode started more than 1 year ago. The problem occurs daily. The problem has been waxing and waning. Associated symptoms include abdominal pain, arthralgias (bilateral shoulders), fatigue, neck pain and numbness. Pertinent negatives include no chest pain, chills, congestion, coughing, fever, headaches, joint swelling (knees, right hip bursitis), nausea, vomiting or weakness.  Exacerbated by: activity. She has tried oral narcotics, acetaminophen, heat and NSAIDs for the symptoms. The treatment provided moderate relief.   Neck Pain   This is a chronic problem. The problem occurs daily. The problem has been waxing and waning. The pain is present in the anterior neck, left side and right side. The quality of the pain is described as aching and burning. The pain is at a severity of 8/10. The pain is moderate. Exacerbated by: movement of neck, use of arms/shoulders. Associated symptoms include numbness. Pertinent negatives include no chest pain, fever, headaches or weakness. She has tried NSAIDs, oral narcotics, muscle relaxants and neck support for the symptoms. The treatment provided moderate relief.      PEG Assessment   What number best describes your pain on average in the past week?4  What number best describes how, during the past week, pain has interfered with your enjoyment of life?4  What number best describes how, during the past week, pain has interfered with your general activity?  5    The following portions of the patient's history were reviewed and updated as appropriate: allergies, current medications, past family history, past medical history, past social history, past surgical history and problem list.    Review of Systems   Constitutional: Positive for fatigue. Negative for chills and fever.   HENT: Negative for congestion.    Respiratory: Negative for cough and shortness of breath.    Cardiovascular: Negative for chest pain.   Gastrointestinal: Positive for abdominal pain and constipation. Negative for diarrhea, nausea and vomiting.   Genitourinary: Negative for difficulty urinating, dyspareunia and dysuria.   Musculoskeletal: Positive for arthralgias (bilateral shoulders) and neck pain. Negative for joint swelling (knees, right hip bursitis).   Neurological: Positive for numbness. Negative for dizziness, weakness, light-headedness and headaches.   Psychiatric/Behavioral:  "Positive for sleep disturbance. Negative for confusion, hallucinations, self-injury and suicidal ideas. The patient is nervous/anxious.    All other systems reviewed and are negative.    I have reviewed and confirmed the accuracy of the ROS as documented by the MA/LPN/RN NEWTON Jordan    Vitals:    05/06/21 1025   BP: 143/88   Pulse: 90   Resp: 18   Temp: 96.2 °F (35.7 °C)   SpO2: 97%   Weight: 105 kg (232 lb)   Height: 160 cm (63\")   PainSc:   8   PainLoc: Neck       Objective   Physical Exam  Vitals and nursing note reviewed.   Constitutional:       General: She is not in acute distress.     Appearance: Normal appearance. She is not ill-appearing.   Cardiovascular:      Rate and Rhythm: Normal rate.   Pulmonary:      Effort: Pulmonary effort is normal. No respiratory distress.   Musculoskeletal:      Right shoulder: Tenderness present. Decreased range of motion.      Left shoulder: Tenderness present. Decreased range of motion.   Skin:     General: Skin is warm and dry.   Neurological:      Mental Status: She is alert and oriented to person, place, and time.      Motor: No weakness.      Gait: Gait abnormal (slowed).   Psychiatric:         Mood and Affect: Mood normal.         Behavior: Behavior normal.             Assessment/Plan   Diagnoses and all orders for this visit:    1. Chronic pain syndrome (Primary)    2. Arthritis, multiple joint involvement    3. Osteoarthritis of both shoulders, unspecified osteoarthritis type  -     ketorolac (TORADOL) injection 30 mg    4. Neck pain    5. Encounter for monitoring opioid maintenance therapy    Other orders  -     HYDROcodone-acetaminophen (NORCO)  MG per tablet; Take 1 tablet by mouth 5 (Five) Times a Day As Needed for Severe Pain . dnf 5/8/2021  Dispense: 150 tablet; Refill: 0      --- Left Suprascapular Nerve RFA   --- Hydrocodone. Patient appears stable with current regimen. No adverse effects. Regarding continuation of opioids, there is no " evidence of aberrant behavior or any red flags.  The patient continues with appropriate response to opioid therapy. ADL's remain intact by self.   --- The oral drug screen confirmation from 3/9/2021 has been reviewed and the result is appropriate (out based on patient and GAYE report) based on patient history and GAYE report  --- The patient signed an updated copy of the controlled substance agreement on 9/8/2020  --- Toradol 30 mg IM today for acute flare   --- Follow-up 2 months/for procedure          GAYE REPORT  As part of the patient's treatment plan, I am prescribing controlled substances. The patient has been made aware of appropriate use of such medications, including potential risk of somnolence, limited ability to drive and/or work safely, and the potential for dependence or overdose. It has also bee made clear that these medications are for use by this patient only, without concomitant use of alcohol or other substances unless prescribed.     Patient has completed prescribing agreement detailing terms of continued prescribing of controlled substances, including monitoring GAYE reports, urine drug screening, and pill counts if necessary. The patient is aware that inappropriate use will results in cessation of prescribing such medications.    GAYE report has been reviewed and scanned into the patient's chart.    As the clinician, I personally reviewed the GAYE from 5/6/2021 while the patient was in the office today.    History and physical exam exhibit continued safe and appropriate use of controlled substances.    EMR Dragon/Transcription disclaimer:   Much of this encounter note is an electronic transcription/translation of spoken language to printed text. The electronic translation of spoken language may permit erroneous, or at times, nonsensical words or phrases to be inadvertently transcribed; Although I have reviewed the note for such errors, some may still exist.

## 2021-06-01 ENCOUNTER — TRANSCRIBE ORDERS (OUTPATIENT)
Dept: LAB | Facility: SURGERY CENTER | Age: 71
End: 2021-06-01

## 2021-06-01 ENCOUNTER — TRANSCRIBE ORDERS (OUTPATIENT)
Dept: SURGERY | Facility: SURGERY CENTER | Age: 71
End: 2021-06-01

## 2021-06-01 DIAGNOSIS — G89.29 CHRONIC LEFT SHOULDER PAIN: Primary | ICD-10-CM

## 2021-06-01 DIAGNOSIS — M25.512 CHRONIC LEFT SHOULDER PAIN: Primary | ICD-10-CM

## 2021-06-01 DIAGNOSIS — Z01.818 OTHER SPECIFIED PRE-OPERATIVE EXAMINATION: Primary | ICD-10-CM

## 2021-06-01 NOTE — TELEPHONE ENCOUNTER
Medication Refill Request    Date of phone call: 21    Medication being requested: Norco  mg sixday PRN  Qty: 150    Date of last visit: 21    Date of last refill: 21    Next Follow up?: n/a    Any new pertinent information? (i.e, new medication allergies, new use of medications, change in patient's health or condition, non-compliance or inconsistency with prescribing agreement?): n/a

## 2021-06-02 RX ORDER — HYDROCODONE BITARTRATE AND ACETAMINOPHEN 10; 325 MG/1; MG/1
1 TABLET ORAL
Qty: 150 TABLET | Refills: 0 | Status: SHIPPED | OUTPATIENT
Start: 2021-06-02 | End: 2021-07-01 | Stop reason: SDUPTHER

## 2021-06-03 RX ORDER — CYCLOBENZAPRINE HCL 10 MG
TABLET ORAL
Qty: 30 TABLET | Refills: 1 | Status: SHIPPED | OUTPATIENT
Start: 2021-06-03 | End: 2021-08-23

## 2021-06-17 ENCOUNTER — OFFICE VISIT (OUTPATIENT)
Dept: FAMILY MEDICINE CLINIC | Facility: CLINIC | Age: 71
End: 2021-06-17

## 2021-06-17 VITALS
OXYGEN SATURATION: 98 % | BODY MASS INDEX: 40.75 KG/M2 | RESPIRATION RATE: 18 BRPM | TEMPERATURE: 95.9 F | WEIGHT: 230 LBS | SYSTOLIC BLOOD PRESSURE: 136 MMHG | HEIGHT: 63 IN | DIASTOLIC BLOOD PRESSURE: 80 MMHG | HEART RATE: 115 BPM

## 2021-06-17 DIAGNOSIS — R11.2 NON-INTRACTABLE VOMITING WITH NAUSEA, UNSPECIFIED VOMITING TYPE: ICD-10-CM

## 2021-06-17 DIAGNOSIS — Z78.0 POSTMENOPAUSAL: ICD-10-CM

## 2021-06-17 DIAGNOSIS — Z12.31 ENCOUNTER FOR SCREENING MAMMOGRAM FOR MALIGNANT NEOPLASM OF BREAST: ICD-10-CM

## 2021-06-17 DIAGNOSIS — E11.59 TYPE 2 DIABETES MELLITUS WITH OTHER CIRCULATORY COMPLICATION, WITHOUT LONG-TERM CURRENT USE OF INSULIN (HCC): ICD-10-CM

## 2021-06-17 DIAGNOSIS — Z00.00 MEDICARE ANNUAL WELLNESS VISIT, SUBSEQUENT: Primary | ICD-10-CM

## 2021-06-17 DIAGNOSIS — Z13.820 SCREENING FOR OSTEOPOROSIS: ICD-10-CM

## 2021-06-17 PROCEDURE — 99213 OFFICE O/P EST LOW 20 MIN: CPT | Performed by: NURSE PRACTITIONER

## 2021-06-17 PROCEDURE — G0439 PPPS, SUBSEQ VISIT: HCPCS | Performed by: NURSE PRACTITIONER

## 2021-06-17 RX ORDER — ONDANSETRON 4 MG/1
4 TABLET, FILM COATED ORAL EVERY 8 HOURS PRN
Qty: 30 TABLET | Refills: 1 | Status: SHIPPED | OUTPATIENT
Start: 2021-06-17 | End: 2021-08-18

## 2021-06-17 RX ORDER — SEMAGLUTIDE 1.34 MG/ML
INJECTION, SOLUTION SUBCUTANEOUS
Qty: 1 PEN | Refills: 2 | Status: SHIPPED | OUTPATIENT
Start: 2021-06-17 | End: 2021-11-04 | Stop reason: SDUPTHER

## 2021-06-17 RX ORDER — FLUCONAZOLE 150 MG/1
150 TABLET ORAL ONCE
Qty: 3 TABLET | Refills: 0 | Status: SHIPPED | OUTPATIENT
Start: 2021-06-17 | End: 2021-06-17

## 2021-06-17 NOTE — PROGRESS NOTES
"Chief Complaint  Medicare Wellness-subsequent, Nausea, and Diabetes    Subjective          Chastity Salgado presents to [facility] PRIMARY CARE  Past 3 months, patient reports episodes of nausea and vomiting, occurs approximately every 3 weeks, occurs usually one time, then resolves. States she initially thought it was flu or something but it kept occurring. Denies weight, appetite is the same, diarrhea with one episode, otherwise regular bowel movements. Started since beginning metformin. Takes her metformin with the evening meal, symptoms occur in the morning. States she was unable to tolerate metformin previously, approximately 4 years ago with cramping and diarrhea but no vomiting. Stopped metformin for several days, but symptoms subside as quickly as they begin.      Objective   Vital Signs:   /80   Pulse 115   Temp 95.9 °F (35.5 °C) (Temporal)   Resp 18   Ht 160 cm (63\")   Wt 104 kg (230 lb)   SpO2 98%   BMI 40.74 kg/m²     Physical Exam  Vitals reviewed.   Constitutional:       Appearance: Normal appearance.   Cardiovascular:      Rate and Rhythm: Normal rate and regular rhythm.      Heart sounds: No murmur heard.   No friction rub. No gallop.    Pulmonary:      Effort: Pulmonary effort is normal. No respiratory distress.      Breath sounds: Normal breath sounds. No wheezing, rhonchi or rales.   Abdominal:      General: Bowel sounds are normal. There is no distension.      Palpations: Abdomen is soft. There is no mass.      Tenderness: There is abdominal tenderness in the right upper quadrant and left upper quadrant. There is no right CVA tenderness, left CVA tenderness, guarding or rebound.      Hernia: No hernia is present.   Skin:     General: Skin is warm and dry.   Neurological:      Mental Status: She is alert and oriented to person, place, and time.   Psychiatric:         Mood and Affect: Mood normal.        Result Review :                 Assessment and Plan    Diagnoses " and all orders for this visit:    1. Medicare annual wellness visit, subsequent (Primary)    2. Type 2 diabetes mellitus with other circulatory complication, without long-term current use of insulin (CMS/[facility])  -     Semaglutide,0.25 or 0.5MG/DOS, (Ozempic, 0.25 or 0.5 MG/DOSE,) 2 MG/1.5ML solution pen-injector; Inject 0.25 mg SQ weekly x 4 weeks, then increase to 0.5 mg SQ weekly  Dispense: 1 pen; Refill: 2  -     Hemoglobin A1c  -     Comprehensive metabolic panel    3. Encounter for screening mammogram for malignant neoplasm of breast  -     Mammo screening digital tomosynthesis bilateral w CAD; Future    4. Postmenopausal  -     DEXA Bone Density Axial; Future    5. Screening for osteoporosis  -     DEXA Bone Density Axial; Future    6. Non-intractable vomiting with nausea, unspecified vomiting type    Other orders  -     ondansetron (Zofran) 4 MG tablet; Take 1 tablet by mouth Every 8 (Eight) Hours As Needed for Nausea or Vomiting.  Dispense: 30 tablet; Refill: 1  -     fluconazole (Diflucan) 150 MG tablet; Take 1 tablet by mouth 1 (One) Time for 1 dose.  Dispense: 3 tablet; Refill: 0        Follow Up   Return in about 3 months (around 9/17/2021).  Patient was given instructions and counseling regarding her condition or for health maintenance advice. Please see specific information pulled into the AVS if appropriate.     Increased nausea, seems to coincide with metformin, however only occurring about every 3 weeks or so. Does have tenderness RUQ and LUQ, previous US gallbladder negative, would consider hida scan if symptoms do not improve with discontinuation of metformin.     Discussed additional medication to manage diabetes, recommend starting ozempic, may benefit from weight loss as well. Discussed side effects do include nausea so if symptoms are significant or nausea/vomiting worsen, would need to d/c    zofran given prn, consider gastro follow up for worsening symptoms or no improvement. IT is good to  note that she has not lost significant weight, but it is down 7 lbs since her last visit.  Will obtain labs today to monitor for any changes and improvement in glucose.

## 2021-06-17 NOTE — PROGRESS NOTES
The ABCs of the Annual Wellness Visit  Subsequent Medicare Wellness Visit    Chief Complaint   Patient presents with   • Medicare Wellness-subsequent       Subjective   History of Present Illness:  Chastity Salgado is a 71 y.o. female who presents for a Subsequent Medicare Wellness Visit.    HEALTH RISK ASSESSMENT    Recent Hospitalizations:  No hospitalization(s) within the last year.    Current Medical Providers:  Patient Care Team:  Marilyn Turner APRN as PCP - General (Family Medicine)  Lino Cullen MD as Consulting Physician (General Surgery)  Tu Vásquez MD as Consulting Physician (Orthopedic Surgery)  Tu Welch MD as Consulting Physician (Dermatology)  Niko Jacobsen MD as Consulting Physician (Medical Oncology)  Josseline Faria MD as Consulting Physician (Obstetrics and Gynecology)  Lino Cullen MD as Consulting Physician (General Surgery)    Smoking Status:  Social History     Tobacco Use   Smoking Status Never Smoker   Smokeless Tobacco Never Used       Alcohol Consumption:  Social History     Substance and Sexual Activity   Alcohol Use Yes    Comment: social alcohol use       Depression Screen:   PHQ-2/PHQ-9 Depression Screening 6/17/2021   Little interest or pleasure in doing things 0   Feeling down, depressed, or hopeless 0   Trouble falling or staying asleep, or sleeping too much -   Feeling tired or having little energy -   Poor appetite or overeating -   Feeling bad about yourself - or that you are a failure or have let yourself or your family down -   Trouble concentrating on things, such as reading the newspaper or watching television -   Moving or speaking so slowly that other people could have noticed. Or the opposite - being so fidgety or restless that you have been moving around a lot more than usual -   Thoughts that you would be better off dead, or of hurting yourself in some way -   Total Score 0   If you checked off any problems, how difficult  have these problems made it for you to do your work, take care of things at home, or get along with other people? -       Fall Risk Screen:  GEOVANNI Fall Risk Assessment was completed, and patient is at LOW risk for falls.Assessment completed on:6/17/2021    Health Habits and Functional and Cognitive Screening:  Functional & Cognitive Status 6/17/2021   Do you have difficulty preparing food and eating? No   Do you have difficulty bathing yourself, getting dressed or grooming yourself? No   Do you have difficulty using the toilet? No   Do you have difficulty moving around from place to place? No   Do you have trouble with steps or getting out of a bed or a chair? No   Current Diet Well Balanced Diet   Dental Exam Up to date   Eye Exam Up to date   Exercise (times per week) 7 times per week   Current Exercises Include Walking   Current Exercise Activities Include -   Do you need help using the phone?  No   Are you deaf or do you have serious difficulty hearing?  No   Do you need help with transportation? No   Do you need help shopping? No   Do you need help preparing meals?  No   Do you need help with housework?  No   Do you need help with laundry? No   Do you need help taking your medications? No   Do you need help managing money? No   Do you ever drive or ride in a car without wearing a seat belt? No   Have you felt unusual stress, anger or loneliness in the last month? No   Who do you live with? Child   If you need help, do you have trouble finding someone available to you? No   Have you been bothered in the last four weeks by sexual problems? No   Do you have difficulty concentrating, remembering or making decisions? No         Does the patient have evidence of cognitive impairment? No    Asprin use counseling:Does not need ASA (and currently is not on it)    Age-appropriate Screening Schedule:  Refer to the list below for future screening recommendations based on patient's age, sex and/or medical conditions.  Orders for these recommended tests are listed in the plan section. The patient has been provided with a written plan.    Health Maintenance   Topic Date Due   • DXA SCAN  Never done   • ZOSTER VACCINE (1 of 2) Never done   • DIABETIC EYE EXAM  Never done   • URINE MICROALBUMIN  01/29/2020   • MAMMOGRAM  12/10/2020   • TDAP/TD VACCINES (1 - Tdap) 08/31/2021 (Originally 2/1/1969)   • INFLUENZA VACCINE  08/01/2021   • HEMOGLOBIN A1C  09/09/2021   • LIPID PANEL  11/03/2021   • DIABETIC FOOT EXAM  06/17/2022   • PAP SMEAR  Discontinued          The following portions of the patient's history were reviewed and updated as appropriate: allergies, current medications, past family history, past medical history, past social history, past surgical history and problem list.    Outpatient Medications Prior to Visit   Medication Sig Dispense Refill   • albuterol (PROVENTIL) (2.5 MG/3ML) 0.083% nebulizer solution Take 2.5 mg by nebulization Every 4 (Four) Hours As Needed for Wheezing. 50 vial 0   • albuterol sulfate  (90 Base) MCG/ACT inhaler Inhale 2 puffs Every 4 (Four) Hours As Needed for Wheezing. 18 g 0   • busPIRone (BUSPAR) 5 MG tablet Take 1 tablet by mouth 2 (two) times a day. 60 tablet 3   • celecoxib (CeleBREX) 200 MG capsule Take 200 mg by mouth Daily.     • cetirizine (ZyrTEC) 10 MG tablet Take 1 tablet by mouth daily as needed.     • Cholecalciferol (VITAMIN D3) 25 MCG (1000 UT) capsule Take 1,000 Units by mouth Daily.     • cyclobenzaprine (FLEXERIL) 10 MG tablet TAKE ONE TABLET BY MOUTH AT NIGHT AS NEEDED FOR MUSCLE SPASMS 30 tablet 1   • DULoxetine (CYMBALTA) 60 MG capsule Take 1 capsule by mouth Daily. 90 capsule 1   • fluticasone (FLONASE) 50 MCG/ACT nasal spray 1 spray into the nostril(s) as directed by provider.     • glucose blood (FREESTYLE LITE) test strip Once daily and as needed 100 each 12   • glucose monitor monitoring kit Check glucose daily and prn 1 each 0   • HYDROcodone-acetaminophen (NORCO)   MG per tablet Take 1 tablet by mouth 5 (Five) Times a Day As Needed for Severe Pain . dnf 6/7/2021 150 tablet 0   • Lancets (freestyle) lancets Check glucose daily and prn 100 each 12   • lisinopril (PRINIVIL,ZESTRIL) 40 MG tablet Take 1 tablet by mouth Daily. 90 tablet 0   • metFORMIN ER (GLUCOPHAGE-XR) 500 MG 24 hr tablet Take 1 tablet by mouth Daily With Breakfast. 30 tablet 2   • Probiotic capsule Take 1 capsule by mouth daily.     • Relistor 150 MG tablet TAKE THREE TABLETS BY MOUTH DAILY AS NEEDED FOR OPIOID INDUCED CONSTIPATION 90 tablet 5   • hydrochlorothiazide (HYDRODIURIL) 12.5 MG tablet Take 1 tablet by mouth Daily. 90 tablet 2     No facility-administered medications prior to visit.       Patient Active Problem List   Diagnosis   • Allergic rhinitis   • Benign essential HTN   • Arthritis of shoulder region, degenerative   • Mild episode of recurrent major depressive disorder (CMS/HCC)   • Fatigue   • Fibrositis   • Generalized osteoarthritis of hand   • Cannot sleep   • Pain in shoulder   • Adiposity   • Arthritis or polyarthritis, rheumatoid (CMS/HCC)   • FOM (frequency of micturition)   • Vitamin D deficiency   • Screening for colon cancer   • Pharyngoesophageal dysphagia   • Hyperglycemia   • Type 2 diabetes mellitus with neurologic complication (CMS/HCC)   • Anxiety   • Dizziness   • Abnormal mammogram of left breast   • Breast cancer, right (CMS/HCC)   • Chronic pain   • Disorder of electrolytes   • Overdose of drug   • Sepsis (CMS/HCC)   • Urge incontinence   • Localized primary osteoarthritis of left lower leg   • Mixed hyperlipidemia   • Dysthymia   • Drug-induced constipation   • Encounter for monitoring opioid maintenance therapy   • Arthritis, multiple joint involvement   • Neck pain   • Fever   • Acute cystitis with hematuria   • Urinary frequency   • Menopausal symptoms   • Vision changes   • Cellulitis of right lower extremity   • Therapeutic opioid induced constipation   • Class 3  "severe obesity due to excess calories with serious comorbidity and body mass index (BMI) of 40.0 to 44.9 in adult (CMS/[facility])   • S/P laparoscopic hysterectomy   • High grade squamous intraepithelial cervical dysplasia   • Chronic left shoulder pain   • Essential tremor       Advanced Care Planning:  ACP discussion was held with the patient during this visit. Patient does not have an advance directive, information provided.    Review of Systems    Compared to one year ago, the patient feels her physical health is the same.  Compared to one year ago, the patient feels her mental health is the same.    Reviewed chart for potential of high risk medication in the elderly: yes  Reviewed chart for potential of harmful drug interactions in the elderly:yes    Objective         Vitals:    06/17/21 0958   BP: 136/80   Pulse: 115   Resp: 18   Temp: 95.9 °F (35.5 °C)   TempSrc: Temporal   SpO2: 98%   Weight: 104 kg (230 lb)   Height: 160 cm (63\")       Body mass index is 40.74 kg/m².  Discussed the patient's BMI with her. The BMI is above average; BMI management plan is completed.    Physical Exam          Assessment/Plan   Medicare Risks and Personalized Health Plan  CMS Preventative Services Quick Reference  Advance Directive Discussion  Immunizations Discussed/Encouraged (specific immunizations; Shingrix and COVID19 )  Obesity/Overweight     The above risks/problems have been discussed with the patient.  Pertinent information has been shared with the patient in the After Visit Summary.  Follow up plans and orders are seen below in the Assessment/Plan Section.    Diagnoses and all orders for this visit:    1. Type 2 diabetes mellitus with other circulatory complication, without long-term current use of insulin (CMS/[facility]) (Primary)  -     Semaglutide,0.25 or 0.5MG/DOS, (Ozempic, 0.25 or 0.5 MG/DOSE,) 2 MG/1.5ML solution pen-injector; Inject 0.25 mg SQ weekly x 4 weeks, then increase to 0.5 mg SQ weekly  Dispense: 1 pen; Refill: " 2  -     Hemoglobin A1c  -     Comprehensive metabolic panel    2. Encounter for screening mammogram for malignant neoplasm of breast  -     Mammo screening digital tomosynthesis bilateral w CAD; Future    3. Postmenopausal  -     DEXA Bone Density Axial; Future    4. Screening for osteoporosis  -     DEXA Bone Density Axial; Future    Other orders  -     ondansetron (Zofran) 4 MG tablet; Take 1 tablet by mouth Every 8 (Eight) Hours As Needed for Nausea or Vomiting.  Dispense: 30 tablet; Refill: 1  -     fluconazole (Diflucan) 150 MG tablet; Take 1 tablet by mouth 1 (One) Time for 1 dose.  Dispense: 3 tablet; Refill: 0      Follow Up:  Return in about 3 months (around 9/17/2021).     An After Visit Summary and PPPS were given to the patient.

## 2021-06-18 LAB
ALBUMIN SERPL-MCNC: 4.3 G/DL (ref 3.5–5.2)
ALBUMIN/GLOB SERPL: 1.3 G/DL
ALP SERPL-CCNC: 81 U/L (ref 39–117)
ALT SERPL-CCNC: 13 U/L (ref 1–33)
AST SERPL-CCNC: 15 U/L (ref 1–32)
BILIRUB SERPL-MCNC: 0.5 MG/DL (ref 0–1.2)
BUN SERPL-MCNC: 10 MG/DL (ref 8–23)
BUN/CREAT SERPL: 13.7 (ref 7–25)
CALCIUM SERPL-MCNC: 9.7 MG/DL (ref 8.6–10.5)
CHLORIDE SERPL-SCNC: 97 MMOL/L (ref 98–107)
CO2 SERPL-SCNC: 29.2 MMOL/L (ref 22–29)
CREAT SERPL-MCNC: 0.73 MG/DL (ref 0.57–1)
GLOBULIN SER CALC-MCNC: 3.2 GM/DL
GLUCOSE SERPL-MCNC: 186 MG/DL (ref 65–99)
HBA1C MFR BLD: 7.3 % (ref 4.8–5.6)
POTASSIUM SERPL-SCNC: 4.5 MMOL/L (ref 3.5–5.2)
PROT SERPL-MCNC: 7.5 G/DL (ref 6–8.5)
SODIUM SERPL-SCNC: 137 MMOL/L (ref 136–145)

## 2021-06-19 ENCOUNTER — APPOINTMENT (OUTPATIENT)
Dept: LAB | Facility: SURGERY CENTER | Age: 71
End: 2021-06-19

## 2021-06-22 ENCOUNTER — TELEPHONE (OUTPATIENT)
Dept: FAMILY MEDICINE CLINIC | Facility: CLINIC | Age: 71
End: 2021-06-22

## 2021-06-22 RX ORDER — LISINOPRIL 40 MG/1
TABLET ORAL
Qty: 90 TABLET | Refills: 1 | Status: SHIPPED | OUTPATIENT
Start: 2021-06-22 | End: 2022-01-14

## 2021-06-22 NOTE — TELEPHONE ENCOUNTER
Pt returned call upon speaking with her she stated once she spoke with you, you were going to call her in Diclofenac in, which she never received from the pharmacy, pt states she requested it for a yeast infection

## 2021-06-22 NOTE — TELEPHONE ENCOUNTER
Caller: YUDITH [address] [address] [facility] AT [facility] [place] - [phone] [facility] [phone] FX    Relationship: Pharmacy      What medication are you requesting: SULCONAZOLE     Additional notes:  DO YOU WANT THE PATIENT TO HAVE EXTRA DOSES?

## 2021-06-22 NOTE — TELEPHONE ENCOUNTER
Diflucan was sent to [facility] pharmacy (diclofenac is an nsaid and I don't think this is what she was referring to)

## 2021-06-29 ENCOUNTER — OFFICE VISIT (OUTPATIENT)
Dept: CARDIOLOGY | Facility: CLINIC | Age: 71
End: 2021-06-29

## 2021-06-29 VITALS
OXYGEN SATURATION: 97 % | HEART RATE: 95 BPM | WEIGHT: 230 LBS | BODY MASS INDEX: 40.75 KG/M2 | SYSTOLIC BLOOD PRESSURE: 110 MMHG | HEIGHT: 63 IN | DIASTOLIC BLOOD PRESSURE: 78 MMHG

## 2021-06-29 DIAGNOSIS — Z91.89 CHRONIC CHEST PAIN WITH LOW TO MODERATE RISK FOR CAD: Primary | ICD-10-CM

## 2021-06-29 DIAGNOSIS — R07.9 CHRONIC CHEST PAIN WITH LOW TO MODERATE RISK FOR CAD: Primary | ICD-10-CM

## 2021-06-29 DIAGNOSIS — I10 BENIGN ESSENTIAL HTN: ICD-10-CM

## 2021-06-29 DIAGNOSIS — E66.01 MORBID OBESITY WITH BMI OF 40.0-44.9, ADULT (HCC): ICD-10-CM

## 2021-06-29 DIAGNOSIS — G89.29 CHRONIC CHEST PAIN WITH LOW TO MODERATE RISK FOR CAD: Primary | ICD-10-CM

## 2021-06-29 PROCEDURE — 99203 OFFICE O/P NEW LOW 30 MIN: CPT | Performed by: INTERNAL MEDICINE

## 2021-06-29 PROCEDURE — 93000 ELECTROCARDIOGRAM COMPLETE: CPT | Performed by: INTERNAL MEDICINE

## 2021-06-29 NOTE — PROGRESS NOTES
Date of Consultation: 2021    Patient Name: Chastity Salgado  :1950    Encounter Provider:Stanley Deshpande MD  Primary Care Provider: Marilyn Turner APRN    Place of Service: [facility] CARDIOLOGY    Chief Complaint   Patient presents with   • Chest Pain       History of Present Illness  I appreciate the opportunity to see this patient in consultation.  She is referred by NEWTON Ramirez for evaluation of chest discomfort that began approximately a year ago.  The symptoms are worsening.    The patient describes a sharp stabbing sensation over her right precordium.  The episodes can last for a few seconds up to 30 minutes.  They often occur while at rest not necessarily with exercise.  She notices them while driving.  There were no other associated symptoms such as lightheadedness, dizziness, diaphoresis or radiation of the discomfort to her neck jaw or left arm.  The patient notices an increased duration and increased frequency of these events.  She has not had any trauma to her chest.  She did undergo a right breast lumpectomy in  that was followed by radiation therapy.    Risk factors for coronary disease include diabetes mellitus, hypertension, obesity, family history.  The patient does not smoke cigarettes.  She reports no history of hyperlipidemia.      Past Medical History:   Diagnosis Date   • Abscess    • Anxiety    • Arthritis     bilateral feet   • Cancer (CMS/HCC)    • Cellulitis of face    • Degenerative disc disease, lumbar    • Depression    • DM (diabetes mellitus), type 2 (CMS/HCC)    • Ear infection    • Fibromyalgia, primary    • History of dysphagia    • History of dysuria    • History of epistaxis    • Hypertension    • Intertrigo    • MRSA (methicillin resistant Staphylococcus aureus)        • Nose mucous membrane dryness    • Osteoarthritis    • Rosacea          Past Surgical History:   Procedure Laterality Date   •  BREAST LUMPECTOMY Right 2013   • HIP BIPOLAR REPLACEMENT Bilateral     left 1998 and right 2007   • INCONTINENCE SURGERY  02/27/2020   • LAPAROSCOPIC TOTAL HYSTERECTOMY  02/27/2020   • NERVE BLOCK Left 2/25/2021    Procedure: left suprascapular nerve block;  Surgeon: John Padgett MD;  Location: [facility] MAIN OR;  Service: Pain Management;  Laterality: Left;   • REPLACEMENT TOTAL KNEE  2005   • SHOULDER SURGERY Right     2000, 2002, 2010 X2, 2012           Current Outpatient Medications:   •  albuterol (PROVENTIL) (2.5 MG/3ML) 0.083% nebulizer solution, Take 2.5 mg by nebulization Every 4 (Four) Hours As Needed for Wheezing., Disp: 50 vial, Rfl: 0  •  albuterol sulfate  (90 Base) MCG/ACT inhaler, Inhale 2 puffs Every 4 (Four) Hours As Needed for Wheezing., Disp: 18 g, Rfl: 0  •  busPIRone (BUSPAR) 5 MG tablet, Take 1 tablet by mouth 2 (two) times a day., Disp: 60 tablet, Rfl: 3  •  celecoxib (CeleBREX) 200 MG capsule, Take 200 mg by mouth Daily., Disp: , Rfl:   •  cetirizine (ZyrTEC) 10 MG tablet, Take 1 tablet by mouth daily as needed., Disp: , Rfl:   •  Cholecalciferol (VITAMIN D3) 25 MCG (1000 UT) capsule, Take 1,000 Units by mouth Daily., Disp: , Rfl:   •  cyclobenzaprine (FLEXERIL) 10 MG tablet, TAKE ONE TABLET BY MOUTH AT NIGHT AS NEEDED FOR MUSCLE SPASMS, Disp: 30 tablet, Rfl: 1  •  DULoxetine (CYMBALTA) 60 MG capsule, Take 1 capsule by mouth Daily., Disp: 90 capsule, Rfl: 1  •  fluticasone (FLONASE) 50 MCG/ACT nasal spray, 1 spray into the nostril(s) as directed by provider., Disp: , Rfl:   •  glucose blood (FREESTYLE LITE) test strip, Once daily and as needed, Disp: 100 each, Rfl: 12  •  glucose monitor monitoring kit, Check glucose daily and prn, Disp: 1 each, Rfl: 0  •  hydrochlorothiazide (HYDRODIURIL) 12.5 MG tablet, Take 1 tablet by mouth Daily., Disp: 90 tablet, Rfl: 2  •  HYDROcodone-acetaminophen (NORCO)  MG per tablet, Take 1 tablet by mouth 5 (Five) Times a Day As Needed for Severe  Pain . dnf 6/7/2021, Disp: 150 tablet, Rfl: 0  •  Lancets (freestyle) lancets, Check glucose daily and prn, Disp: 100 each, Rfl: 12  •  lisinopril (PRINIVIL,ZESTRIL) 40 MG tablet, TAKE ONE TABLET BY MOUTH DAILY, Disp: 90 tablet, Rfl: 1  •  metFORMIN ER (GLUCOPHAGE-XR) 500 MG 24 hr tablet, Take 1 tablet by mouth Daily With Breakfast., Disp: 30 tablet, Rfl: 2  •  ondansetron (Zofran) 4 MG tablet, Take 1 tablet by mouth Every 8 (Eight) Hours As Needed for Nausea or Vomiting., Disp: 30 tablet, Rfl: 1  •  Probiotic capsule, Take 1 capsule by mouth daily., Disp: , Rfl:   •  Relistor 150 MG tablet, TAKE THREE TABLETS BY MOUTH DAILY AS NEEDED FOR OPIOID INDUCED CONSTIPATION, Disp: 90 tablet, Rfl: 5  •  Semaglutide,0.25 or 0.5MG/DOS, (Ozempic, 0.25 or 0.5 MG/DOSE,) 2 MG/1.5ML solution pen-injector, Inject 0.25 mg SQ weekly x 4 weeks, then increase to 0.5 mg SQ weekly, Disp: 1 pen, Rfl: 2      Social History     Socioeconomic History   • Marital status: Single     Spouse name: Not on file   • Number of children: 3   • Years of education: Not on file   • Highest education level: Not on file   Tobacco Use   • Smoking status: Never Smoker   • Smokeless tobacco: Never Used   Vaping Use   • Vaping Use: Never used   Substance and Sexual Activity   • Alcohol use: Yes     Comment: social alcohol use   • Drug use: No   • Sexual activity: Defer         Review of Systems   Constitutional: Positive for malaise/fatigue.   HENT: Negative.    Eyes: Negative.    Cardiovascular: Positive for chest pain, dyspnea on exertion and leg swelling.   Respiratory: Negative.    Endocrine: Negative.    Skin: Negative.    Musculoskeletal: Negative.    Gastrointestinal: Negative.    Neurological: Negative.    Psychiatric/Behavioral: Negative.        Procedures      ECG 12 Lead    Date/Time: 6/29/2021 12:23 PM  Performed by: Stanley Deshpande MD  Authorized by: Stanley Deshpande MD   Comparison: compared with previous ECG from 4/15/2021  Similar to  "previous ECG  Rhythm: sinus rhythm  Rate: normal  Conduction: conduction normal  QRS axis: normal  Other findings: low voltage                Objective:    /78 (BP Location: Left arm, Patient Position: Sitting)   Pulse 95   Ht 160 cm (63\")   Wt 104 kg (230 lb)   SpO2 97%   BMI 40.74 kg/m²         Vitals reviewed.   Constitutional:       Appearance: Healthy appearance. Well-developed. Morbidly obese.   Eyes:      Pupils: Pupils are equal, round, and reactive to light.   HENT:      Head: Normocephalic.   Neck:      Thyroid: No thyromegaly.      Vascular: No carotid bruit or JVD.   Pulmonary:      Effort: Pulmonary effort is normal.      Breath sounds: Normal breath sounds.   Cardiovascular:      Normal rate. Regular rhythm. Normal S1. Normal S2.      Murmurs: There is no murmur.      No gallop.   Pulses:     Intact distal pulses.   Edema:     Peripheral edema absent.   Abdominal:      General: Bowel sounds are normal.      Palpations: Abdomen is soft.   Musculoskeletal:      Cervical back: Normal range of motion. Skin:     General: Skin is warm and dry.      Findings: No erythema.   Neurological:      Mental Status: Alert and oriented to person, place, and time.             Assessment:       Diagnosis Plan   1. Chronic chest pain with low to moderate risk for CAD  Stress Test With Myocardial Perfusion One Day   2. Morbid obesity with BMI of 40.0-44.9, adult (CMS/HCC)     3. Benign essential HTN         1.  Chest discomfort: I believe this is probably going to be noncardiac pain.  Her symptoms are somewhat atypical but she does have fairly significant risk factors.  I think at least a routine treadmill test would be in order.  In addition the benefit of the stress test is to give her an idea where she is from an overall cardiovascular health standpoint.  I believe this patient's exertional dyspnea is all related to her morbid obesity.  She needs a more healthy lifestyle including significant weight reduction " going forward.  Hopefully the information we obtained from the treadmill will be a motivation for her to change her lifestyle.  2.  Hypertension: Stable on medical therapy  3.  Morbid obesity: Discussed in detail  4.  Diabetes mellitus, type II: On oral therapy     Plan:   1.  Routine treadmill stress test    I appreciate the opportunity to evaluate this patient in consultation

## 2021-07-01 ENCOUNTER — OFFICE VISIT (OUTPATIENT)
Dept: PAIN MEDICINE | Facility: CLINIC | Age: 71
End: 2021-07-01

## 2021-07-01 ENCOUNTER — OFFICE VISIT (OUTPATIENT)
Dept: FAMILY MEDICINE CLINIC | Facility: CLINIC | Age: 71
End: 2021-07-01

## 2021-07-01 VITALS
SYSTOLIC BLOOD PRESSURE: 136 MMHG | DIASTOLIC BLOOD PRESSURE: 81 MMHG | WEIGHT: 226 LBS | OXYGEN SATURATION: 99 % | BODY MASS INDEX: 40.04 KG/M2 | RESPIRATION RATE: 20 BRPM | HEIGHT: 63 IN | HEART RATE: 88 BPM | TEMPERATURE: 96.6 F

## 2021-07-01 VITALS
HEIGHT: 63 IN | OXYGEN SATURATION: 99 % | RESPIRATION RATE: 18 BRPM | HEART RATE: 94 BPM | BODY MASS INDEX: 40.18 KG/M2 | DIASTOLIC BLOOD PRESSURE: 78 MMHG | TEMPERATURE: 97.1 F | WEIGHT: 226.8 LBS | SYSTOLIC BLOOD PRESSURE: 134 MMHG

## 2021-07-01 DIAGNOSIS — R10.11 RUQ PAIN: Primary | ICD-10-CM

## 2021-07-01 DIAGNOSIS — R14.2 BELCHING: ICD-10-CM

## 2021-07-01 DIAGNOSIS — M54.2 NECK PAIN: ICD-10-CM

## 2021-07-01 DIAGNOSIS — F51.01 PRIMARY INSOMNIA: ICD-10-CM

## 2021-07-01 DIAGNOSIS — H66.001 NON-RECURRENT ACUTE SUPPURATIVE OTITIS MEDIA OF RIGHT EAR WITHOUT SPONTANEOUS RUPTURE OF TYMPANIC MEMBRANE: ICD-10-CM

## 2021-07-01 DIAGNOSIS — M12.9 ARTHRITIS, MULTIPLE JOINT INVOLVEMENT: ICD-10-CM

## 2021-07-01 DIAGNOSIS — Z51.81 ENCOUNTER FOR MONITORING OPIOID MAINTENANCE THERAPY: ICD-10-CM

## 2021-07-01 DIAGNOSIS — G89.4 CHRONIC PAIN SYNDROME: Primary | ICD-10-CM

## 2021-07-01 DIAGNOSIS — Z79.891 ENCOUNTER FOR MONITORING OPIOID MAINTENANCE THERAPY: ICD-10-CM

## 2021-07-01 DIAGNOSIS — R11.2 NON-INTRACTABLE VOMITING WITH NAUSEA, UNSPECIFIED VOMITING TYPE: ICD-10-CM

## 2021-07-01 PROCEDURE — 99214 OFFICE O/P EST MOD 30 MIN: CPT | Performed by: NURSE PRACTITIONER

## 2021-07-01 PROCEDURE — 96372 THER/PROPH/DIAG INJ SC/IM: CPT | Performed by: NURSE PRACTITIONER

## 2021-07-01 RX ORDER — TRAZODONE HYDROCHLORIDE 50 MG/1
TABLET ORAL
Qty: 30 TABLET | Refills: 1 | Status: SHIPPED | OUTPATIENT
Start: 2021-07-01 | End: 2022-04-26

## 2021-07-01 RX ORDER — AZITHROMYCIN 250 MG/1
TABLET, FILM COATED ORAL
Qty: 6 TABLET | Refills: 0 | Status: SHIPPED | OUTPATIENT
Start: 2021-07-01 | End: 2021-08-19

## 2021-07-01 RX ORDER — NALOXEGOL OXALATE 25 MG/1
25 TABLET, FILM COATED ORAL EVERY MORNING
Qty: 30 TABLET | Refills: 1 | Status: SHIPPED | OUTPATIENT
Start: 2021-07-01 | End: 2021-11-29

## 2021-07-01 RX ORDER — HYDROCODONE BITARTRATE AND ACETAMINOPHEN 10; 325 MG/1; MG/1
1 TABLET ORAL
Qty: 150 TABLET | Refills: 0 | Status: SHIPPED | OUTPATIENT
Start: 2021-07-01 | End: 2021-07-29 | Stop reason: SDUPTHER

## 2021-07-01 RX ORDER — KETOROLAC TROMETHAMINE 30 MG/ML
30 INJECTION, SOLUTION INTRAMUSCULAR; INTRAVENOUS ONCE
Status: COMPLETED | OUTPATIENT
Start: 2021-07-01 | End: 2021-07-01

## 2021-07-01 RX ADMIN — KETOROLAC TROMETHAMINE 30 MG: 30 INJECTION, SOLUTION INTRAMUSCULAR; INTRAVENOUS at 11:17

## 2021-07-01 NOTE — PROGRESS NOTES
"Chief Complaint  Earache (both ears couple of weeks ) and Insomnia (since tuesday, really bad stomach cramps)    Subjective          Chastity Salgado presents to [facility]  Nausea and vomiting every 3 weeks, now with increased belching, states     Ear pain, starts in past few weeks, waxing and waning  When lying down at night, feels like she has drainage, denies pain to touch ear or lay on ear  Right > left  Increased allergies, nasal congesiton, runny nose, denies sneezing, denies cough    Insomnia, increased stress, trouble falling asleep, lays in bed, tossing and turning. Tried a sound machine, tried a glass of wine, environmental     Earache   There is pain in both ears. This is a new problem. The current episode started 1 to 4 weeks ago. The problem occurs every few minutes. The problem has been unchanged. There has been no fever. The pain is moderate. Associated symptoms include abdominal pain (RUQ) and ear discharge. Pertinent negatives include no coughing, diarrhea, headaches, hearing loss, neck pain, rash, rhinorrhea, sore throat or vomiting. She has tried nothing for the symptoms. The treatment provided no relief.   Insomnia  This is a new problem. The current episode started more than 1 month ago. The problem occurs daily. The problem has been unchanged. Associated symptoms include abdominal pain (RUQ), a change in bowel habit (constipation) and fatigue. Pertinent negatives include no anorexia, arthralgias, chest pain, chills, congestion, coughing, fever, headaches, joint swelling, myalgias, nausea, neck pain, numbness, rash, sore throat, swollen glands, urinary symptoms, vertigo, visual change, vomiting or weakness. Exacerbated by: stress at home. Treatments tried: melatonin. The treatment provided no relief.       Objective   Vital Signs:   /78   Pulse 94   Temp 97.1 °F (36.2 °C) (Temporal)   Resp 18   Ht 160 cm (63\")   Wt 103 kg (226 lb 12.8 oz)   SpO2 99%   " BMI 40.18 kg/m²     Physical Exam  Vitals reviewed.   Constitutional:       Appearance: Normal appearance.   HENT:      Right Ear: Tympanic membrane is erythematous.      Left Ear: Tympanic membrane and ear canal normal.      Nose: Congestion present.      Mouth/Throat:      Mouth: Mucous membranes are moist.      Pharynx: Oropharynx is clear.   Cardiovascular:      Rate and Rhythm: Normal rate and regular rhythm.      Heart sounds: No murmur heard.   No friction rub. No gallop.    Pulmonary:      Effort: Pulmonary effort is normal. No respiratory distress.      Breath sounds: Normal breath sounds. No wheezing, rhonchi or rales.   Abdominal:      General: Bowel sounds are normal. There is no distension.      Palpations: Abdomen is soft. There is no mass.      Tenderness: There is no abdominal tenderness.      Hernia: No hernia is present.   Skin:     General: Skin is warm and dry.   Neurological:      General: No focal deficit present.      Mental Status: She is alert and oriented to person, place, and time.   Psychiatric:         Mood and Affect: Mood normal.        Result Review :                 Assessment and Plan    Diagnoses and all orders for this visit:    1. RUQ pain (Primary)  -     NM HIDA scan with pharmacological intervention; Future  -     Ambulatory Referral to Gastroenterology    2. Belching  -     NM HIDA scan with pharmacological intervention; Future  -     Ambulatory Referral to Gastroenterology    3. Non-intractable vomiting with nausea, unspecified vomiting type  -     Ambulatory Referral to Gastroenterology    4. Non-recurrent acute suppurative otitis media of right ear without spontaneous rupture of tympanic membrane  -     azithromycin (Zithromax Z-Alejandro) 250 MG tablet; Take 2 tablets the first day, then 1 tablet daily for 4 days.  Dispense: 6 tablet; Refill: 0    5. Primary insomnia    Other orders  -     traZODone (DESYREL) 50 MG tablet; Take 1/2 - 1 tab po qhs prn insomnia  Dispense: 30  tablet; Refill: 1      I spent 30 minutes caring for Chastity on this date of service. This time includes time spent by me in the following activities:reviewing tests, performing a medically appropriate examination and/or evaluation , counseling and educating the patient/family/caregiver and documenting information in the medical record  Follow Up   No follow-ups on file.  Patient was given instructions and counseling regarding her condition or for health maintenance advice. Please see specific information pulled into the AVS if appropriate.     RUQ pain, US negative, increased belching, vomiting intermittent, recommend hida scan and GI follow up  OM: zithromax, as directed,  Insomnia: tried otc medications including melatonin, environmental changes, trazodone as prescribed,

## 2021-07-01 NOTE — PROGRESS NOTES
CHIEF COMPLAINT  F/u neck and bilat shoulder pain. Pt sts pain has worsened since last ov.    Subjective   Chastity Salgado is a 71 y.o. female  who presents for follow-up.  She has a history of chronic neck and shoulder pian.  Left shoulder pain worse. Was scheduled for RFA but had to cancel due to vomiting.      left suprascapular nerve block   on  2/25/2021 - 50% relief for 1 month   left suprascapular nerve block/steroid injection - posterior approach   on  9/22/2020 - 70% relief for 1-2 months    C/o diffuse joint pain.  Pain today 8/10 in severity (today both shoulders are bothering her).  Taking Hydrocodone 10/325 5/day, Celebrex 200 mg daily, Started Cymbalta 60 mg daily last week.  Denies adverse reactions.  Reports at least moderate pain reduction with regimen reports that she would not be able to function without it.  constipation reported, was previously managed with Relistor, lately still going several days without a BM.       Issues with nausea/vomiting. Workup ongoing.  HIDA scan coming up also waiting GI consult.      Rheumatologist - Dr. Lucero.  Saw recently, told not rheumatoid.  All OA, she has been released     Counseling - Dr. Edison Vásquez - ortho.  Left shoulder injections every 90 days. History of multiple right shoulder surgeries.  Says right shoulder worse recently, saw Dr. Vásquez recently and was told hardware was loose, needs another left total shoulder replacement which has been scheduled 9/7/2021.       Neck pain worsening but has previously declined MRI due to claustrophobia    Patient remained masked during entire encounter. No cough present. I donned a mask and eye protection throughout entire visit. Prior to donning mask and eye protection, hand hygiene was performed, as well as when it was doffed.  I was closer than 6 feet, but not for an extended period of time. No obvious exposure to any bodily fluids.    Joint Pain  This is a chronic problem. The current episode started  more than 1 year ago. The problem occurs daily. The problem has been waxing and waning. Associated symptoms include abdominal pain, arthralgias (bilat shoulder) and neck pain. Pertinent negatives include no chest pain, chills, congestion, coughing, fatigue, fever, headaches, joint swelling (knees, right hip bursitis), nausea, numbness, vomiting or weakness. Exacerbated by: activity. She has tried oral narcotics, acetaminophen, heat and NSAIDs for the symptoms. The treatment provided moderate relief.   Neck Pain   This is a chronic problem. The problem occurs daily. The problem has been waxing and waning. The pain is present in the anterior neck, left side and right side. The quality of the pain is described as aching and burning. The pain is at a severity of 7/10. The pain is moderate. Exacerbated by: movement of neck, use of arms/shoulders. Pertinent negatives include no chest pain, fever, headaches, numbness or weakness. She has tried NSAIDs, oral narcotics, muscle relaxants and neck support for the symptoms. The treatment provided moderate relief.     PEG Assessment   What number best describes your pain on average in the past week?7  What number best describes how, during the past week, pain has interfered with your enjoyment of life?7  What number best describes how, during the past week, pain has interfered with your general activity?  7    The following portions of the patient's history were reviewed and updated as appropriate: allergies, current medications, past family history, past medical history, past social history, past surgical history and problem list.    Review of Systems   Constitutional: Negative for activity change, chills, fatigue and fever.   HENT: Negative for congestion.    Eyes: Negative for visual disturbance.   Respiratory: Negative for cough and shortness of breath.    Cardiovascular: Negative for chest pain.   Gastrointestinal: Positive for abdominal pain. Negative for constipation,  "diarrhea, nausea and vomiting.   Endocrine: Negative for polyuria.   Genitourinary: Negative for difficulty urinating.   Musculoskeletal: Positive for arthralgias (bilat shoulder) and neck pain. Negative for joint swelling (knees, right hip bursitis).   Neurological: Negative for dizziness, weakness, light-headedness, numbness and headaches.   Psychiatric/Behavioral: Negative for agitation, sleep disturbance and suicidal ideas. The patient is not nervous/anxious.      I have reviewed and confirmed the accuracy of the ROS as documented by the MA/LPN/RN NEWTON Jordan    Vitals:    07/01/21 1048   BP: 136/81   Pulse: 88   Resp: 20   Temp: 96.6 °F (35.9 °C)   SpO2: 99%   Weight: 103 kg (226 lb)   Height: 160 cm (63\")   PainSc:   7   PainLoc: Neck  Comment: and bilat shoulders     Objective   Physical Exam  Vitals and nursing note reviewed.   Constitutional:       General: She is not in acute distress.     Appearance: Normal appearance. She is not ill-appearing.   Pulmonary:      Effort: Pulmonary effort is normal. No respiratory distress.   Musculoskeletal:      Right shoulder: Tenderness present. Decreased range of motion.      Left shoulder: Tenderness present. Decreased range of motion.   Skin:     General: Skin is warm and dry.   Neurological:      Mental Status: She is alert and oriented to person, place, and time.      Motor: No weakness.      Gait: Gait abnormal (slowed).   Psychiatric:         Mood and Affect: Mood normal.         Behavior: Behavior normal.       Assessment/Plan   Diagnoses and all orders for this visit:    1. Chronic pain syndrome (Primary)  -     ketorolac (TORADOL) injection 30 mg    2. Arthritis, multiple joint involvement    3. Neck pain    4. Encounter for monitoring opioid maintenance therapy    Other orders  -     Naloxegol Oxalate (Movantik) 25 MG tablet; Take 1 tablet by mouth Every Morning.  Dispense: 30 tablet; Refill: 1  -     HYDROcodone-acetaminophen (NORCO)  MG " per tablet; Take 1 tablet by mouth 5 (Five) Times a Day As Needed for Severe Pain . dnf 7/7/2021  Dispense: 150 tablet; Refill: 0      --- Reschedule left suprascapular RFA.  Has other medical issues currently, will call to re-schedule  --- Refill Hydrocodone. Patient appears stable with current regimen. No adverse effects. Regarding continuation of opioids, there is no evidence of aberrant behavior or any red flags.  The patient continues with appropriate response to opioid therapy. ADL's remain intact by self.   --- The urine drug screen confirmation from 3/9/2021 has been reviewed and the result is appropriate based on patient history and GAYE report  --- The patient signed an updated copy of the controlled substance agreement on 9/8/2020  --- D/c Relistor. Trial Movantik. If not helping, she will discuss constipation issues in more detail with GI specialist at upcoming appointment.  Discussed medication with the patient.  Included in this discussion was the potential for side effects and adverse events.  Patient verbalized understanding and wished to proceed.  Prescription will be sent to pharmacy.  --- Follow-up 2 months          GAYE REPORT  As part of the patient's treatment plan, I am prescribing controlled substances. The patient has been made aware of appropriate use of such medications, including potential risk of somnolence, limited ability to drive and/or work safely, and the potential for dependence or overdose. It has also bee made clear that these medications are for use by this patient only, without concomitant use of alcohol or other substances unless prescribed.     Patient has completed prescribing agreement detailing terms of continued prescribing of controlled substances, including monitoring GAYE reports, urine drug screening, and pill counts if necessary. The patient is aware that inappropriate use will results in cessation of prescribing such medications.    As the clinician, I  personally reviewed the GAYE from 7/1/2021 while the patient was in the office today.    History and physical exam exhibit continued safe and appropriate use of controlled substances.    EMR Dragon/Transcription disclaimer:   Much of this encounter note is an electronic transcription/translation of spoken language to printed text. The electronic translation of spoken language may permit erroneous, or at times, nonsensical words or phrases to be inadvertently transcribed; Although I have reviewed the note for such errors, some may still exist.

## 2021-07-12 ENCOUNTER — HOSPITAL ENCOUNTER (OUTPATIENT)
Dept: NUCLEAR MEDICINE | Facility: HOSPITAL | Age: 71
Discharge: HOME | End: 2021-07-12

## 2021-07-12 DIAGNOSIS — R10.11 RUQ PAIN: ICD-10-CM

## 2021-07-12 DIAGNOSIS — R14.2 BELCHING: ICD-10-CM

## 2021-07-12 PROCEDURE — 78226 HEPATOBILIARY SYSTEM IMAGING: CPT

## 2021-07-12 PROCEDURE — 0 TECHNETIUM TC 99M MEBROFENIN KIT: Performed by: NURSE PRACTITIONER

## 2021-07-12 PROCEDURE — A9537 TC99M MEBROFENIN: HCPCS | Performed by: NURSE PRACTITIONER

## 2021-07-12 RX ORDER — KIT FOR THE PREPARATION OF TECHNETIUM TC 99M MEBROFENIN 45 MG/10ML
1 INJECTION, POWDER, LYOPHILIZED, FOR SOLUTION INTRAVENOUS
Status: COMPLETED | OUTPATIENT
Start: 2021-07-12 | End: 2021-07-12

## 2021-07-12 RX ADMIN — MEBROFENIN 1 DOSE: 45 INJECTION, POWDER, LYOPHILIZED, FOR SOLUTION INTRAVENOUS at 13:02

## 2021-07-20 NOTE — TELEPHONE ENCOUNTER
Patient Education     Controlling High Blood Pressure  High blood pressure (hypertension) is often called the silent killer. This is because many people who have it, don’t know it. It can be very dangerous. High blood pressure can raise your risk of heart attack, stroke, heart disease, and heart failure. Controlling your blood pressure can decrease your risk of these problems. It's important to know the appropriate blood pressure range and remember to check your blood pressure regularly. Doing so can save your life.  Blood pressure measurements are given as 2 numbers. Systolic blood pressure is the upper number. This is the pressure when the heart contracts. Diastolic blood pressure is the lower number. This is the pressure when the heart relaxes between beats.  Blood pressure is categorized as normal, elevated, or stage 1 or stage 2 high blood pressure:  · Normal blood pressure is systolic of less than 120 and diastolic of less than 80 (120/80)  · Elevated blood pressure is systolic of 120 to 129 and diastolic less than 80  · Stage 1 high blood pressure is systolic of 130 to 139 or diastolic between 80 to 89  · Stage 2 high blood pressure is when systolic is 140 or higher or the diastolic is 90 or higher  A heart-healthy lifestyle can help you control your blood pressure without medicines. Here are some things you can do to pursue a heart-healthy lifestyle:    Choose heart-healthy foods  · Select low-salt, low-fat foods. Limit sodium intake to 2,400 mg per day or the amount suggested by your healthcare provider.  · Limit canned, dried, cured, packaged, and fast foods. These can contain a lot of salt.  · Eat 8 to 10 servings of fruits and vegetables every day.  · Choose lean meats, fish, or chicken.  · Eat whole-grain pasta, brown rice, and beans.  · Eat 2 to 3 servings of low-fat or fat-free dairy products.  · Ask your doctor about the DASH eating plan. This plan helps reduce blood pressure.  · When you go to a  LMA - to call.   restaurant, ask that your meal be prepared with no added salt.    Stay at a healthy weight  · Ask your healthcare provider how many calories to eat a day. Then stick to that number.  · Ask your healthcare provider what weight range is healthiest for you. If you are overweight, a weight loss of only 3% to 5% of your body weight can help lower blood pressure. Generally, a good weight loss goal is to lose 10% of your body weight in a year.  · Limit snacks and sweets.  · Get regular exercise.    Get up and get active  · Find activities you enjoy that can be done alone or with friends or family. Such activities might include bicycling, dancing, walking, or jogging.  · Park farther away from building entrances to walk more.  · Use stairs instead of the elevator.  · When you can, walk or bike instead of driving.  · [place] leaves, garden, or do household repairs.  · Be active at a moderate to vigorous level of physical activity for at least 40 minutes for a minimum of 3 to 4 days a week.     Manage stress  · Make time to relax and enjoy life. Find time to laugh.  · Communicate your concerns with your loved ones and your healthcare provider.  · Visit with family and friends, and keep up with hobbies.    Limit alcohol and quit smoking  · Men should have no more than 2 drinks per day.  · Women should have no more than 1 drink per day.  · Talk with your healthcare provider about quitting smoking. Smoking significantly increases your risk for heart disease and stroke. Ask your healthcare provider about community smoking cessation programs and other options.    Medicines  If lifestyle changes aren’t enough, your healthcare provider may prescribe high blood pressure medicine. Take all medicines as prescribed. If you have any questions about your medicines, ask your healthcare provider before stopping or changing them.  [employer] last reviewed this educational content on 6/1/2019  © 7816-1171 [employer] [address] [place] [address]  [place] [address]. All rights reserved. This information is not intended as a substitute for professional medical care. Always follow your healthcare professional's instructions.           Patient Education     Understanding STDs    When it comes to sex, nothing is risk-free. Any sexual contact with the penis, vagina, anus, or mouth can spread a sexually transmitted disease (STD). The only sure way to prevent STDs is abstinence (not having sex). But there are ways to make sex safer. Use a latex condom each time you have sex. And choose your partner wisely.  Use condoms for safer sex  If you have sex, latex condoms provide the best protection against STDs. Latex condoms stop the exchange of body fluids that carry certain STDs. They also limit contact with affected skin. Be aware though, a condom doesn’t cover all skin. So, affected skin that is not covered can still transfer disease. But you’re safer with a condom than without one. Use a condom even if you use other birth control. While birth control methods like the pill or IUD help prevent pregnancy, they do not protect against STDs.  Choose the right condom  Condoms made of latex prevent disease best. If you’re allergic to latex, use polyurethane condoms instead. Male condoms fit over the penis. Female condoms line the vagina. Before buying a condom, read the label to be sure it prevents disease. Some novelty condoms don’t.  The right lubricant helps  Buy lubricated condoms or use lubricant. This provides greater comfort and reduces the risk of condom breakage. Use only water-based lubricants. Don’t use oil, lotion, or petroleum jelly. They can weaken the condom, causing breakage. Also, you may want to choose lubricants without nonoxynol-9. It’s now known that this spermicide does not prevent disease and may cause irritation.  Use condoms correctly  For condoms to work, they must be used the right way. Keep these tips in mind:  · Use a new latex condom  each time you have sex. Slip the condom on the penis before any contact is made.  · When ready to withdraw, hold the rim of the condom as the penis pulls out. This prevents the condom from slipping off.  · Check the expiration date before using a condom.  · Don’t store condoms in places that can get hot, such as a car or a wallet that is carried in a back pocket.  Get to know your partner  Safer sex is a process. It involves getting to know your partner and making informed choices. Ask each other how many partners you have had in the past, and how many you have now. Find out if either of you has an STD. If you decide to have sex, use a condom each time. Don’t stop using condoms unless you’re sure neither of you has other partners and you’ve both been tested to confirm you don’t have STDs. Then stay free of disease by having sex only with each other (monogamy).  Keep your cool  Don’t let alcohol or drugs cloud your judgment. They could lead you to have sex with someone you wouldn’t have chosen if you were sober. Or, you might forget to use a condom. If you do plan to have sex, keep a latex condom with you. Don’t wait until you’re in the heat of passion to try to find one.  Consider abstinence  The only way to be sure you won’t get an STD is to abstain from sex. Abstinence is a choice that many people make at some point in their lives. Maybe you want to wait until you are sure you’re ready before you have sex. Maybe you’d like a break from the responsibilities of sex for a while. Or maybe you just want to know your partner better before taking the next step. Abstinence is a choice you can make now to protect your future.  Date Last Reviewed: 12/1/2016  © 6964-6176 [employer]. [address] [facility], [place] [address]. All rights reserved. This information is not intended as a substitute for professional medical care. Always follow your healthcare professional's instructions.         Possible Exposure to  STD/STI:  -Antimicrobials and labs as ordered.  -Always ensure safe sexual practices.  -Abstinence as discussed.  -Important for partner to be evaluated if not already completed.  -Monitor for fevers, abdominal / back pain, lesions / rash, eye concerns, muscle/joint concerns or other worrisome symptoms.    -Discussed AM blood pressure monitoring with proper technique.  -Pt encouraged to create BP log and to bring with to f/u PCP office visit.  -Encouraged DASH diet and discussed dietary modifications.

## 2021-07-29 ENCOUNTER — TELEPHONE (OUTPATIENT)
Dept: PAIN MEDICINE | Facility: CLINIC | Age: 71
End: 2021-07-29

## 2021-08-02 ENCOUNTER — APPOINTMENT (OUTPATIENT)
Dept: NUCLEAR MEDICINE | Facility: HOSPITAL | Age: 71
End: 2021-08-02

## 2021-08-03 RX ORDER — HYDROCODONE BITARTRATE AND ACETAMINOPHEN 10; 325 MG/1; MG/1
1 TABLET ORAL
Qty: 150 TABLET | Refills: 0 | Status: SHIPPED | OUTPATIENT
Start: 2021-08-03 | End: 2021-09-01 | Stop reason: SDUPTHER

## 2021-08-11 NOTE — PROGRESS NOTES
Chief Complaint   Patient presents with   • Abdominal Pain        {CC  Problem List  Visit Diagnosis   Encounters  Notes  Medications  Labs  Result Review Imaging  Media :23}   History of Present Illness  Patient is a 71-year-old female who presents today for evaluation.  She was referred for nausea, vomiting, right upper quadrant pain.  Gallbladder ultrasound March 2021 was normal but showed fatty liver and small hepatic cyst.  HIDA scan July 2021 was normal with ejection fraction of 67%.    Patient presents today with concerns about vomiting and abdominal pain.  She reports symptoms began February 2021.  She reports initially she was having episodes around once per month but now the episodes are occurring around every 5 to 10 days.  She reports no specific triggers to the episodes.  When she has the episodes she feels pain to her epigastric periumbilical area and reports a sensation of abdominal gurgling gurgling.  She will then have vomiting.  She reports at times she will vomit up undigested food.  She reports symptoms generally occur at night or early in the morning.  She reports increased belching and a sour stomach.  She denies any heartburn.  Has also noted some intermittent discomfort to her right lower quadrant.    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  She has been taking Movantik for this but constipation has worsened.  She is going around 4 days without having a bowel movement.  She questions if there is anything else that she can take for this.    She reports a history of esophageal spasms.  She reports previous issues with dysphagia with meat getting stuck in the esophagus.  She reports that she has not had an episode of this over the last year.    She had an aunt with colon cancer.  She has never had an EGD or colonoscopy.  She has a personal history of breast cancer and has had a hysterectomy.         Result Review :       NM Hepatobiliary Without CCK (07/12/2021 14:53)   US  "Gallbladder (03/19/2021 13:37)   NM Hepatobiliary Without CCK (07/12/2021 14:53)   Office Visit with Marilyn Turner APRN (07/01/2021)       Vital Signs:   /80   Pulse 103   Temp 97.8 °F (36.6 °C)   Ht 160 cm (63\")   Wt 98.9 kg (218 lb)   BMI 38.62 kg/m²     Body mass index is 38.62 kg/m².     Physical Exam  Vitals reviewed.   Constitutional:       General: She is not in acute distress.     Appearance: She is well-developed.   HENT:      Head: Normocephalic and atraumatic.   Pulmonary:      Effort: Pulmonary effort is normal. No respiratory distress.   Abdominal:      General: Abdomen is flat. Bowel sounds are normal.      Palpations: Abdomen is soft.      Tenderness: There is abdominal tenderness in the epigastric area.   Skin:     General: Skin is dry.      Coloration: Skin is not pale.   Neurological:      Mental Status: She is alert and oriented to person, place, and time.   Psychiatric:         Thought Content: Thought content normal.           Assessment and Plan    Diagnoses and all orders for this visit:    1. Nausea and vomiting, intractability of vomiting not specified, unspecified vomiting type (Primary)  -     CT Abdomen Pelvis With Contrast; Future  -     NM Gastric Emptying; Future    2. Early satiety  -     CT Abdomen Pelvis With Contrast; Future  -     NM Gastric Emptying; Future    3. Epigastric pain  -     CT Abdomen Pelvis With Contrast; Future  -     NM Gastric Emptying; Future    4. Periumbilical abdominal pain  -     CT Abdomen Pelvis With Contrast; Future  -     NM Gastric Emptying; Future    5. Right lower quadrant abdominal pain  -     CT Abdomen Pelvis With Contrast; Future  -     NM Gastric Emptying; Future    6. Constipation, unspecified constipation type  -     CT Abdomen Pelvis With Contrast; Future  -     NM Gastric Emptying; Future        Discussion  Patient presents today with new complaints of Nausea, vomiting, and abdominal pain.  This is an undiagnosed " problem.  Discussed suspicion for diabetic gastroparesis.  We will schedule EGD to evaluate for any evidence of peptic ulcer disease, celiac disease, hiatal hernia that could be contributing to symptoms, schedule gastric emptying study to evaluate for gastroparesis, schedule CT scan to rule out any evidence of obstruction or malignancy due to her history of breast cancer, and schedule colonoscopy for screening purposes.          Follow Up   Return for Follow up to review results after testing complete.    Patient Instructions   Schedule EGD for further evaluation of symptoms.    Schedule colonoscopy for colon cancer screening.    Schedule CT abdomen/pelvis for further evaluation.    Schedule gastric emptying study to assess for gastroparesis.    For constipation, recommend starting MiraLAX daily available over-the-counter.  Mix 1 capful in 8 ounces of noncarbonated liquid and drink once daily.

## 2021-08-13 ENCOUNTER — TRANSCRIBE ORDERS (OUTPATIENT)
Dept: LAB | Facility: SURGERY CENTER | Age: 71
End: 2021-08-13

## 2021-08-13 ENCOUNTER — OFFICE VISIT (OUTPATIENT)
Dept: GASTROENTEROLOGY | Facility: CLINIC | Age: 71
End: 2021-08-13

## 2021-08-13 ENCOUNTER — PREP FOR SURGERY (OUTPATIENT)
Dept: SURGERY | Facility: SURGERY CENTER | Age: 71
End: 2021-08-13

## 2021-08-13 VITALS
HEIGHT: 63 IN | DIASTOLIC BLOOD PRESSURE: 80 MMHG | SYSTOLIC BLOOD PRESSURE: 138 MMHG | TEMPERATURE: 97.8 F | BODY MASS INDEX: 38.62 KG/M2 | WEIGHT: 218 LBS | HEART RATE: 103 BPM

## 2021-08-13 DIAGNOSIS — Z01.818 OTHER SPECIFIED PRE-OPERATIVE EXAMINATION: Primary | ICD-10-CM

## 2021-08-13 DIAGNOSIS — R11.2 NAUSEA AND VOMITING, INTRACTABILITY OF VOMITING NOT SPECIFIED, UNSPECIFIED VOMITING TYPE: Primary | ICD-10-CM

## 2021-08-13 DIAGNOSIS — K59.00 CONSTIPATION, UNSPECIFIED CONSTIPATION TYPE: ICD-10-CM

## 2021-08-13 DIAGNOSIS — Z12.11 ENCOUNTER FOR SCREENING FOR MALIGNANT NEOPLASM OF COLON: ICD-10-CM

## 2021-08-13 DIAGNOSIS — R68.81 EARLY SATIETY: ICD-10-CM

## 2021-08-13 DIAGNOSIS — R10.33 PERIUMBILICAL ABDOMINAL PAIN: ICD-10-CM

## 2021-08-13 DIAGNOSIS — R10.13 EPIGASTRIC PAIN: ICD-10-CM

## 2021-08-13 DIAGNOSIS — R10.31 RIGHT LOWER QUADRANT ABDOMINAL PAIN: ICD-10-CM

## 2021-08-13 PROCEDURE — 99214 OFFICE O/P EST MOD 30 MIN: CPT | Performed by: NURSE PRACTITIONER

## 2021-08-13 RX ORDER — SODIUM CHLORIDE 0.9 % (FLUSH) 0.9 %
3 SYRINGE (ML) INJECTION EVERY 12 HOURS SCHEDULED
Status: CANCELLED | OUTPATIENT
Start: 2021-08-13

## 2021-08-13 RX ORDER — SODIUM CHLORIDE, SODIUM LACTATE, POTASSIUM CHLORIDE, CALCIUM CHLORIDE 600; 310; 30; 20 MG/100ML; MG/100ML; MG/100ML; MG/100ML
30 INJECTION, SOLUTION INTRAVENOUS CONTINUOUS PRN
Status: CANCELLED | OUTPATIENT
Start: 2021-08-13

## 2021-08-13 RX ORDER — SODIUM CHLORIDE 0.9 % (FLUSH) 0.9 %
10 SYRINGE (ML) INJECTION AS NEEDED
Status: CANCELLED | OUTPATIENT
Start: 2021-08-13

## 2021-08-13 NOTE — PATIENT INSTRUCTIONS
Schedule EGD for further evaluation of symptoms.    Schedule colonoscopy for colon cancer screening.    Schedule CT abdomen/pelvis for further evaluation.    Schedule gastric emptying study to assess for gastroparesis.    For constipation, recommend starting MiraLAX daily available over-the-counter.  Mix 1 capful in 8 ounces of noncarbonated liquid and drink once daily.

## 2021-08-18 RX ORDER — ONDANSETRON 4 MG/1
TABLET, FILM COATED ORAL
Qty: 30 TABLET | Refills: 1 | Status: SHIPPED | OUTPATIENT
Start: 2021-08-18 | End: 2021-12-15

## 2021-08-19 ENCOUNTER — APPOINTMENT (OUTPATIENT)
Dept: CT IMAGING | Facility: HOSPITAL | Age: 71
End: 2021-08-19

## 2021-08-20 ENCOUNTER — OFFICE VISIT (OUTPATIENT)
Dept: FAMILY MEDICINE CLINIC | Facility: CLINIC | Age: 71
End: 2021-08-20

## 2021-08-20 ENCOUNTER — LAB (OUTPATIENT)
Dept: LAB | Facility: SURGERY CENTER | Age: 71
End: 2021-08-20

## 2021-08-20 VITALS
HEART RATE: 114 BPM | SYSTOLIC BLOOD PRESSURE: 98 MMHG | OXYGEN SATURATION: 98 % | DIASTOLIC BLOOD PRESSURE: 68 MMHG | TEMPERATURE: 96.8 F | RESPIRATION RATE: 16 BRPM | HEIGHT: 63 IN | BODY MASS INDEX: 38.89 KG/M2 | WEIGHT: 219.5 LBS

## 2021-08-20 DIAGNOSIS — L01.00 IMPETIGO: Primary | ICD-10-CM

## 2021-08-20 DIAGNOSIS — Z01.818 OTHER SPECIFIED PRE-OPERATIVE EXAMINATION: ICD-10-CM

## 2021-08-20 LAB — SARS-COV-2 ORF1AB RESP QL NAA+PROBE: NOT DETECTED

## 2021-08-20 PROCEDURE — 99213 OFFICE O/P EST LOW 20 MIN: CPT | Performed by: NURSE PRACTITIONER

## 2021-08-20 PROCEDURE — U0005 INFEC AGEN DETEC AMPLI PROBE: HCPCS | Performed by: SURGERY

## 2021-08-20 PROCEDURE — U0004 COV-19 TEST NON-CDC HGH THRU: HCPCS | Performed by: SURGERY

## 2021-08-20 PROCEDURE — C9803 HOPD COVID-19 SPEC COLLECT: HCPCS

## 2021-08-20 NOTE — PROGRESS NOTES
"Chief Complaint  Rash (right side of face  x5days)    Subjective          Chastity Salgado presents to [facility]  New lesions on face, erupts like a pimple, she does admit to picking, and now scabbed and irritated. She is having a colonoscopy/egd Monday and concerned.     Rash  This is a new problem. The current episode started in the past 7 days. The problem has been gradually improving since onset. The affected locations include the face. The rash is characterized by redness. She was exposed to nothing. Pertinent negatives include no anorexia, congestion, cough, diarrhea, eye pain, facial edema, fatigue, fever, joint pain, nail changes, rhinorrhea, shortness of breath, sore throat or vomiting. Past treatments include nothing. The treatment provided no relief.       Objective   Vital Signs:   BP 98/68   Pulse 114   Temp 96.8 °F (36 °C) (Temporal)   Resp 16   Ht 160 cm (63\")   Wt 99.6 kg (219 lb 8 oz)   SpO2 98%   BMI 38.88 kg/m²     Physical Exam  Vitals reviewed.   Constitutional:       Appearance: Normal appearance.   Skin:     General: Skin is warm and dry.      Findings: Wound present. No rash.      Comments: Nose, chin and anterior left ear  No discharge, scabbed   Neurological:      Mental Status: She is alert.        Result Review :                 Assessment and Plan    There are no diagnoses linked to this encounter.    Follow Up   No follow-ups on file.  Patient was given instructions and counseling regarding her condition or for health maintenance advice. Please see specific information pulled into the AVS if appropriate.     Start mupirocin twice daily x 7 days  Avoid picking at face  For no improvement or worsening symptoms, follow up in office  She does have history of rosacea, this is likely some of her issues and then introducing bacteria with picking  She will follow up prn    "

## 2021-08-23 ENCOUNTER — TRANSCRIBE ORDERS (OUTPATIENT)
Dept: LAB | Facility: SURGERY CENTER | Age: 71
End: 2021-08-23

## 2021-08-23 DIAGNOSIS — Z01.818 OTHER SPECIFIED PRE-OPERATIVE EXAMINATION: Primary | ICD-10-CM

## 2021-08-23 RX ORDER — CYCLOBENZAPRINE HCL 10 MG
TABLET ORAL
Qty: 30 TABLET | Refills: 0 | Status: SHIPPED | OUTPATIENT
Start: 2021-08-23 | End: 2021-09-29 | Stop reason: SDUPTHER

## 2021-08-23 RX ORDER — DULOXETIN HYDROCHLORIDE 60 MG/1
CAPSULE, DELAYED RELEASE ORAL
Qty: 90 CAPSULE | Refills: 1 | Status: SHIPPED | OUTPATIENT
Start: 2021-08-23

## 2021-08-25 ENCOUNTER — APPOINTMENT (OUTPATIENT)
Dept: CT IMAGING | Facility: HOSPITAL | Age: 71
End: 2021-08-25

## 2021-09-01 ENCOUNTER — OFFICE VISIT (OUTPATIENT)
Dept: PAIN MEDICINE | Facility: CLINIC | Age: 71
End: 2021-09-01

## 2021-09-01 VITALS
HEIGHT: 63 IN | WEIGHT: 224.6 LBS | SYSTOLIC BLOOD PRESSURE: 133 MMHG | BODY MASS INDEX: 39.8 KG/M2 | OXYGEN SATURATION: 98 % | RESPIRATION RATE: 16 BRPM | DIASTOLIC BLOOD PRESSURE: 77 MMHG | HEART RATE: 90 BPM | TEMPERATURE: 96.6 F

## 2021-09-01 DIAGNOSIS — Z51.81 ENCOUNTER FOR MONITORING OPIOID MAINTENANCE THERAPY: ICD-10-CM

## 2021-09-01 DIAGNOSIS — M54.2 NECK PAIN: ICD-10-CM

## 2021-09-01 DIAGNOSIS — G89.4 CHRONIC PAIN SYNDROME: Primary | ICD-10-CM

## 2021-09-01 DIAGNOSIS — T40.2X5A THERAPEUTIC OPIOID INDUCED CONSTIPATION: ICD-10-CM

## 2021-09-01 DIAGNOSIS — Z79.891 ENCOUNTER FOR MONITORING OPIOID MAINTENANCE THERAPY: ICD-10-CM

## 2021-09-01 DIAGNOSIS — M12.9 ARTHRITIS, MULTIPLE JOINT INVOLVEMENT: ICD-10-CM

## 2021-09-01 DIAGNOSIS — K59.03 THERAPEUTIC OPIOID INDUCED CONSTIPATION: ICD-10-CM

## 2021-09-01 PROCEDURE — 99214 OFFICE O/P EST MOD 30 MIN: CPT | Performed by: NURSE PRACTITIONER

## 2021-09-01 PROCEDURE — 80305 DRUG TEST PRSMV DIR OPT OBS: CPT | Performed by: NURSE PRACTITIONER

## 2021-09-01 RX ORDER — HYDROCODONE BITARTRATE AND ACETAMINOPHEN 10; 325 MG/1; MG/1
1 TABLET ORAL
Qty: 150 TABLET | Refills: 0 | Status: SHIPPED | OUTPATIENT
Start: 2021-09-01 | End: 2021-09-29 | Stop reason: SDUPTHER

## 2021-09-01 RX ORDER — GABAPENTIN 300 MG/1
CAPSULE ORAL
Qty: 60 CAPSULE | Refills: 1 | Status: SHIPPED | OUTPATIENT
Start: 2021-09-01 | End: 2021-11-01 | Stop reason: SINTOL

## 2021-09-01 NOTE — PROGRESS NOTES
CHIEF COMPLAINT  F/U neck and joint pain- patient states that her pain has remained the same since her last visit.     Subjective   Chastity Salgado is a 71 y.o. female  who presents for follow-up.  She has a history of chronic neck and joint pain.    left suprascapular nerve block   on  2/25/2021 - 50% relief for 1 month   left suprascapular nerve block/steroid injection - posterior approach   on  9/22/2020 - 70% relief for 1-2 months     C/o diffuse joint pain.  Pain today 6/10 in severity (today both shoulders are bothering her, also reports difficulty sleeping because of the pain). Taking Hydrocodone 10/325 5/day, Celebrex 200 mg daily, Cymbalta 60 mg daily.  Denies adverse reactions.  Reports at least moderate pain reduction with regimen reports that she would not be able to function without it.  constipation reported, currently managed with Movantik. Says she tried Gabapentin a few years ago, helped but made her drowsy during the day.      Issues with nausea/vomiting. Workup ongoing with GI.      Rheumatologist - Dr. Lucero.  Saw recently, told not rheumatoid.  All OA.     Counseling - Dr. Edison Vásquez - ortho.  Left shoulder injections periodically. History of multiple right shoulder surgeries.  Says right shoulder worse recently, saw Dr. Vásquez recently and was told hardware was loose, needs another total shoulder replacement which is not yet scheduled.      Patient remained masked during entire encounter. No cough present. I donned a mask and eye protection throughout entire visit. Prior to donning mask and eye protection, hand hygiene was performed, as well as when it was doffed.  I was closer than 6 feet, but not for an extended period of time. No obvious exposure to any bodily fluids.    Joint Pain  This is a chronic problem. The current episode started more than 1 year ago. The problem occurs daily. The problem has been waxing and waning. Associated symptoms include abdominal pain, arthralgias  (bilat shoulder), fatigue, neck pain, numbness (4th and 5th digit on left hand) and weakness. Pertinent negatives include no chest pain, chills, congestion, coughing, fever, headaches, joint swelling (knees, right hip bursitis), nausea or vomiting. Exacerbated by: activity. She has tried oral narcotics, acetaminophen, heat and NSAIDs for the symptoms. The treatment provided moderate relief.   Neck Pain   This is a chronic problem. The problem occurs daily. The problem has been waxing and waning. The pain is present in the anterior neck, left side and right side. The quality of the pain is described as aching and burning. The pain is at a severity of 6/10. The pain is moderate. Exacerbated by: movement of neck, use of arms/shoulders. Associated symptoms include numbness (4th and 5th digit on left hand) and weakness. Pertinent negatives include no chest pain, fever or headaches. She has tried NSAIDs, oral narcotics, muscle relaxants and neck support for the symptoms. The treatment provided moderate relief.     PEG Assessment   What number best describes your pain on average in the past week?8  What number best describes how, during the past week, pain has interfered with your enjoyment of life?7  What number best describes how, during the past week, pain has interfered with your general activity?  7    The following portions of the patient's history were reviewed and updated as appropriate: allergies, current medications, past family history, past medical history, past social history, past surgical history and problem list.    Review of Systems   Constitutional: Positive for activity change (decreased) and fatigue. Negative for chills and fever.   HENT: Negative for congestion.    Eyes: Negative for visual disturbance.   Respiratory: Negative for cough, chest tightness and shortness of breath.    Cardiovascular: Negative for chest pain.   Gastrointestinal: Positive for abdominal pain, constipation and diarrhea.  "Negative for nausea and vomiting.   Genitourinary: Negative for difficulty urinating, dyspareunia and dysuria.   Musculoskeletal: Positive for arthralgias (bilat shoulder) and neck pain. Negative for joint swelling (knees, right hip bursitis).   Neurological: Positive for weakness and numbness (4th and 5th digit on left hand). Negative for dizziness, light-headedness and headaches.   Psychiatric/Behavioral: Positive for sleep disturbance. Negative for agitation, self-injury and suicidal ideas. The patient is not nervous/anxious.      I have reviewed and confirmed the accuracy of the ROS as documented by the MA/LPN/RN NEWTON Jordan    Vitals:    09/01/21 1115   BP: 133/77   Pulse: 90   Resp: 16   Temp: 96.6 °F (35.9 °C)   SpO2: 98%   Weight: 102 kg (224 lb 9.6 oz)   Height: 160 cm (63\")   PainSc:   6   PainLoc: Shoulder     Objective   Physical Exam  Vitals and nursing note reviewed.   Constitutional:       General: She is not in acute distress.     Appearance: Normal appearance. She is not ill-appearing.   Pulmonary:      Effort: Pulmonary effort is normal. No respiratory distress.   Musculoskeletal:      Right shoulder: Tenderness present. Decreased range of motion.      Left shoulder: Tenderness present. Decreased range of motion.   Skin:     General: Skin is warm and dry.   Neurological:      Mental Status: She is alert and oriented to person, place, and time.      Motor: No weakness.      Gait: Gait abnormal (slowed).   Psychiatric:         Mood and Affect: Mood normal.         Behavior: Behavior normal.       Assessment/Plan   Diagnoses and all orders for this visit:    1. Chronic pain syndrome (Primary)    2. Arthritis, multiple joint involvement    3. Neck pain    4. Encounter for monitoring opioid maintenance therapy    5. Therapeutic opioid induced constipation    Other orders  -     HYDROcodone-acetaminophen (NORCO)  MG per tablet; Take 1 tablet by mouth 5 (Five) Times a Day As Needed for " Severe Pain . dnf 9/5/2021  Dispense: 150 tablet; Refill: 0  -     gabapentin (NEURONTIN) 300 MG capsule; Take 1-2 po hs  Dispense: 60 capsule; Refill: 1    --- Reschedule left suprascapular RFA - she will call to schedule once other medical issues have been sorted out   --- Refill Hydrocodone. Patient appears stable with current regimen. No adverse effects. Regarding continuation of opioids, there is no evidence of aberrant behavior or any red flags.  The patient continues with appropriate response to opioid therapy. ADL's remain intact by self.   --- Ok for acute post operative pain management through orthopedic surgery. She will notify office of surgery date.    --- Routine UDS in office today as part of monitoring requirements for controlled substances.  The specimen was viewed and the immunoassay result reviewed and is +OPI.  This specimen will be sent to [facility] laboratory for confirmation.     --- The patient signed an updated copy of the controlled substance agreement on 9/8/2020  --- Trial Gabapentin 300 mg HS. Can increase to 600 mg hs after 1-2 weeks if needed/tolerated. Discussed medication with the patient.  Included in this discussion was the potential for side effects and adverse events.  Patient verbalized understanding and wished to proceed.  Prescription will be sent to pharmacy.  --- Follow-up 2 months        GAYE REPORT  As part of the patient's treatment plan, I am prescribing controlled substances. The patient has been made aware of appropriate use of such medications, including potential risk of somnolence, limited ability to drive and/or work safely, and the potential for dependence or overdose. It has also bee made clear that these medications are for use by this patient only, without concomitant use of alcohol or other substances unless prescribed.     Patient has completed prescribing agreement detailing terms of continued prescribing of controlled substances, including monitoring  GAYE reports, urine drug screening, and pill counts if necessary. The patient is aware that inappropriate use will results in cessation of prescribing such medications.    As the clinician, I personally reviewed the GAYE from 9/1/2021 while the patient was in the office today.    History and physical exam exhibit continued safe and appropriate use of controlled substances.     Dictated utilizing Dragon dictation.

## 2021-09-08 ENCOUNTER — HOSPITAL ENCOUNTER (OUTPATIENT)
Dept: CT IMAGING | Facility: HOSPITAL | Age: 71
Discharge: HOME | End: 2021-09-08
Admitting: NURSE PRACTITIONER

## 2021-09-08 DIAGNOSIS — R11.2 NAUSEA AND VOMITING, INTRACTABILITY OF VOMITING NOT SPECIFIED, UNSPECIFIED VOMITING TYPE: ICD-10-CM

## 2021-09-08 DIAGNOSIS — R10.33 PERIUMBILICAL ABDOMINAL PAIN: ICD-10-CM

## 2021-09-08 DIAGNOSIS — R10.13 EPIGASTRIC PAIN: ICD-10-CM

## 2021-09-08 DIAGNOSIS — K59.00 CONSTIPATION, UNSPECIFIED CONSTIPATION TYPE: ICD-10-CM

## 2021-09-08 DIAGNOSIS — R68.81 EARLY SATIETY: ICD-10-CM

## 2021-09-08 DIAGNOSIS — R10.31 RIGHT LOWER QUADRANT ABDOMINAL PAIN: ICD-10-CM

## 2021-09-08 PROCEDURE — 74177 CT ABD & PELVIS W/CONTRAST: CPT

## 2021-09-08 PROCEDURE — 82565 ASSAY OF CREATININE: CPT

## 2021-09-08 PROCEDURE — 0 DIATRIZOATE MEGLUMINE & SODIUM PER 1 ML: Performed by: NURSE PRACTITIONER

## 2021-09-08 PROCEDURE — 25010000002 IOPAMIDOL 61 % SOLUTION: Performed by: NURSE PRACTITIONER

## 2021-09-08 RX ADMIN — IOPAMIDOL 100 ML: 612 INJECTION, SOLUTION INTRAVENOUS at 11:37

## 2021-09-08 RX ADMIN — DIATRIZOATE MEGLUMINE AND DIATRIZOATE SODIUM 30 ML: 660; 100 LIQUID ORAL; RECTAL at 10:37

## 2021-09-09 LAB — CREAT BLDA-MCNC: 0.5 MG/DL (ref 0.6–1.3)

## 2021-09-17 ENCOUNTER — LAB (OUTPATIENT)
Dept: LAB | Facility: SURGERY CENTER | Age: 71
End: 2021-09-17

## 2021-09-17 DIAGNOSIS — Z01.818 OTHER SPECIFIED PRE-OPERATIVE EXAMINATION: ICD-10-CM

## 2021-09-17 PROCEDURE — U0004 COV-19 TEST NON-CDC HGH THRU: HCPCS | Performed by: SURGERY

## 2021-09-17 PROCEDURE — U0005 INFEC AGEN DETEC AMPLI PROBE: HCPCS | Performed by: SURGERY

## 2021-09-17 PROCEDURE — C9803 HOPD COVID-19 SPEC COLLECT: HCPCS

## 2021-09-17 NOTE — SIGNIFICANT NOTE
Education provided the Patient on the following:    - Nothing to Eat or Drink after MN the night before the procedure  -Your required COVID Test is Scheduled on          Between the Hours of 4939-1406  -You will only be notified if your COVID test Result is POSITIVE  -The importance of reducing your number of contacts by self quarantining after you COVID test until the date of your Colonoscopy and EGD    - Avoid red/purple fluids while completing their bowel prep as ordered by physician  -Contact Gastrointerologist office for any questions about specific details regarding colon prep    -You will need to have someone drive you home after your colonoscopy and remain with you for 24 hours after the procedure  - The date of your procedure, your are welcome to have one visitor at bedside or remain within 10-15 minutes of [facility]  -Please wear warm socks when you arrive for your procedure  -Remove all jewelry and leave any valuables before arriving the day of your procedure (all will have to be removed before leaving preop)  -You will need to arrive at  8         on     9/20    for your procedure    -Feel free to contact us at: [phone] with any additional questions/concerns

## 2021-09-18 LAB — SARS-COV-2 ORF1AB RESP QL NAA+PROBE: NOT DETECTED

## 2021-09-20 ENCOUNTER — ANESTHESIA EVENT (OUTPATIENT)
Dept: SURGERY | Facility: SURGERY CENTER | Age: 71
End: 2021-09-20

## 2021-09-20 ENCOUNTER — ANESTHESIA (OUTPATIENT)
Dept: SURGERY | Facility: SURGERY CENTER | Age: 71
End: 2021-09-20

## 2021-09-20 ENCOUNTER — HOSPITAL ENCOUNTER (OUTPATIENT)
Facility: SURGERY CENTER | Age: 71
Setting detail: HOSPITAL OUTPATIENT SURGERY
Discharge: HOME | End: 2021-09-20
Attending: INTERNAL MEDICINE | Admitting: INTERNAL MEDICINE

## 2021-09-20 VITALS
OXYGEN SATURATION: 97 % | RESPIRATION RATE: 16 BRPM | DIASTOLIC BLOOD PRESSURE: 70 MMHG | SYSTOLIC BLOOD PRESSURE: 155 MMHG | BODY MASS INDEX: 39.25 KG/M2 | WEIGHT: 221.5 LBS | HEIGHT: 63 IN | TEMPERATURE: 97.3 F | HEART RATE: 75 BPM

## 2021-09-20 DIAGNOSIS — R10.13 EPIGASTRIC PAIN: ICD-10-CM

## 2021-09-20 DIAGNOSIS — R11.2 NAUSEA AND VOMITING, INTRACTABILITY OF VOMITING NOT SPECIFIED, UNSPECIFIED VOMITING TYPE: ICD-10-CM

## 2021-09-20 DIAGNOSIS — R68.81 EARLY SATIETY: ICD-10-CM

## 2021-09-20 DIAGNOSIS — Z12.11 ENCOUNTER FOR SCREENING FOR MALIGNANT NEOPLASM OF COLON: ICD-10-CM

## 2021-09-20 LAB — GLUCOSE BLDC GLUCOMTR-MCNC: 159 MG/DL (ref 70–130)

## 2021-09-20 PROCEDURE — 88305 TISSUE EXAM BY PATHOLOGIST: CPT | Performed by: INTERNAL MEDICINE

## 2021-09-20 PROCEDURE — 0DBK8ZX EXCISION OF ASCENDING COLON, VIA NATURAL OR ARTIFICIAL OPENING ENDOSCOPIC, DIAGNOSTIC: ICD-10-PCS | Performed by: NURSE PRACTITIONER

## 2021-09-20 PROCEDURE — 43239 EGD BIOPSY SINGLE/MULTIPLE: CPT | Performed by: INTERNAL MEDICINE

## 2021-09-20 PROCEDURE — 0DB98ZX EXCISION OF DUODENUM, VIA NATURAL OR ARTIFICIAL OPENING ENDOSCOPIC, DIAGNOSTIC: ICD-10-PCS | Performed by: NURSE PRACTITIONER

## 2021-09-20 PROCEDURE — 25010000002 PROPOFOL 10 MG/ML EMULSION: Performed by: NURSE ANESTHETIST, CERTIFIED REGISTERED

## 2021-09-20 PROCEDURE — 45380 COLONOSCOPY AND BIOPSY: CPT | Performed by: INTERNAL MEDICINE

## 2021-09-20 RX ORDER — SODIUM CHLORIDE 0.9 % (FLUSH) 0.9 %
3 SYRINGE (ML) INJECTION EVERY 12 HOURS SCHEDULED
Status: DISCONTINUED | OUTPATIENT
Start: 2021-09-20 | End: 2021-09-20 | Stop reason: HOSPADM

## 2021-09-20 RX ORDER — MAGNESIUM HYDROXIDE 1200 MG/15ML
LIQUID ORAL AS NEEDED
Status: DISCONTINUED | OUTPATIENT
Start: 2021-09-20 | End: 2021-09-20 | Stop reason: HOSPADM

## 2021-09-20 RX ORDER — LIDOCAINE HYDROCHLORIDE 10 MG/ML
0.5 INJECTION, SOLUTION INFILTRATION; PERINEURAL ONCE AS NEEDED
Status: DISCONTINUED | OUTPATIENT
Start: 2021-09-20 | End: 2021-09-20 | Stop reason: HOSPADM

## 2021-09-20 RX ORDER — SODIUM CHLORIDE 0.9 % (FLUSH) 0.9 %
10 SYRINGE (ML) INJECTION AS NEEDED
Status: DISCONTINUED | OUTPATIENT
Start: 2021-09-20 | End: 2021-09-20 | Stop reason: HOSPADM

## 2021-09-20 RX ORDER — PROPOFOL 10 MG/ML
VIAL (ML) INTRAVENOUS AS NEEDED
Status: DISCONTINUED | OUTPATIENT
Start: 2021-09-20 | End: 2021-09-20 | Stop reason: SURG

## 2021-09-20 RX ORDER — SODIUM CHLORIDE, SODIUM LACTATE, POTASSIUM CHLORIDE, CALCIUM CHLORIDE 600; 310; 30; 20 MG/100ML; MG/100ML; MG/100ML; MG/100ML
1000 INJECTION, SOLUTION INTRAVENOUS CONTINUOUS
Status: DISCONTINUED | OUTPATIENT
Start: 2021-09-20 | End: 2021-09-20 | Stop reason: HOSPADM

## 2021-09-20 RX ORDER — LIDOCAINE HYDROCHLORIDE 20 MG/ML
INJECTION, SOLUTION INFILTRATION; PERINEURAL AS NEEDED
Status: DISCONTINUED | OUTPATIENT
Start: 2021-09-20 | End: 2021-09-20 | Stop reason: SURG

## 2021-09-20 RX ADMIN — LIDOCAINE HYDROCHLORIDE 100 MG: 20 INJECTION, SOLUTION INFILTRATION; PERINEURAL at 09:23

## 2021-09-20 RX ADMIN — PROPOFOL 160 MCG/KG/MIN: 10 INJECTION, EMULSION INTRAVENOUS at 09:23

## 2021-09-20 RX ADMIN — PROPOFOL 80 MG: 10 INJECTION, EMULSION INTRAVENOUS at 09:23

## 2021-09-20 RX ADMIN — GLYCOPYRROLATE 0.1 MG: 0.2 INJECTION, SOLUTION INTRAMUSCULAR; INTRAVITREAL at 09:23

## 2021-09-20 RX ADMIN — SODIUM CHLORIDE, POTASSIUM CHLORIDE, SODIUM LACTATE AND CALCIUM CHLORIDE 1000 ML: 600; 310; 30; 20 INJECTION, SOLUTION INTRAVENOUS at 09:12

## 2021-09-20 NOTE — ANESTHESIA PREPROCEDURE EVALUATION
Anesthesia Evaluation     Patient summary reviewed and Nursing notes reviewed   NPO Solid Status: > 8 hours  NPO Liquid Status: > 2 hours           Airway   Mallampati: II  TM distance: >3 FB  Neck ROM: full  no difficulty expected  Dental - normal exam     Pulmonary - negative pulmonary ROS and normal exam   (-) decreased breath sounds, wheezes  Cardiovascular - normal exam  Exercise tolerance: good (4-7 METS)    (+) hypertension, hyperlipidemia,       Neuro/Psych- negative ROS  (-) seizures, CVA  GI/Hepatic/Renal/Endo    (+) obesity,   diabetes mellitus,     Musculoskeletal     (+) neck pain,   Abdominal  - normal exam   Substance History - negative use  (-) alcohol use, drug use     OB/GYN negative ob/gyn ROS         Other   arthritis,                      Anesthesia Plan    ASA 3     MAC     intravenous induction     Anesthetic plan, all risks, benefits, and alternatives have been provided, discussed and informed consent has been obtained with: patient.    Plan discussed with CRNA.

## 2021-09-20 NOTE — H&P
No chief complaint on file.      HPI  Nausea, vomiting, epigastric pain    screening         Problem List:    Patient Active Problem List   Diagnosis   • Allergic rhinitis   • Benign essential HTN   • Arthritis of shoulder region, degenerative   • Mild episode of recurrent major depressive disorder (CMS/[facility])   • Fatigue   • Fibrositis   • Generalized osteoarthritis of hand   • Cannot sleep   • Pain in shoulder   • Adiposity   • Arthritis or polyarthritis, rheumatoid (CMS/[facility])   • FOM (frequency of micturition)   • Vitamin D deficiency   • Screening for colon cancer   • Pharyngoesophageal dysphagia   • Hyperglycemia   • Type 2 diabetes mellitus with neurologic complication (CMS/[facility])   • Anxiety   • Dizziness   • Abnormal mammogram of left breast   • Breast cancer, right (CMS/[facility])   • Chronic pain   • Disorder of electrolytes   • Overdose of drug   • Sepsis (CMS/[facility])   • Urge incontinence   • Localized primary osteoarthritis of left lower leg   • Mixed hyperlipidemia   • Dysthymia   • Drug-induced constipation   • Encounter for monitoring opioid maintenance therapy   • Arthritis, multiple joint involvement   • Neck pain   • Fever   • Acute cystitis with hematuria   • Urinary frequency   • Menopausal symptoms   • Vision changes   • Cellulitis of right lower extremity   • Therapeutic opioid induced constipation   • Class 3 severe obesity due to excess calories with serious comorbidity and body mass index (BMI) of 40.0 to 44.9 in adult (CMS/[facility])   • S/P laparoscopic hysterectomy   • High grade squamous intraepithelial cervical dysplasia   • Chronic left shoulder pain   • Essential tremor   • Nausea and vomiting   • Early satiety   • Epigastric pain       Medical History:    Past Medical History:   Diagnosis Date   • Abscess    • Anxiety    • Arthritis     bilateral feet   • Cancer (CMS/[facility]) 2013    breast    • Cellulitis of face    • Degenerative disc disease, lumbar    • Depression    • DM (diabetes mellitus), type 2  (CMS/[facility])    • Ear infection    • Fibromyalgia, primary    • History of dysphagia    • History of dysuria    • History of epistaxis    • Hypertension    • Intertrigo    • MRSA (methicillin resistant Staphylococcus aureus)     2015   • Nose mucous membrane dryness    • Osteoarthritis    • Rosacea         Social History:    Social History     Socioeconomic History   • Marital status: Single     Spouse name: Not on file   • Number of children: 3   • Years of education: Not on file   • Highest education level: Not on file   Tobacco Use   • Smoking status: Never Smoker   • Smokeless tobacco: Never Used   Vaping Use   • Vaping Use: Never used   Substance and Sexual Activity   • Alcohol use: Yes     Comment: social alcohol use   • Drug use: No   • Sexual activity: Defer       Family History:   Family History   Problem Relation Age of Onset   • Arthritis Other    • Hypertension Other    • Heart disease Other    • Stroke Mother    • Hypertension Mother    • Diabetes Mother    • Arthritis Mother    • Parkinsonism Mother    • Arthritis Father    • Heart disease Sister    • Diabetes Sister    • Parkinsonism Sister    • COPD Brother    • Colon cancer Neg Hx        Surgical History:   Past Surgical History:   Procedure Laterality Date   • BREAST LUMPECTOMY Right 2013   • HIP BIPOLAR REPLACEMENT Bilateral     left 1998 and right 2007   • INCONTINENCE SURGERY  02/27/2020   • LAPAROSCOPIC TOTAL HYSTERECTOMY  02/27/2020   • NERVE BLOCK Left 2/25/2021    Procedure: left suprascapular nerve block;  Surgeon: John Padgett MD;  [place] [facility] [place];  Service: Pain Management;  Laterality: Left;   • REPLACEMENT TOTAL KNEE  2005   • SHOULDER SURGERY Right     2000, 2002, 2010 X2, 2012       No current facility-administered medications for this encounter.    Current Outpatient Medications:   •  busPIRone (BUSPAR) 5 MG tablet, Take 1 tablet by mouth 2 (two) times a day., Disp: 60 tablet, Rfl: 3  •  celecoxib (CeleBREX) 200 MG  capsule, Take 200 mg by mouth Daily., Disp: , Rfl:   •  cetirizine (ZyrTEC) 10 MG tablet, Take 1 tablet by mouth daily as needed., Disp: , Rfl:   •  Cholecalciferol (VITAMIN D3) 25 MCG (1000 UT) capsule, Take 1,000 Units by mouth Daily., Disp: , Rfl:   •  cyclobenzaprine (FLEXERIL) 10 MG tablet, TAKE ONE TABLET BY MOUTH AT NIGHT AS NEEDED FOR MUSCLE SPASMS, Disp: 30 tablet, Rfl: 0  •  DULoxetine (CYMBALTA) 60 MG capsule, TAKE ONE CAPSULE BY MOUTH DAILY, Disp: 90 capsule, Rfl: 1  •  fluticasone (FLONASE) 50 MCG/ACT nasal spray, 1 spray into the nostril(s) as directed by provider., Disp: , Rfl:   •  gabapentin (NEURONTIN) 300 MG capsule, Take 1-2 po hs, Disp: 60 capsule, Rfl: 1  •  glucose blood (FREESTYLE LITE) test strip, Once daily and as needed, Disp: 100 each, Rfl: 12  •  glucose monitor monitoring kit, Check glucose daily and prn, Disp: 1 each, Rfl: 0  •  HYDROcodone-acetaminophen (NORCO)  MG per tablet, Take 1 tablet by mouth 5 (Five) Times a Day As Needed for Severe Pain . dnf 9/5/2021, Disp: 150 tablet, Rfl: 0  •  Lancets (freestyle) lancets, Check glucose daily and prn, Disp: 100 each, Rfl: 12  •  lisinopril (PRINIVIL,ZESTRIL) 40 MG tablet, TAKE ONE TABLET BY MOUTH DAILY, Disp: 90 tablet, Rfl: 1  •  mupirocin (Bactroban) 2 % ointment, Apply  topically to the appropriate area as directed 2 (Two) Times a Day., Disp: 22 g, Rfl: 0  •  Naloxegol Oxalate (Movantik) 25 MG tablet, Take 1 tablet by mouth Every Morning., Disp: 30 tablet, Rfl: 1  •  ondansetron (ZOFRAN) 4 MG tablet, TAKE ONE TABLET BY MOUTH EVERY 8 HOURS AS NEEDED FOR NAUSEA OR VOMITING, Disp: 30 tablet, Rfl: 1  •  Probiotic capsule, Take 1 capsule by mouth daily., Disp: , Rfl:   •  Semaglutide,0.25 or 0.5MG/DOS, (Ozempic, 0.25 or 0.5 MG/DOSE,) 2 MG/1.5ML solution pen-injector, Inject 0.25 mg SQ weekly x 4 weeks, then increase to 0.5 mg SQ weekly, Disp: 1 pen, Rfl: 2  •  traZODone (DESYREL) 50 MG tablet, Take 1/2 - 1 tab po qhs prn insomnia,  Disp: 30 tablet, Rfl: 1    Allergies:   Allergies   Allergen Reactions   • Adhesive Tape Rash     REDNESS   • Shellfish-Derived Products    • Latex Rash   • Latex, Natural Rubber Itching        The following portions of the patient's history were reviewed by me and updated as appropriate: review of systems, allergies, current medications, past family history, past medical history, past social history, past surgical history and problem list.    There were no vitals filed for this visit.    PHYSICAL EXAM:    CONSTITUTIONAL:  today's vital signs reviewed by me  GASTROINTESTINAL: abdomen is soft nontender nondistended with normal active bowel sounds, no masses are appreciated    Assessment/ Plan  Nausea vomiting screening    egd and colonosocpy    Risks and benefits as well as alternatives to endoscopic evaluation were explained to the patient and they voiced understanding and wish to proceed.  These risks include but are not limited to the risk of bleeding, perforation, adverse reaction to sedation, and missed lesions.  The patient was given the opportunity to ask questions prior to the endoscopic procedure.

## 2021-09-20 NOTE — ANESTHESIA POSTPROCEDURE EVALUATION
"Patient: Chastity Salgado    Procedure Summary     Date: 09/20/21 Room / Location: [facility]    Anesthesia Start: 0918 Anesthesia Stop: 0948    Procedures:       ESOPHAGOGASTRODUODENOSCOPY (N/A )      COLONOSCOPY FOR SCREENING (N/A ) Diagnosis:       Nausea and vomiting, intractability of vomiting not specified, unspecified vomiting type      Early satiety      Epigastric pain      Encounter for screening for malignant neoplasm of colon      (Nausea and vomiting, intractability of vomiting not specified, unspecified vomiting type [R11.2])      (Early satiety [R68.81])      (Epigastric pain [R10.13])      (Encounter for screening for malignant neoplasm of colon [Z12.11])    Surgeons: Neeraj Jj MD Provider: Armin Roger MD    Anesthesia Type: MAC ASA Status: 3          Anesthesia Type: MAC    Vitals  Vitals Value Taken Time   /70 09/20/21 1000   Temp 36.3 °C (97.3 °F) 09/20/21 0945   Pulse 75 09/20/21 1000   Resp 16 09/20/21 1000   SpO2 97 % 09/20/21 1000           Post Anesthesia Care and Evaluation    Patient location during evaluation: bedside  Patient participation: complete - patient participated  Level of consciousness: awake and alert  Pain management: adequate  Airway patency: patent  Anesthetic complications: No anesthetic complications    Cardiovascular status: acceptable  Respiratory status: acceptable  Hydration status: acceptable    Comments: /70 (BP Location: Left arm, Patient Position: Sitting)   Pulse 75   Temp 36.3 °C (97.3 °F) (Temporal)   Resp 16   Ht 160 cm (63\")   Wt 100 kg (221 lb 8 oz)   SpO2 97%   BMI 39.24 kg/m²       "

## 2021-09-21 ENCOUNTER — OFFICE VISIT (OUTPATIENT)
Dept: FAMILY MEDICINE CLINIC | Facility: CLINIC | Age: 71
End: 2021-09-21

## 2021-09-21 VITALS
TEMPERATURE: 97.3 F | SYSTOLIC BLOOD PRESSURE: 132 MMHG | OXYGEN SATURATION: 100 % | HEART RATE: 88 BPM | HEIGHT: 63 IN | WEIGHT: 220.9 LBS | DIASTOLIC BLOOD PRESSURE: 82 MMHG | BODY MASS INDEX: 39.14 KG/M2 | RESPIRATION RATE: 16 BRPM

## 2021-09-21 DIAGNOSIS — E11.59 TYPE 2 DIABETES MELLITUS WITH OTHER CIRCULATORY COMPLICATION, WITHOUT LONG-TERM CURRENT USE OF INSULIN (HCC): Primary | ICD-10-CM

## 2021-09-21 DIAGNOSIS — H65.03 NON-RECURRENT ACUTE SEROUS OTITIS MEDIA OF BOTH EARS: ICD-10-CM

## 2021-09-21 LAB
CYTO UR: NORMAL
LAB AP CASE REPORT: NORMAL
LAB AP CLINICAL INFORMATION: NORMAL
PATH REPORT.FINAL DX SPEC: NORMAL
PATH REPORT.GROSS SPEC: NORMAL

## 2021-09-21 PROCEDURE — 99213 OFFICE O/P EST LOW 20 MIN: CPT | Performed by: NURSE PRACTITIONER

## 2021-09-21 NOTE — PROGRESS NOTES
"Chief Complaint  Abdominal Pain (pt states continues to have stomach issues ) and Earache (pt states has been going on for about a week 1/2 )    Subjective          Chastity Salgado presents to [facility]  Recent upper and lower GI  Has delayed gastric emptying, scheduled for gastric emptying study  Diverticulosis, possible previous infection, one polyp, biopsy results pending    Stopped ozempic, due to increase n/v  Diabetes has been stable    Ear pain, left > right  Earache   There is pain in both ears. This is a new problem. The current episode started in the past 7 days. The problem occurs every few hours. The problem has been waxing and waning. There has been no fever. The pain is mild. Pertinent negatives include no abdominal pain, coughing, diarrhea, ear discharge, headaches, hearing loss, neck pain, rash, rhinorrhea, sore throat or vomiting. She has tried nothing for the symptoms. The treatment provided no relief.   Diabetes  She presents for her follow-up diabetic visit. She has type 2 diabetes mellitus. Her disease course has been stable. There are no hypoglycemic associated symptoms. Pertinent negatives for hypoglycemia include no headaches. There are no diabetic associated symptoms. There are no hypoglycemic complications. There are no diabetic complications.       Objective   Vital Signs:   /82   Pulse 88   Temp 97.3 °F (36.3 °C) (Temporal)   Resp 16   Ht 160 cm (63\")   Wt 100 kg (220 lb 14.4 oz)   SpO2 100%   BMI 39.13 kg/m²     Physical Exam  Vitals reviewed.   Constitutional:       Appearance: Normal appearance.   HENT:      Right Ear: Ear canal normal. A middle ear effusion is present. Tympanic membrane is not erythematous or bulging.      Left Ear: Ear canal normal. A middle ear effusion is present. Tympanic membrane is not erythematous or bulging.   Cardiovascular:      Rate and Rhythm: Normal rate and regular rhythm.      Heart sounds: No murmur heard. "   No friction rub. No gallop.    Pulmonary:      Effort: Pulmonary effort is normal. No respiratory distress.      Breath sounds: Normal breath sounds. No wheezing, rhonchi or rales.   Skin:     General: Skin is warm and dry.   Neurological:      Mental Status: She is alert and oriented to person, place, and time.   Psychiatric:         Mood and Affect: Mood normal.        Result Review :                 Assessment and Plan    Diagnoses and all orders for this visit:    1. Type 2 diabetes mellitus with other circulatory complication, without long-term current use of insulin (CMS/[facility]) (Primary)  -     Comprehensive metabolic panel  -     Hemoglobin A1c  -     Microalbumin / Creatinine Urine Ratio - Urine, Clean Catch    2. Non-recurrent acute serous otitis media of both ears        Follow Up   Return in about 3 months (around 12/21/2021).  Patient was given instructions and counseling regarding her condition or for health maintenance advice. Please see specific information pulled into the AVS if appropriate.     Has been off ozempic, would like to continue to hold given delayed gastric emptying, she is aware that this can contribute to nausea and delayed gastric emptying, she is reluctant to stop altogether, she was hopeful to have weight loss with the medication and manage her diabetes. Will need to evaluate risk/benefit. She does have a gastric emptying study upcoming and if abnormal, would hold on ozempic.     Labs today  Follow up in 3 months to monitor glucose

## 2021-09-21 NOTE — PATIENT INSTRUCTIONS
Fatty Liver Disease    Fatty liver disease occurs when too much fat has built up in your liver cells. Fatty liver disease is also called hepatic steatosis or steatohepatitis. The liver removes harmful substances from your bloodstream and produces fluids that your body needs. It also helps your body use and store energy from the food you eat.  In many cases, fatty liver disease does not cause symptoms or problems. It is often diagnosed when tests are being done for other reasons. However, over time, fatty liver can cause inflammation that may lead to more serious liver problems, such as scarring of the liver (cirrhosis) and liver failure.  Fatty liver is associated with insulin resistance, increased body fat, high blood pressure (hypertension), and high cholesterol. These are features of metabolic syndrome and increase your risk for stroke, diabetes, and heart disease.  What are the causes?  This condition may be caused by:  · Drinking too much alcohol.  · Poor nutrition.  · Obesity.  · Cushing's syndrome.  · Diabetes.  · High cholesterol.  · Certain drugs.  · Poisons.  · Some viral infections.  · Pregnancy.  What increases the risk?  You are more likely to develop this condition if you:  · Abuse alcohol.  · Are overweight.  · Have diabetes.  · Have hepatitis.  · Have a high triglyceride level.  · Are pregnant.  What are the signs or symptoms?  Fatty liver disease often does not cause symptoms. If symptoms do develop, they can include:  · Fatigue.  · Weakness.  · Weight loss.  · Confusion.  · Abdominal pain.  · Nausea and vomiting.  · Yellowing of your skin and the white parts of your eyes (jaundice).  · Itchy skin.  How is this diagnosed?  This condition may be diagnosed by:  · A physical exam and medical history.  · Blood tests.  · Imaging tests, such as an ultrasound, CT scan, or MRI.  · A liver biopsy. A small sample of liver tissue is removed using a needle. The sample is then looked at under a microscope.  How  is this treated?  Fatty liver disease is often caused by other health conditions. Treatment for fatty liver may involve medicines and lifestyle changes to manage conditions such as:  · Alcoholism.  · High cholesterol.  · Diabetes.  · Being overweight or obese.  Follow these instructions at home:    · Do not drink alcohol. If you have trouble quitting, ask your health care provider how to safely quit with the help of medicine or a supervised program. This is important to keep your condition from getting worse.  · Eat a healthy diet as told by your health care provider. Ask your health care provider about working with a diet and nutrition specialist (dietitian) to develop an eating plan.  · Exercise regularly. This can help you lose weight and control your cholesterol and diabetes. Talk to your health care provider about an exercise plan and which activities are best for you.  · Take over-the-counter and prescription medicines only as told by your health care provider.  · Keep all follow-up visits as told by your health care provider. This is important.  Contact a health care provider if:  You have trouble controlling your:  · Blood sugar. This is especially important if you have diabetes.  · Cholesterol.  · Drinking of alcohol.  Get help right away if:  · You have abdominal pain.  · You have jaundice.  · You have nausea and vomiting.  · You vomit blood or material that looks like coffee grounds.  · You have stools that are black, tar-like, or bloody.  Summary  · Fatty liver disease develops when too much fat builds up in the cells of your liver.  · Fatty liver disease often causes no symptoms or problems. However, over time, fatty liver can cause inflammation that may lead to more serious liver problems, such as scarring of the liver (cirrhosis).  · You are more likely to develop this condition if you abuse alcohol, are pregnant, are overweight, have diabetes, have hepatitis, or have high triglyceride  levels.  · Contact your health care provider if you have trouble controlling your weight, blood sugar, cholesterol, or drinking of alcohol.  This information is not intended to replace advice given to you by your health care provider. Make sure you discuss any questions you have with your health care provider.  Document Revised: 11/30/2018 Document Reviewed: 09/26/2018  [employer] Patient Education © 2021 [employer]

## 2021-09-22 LAB
ALBUMIN SERPL-MCNC: 4.4 G/DL (ref 3.5–5.2)
ALBUMIN/CREAT UR: 361 MG/G CREAT (ref 0–29)
ALBUMIN/GLOB SERPL: 1.6 G/DL
ALP SERPL-CCNC: 79 U/L (ref 39–117)
ALT SERPL-CCNC: 13 U/L (ref 1–33)
AST SERPL-CCNC: 23 U/L (ref 1–32)
BILIRUB SERPL-MCNC: 0.5 MG/DL (ref 0–1.2)
BUN SERPL-MCNC: 7 MG/DL (ref 8–23)
BUN/CREAT SERPL: 12.1 (ref 7–25)
CALCIUM SERPL-MCNC: 9.5 MG/DL (ref 8.6–10.5)
CHLORIDE SERPL-SCNC: 100 MMOL/L (ref 98–107)
CO2 SERPL-SCNC: 31.4 MMOL/L (ref 22–29)
CREAT SERPL-MCNC: 0.58 MG/DL (ref 0.57–1)
CREAT UR-MCNC: 71 MG/DL
GLOBULIN SER CALC-MCNC: 2.7 GM/DL
GLUCOSE SERPL-MCNC: 119 MG/DL (ref 65–99)
HBA1C MFR BLD: 6.7 % (ref 4.8–5.6)
MICROALBUMIN UR-MCNC: 256.6 UG/ML
POTASSIUM SERPL-SCNC: 4.8 MMOL/L (ref 3.5–5.2)
PROT SERPL-MCNC: 7.1 G/DL (ref 6–8.5)
SODIUM SERPL-SCNC: 140 MMOL/L (ref 136–145)

## 2021-09-29 ENCOUNTER — TELEPHONE (OUTPATIENT)
Dept: FAMILY MEDICINE CLINIC | Facility: CLINIC | Age: 71
End: 2021-09-29

## 2021-09-29 RX ORDER — CYCLOBENZAPRINE HCL 10 MG
10 TABLET ORAL NIGHTLY PRN
Qty: 30 TABLET | Refills: 3 | Status: SHIPPED | OUTPATIENT
Start: 2021-09-29 | End: 2022-01-03 | Stop reason: SDUPTHER

## 2021-09-29 RX ORDER — HYDROCODONE BITARTRATE AND ACETAMINOPHEN 10; 325 MG/1; MG/1
1 TABLET ORAL
Qty: 150 TABLET | Refills: 0 | Status: SHIPPED | OUTPATIENT
Start: 2021-09-29 | End: 2021-11-01 | Stop reason: SDUPTHER

## 2021-09-29 NOTE — TELEPHONE ENCOUNTER
DELETE AFTER REVIEWING: Telephone encounter to be sent to the clinical pool.    Caller: Chastity Oliver    Relationship: Self    Best call back number: [phone] (H)    Caller requesting test results: CHASTITY OLIVER    What test was performed: URINE TEST AND BLOOD WORK    When was the test performed: 09/21/2021    Where was the test performed: IN OFFICE    Additional notes: PATIENT WOULD LIKE A CALL BACK WITH RESULTS ASAP

## 2021-09-29 NOTE — TELEPHONE ENCOUNTER
Medication Refill Request    Date of phone call: 21    Medication being requested: norco 10/325mg si tab po 5 times a day prn   Qty: 150    Date of last visit: 21    Date of last refill:     GAYE up to date?:     Next Follow up?: 21    Any new pertinent information? (i.e, new medication allergies, new use of medications, change in patient's health or condition, non-compliance or inconsistency with prescribing agreement?):     Medication Refill Request    Date of phone call: 21    Medication being requested: flexeril 10mg  si tab po q hs prn   Qty: 30    Date of last visit: 21    Date of last refill:     GAYE up to date?:     Next Follow up?: 21    Any new pertinent information? (i.e, new medication allergies, new use of medications, change in patient's health or condition, non-compliance or inconsistency with prescribing agreement?):

## 2021-09-30 ENCOUNTER — TELEPHONE (OUTPATIENT)
Dept: FAMILY MEDICINE CLINIC | Facility: CLINIC | Age: 71
End: 2021-09-30

## 2021-10-07 ENCOUNTER — HOSPITAL ENCOUNTER (OUTPATIENT)
Dept: NUCLEAR MEDICINE | Facility: HOSPITAL | Age: 71
End: 2021-10-07

## 2021-10-14 ENCOUNTER — OFFICE VISIT (OUTPATIENT)
Dept: GASTROENTEROLOGY | Facility: CLINIC | Age: 71
End: 2021-10-14

## 2021-10-14 VITALS
SYSTOLIC BLOOD PRESSURE: 140 MMHG | OXYGEN SATURATION: 95 % | DIASTOLIC BLOOD PRESSURE: 82 MMHG | RESPIRATION RATE: 18 BRPM | HEART RATE: 92 BPM | HEIGHT: 63 IN | WEIGHT: 222.6 LBS | TEMPERATURE: 97.5 F | BODY MASS INDEX: 39.44 KG/M2

## 2021-10-14 DIAGNOSIS — K44.9 HIATAL HERNIA: ICD-10-CM

## 2021-10-14 DIAGNOSIS — R68.81 EARLY SATIETY: ICD-10-CM

## 2021-10-14 DIAGNOSIS — R11.2 NAUSEA AND VOMITING, INTRACTABILITY OF VOMITING NOT SPECIFIED, UNSPECIFIED VOMITING TYPE: Primary | ICD-10-CM

## 2021-10-14 DIAGNOSIS — R10.13 EPIGASTRIC PAIN: ICD-10-CM

## 2021-10-14 DIAGNOSIS — K76.0 FATTY LIVER: ICD-10-CM

## 2021-10-14 DIAGNOSIS — K31.89 RETAINED FOOD IN STOMACH: ICD-10-CM

## 2021-10-14 PROCEDURE — 99214 OFFICE O/P EST MOD 30 MIN: CPT | Performed by: NURSE PRACTITIONER

## 2021-10-14 NOTE — PATIENT INSTRUCTIONS
Proceed with gastric emptying study as planned for further evaluation.    For suspected gastroparesis, follow a gastroparesis diet. Eat smaller more frequent meals as opposed to large meals and foods lower in fat and fiber.    For fatty liver, weight loss is recommended. Recommend following a low fat and low sugar diet. Recommend management of diabetes and elevated cholesterol with primary care provider if indicated. Regular exercise is recommended. Alcohol avoidance is recommended.

## 2021-11-01 ENCOUNTER — OFFICE VISIT (OUTPATIENT)
Dept: PAIN MEDICINE | Facility: CLINIC | Age: 71
End: 2021-11-01

## 2021-11-01 VITALS
SYSTOLIC BLOOD PRESSURE: 123 MMHG | BODY MASS INDEX: 39.87 KG/M2 | TEMPERATURE: 96.9 F | WEIGHT: 225 LBS | DIASTOLIC BLOOD PRESSURE: 79 MMHG | HEART RATE: 73 BPM | OXYGEN SATURATION: 97 % | RESPIRATION RATE: 16 BRPM | HEIGHT: 63 IN

## 2021-11-01 DIAGNOSIS — T40.2X5A THERAPEUTIC OPIOID INDUCED CONSTIPATION: ICD-10-CM

## 2021-11-01 DIAGNOSIS — Z79.891 ENCOUNTER FOR MONITORING OPIOID MAINTENANCE THERAPY: ICD-10-CM

## 2021-11-01 DIAGNOSIS — M19.012 OSTEOARTHRITIS OF BOTH SHOULDERS, UNSPECIFIED OSTEOARTHRITIS TYPE: ICD-10-CM

## 2021-11-01 DIAGNOSIS — M19.011 OSTEOARTHRITIS OF BOTH SHOULDERS, UNSPECIFIED OSTEOARTHRITIS TYPE: ICD-10-CM

## 2021-11-01 DIAGNOSIS — M54.2 NECK PAIN: ICD-10-CM

## 2021-11-01 DIAGNOSIS — K59.03 THERAPEUTIC OPIOID INDUCED CONSTIPATION: ICD-10-CM

## 2021-11-01 DIAGNOSIS — G89.4 CHRONIC PAIN SYNDROME: Primary | ICD-10-CM

## 2021-11-01 DIAGNOSIS — M12.9 ARTHRITIS, MULTIPLE JOINT INVOLVEMENT: ICD-10-CM

## 2021-11-01 DIAGNOSIS — Z51.81 ENCOUNTER FOR MONITORING OPIOID MAINTENANCE THERAPY: ICD-10-CM

## 2021-11-01 PROCEDURE — 99214 OFFICE O/P EST MOD 30 MIN: CPT | Performed by: NURSE PRACTITIONER

## 2021-11-01 PROCEDURE — 96372 THER/PROPH/DIAG INJ SC/IM: CPT | Performed by: NURSE PRACTITIONER

## 2021-11-01 RX ORDER — HYDROCODONE BITARTRATE AND ACETAMINOPHEN 10; 325 MG/1; MG/1
1 TABLET ORAL
Qty: 150 TABLET | Refills: 0 | Status: SHIPPED | OUTPATIENT
Start: 2021-11-01 | End: 2021-11-29 | Stop reason: SDUPTHER

## 2021-11-01 RX ORDER — KETOROLAC TROMETHAMINE 30 MG/ML
30 INJECTION, SOLUTION INTRAMUSCULAR; INTRAVENOUS ONCE
Status: COMPLETED | OUTPATIENT
Start: 2021-11-01 | End: 2021-11-01

## 2021-11-01 RX ORDER — KETOROLAC TROMETHAMINE 30 MG/ML
30 INJECTION, SOLUTION INTRAMUSCULAR; INTRAVENOUS ONCE
Status: DISCONTINUED | OUTPATIENT
Start: 2021-11-01 | End: 2021-11-01

## 2021-11-01 RX ADMIN — KETOROLAC TROMETHAMINE 30 MG: 30 INJECTION, SOLUTION INTRAMUSCULAR; INTRAVENOUS at 12:11

## 2021-11-01 NOTE — PROGRESS NOTES
"CHIEF COMPLAINT  F/U for neck and shoulder pain. Pt states her pain level has gotten worse.     Subjective   Chastity Salgado is a 71 y.o. female  who presents for follow-up.  She has a history of neck and shoulder pain.  Today her pain is 7/10VAS in severity. She states that she was scheduled for surgery on her right shoulder due to lose hardware, this was postponed due to GI issues which she is currently  Having worked up. She states she is being worked up for gastroparesis. 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     She continues with Hydrocodone 10/325 5/day, Celebrex 200 mg daily, Cymbalta 60 mg daily, flexeril 10 mg nightly, and Gabapentin 600 mg nightly. She reports sleep walking with the Gabapentin. She fell recently while sleep walking, we will discontinue her Gabapentin at this time. This medication regimen decreases her pain and allows her to function. ADLs by self. \"If I didn't have it I would probably be a 10 all the time\". She has constipation which is managed by Movantik. She denies any other side effects including somnolence.    Plan was to move forward with Left suprascapular nerve RFA--This remains postponed while she continues with work with GI.     left suprascapular nerve block   on  2/25/2021 - 50% relief for 1 month   left suprascapular nerve block/steroid injection - posterior approach  on 9/22/2020 - 70% relief for 1-2 months    Rheumatologist - Dr. Lucero.  Saw recently, told not rheumatoid.  All OA.     Patient remained masked during entire encounter. No cough present. I donned a mask and eye protection throughout entire visit. Prior to donning mask and eye protection, hand hygiene was performed, as well as when it was doffed.  I was closer than 6 feet, but not for an extended period of time. No obvious exposure " to any bodily fluids.    Joint Pain  This is a chronic (7/10VAS in severity) problem. The current episode started more than 1 year ago. The problem occurs daily. The problem has been waxing and waning. Associated symptoms include abdominal pain, arthralgias (bilat shoulder), neck pain, numbness (left hand) and weakness (left hand, right arm). Pertinent negatives include no chest pain, chills, congestion, coughing, fatigue, fever, headaches, joint swelling (knees, right hip bursitis), nausea or vomiting. Exacerbated by: activity. She has tried oral narcotics, acetaminophen, heat and NSAIDs for the symptoms. The treatment provided moderate relief.   Neck Pain   This is a chronic problem. The problem occurs daily. The problem has been waxing and waning. The pain is present in the anterior neck, left side and right side. The quality of the pain is described as aching and burning. The pain is at a severity of 6/10. The pain is moderate. Exacerbated by: movement of neck, use of arms/shoulders. Associated symptoms include numbness (left hand) and weakness (left hand, right arm). Pertinent negatives include no chest pain, fever or headaches. She has tried NSAIDs, oral narcotics, muscle relaxants and neck support for the symptoms. The treatment provided moderate relief.      PEG Assessment   What number best describes your pain on average in the past week?8  What number best describes how, during the past week, pain has interfered with your enjoyment of life?8  What number best describes how, during the past week, pain has interfered with your general activity?  8    The following portions of the patient's history were reviewed and updated as appropriate: allergies, current medications, past family history, past medical history, past social history, past surgical history and problem list.    Review of Systems   Constitutional: Negative for activity change, chills, fatigue and fever.   HENT: Negative for congestion.    Eyes:  "Negative for visual disturbance.   Respiratory: Negative for cough and chest tightness.    Cardiovascular: Negative for chest pain.   Gastrointestinal: Positive for abdominal pain, constipation and diarrhea. Negative for nausea and vomiting.   Genitourinary: Negative for difficulty urinating and dysuria.   Musculoskeletal: Positive for arthralgias (bilat shoulder) and neck pain. Negative for joint swelling (knees, right hip bursitis).   Neurological: Positive for weakness (left hand, right arm) and numbness (left hand). Negative for dizziness, light-headedness and headaches.   Psychiatric/Behavioral: Positive for sleep disturbance. Negative for agitation and suicidal ideas. The patient is not nervous/anxious.      --  The aforementioned information the Chief Complaint section and above subjective data including any HPI data, and also the Review of Systems data, has been personally reviewed and affirmed.  --    Vitals:    11/01/21 1055   BP: 123/79   Pulse: 73   Resp: 16   Temp: 96.9 °F (36.1 °C)   SpO2: 97%   Weight: 102 kg (225 lb)   Height: 160 cm (63\")   PainSc:   7   PainLoc: Neck  Comment: shoulder     Objective   Physical Exam  Vitals and nursing note reviewed.   Constitutional:       Appearance: Normal appearance. She is well-developed.   Eyes:      General: Lids are normal.   Cardiovascular:      Rate and Rhythm: Normal rate.   Pulmonary:      Effort: Pulmonary effort is normal.   Musculoskeletal:      Right shoulder: Tenderness present. Decreased range of motion. Decreased strength.      Left shoulder: Tenderness present. Decreased range of motion.      Cervical back: Tenderness present. Decreased range of motion.   Neurological:      Mental Status: She is alert and oriented to person, place, and time.   Psychiatric:         Speech: Speech normal.         Behavior: Behavior normal.         Judgment: Judgment normal.       Assessment/Plan   Diagnoses and all orders for this visit:    1. Chronic pain syndrome " (Primary)  -     Discontinue: ketorolac (TORADOL) injection 30 mg  -     ketorolac (TORADOL) injection 30 mg    2. Arthritis, multiple joint involvement    3. Neck pain    4. Encounter for monitoring opioid maintenance therapy    5. Therapeutic opioid induced constipation    6. Osteoarthritis of both shoulders, unspecified osteoarthritis type    Other orders  -     HYDROcodone-acetaminophen (NORCO)  MG per tablet; Take 1 tablet by mouth 5 (Five) Times a Day As Needed for Severe Pain . dnf 11/4/2021  Dispense: 150 tablet; Refill: 0        --- The urine drug screen confirmation from 9/1/2021 has been reviewed and the result is approppriate based on patient history and GAYE report  --- Refill [place] 10/325. DNF 11/4/2021 applied. Patient appears stable with current regimen. No adverse effects. Regarding continuation of opioids, there is no evidence of aberrant behavior or any red flags.  The patient continues with appropriate response to opioid therapy. ADL's remain intact by self.   --- CSA updated 5/26/2021  --- Decrease Gabapentin to 300 mg nightly x 2 weeks and then stop.   --- Patient counseled to Hold diclofenc x 1 day following toradol injection. Patient states understanding.   --- Follow-up 2 months or sooner if needed.      GAYE REPORT  As part of the patient's treatment plan, I am prescribing controlled substances. The patient has been made aware of appropriate use of such medications, including potential risk of somnolence, limited ability to drive and/or work safely, and the potential for dependence or overdose. It has also bee made clear that these medications are for use by this patient only, without concomitant use of alcohol or other substances unless prescribed.     Patient has completed prescribing agreement detailing terms of continued prescribing of controlled substances, including monitoring GAYE reports, urine drug screening, and pill counts if necessary. The patient is aware that  inappropriate use will results in cessation of prescribing such medications.    As the clinician, I personally reviewed the GAYE from 11/1/2021 while the patient was in the office today.    History and physical exam exhibit continued safe and appropriate use of controlled substances.    Dictated utilizing Dragon dictation.

## 2021-11-04 ENCOUNTER — TELEPHONE (OUTPATIENT)
Dept: FAMILY MEDICINE CLINIC | Facility: CLINIC | Age: 71
End: 2021-11-04

## 2021-11-04 DIAGNOSIS — E11.59 TYPE 2 DIABETES MELLITUS WITH OTHER CIRCULATORY COMPLICATION, WITHOUT LONG-TERM CURRENT USE OF INSULIN (HCC): ICD-10-CM

## 2021-11-04 RX ORDER — SEMAGLUTIDE 1.34 MG/ML
INJECTION, SOLUTION SUBCUTANEOUS
Qty: 1 PEN | Refills: 2 | Status: SHIPPED | OUTPATIENT
Start: 2021-11-04 | End: 2022-05-24 | Stop reason: SDUPTHER

## 2021-11-04 NOTE — TELEPHONE ENCOUNTER
Caller: Chastity Salgado    Relationship: Self    Best call back number: [phone]*    What is the best time to reach you: ANYTIME    Who are you requesting to speak with (clinical staff, provider,  specific staff member): CLINICAL STAFF MEMBER    What was the call regarding: THE PATIENT CALLED WANTING TO LET [facility] KNOW THAT HER GASTROENTEROLOGIST HAS TOLD HER THAT IT IS FINE TO START TAKING THE Semaglutide,0.25 or 0.5MG/DOS, (Ozempic, 0.25 or 0.5 MG/DOSE,) 2 MG/1.5ML solution pen-injector AGAIN. THE PATIENT STATES THAT THE PHARMACY ADVISED HER THAT THIS MEDICATION WILL REQUIRE A PRIOR AUTHORIZATION, AND THE PHARMACY WILL BE FAXING PRIOR AUTHORIZATION REQUEST TO [facility] TO COMPLETE.    PHARMACY CONFIRMED:  YUDITH SALAS [address] [place] [address] [id number] [place] [address] AT [facility] & [facility] - [phone]  - [phone] FX  [phone]    Do you require a callback: NOT UNLESS THERE ARE QUESTIONS.

## 2021-11-04 NOTE — TELEPHONE ENCOUNTER
Rx Refill Note  Requested Prescriptions     Pending Prescriptions Disp Refills   • Semaglutide,0.25 or 0.5MG/DOS, (Ozempic, 0.25 or 0.5 MG/DOSE,) 2 MG/1.5ML solution pen-injector 1 pen 2     Sig: Inject 0.25 mg SQ weekly x 4 weeks, then increase to 0.5 mg SQ weekly      Last office visit with prescribing clinician: 9/21/2021      Next office visit with prescribing clinician: 12/15/2021            Johnie Zacarias MA  11/04/21, 13:45 EDT

## 2021-11-23 ENCOUNTER — HOSPITAL ENCOUNTER (OUTPATIENT)
Dept: NUCLEAR MEDICINE | Facility: HOSPITAL | Age: 71
Discharge: HOME | End: 2021-11-23

## 2021-11-23 DIAGNOSIS — R68.81 EARLY SATIETY: ICD-10-CM

## 2021-11-23 DIAGNOSIS — R11.2 NAUSEA AND VOMITING, INTRACTABILITY OF VOMITING NOT SPECIFIED, UNSPECIFIED VOMITING TYPE: ICD-10-CM

## 2021-11-23 DIAGNOSIS — R10.13 EPIGASTRIC PAIN: ICD-10-CM

## 2021-11-23 DIAGNOSIS — K59.00 CONSTIPATION, UNSPECIFIED CONSTIPATION TYPE: ICD-10-CM

## 2021-11-23 DIAGNOSIS — R10.31 RIGHT LOWER QUADRANT ABDOMINAL PAIN: ICD-10-CM

## 2021-11-23 DIAGNOSIS — R10.33 PERIUMBILICAL ABDOMINAL PAIN: ICD-10-CM

## 2021-11-23 PROCEDURE — 0 TECHNETIUM ALBUMIN AGGREGATED: Performed by: NURSE PRACTITIONER

## 2021-11-23 PROCEDURE — A9540 TC99M MAA: HCPCS | Performed by: NURSE PRACTITIONER

## 2021-11-23 PROCEDURE — 78264 GASTRIC EMPTYING IMG STUDY: CPT

## 2021-11-23 RX ADMIN — KIT FOR THE PREPARATION OF TECHNETIUM TC 99M ALBUMIN AGGREGATED 1 DOSE: 2.5 INJECTION, POWDER, FOR SOLUTION INTRAVENOUS at 07:41

## 2021-11-29 RX ORDER — HYDROCODONE BITARTRATE AND ACETAMINOPHEN 10; 325 MG/1; MG/1
1 TABLET ORAL
Qty: 150 TABLET | Refills: 0 | Status: SHIPPED | OUTPATIENT
Start: 2021-11-29 | End: 2022-01-03 | Stop reason: SDUPTHER

## 2021-11-29 RX ORDER — NALOXEGOL OXALATE 25 MG/1
TABLET, FILM COATED ORAL
Qty: 30 TABLET | Refills: 1 | Status: SHIPPED | OUTPATIENT
Start: 2021-11-29 | End: 2022-01-03 | Stop reason: SDUPTHER

## 2021-11-29 NOTE — TELEPHONE ENCOUNTER
Medication Refill Request    Date of phone call: 11/29/2021    Medication being requested: hydrocodone  mg sig: Take 1 tablet by mouth 5 (Five) Times a Day As Needed for Severe Pain   Qty: 150    Date of last visit: 11/01/2021    Date of last refill:     GAYE up to date?:     Next Follow up?: 01/03/2022    Any new pertinent information? (i.e, new medication allergies, new use of medications, change in patient's health or condition, non-compliance or inconsistency with prescribing agreement?):

## 2021-12-07 ENCOUNTER — TELEPHONE (OUTPATIENT)
Dept: GASTROENTEROLOGY | Facility: CLINIC | Age: 71
End: 2021-12-07

## 2021-12-07 NOTE — TELEPHONE ENCOUNTER
Patient calls stating she had a gastric study done on 11/22/21 and hasn't received results yet. Please call.

## 2021-12-10 ENCOUNTER — APPOINTMENT (OUTPATIENT)
Dept: GENERAL RADIOLOGY | Facility: HOSPITAL | Age: 71
End: 2021-12-10

## 2021-12-10 PROCEDURE — 71101 X-RAY EXAM UNILAT RIBS/CHEST: CPT | Performed by: FAMILY MEDICINE

## 2021-12-15 RX ORDER — ONDANSETRON 4 MG/1
TABLET, FILM COATED ORAL
Qty: 30 TABLET | Refills: 1 | Status: SHIPPED | OUTPATIENT
Start: 2021-12-15 | End: 2022-02-10 | Stop reason: SDUPTHER

## 2021-12-15 NOTE — TELEPHONE ENCOUNTER
Rx Refill Note  Requested Prescriptions     Pending Prescriptions Disp Refills   • ondansetron (ZOFRAN) 4 MG tablet [Pharmacy Med Name: ONDANSETRON HCL 4 MG TABLET] 30 tablet 1     Sig: TAKE ONE TABLET BY MOUTH EVERY 8 HOURS AS NEEDED FOR NAUSEA OR VOMITING      Last office visit with prescribing clinician: 9/21/2021      Next office visit with prescribing clinician: Visit date not found            Johnie Zacarias MA  12/15/21, 14:56 EST

## 2021-12-20 ENCOUNTER — TELEPHONE (OUTPATIENT)
Dept: FAMILY MEDICINE CLINIC | Facility: CLINIC | Age: 71
End: 2021-12-20

## 2021-12-20 ENCOUNTER — TELEMEDICINE (OUTPATIENT)
Dept: FAMILY MEDICINE CLINIC | Facility: CLINIC | Age: 71
End: 2021-12-20

## 2021-12-20 DIAGNOSIS — U07.1 COVID-19: Primary | ICD-10-CM

## 2021-12-20 PROCEDURE — 99442 PR PHYS/QHP TELEPHONE EVALUATION 11-20 MIN: CPT | Performed by: NURSE PRACTITIONER

## 2021-12-20 RX ORDER — SODIUM CHLORIDE 9 MG/ML
30 INJECTION, SOLUTION INTRAVENOUS ONCE
Status: CANCELLED | OUTPATIENT
Start: 2021-12-20

## 2021-12-20 RX ORDER — DIPHENHYDRAMINE HYDROCHLORIDE 50 MG/ML
50 INJECTION INTRAMUSCULAR; INTRAVENOUS ONCE AS NEEDED
OUTPATIENT
Start: 2021-12-20

## 2021-12-20 RX ORDER — EPINEPHRINE 1 MG/ML
0.3 INJECTION, SOLUTION, CONCENTRATE INTRAVENOUS AS NEEDED
OUTPATIENT
Start: 2021-12-20

## 2021-12-20 RX ORDER — DIPHENHYDRAMINE HCL 25 MG
50 TABLET ORAL ONCE AS NEEDED
OUTPATIENT
Start: 2021-12-20

## 2021-12-20 RX ORDER — METHYLPREDNISOLONE SODIUM SUCCINATE 125 MG/2ML
125 INJECTION, POWDER, LYOPHILIZED, FOR SOLUTION INTRAMUSCULAR; INTRAVENOUS AS NEEDED
OUTPATIENT
Start: 2021-12-20

## 2021-12-20 RX ORDER — ALBUTEROL SULFATE 90 UG/1
2 AEROSOL, METERED RESPIRATORY (INHALATION) EVERY 4 HOURS PRN
Qty: 8 G | Refills: 0 | Status: SHIPPED | OUTPATIENT
Start: 2021-12-20 | End: 2022-03-07

## 2021-12-20 NOTE — TELEPHONE ENCOUNTER
Marilyn  Is there way we can get her antibody set up for tomorrow  I spoke to her Saturday she was without any shortness of breath etc. if you could call her  I believe we can bill for telephone visit as well  If you cannot or need me to do it because you are too busy let me know ASAP thanks!!!!        On call Saturday  Patient has cough sinus drainage, mild symptoms positive Covid sick for 2 to 3 days  Had vaccines, but recent rib fractures after injury.  No increased chest pain shortness of breath    She is interested in monoclonal antibody      I am not aware of any place we can get me today  Advised patient to go to urgent care for an evaluation    Otherwise call Monday morning and get Marilyn message that she is interested in monoclonal antibodies  There is urgency in getting these should she have increased pain fever chills weakness emergency room    Also explained over the phone I was unable to evaluate her so advised she go to urgent care for any increased or persistent symptoms or shortness of breath immediately.

## 2021-12-20 NOTE — PROGRESS NOTES
Chief Complaint  Covid-19 Home Monitoring Phone Call    Subjective          Chastity Salgado presents to [facility]  Patient presents via telephone visit with recent covid positive. Onset of symptoms 12/15/2021, tested positive at home test on Saturday 12/18. Daughter with recent covid, tested positive earlier in week.     Symptoms include dry cough, gradually worsening, hot sweats, chills, ear pain, right side, dizziness related to ear pain, woke with a sore throat a few days,     Recent rib fracture, painful to cough, splinting with pillow. Head congestion. Sleeping 16 hours at a time. Mild wheeze, notices with walking her dog outside on leash. Intermittent. History of [facility]      Objective   Vital Signs:   There were no vitals taken for this visit.    Physical Exam  Pulmonary:      Comments: Completing full sentences, does not sound short of breath    Neurological:      Mental Status: She is alert and oriented to person, place, and time.   Psychiatric:         Mood and Affect: Mood normal.        Result Review :                 Assessment and Plan    Diagnoses and all orders for this visit:    1. COVID-19 (Primary)  -     Ambulatory Referral For Covid-19 Infusion Treatment    Other orders  -     albuterol sulfate  (90 Base) MCG/ACT inhaler; Inhale 2 puffs Every 4 (Four) Hours As Needed for Wheezing.  Dispense: 8 g; Refill: 0        Follow Up   No follow-ups on file.  Patient was given instructions and counseling regarding her condition or for health maintenance advice. Please see specific information pulled into the AVS if appropriate.     You have chosen to receive care through a telephone visit. Do you consent to use a telephone visit for your medical care today? Yes  Time spent 15 minutes including time to document in medical chart    Patient located in [place], [place]  Provider Located in [place], [place]    Verbal consent given for monoclonal antibodies, she is aware this is  not FDA approved, rather EUA

## 2021-12-20 NOTE — TELEPHONE ENCOUNTER
Can you schedule for telephone visit tomorrow, you can put her anywhere we can fit her in. I have to review medication and discuss side effects etc.

## 2021-12-23 ENCOUNTER — HOSPITAL ENCOUNTER (OUTPATIENT)
Dept: INFUSION THERAPY | Facility: HOSPITAL | Age: 71
Setting detail: INFUSION SERIES
Discharge: HOME | End: 2021-12-23

## 2021-12-23 VITALS
HEART RATE: 65 BPM | DIASTOLIC BLOOD PRESSURE: 70 MMHG | OXYGEN SATURATION: 99 % | TEMPERATURE: 97 F | SYSTOLIC BLOOD PRESSURE: 121 MMHG | RESPIRATION RATE: 18 BRPM

## 2021-12-23 DIAGNOSIS — U07.1 CLINICAL DIAGNOSIS OF COVID-19: Primary | ICD-10-CM

## 2021-12-23 PROCEDURE — M0245 HC IV INFUSION, BAMLANIVIMAB AND ETESEVIMAB, 2100 MG: HCPCS | Performed by: NURSE PRACTITIONER

## 2021-12-23 PROCEDURE — 96365 THER/PROPH/DIAG IV INF INIT: CPT

## 2021-12-23 PROCEDURE — 25010000002 INJECTION, BAMLANIVIMAB AND ETESEVIMAB, 2100 MG: Performed by: NURSE PRACTITIONER

## 2021-12-23 RX ORDER — METHYLPREDNISOLONE SODIUM SUCCINATE 125 MG/2ML
125 INJECTION, POWDER, LYOPHILIZED, FOR SOLUTION INTRAMUSCULAR; INTRAVENOUS AS NEEDED
OUTPATIENT
Start: 2021-12-23

## 2021-12-23 RX ORDER — DIPHENHYDRAMINE HCL 25 MG
50 CAPSULE ORAL ONCE AS NEEDED
OUTPATIENT
Start: 2021-12-23

## 2021-12-23 RX ORDER — SODIUM CHLORIDE 9 MG/ML
30 INJECTION, SOLUTION INTRAVENOUS ONCE
Status: CANCELLED | OUTPATIENT
Start: 2021-12-23

## 2021-12-23 RX ORDER — SODIUM CHLORIDE 9 MG/ML
30 INJECTION, SOLUTION INTRAVENOUS ONCE
Status: COMPLETED | OUTPATIENT
Start: 2021-12-23 | End: 2021-12-23

## 2021-12-23 RX ORDER — DIPHENHYDRAMINE HYDROCHLORIDE 50 MG/ML
50 INJECTION INTRAMUSCULAR; INTRAVENOUS ONCE AS NEEDED
OUTPATIENT
Start: 2021-12-23

## 2021-12-23 RX ADMIN — SODIUM CHLORIDE 30 ML/HR: 9 INJECTION, SOLUTION INTRAVENOUS at 08:57

## 2021-12-23 RX ADMIN — SODIUM CHLORIDE: 9 INJECTION, SOLUTION INTRAVENOUS at 08:53

## 2022-01-03 ENCOUNTER — OFFICE VISIT (OUTPATIENT)
Dept: PAIN MEDICINE | Facility: CLINIC | Age: 72
End: 2022-01-03

## 2022-01-03 VITALS
RESPIRATION RATE: 18 BRPM | SYSTOLIC BLOOD PRESSURE: 143 MMHG | BODY MASS INDEX: 38.8 KG/M2 | HEIGHT: 63 IN | HEART RATE: 85 BPM | DIASTOLIC BLOOD PRESSURE: 78 MMHG | TEMPERATURE: 96.8 F | WEIGHT: 219 LBS | OXYGEN SATURATION: 100 %

## 2022-01-03 DIAGNOSIS — M19.012 OSTEOARTHRITIS OF BOTH SHOULDERS, UNSPECIFIED OSTEOARTHRITIS TYPE: ICD-10-CM

## 2022-01-03 DIAGNOSIS — T40.2X5A THERAPEUTIC OPIOID INDUCED CONSTIPATION: ICD-10-CM

## 2022-01-03 DIAGNOSIS — M54.2 NECK PAIN: ICD-10-CM

## 2022-01-03 DIAGNOSIS — M19.011 OSTEOARTHRITIS OF BOTH SHOULDERS, UNSPECIFIED OSTEOARTHRITIS TYPE: ICD-10-CM

## 2022-01-03 DIAGNOSIS — G89.29 CHRONIC RIGHT SHOULDER PAIN: ICD-10-CM

## 2022-01-03 DIAGNOSIS — Z79.891 ENCOUNTER FOR MONITORING OPIOID MAINTENANCE THERAPY: ICD-10-CM

## 2022-01-03 DIAGNOSIS — M25.511 CHRONIC RIGHT SHOULDER PAIN: ICD-10-CM

## 2022-01-03 DIAGNOSIS — M12.9 ARTHRITIS, MULTIPLE JOINT INVOLVEMENT: ICD-10-CM

## 2022-01-03 DIAGNOSIS — M25.512 CHRONIC LEFT SHOULDER PAIN: ICD-10-CM

## 2022-01-03 DIAGNOSIS — G89.4 CHRONIC PAIN SYNDROME: Primary | ICD-10-CM

## 2022-01-03 DIAGNOSIS — K59.03 THERAPEUTIC OPIOID INDUCED CONSTIPATION: ICD-10-CM

## 2022-01-03 DIAGNOSIS — G89.29 CHRONIC LEFT SHOULDER PAIN: ICD-10-CM

## 2022-01-03 DIAGNOSIS — Z51.81 ENCOUNTER FOR MONITORING OPIOID MAINTENANCE THERAPY: ICD-10-CM

## 2022-01-03 PROCEDURE — 99214 OFFICE O/P EST MOD 30 MIN: CPT | Performed by: NURSE PRACTITIONER

## 2022-01-03 RX ORDER — NALOXEGOL OXALATE 25 MG/1
25 TABLET, FILM COATED ORAL EVERY MORNING
Qty: 30 TABLET | Refills: 5 | Status: SHIPPED | OUTPATIENT
Start: 2022-01-03 | End: 2022-05-06 | Stop reason: SDUPTHER

## 2022-01-03 RX ORDER — HYDROCODONE BITARTRATE AND ACETAMINOPHEN 10; 325 MG/1; MG/1
1 TABLET ORAL
Qty: 150 TABLET | Refills: 0 | Status: SHIPPED | OUTPATIENT
Start: 2022-01-03 | End: 2022-01-26 | Stop reason: SDUPTHER

## 2022-01-03 RX ORDER — CYCLOBENZAPRINE HCL 10 MG
10 TABLET ORAL NIGHTLY PRN
Qty: 30 TABLET | Refills: 3 | Status: SHIPPED | OUTPATIENT
Start: 2022-01-03 | End: 2022-07-05 | Stop reason: SDUPTHER

## 2022-01-03 NOTE — PROGRESS NOTES
CHIEF COMPLAINT  Follow-up for neck and shoulder pain.    Subjective   Chastity Salgado is a 71 y.o. female  who presents for follow-up.  She has a history of bilateral shoulder pain and neck pain.    Patient was planning to proceed with left shoulder replacement, she decided to cancel this after having covid this winter and then also currently having a sinus infection.  She is now thinking she wants to proceed with the procedure she needs on her right shoulder prior to having the left replaced.     She continues with Hydrocodone 10/325 5/day, Celebrex 200 mg daily, Cymbalta 60 mg daily, and flexeril 10 mg nightly.  This medication regimen decreases her pain and increases her functionality.  ADLs by self. She does have constipation, this is well managed by Movantik.     Did not tolerate Gabapentin--caused sleep walking.    Plan was to move forward with Left suprascapular nerve RFA--She was having GI issues which is why she did not schedule this. She states she has been diagnosed with gastroparesis. She would like to continue to postpone this until she sees her orthopedic surgeon back in follow up.      left suprascapular nerve block   on  2/25/2021 - 50% relief for 1 month   left suprascapular nerve block/steroid injection - posterior approach  on 9/22/2020 - 70% relief for 1-2 months     Rheumatologist - Dr. Lucero. Told not rheumatoid.  All OA.     Patient remained masked during entire encounter. No cough present. I donned a mask and eye protection throughout entire visit. Prior to donning mask and eye protection, hand hygiene was performed, as well as when it was doffed.  I was closer than 6 feet, but not for an extended period of time. No obvious exposure to any bodily fluids.    Joint Pain  This is a chronic (7/10VAS in severity) problem. The current episode started more than 1 year ago. The problem occurs daily. The problem has been gradually worsening. Associated symptoms include abdominal pain, arthralgias  (bilateral shoulders), fatigue and neck pain. Pertinent negatives include no chest pain, chills, congestion, coughing, fever, headaches, joint swelling (knees, right hip bursitis), nausea, numbness, vomiting or weakness. Exacerbated by: activity. She has tried oral narcotics, acetaminophen, heat and NSAIDs for the symptoms. The treatment provided moderate relief.   Neck Pain   This is a chronic problem. The problem occurs daily. The problem has been waxing and waning. The pain is present in the anterior neck, left side and right side. The quality of the pain is described as aching and burning. The pain is at a severity of 5/10. The pain is moderate. Exacerbated by: movement of neck, use of arms/shoulders. Pertinent negatives include no chest pain, fever, headaches, numbness or weakness. She has tried NSAIDs, oral narcotics, muscle relaxants and neck support for the symptoms. The treatment provided moderate relief.      PEG Assessment   What number best describes your pain on average in the past week?8  What number best describes how, during the past week, pain has interfered with your enjoyment of life?9  What number best describes how, during the past week, pain has interfered with your general activity?  8    The following portions of the patient's history were reviewed and updated as appropriate: allergies, current medications, past family history, past medical history, past social history, past surgical history and problem list.    Review of Systems   Constitutional: Positive for fatigue. Negative for chills and fever.   HENT: Negative for congestion.    Eyes: Positive for visual disturbance (blurry vision).   Respiratory: Negative for cough and shortness of breath.    Cardiovascular: Negative for chest pain.   Gastrointestinal: Positive for abdominal pain and constipation. Negative for diarrhea, nausea and vomiting.   Genitourinary: Negative for difficulty urinating.   Musculoskeletal: Positive for arthralgias  "(bilateral shoulders) and neck pain. Negative for joint swelling (knees, right hip bursitis).   Neurological: Negative for weakness, numbness and headaches.   Psychiatric/Behavioral: Positive for sleep disturbance. Negative for suicidal ideas. The patient is nervous/anxious.      --  The aforementioned information the Chief Complaint section and above subjective data including any HPI data, and also the Review of Systems data, has been personally reviewed and affirmed.  --    Vitals:    01/03/22 1107   BP: 143/78   Pulse: 85   Resp: 18   Temp: 96.8 °F (36 °C)   SpO2: 100%   Weight: 99.3 kg (219 lb)   Height: 160 cm (63\")   PainSc:   7   PainLoc: Shoulder     Objective   Physical Exam  Vitals and nursing note reviewed.   Constitutional:       Appearance: Normal appearance. She is well-developed.   Eyes:      General: Lids are normal.   Cardiovascular:      Rate and Rhythm: Normal rate.   Pulmonary:      Effort: Pulmonary effort is normal.   Musculoskeletal:      Right shoulder: Tenderness and bony tenderness present. Decreased range of motion.      Left shoulder: Tenderness and bony tenderness present. Decreased range of motion.   Neurological:      Mental Status: She is alert and oriented to person, place, and time.   Psychiatric:         Speech: Speech normal.         Behavior: Behavior normal.         Judgment: Judgment normal.       Assessment/Plan   Diagnoses and all orders for this visit:    1. Chronic pain syndrome (Primary)    2. Arthritis, multiple joint involvement    3. Encounter for monitoring opioid maintenance therapy    4. Therapeutic opioid induced constipation    5. Osteoarthritis of both shoulders, unspecified osteoarthritis type    6. Neck pain    7. Chronic left shoulder pain    8. Chronic right shoulder pain    Other orders  -     HYDROcodone-acetaminophen (NORCO)  MG per tablet; Take 1 tablet by mouth 5 (Five) Times a Day As Needed for Severe Pain .  Dispense: 150 tablet; Refill: 0  -     " Naloxegol Oxalate (Movantik) 25 MG tablet; Take 1 tablet by mouth Every Morning.  Dispense: 30 tablet; Refill: 5  -     cyclobenzaprine (FLEXERIL) 10 MG tablet; Take 1 tablet by mouth At Night As Needed for Muscle Spasms.  Dispense: 30 tablet; Refill: 3      --- Consider Left suprascapular nerve RFA--patient would like to discuss with her orthopedic surgeon first.   --- The urine drug screen confirmation from  9/1/2021 has been reviewed and the result is appropriate based on patient history and GAYE report  --- CSA updated 5/26/2021  --- Refill [place] 10/325. Patient appears stable with current regimen. No adverse effects. Regarding continuation of opioids, there is no evidence of aberrant behavior or any red flags.  The patient continues with appropriate response to opioid therapy. ADL's remain intact by self.   --- Follow-up 2 months or sooner if needed.      GAYE REPORT  As part of the patient's treatment plan, I am prescribing controlled substances. The patient has been made aware of appropriate use of such medications, including potential risk of somnolence, limited ability to drive and/or work safely, and the potential for dependence or overdose. It has also bee made clear that these medications are for use by this patient only, without concomitant use of alcohol or other substances unless prescribed.     Patient has completed prescribing agreement detailing terms of continued prescribing of controlled substances, including monitoring GAYE reports, urine drug screening, and pill counts if necessary. The patient is aware that inappropriate use will results in cessation of prescribing such medications.    As the clinician, I personally reviewed the GAYE from 1/3/2022 while the patient was in the office today.    History and physical exam exhibit continued safe and appropriate use of controlled substances.    Dictated utilizing Dragon dictation.     This document is intended for medical expert use only.  Reading of this document by patients and/or patient's family without participating medical staff guidance may result in misinterpretation and unintended morbidity.   Any interpretation of such data is the responsibility of the patient and/or family member responsible for the patient in concert with their primary or specialist providers, not to be left for sources of online searches such as [employer], [employer] or similar queries. Relying on these approaches to knowledge may result in misinterpretation, misguided goals of care and even death should patients or family members try recommendations outside of the realm of professional medical care in a supervised way.

## 2022-01-14 RX ORDER — LISINOPRIL 40 MG/1
TABLET ORAL
Qty: 90 TABLET | Refills: 1 | Status: SHIPPED | OUTPATIENT
Start: 2022-01-14 | End: 2022-08-12

## 2022-01-14 NOTE — TELEPHONE ENCOUNTER
Rx Refill Note  Requested Prescriptions     Pending Prescriptions Disp Refills   • lisinopril (PRINIVIL,ZESTRIL) 40 MG tablet [Pharmacy Med Name: LISINOPRIL 40 MG TABLET] 90 tablet 1     Sig: TAKE ONE TABLET BY MOUTH DAILY      Last office visit with prescribing clinician: 9/21/2021      Next office visit with prescribing clinician: Visit date not found            Sheyla Noonan MA  01/14/22, 10:40 EST

## 2022-01-26 RX ORDER — HYDROCODONE BITARTRATE AND ACETAMINOPHEN 10; 325 MG/1; MG/1
1 TABLET ORAL
Qty: 150 TABLET | Refills: 0 | Status: SHIPPED | OUTPATIENT
Start: 2022-01-26 | End: 2022-03-02 | Stop reason: SDUPTHER

## 2022-01-26 NOTE — TELEPHONE ENCOUNTER
Medication Refill Request    Date of phone call: 01/26/2022    Medication being requested: hydrocodone  mgsig: Take 1 tablet by mouth 5 (Five) Times a Day As Needed for Severe Pain   Qty: 150    Date of last visit: 01/03/2022    Date of last refill:     GAYE up to date?:     Next Follow up?: 03/01/2022    Any new pertinent information? (i.e, new medication allergies, new use of medications, change in patient's health or condition, non-compliance or inconsistency with prescribing agreement?):

## 2022-02-10 ENCOUNTER — OFFICE VISIT (OUTPATIENT)
Dept: FAMILY MEDICINE CLINIC | Facility: CLINIC | Age: 72
End: 2022-02-10

## 2022-02-10 VITALS
TEMPERATURE: 97.7 F | OXYGEN SATURATION: 96 % | RESPIRATION RATE: 18 BRPM | DIASTOLIC BLOOD PRESSURE: 78 MMHG | BODY MASS INDEX: 40.06 KG/M2 | WEIGHT: 226.1 LBS | HEIGHT: 63 IN | HEART RATE: 85 BPM | SYSTOLIC BLOOD PRESSURE: 122 MMHG

## 2022-02-10 DIAGNOSIS — L98.9 SKIN LESION: ICD-10-CM

## 2022-02-10 DIAGNOSIS — H60.312 ACUTE DIFFUSE OTITIS EXTERNA OF LEFT EAR: ICD-10-CM

## 2022-02-10 DIAGNOSIS — R35.0 FREQUENCY OF URINATION: Primary | ICD-10-CM

## 2022-02-10 LAB
BILIRUB BLD-MCNC: NEGATIVE MG/DL
CLARITY, POC: CLEAR
COLOR UR: YELLOW
EXPIRATION DATE: ABNORMAL
GLUCOSE UR STRIP-MCNC: NEGATIVE MG/DL
KETONES UR QL: NEGATIVE
LEUKOCYTE EST, POC: NEGATIVE
Lab: ABNORMAL
NITRITE UR-MCNC: NEGATIVE MG/ML
PH UR: 5.5 [PH] (ref 5–8)
PROT UR STRIP-MCNC: ABNORMAL MG/DL
RBC # UR STRIP: ABNORMAL /UL
SP GR UR: 1.02 (ref 1–1.03)
UROBILINOGEN UR QL: NORMAL

## 2022-02-10 PROCEDURE — 99214 OFFICE O/P EST MOD 30 MIN: CPT | Performed by: NURSE PRACTITIONER

## 2022-02-10 PROCEDURE — 81003 URINALYSIS AUTO W/O SCOPE: CPT | Performed by: NURSE PRACTITIONER

## 2022-02-10 RX ORDER — MECLIZINE HYDROCHLORIDE 25 MG/1
25 TABLET ORAL 3 TIMES DAILY PRN
Qty: 60 TABLET | Refills: 1 | Status: SHIPPED | OUTPATIENT
Start: 2022-02-10

## 2022-02-10 RX ORDER — ONDANSETRON 4 MG/1
4 TABLET, FILM COATED ORAL EVERY 8 HOURS PRN
Qty: 30 TABLET | Refills: 1 | Status: SHIPPED | OUTPATIENT
Start: 2022-02-10 | End: 2022-04-15

## 2022-02-10 NOTE — PROGRESS NOTES
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 After voiding, feels like there be urge to continue to urinate, denies blood    Left ear pain, over past few weeks, intermittently, bad pain, increased fluttering. Resumed zyrtec, going to resume flonase.     Skin breakout, applied mupirocin, states started as cluster of blisters on her chin, then purulent discharge, yesterday skin sloughed off    Earache   There is pain in the left ear. This is a new problem. The current episode started in the past 7 days. The problem has been unchanged. There has been no fever. The pain is mild. Pertinent negatives include no abdominal pain, coughing, diarrhea, ear discharge, headaches, hearing loss, neck pain, rash, rhinorrhea, sore throat or vomiting. She has tried nothing for the symptoms. The treatment provided no relief.   Urinary Frequency   This is a new problem. The current episode started 1 to 4 weeks ago. The problem occurs intermittently. The problem has been unchanged. The pain is mild. There has been no fever. Associated symptoms include frequency. Pertinent negatives include no chills, discharge, flank pain, hematuria, hesitancy, nausea, sweats, urgency or vomiting. She has tried increased fluids for the symptoms. The treatment provided no relief.   Rash  This is a new problem. The current episode  "started in the past 7 days. The problem has been gradually improving since onset. The affected locations include the face. The rash is characterized by blistering, redness, pain and draining. Associated with: face mask. Pertinent negatives include no anorexia, congestion, cough, diarrhea, eye pain, facial edema, fatigue, fever, joint pain, nail changes, rhinorrhea, shortness of breath, sore throat or vomiting. Past treatments include antibiotic cream. The treatment provided mild relief.       Objective   Vital Signs:   /78 (BP Location: Right arm, Patient Position: Sitting, Cuff Size: Large Adult)   Pulse 85   Temp 97.7 °F (36.5 °C) (Temporal)   Resp 18   Ht 160 cm (62.99\")   Wt 103 kg (226 lb 1.6 oz)   SpO2 96%   BMI 40.06 kg/m²     Physical Exam  Constitutional:       Appearance: Normal appearance.   HENT:      Right Ear: Tympanic membrane and ear canal normal.      Left Ear: Tympanic membrane normal. Swelling and tenderness present. No drainage. Tympanic membrane is not perforated, erythematous, retracted or bulging.      Nose: Nose normal.      Mouth/Throat:      Mouth: Mucous membranes are moist.   Cardiovascular:      Rate and Rhythm: Normal rate and regular rhythm.      Heart sounds: No murmur heard.  No friction rub. No gallop.    Pulmonary:      Effort: Pulmonary effort is normal. No respiratory distress.      Breath sounds: Normal breath sounds. No wheezing, rhonchi or rales.   Skin:            Comments: excoration with erythema to chin, no discharge, crusted, nontender,    Neurological:      Mental Status: She is alert.        Result Review :                 Assessment and Plan    Diagnoses and all orders for this visit:    1. Frequency of urination (Primary)  -     POCT urinalysis dipstick, automated  -     Urine Culture - Urine, Urine, Clean Catch    2. Acute diffuse otitis externa of left ear    3. Skin lesion    Other orders  -     ondansetron (ZOFRAN) 4 MG tablet; Take 1 tablet by mouth " Every 8 (Eight) Hours As Needed for Nausea or Vomiting.  Dispense: 30 tablet; Refill: 1  -     neomycin-polymyxin-hydrocortisone (CORTISPORIN) 3.5-41146-5 otic solution; Administer 3 drops into the left ear 4 (Four) Times a Day.  Dispense: 10 mL; Refill: 0  -     meclizine (ANTIVERT) 25 MG tablet; Take 1 tablet by mouth 3 (Three) Times a Day As Needed for Dizziness.  Dispense: 60 tablet; Refill: 1      I spent 35 minutes caring for Chastity on this date of service. This time includes time spent by me in the following activities:performing a medically appropriate examination and/or evaluation , counseling and educating the patient/family/caregiver and documenting information in the medical record  Follow Up   No follow-ups on file.  Patient was given instructions and counseling regarding her condition or for health maintenance advice. Please see specific information pulled into the AVS if appropriate.     Ear ache, will start drops as prescribed, keep ears clean and dry  Advised to Avoid submerging in water    Urine frequency, will proceed with culture, UA is not impressive    Skin lesion, based on vesicular appearance, question HSV or herpes zoster, however now healing, she did apply mupirocin and states it seemed to worsen and had purulent discharge, she will send pictures if it occurs again

## 2022-02-12 LAB
BACTERIA UR CULT: NORMAL
BACTERIA UR CULT: NORMAL

## 2022-02-14 ENCOUNTER — TELEPHONE (OUTPATIENT)
Dept: PAIN MEDICINE | Facility: CLINIC | Age: 72
End: 2022-02-14

## 2022-02-14 DIAGNOSIS — G89.4 CHRONIC PAIN SYNDROME: Primary | ICD-10-CM

## 2022-02-14 RX ORDER — KETOROLAC TROMETHAMINE 30 MG/ML
30 INJECTION, SOLUTION INTRAMUSCULAR; INTRAVENOUS ONCE
Status: SHIPPED | OUTPATIENT
Start: 2022-02-14 | End: 2022-02-19

## 2022-02-14 NOTE — TELEPHONE ENCOUNTER
Yes, should be fine. 30 mg Toradol IM ordered. Please advise patient to hold celebrex x 24 hours after injection. Thanks

## 2022-02-14 NOTE — TELEPHONE ENCOUNTER
PT  Wants a  TORADOL inj if possible tomorrow .   I believe this goes on the nurse mario     She wanted to make sure that it was ok by provider to get said inj .     Any questions pt phone number is correct in chart

## 2022-02-17 ENCOUNTER — TELEPHONE (OUTPATIENT)
Dept: GASTROENTEROLOGY | Facility: CLINIC | Age: 72
End: 2022-02-17

## 2022-02-17 DIAGNOSIS — R10.84 GENERALIZED ABDOMINAL CRAMPING: Primary | ICD-10-CM

## 2022-02-17 RX ORDER — DICYCLOMINE HYDROCHLORIDE 10 MG/1
10 CAPSULE ORAL 3 TIMES DAILY PRN
Qty: 90 CAPSULE | Refills: 1 | Status: SHIPPED | OUTPATIENT
Start: 2022-02-17

## 2022-02-17 NOTE — TELEPHONE ENCOUNTER
Patient states she is have a flare up of gastroparesis. She has nausea, stomach cramping, belching with a foul smell.  Is currently taking nikolas root and omeprazole. Just increased dose one month ago.  What should she do?

## 2022-02-17 NOTE — TELEPHONE ENCOUNTER
I can send in prescription for cramping, dicyclomine 1 tablet 3 times daily as needed.  She should use Zofran as needed for nausea.  She should use lowest dose of pain medication possible as this can worsen gastroparesis during a flare.  Recommend soft more liquid or puréed GI soft diet when she is in a flare.  Recommend follow-up office visit next week with Mely, please schedule patient appointment and let her know that I sent prescription for dicyclomine to pharmacy and the above recommendations in the meantime.  Thank you.Yolis

## 2022-02-18 ENCOUNTER — TELEPHONE (OUTPATIENT)
Dept: FAMILY MEDICINE CLINIC | Facility: CLINIC | Age: 72
End: 2022-02-18

## 2022-02-18 NOTE — TELEPHONE ENCOUNTER
Is she taking ozempic or had a recent increase in her dose? That may contribute. She had a normal gastric emptying study, I would continue omeprazole.

## 2022-02-18 NOTE — TELEPHONE ENCOUNTER
PT IS CALLING TO LET OFFICE KNOW THAT HER GASTRO PROVIDER IS TAKING CARE OF HER ISSUE NOW. THANK YOU!

## 2022-02-18 NOTE — TELEPHONE ENCOUNTER
Spoke to patient and no change in her ozempic, still at 0.25. get belching, smell, taste, gas, when taking Celebrex.

## 2022-02-18 NOTE — TELEPHONE ENCOUNTER
Celebrex can increase gi issues, placing at risk for GI bleed. If that is causing the problem, would discontinue and hold NSAIDs (aleve, ibuprofen, celebrex etc) and try tylenol arthritis

## 2022-02-18 NOTE — TELEPHONE ENCOUNTER
Caller: Chastity Salgado    Relationship: Self    Best call back number: [phone]     What medication are you requesting:     What are your current symptoms: STOMACH CRAMPS, BURPING THAT SMELLS LIKE VOMIT AND GAS     How long have you been experiencing symptoms: 1 WEEK     Have you had these symptoms before:    [x] Yes  [] No    Have you been treated for these symptoms before:   [x] Yes  [] No    If a prescription is needed, what is your preferred pharmacy and phone number: [facility] [id number] [place] [address] AT [facility] & [facility] - [phone]  - [phone]      Additional notes: PATIENT STATES SHE HAS BEEN TAKING HER MEDICATION OMEPRAZOLE FOR HER CONDITION AND MARIS ROOT. PATIENT IS WONDERING WHAT ELSE SHE CAN DO OR BE PRESCRIBED     PATIENT STATES SHE HAS STARTED BACK ON A BLAND DIET SINCE HER SYMPTOMS AND IT IS NOT IMPROVING

## 2022-02-18 NOTE — TELEPHONE ENCOUNTER
Called patient and gave her all the information.  She will call Monday and schedule an appointment.

## 2022-03-02 ENCOUNTER — OFFICE VISIT (OUTPATIENT)
Dept: PAIN MEDICINE | Facility: CLINIC | Age: 72
End: 2022-03-02

## 2022-03-02 ENCOUNTER — OFFICE VISIT (OUTPATIENT)
Dept: FAMILY MEDICINE CLINIC | Facility: CLINIC | Age: 72
End: 2022-03-02

## 2022-03-02 VITALS
HEART RATE: 99 BPM | HEIGHT: 63 IN | BODY MASS INDEX: 38.68 KG/M2 | TEMPERATURE: 97.5 F | WEIGHT: 218.3 LBS | SYSTOLIC BLOOD PRESSURE: 134 MMHG | DIASTOLIC BLOOD PRESSURE: 72 MMHG | OXYGEN SATURATION: 97 %

## 2022-03-02 VITALS
WEIGHT: 218 LBS | TEMPERATURE: 96.9 F | BODY MASS INDEX: 38.62 KG/M2 | HEART RATE: 109 BPM | HEIGHT: 63 IN | OXYGEN SATURATION: 95 % | SYSTOLIC BLOOD PRESSURE: 116 MMHG | DIASTOLIC BLOOD PRESSURE: 75 MMHG

## 2022-03-02 DIAGNOSIS — E11.59 TYPE 2 DIABETES MELLITUS WITH OTHER CIRCULATORY COMPLICATION, WITHOUT LONG-TERM CURRENT USE OF INSULIN: ICD-10-CM

## 2022-03-02 DIAGNOSIS — G89.4 CHRONIC PAIN SYNDROME: Primary | ICD-10-CM

## 2022-03-02 DIAGNOSIS — M19.011 OSTEOARTHRITIS OF BOTH SHOULDERS, UNSPECIFIED OSTEOARTHRITIS TYPE: ICD-10-CM

## 2022-03-02 DIAGNOSIS — Z51.81 ENCOUNTER FOR MONITORING OPIOID MAINTENANCE THERAPY: ICD-10-CM

## 2022-03-02 DIAGNOSIS — M19.012 OSTEOARTHRITIS OF BOTH SHOULDERS, UNSPECIFIED OSTEOARTHRITIS TYPE: ICD-10-CM

## 2022-03-02 DIAGNOSIS — Z79.891 ENCOUNTER FOR MONITORING OPIOID MAINTENANCE THERAPY: ICD-10-CM

## 2022-03-02 DIAGNOSIS — Z01.818 PREOPERATIVE CLEARANCE: Primary | ICD-10-CM

## 2022-03-02 DIAGNOSIS — Z79.899 MEDICATION MANAGEMENT: ICD-10-CM

## 2022-03-02 DIAGNOSIS — M12.9 ARTHRITIS, MULTIPLE JOINT INVOLVEMENT: ICD-10-CM

## 2022-03-02 DIAGNOSIS — M54.2 NECK PAIN: ICD-10-CM

## 2022-03-02 PROCEDURE — 99214 OFFICE O/P EST MOD 30 MIN: CPT | Performed by: NURSE PRACTITIONER

## 2022-03-02 PROCEDURE — 96372 THER/PROPH/DIAG INJ SC/IM: CPT | Performed by: NURSE PRACTITIONER

## 2022-03-02 PROCEDURE — 99213 OFFICE O/P EST LOW 20 MIN: CPT | Performed by: NURSE PRACTITIONER

## 2022-03-02 RX ORDER — HYDROCODONE BITARTRATE AND ACETAMINOPHEN 10; 325 MG/1; MG/1
1 TABLET ORAL
Qty: 150 TABLET | Refills: 0 | Status: SHIPPED | OUTPATIENT
Start: 2022-03-02 | End: 2022-04-04 | Stop reason: SDUPTHER

## 2022-03-02 RX ORDER — KETOROLAC TROMETHAMINE 30 MG/ML
30 INJECTION, SOLUTION INTRAMUSCULAR; INTRAVENOUS ONCE
Status: COMPLETED | OUTPATIENT
Start: 2022-03-02 | End: 2022-03-02

## 2022-03-02 RX ADMIN — KETOROLAC TROMETHAMINE 30 MG: 30 INJECTION, SOLUTION INTRAMUSCULAR; INTRAVENOUS at 13:47

## 2022-03-02 NOTE — PROGRESS NOTES
CHIEF COMPLAINT  F/U back nan shoulder pain- patient states that her pain has worsened since her last visit.     Subjective   Chastity Salgado is a 72 y.o. female  who presents for follow-up.  She has a history of chronic back pain.    left suprascapular nerve block   on  2/25/2021 - 50% relief for 1 month   left suprascapular nerve block/steroid injection - posterior approach   on  9/22/2020 - 70% relief for 1-2 months     C/o diffuse joint pain.  Pain today 6/10 in severity. Taking Hydrocodone 10/325 5/day, Cymbalta 60 mg daily.  Denies adverse reactions.  Reports at least moderate pain reduction with regimen reports that she would not be able to function without it.  Constipation reported, currently managed with Movantik. She has stopped Celebrex recently due to GI issues.  Did not tolerate Gabapentin (sleep walking).     Rheumatologist - Dr. Lucero.  Saw recently, told not rheumatoid.  All OA.      Counseling - Dr. Edison Vásquez - ortho.  Left shoulder injections periodically. History of multiple right shoulder surgeries.  Says right shoulder worse recently, saw Dr. Vásquez recently and was told hardware was loose, needs another total shoulder replacement. She has postponed this due to recent Covid infection and other acute issues with her vision.      Patient remained masked during entire encounter. No cough present. I donned a mask and eye protection throughout entire visit. Prior to donning mask and eye protection, hand hygiene was performed, as well as when it was doffed.  I was closer than 6 feet, but not for an extended period of time. No obvious exposure to any bodily fluids.    Joint Pain  This is a chronic problem. The current episode started more than 1 year ago. The problem occurs daily. The problem has been waxing and waning. Associated symptoms include arthralgias (bilat shoulder), fatigue, neck pain, numbness and weakness. Pertinent negatives include no abdominal pain, chest pain, chills,  congestion, coughing, fever, headaches, joint swelling (knees, right hip bursitis), nausea or vomiting. Exacerbated by: activity. She has tried oral narcotics, acetaminophen, heat and NSAIDs for the symptoms. The treatment provided moderate relief.   Neck Pain   This is a chronic problem. The problem occurs daily. The problem has been waxing and waning. The pain is present in the anterior neck, left side and right side. The quality of the pain is described as aching and burning. The pain is at a severity of 6/10. The pain is moderate. Exacerbated by: movement of neck, use of arms/shoulders. Associated symptoms include numbness and weakness. Pertinent negatives include no chest pain, fever or headaches. She has tried NSAIDs, oral narcotics, muscle relaxants and neck support for the symptoms. The treatment provided moderate relief.      PEG Assessment   What number best describes your pain on average in the past week?8  What number best describes how, during the past week, pain has interfered with your enjoyment of life?8  What number best describes how, during the past week, pain has interfered with your general activity?  8    The following portions of the patient's history were reviewed and updated as appropriate: allergies, current medications, past family history, past medical history, past social history, past surgical history and problem list.    Review of Systems   Constitutional: Positive for activity change (decreased) and fatigue. Negative for chills and fever.   HENT: Negative for congestion.    Eyes: Negative for visual disturbance.   Respiratory: Negative for cough, chest tightness and shortness of breath.    Cardiovascular: Negative for chest pain.   Gastrointestinal: Positive for constipation. Negative for abdominal pain, diarrhea, nausea and vomiting.   Genitourinary: Negative for difficulty urinating, dyspareunia and dysuria.   Musculoskeletal: Positive for arthralgias (bilat shoulder) and neck pain.  "Negative for joint swelling (knees, right hip bursitis).   Neurological: Positive for weakness and numbness. Negative for dizziness, light-headedness and headaches.   Psychiatric/Behavioral: Positive for sleep disturbance. Negative for agitation. The patient is not nervous/anxious.      I have reviewed and confirmed the accuracy of the ROS as documented by the MA/LPN/RN NEWTON Jordan    Vitals:    03/02/22 1303   BP: 116/75   Pulse: 109   Temp: 96.9 °F (36.1 °C)   SpO2: 95%   Weight: 98.9 kg (218 lb)   Height: 160 cm (63\")   PainSc:   6   PainLoc: Shoulder  Comment: left     Objective   Physical Exam  Vitals and nursing note reviewed.   Constitutional:       General: She is not in acute distress.     Appearance: Normal appearance. She is not ill-appearing.   Pulmonary:      Effort: Pulmonary effort is normal. No respiratory distress.   Musculoskeletal:      Right shoulder: Tenderness present. Decreased range of motion.      Left shoulder: Tenderness present. Decreased range of motion.   Skin:     General: Skin is warm and dry.   Neurological:      Mental Status: She is alert and oriented to person, place, and time.      Motor: No weakness.      Gait: Gait abnormal (slowed).   Psychiatric:         Mood and Affect: Mood normal.         Behavior: Behavior normal.       Assessment/Plan   Diagnoses and all orders for this visit:    1. Chronic pain syndrome (Primary)    2. Arthritis, multiple joint involvement    3. Neck pain    4. Osteoarthritis of both shoulders, unspecified osteoarthritis type    5. Encounter for monitoring opioid maintenance therapy      --- Refill Hydrocodone. Patient appears stable with current regimen. No adverse effects. Regarding continuation of opioids, there is no evidence of aberrant behavior or any red flags.  The patient continues with appropriate response to opioid therapy. ADL's remain intact by self.   --- The urine drug screen confirmation from 9/1/2021 has been reviewed and " the result is appropriate based on patient history and GAYE report  --- The patient signed an updated copy of the controlled substance agreement on 5/26/2021  --- Toradol 60 mg IM today for acute flare   --- Follow-up 2 months          GAYE REPORT  As part of the patient's treatment plan, I am prescribing controlled substances. The patient has been made aware of appropriate use of such medications, including potential risk of somnolence, limited ability to drive and/or work safely, and the potential for dependence or overdose. It has also bee made clear that these medications are for use by this patient only, without concomitant use of alcohol or other substances unless prescribed.     Patient has completed prescribing agreement detailing terms of continued prescribing of controlled substances, including monitoring GAYE reports, urine drug screening, and pill counts if necessary. The patient is aware that inappropriate use will results in cessation of prescribing such medications.    As the clinician, I personally reviewed the GAYE from 3/2/2022 while the patient was in the office today.    History and physical exam exhibit continued safe and appropriate use of controlled substances.     Dictated utilizing Dragon dictation.     This document is intended for medical expert use only. Reading of this document by patients and/or patient's family without participating medical staff guidance may result in misinterpretation and unintended morbidity.   Any interpretation of such data is the responsibility of the patient and/or family member responsible for the patient in concert with their primary or specialist providers, not to be left for sources of online searches such as [employer], [employer] or similar queries. Relying on these approaches to knowledge may result in misinterpretation, misguided goals of care and even death should patients or family members try recommendations outside of the realm of professional medical  care in a supervised way.

## 2022-03-02 NOTE — PROGRESS NOTES
"Chief Complaint  surgery clearance (left eye)    Subjective          Chastity Salgado presents to [facility]  Chastity presents for preop clearance. She is scheduled for surgery on her left eye in 12 days. She denies any acute concerns currently.      Objective   Vital Signs:   /72 (BP Location: Left arm, Patient Position: Sitting, Cuff Size: Adult)   Pulse 99   Temp 97.5 °F (36.4 °C) (Temporal)   Ht 160 cm (62.99\")   Wt 99 kg (218 lb 4.8 oz)   SpO2 97%   BMI 38.68 kg/m²     Physical Exam  Vitals reviewed.   Constitutional:       General: She is not in acute distress.     Appearance: She is well-developed. She is not diaphoretic.   HENT:      Head: Normocephalic and atraumatic.      Right Ear: Tympanic membrane, ear canal and external ear normal.      Left Ear: Tympanic membrane, ear canal and external ear normal.      Nose: Nose normal.      Mouth/Throat:      Mouth: Mucous membranes are moist.      Pharynx: Oropharynx is clear. Uvula midline. No oropharyngeal exudate.   Eyes:      Conjunctiva/sclera: Conjunctivae normal.      Pupils: Pupils are equal, round, and reactive to light.   Cardiovascular:      Rate and Rhythm: Normal rate and regular rhythm.      Heart sounds: Normal heart sounds. No murmur heard.  No friction rub. No gallop.    Pulmonary:      Effort: Pulmonary effort is normal. No respiratory distress.      Breath sounds: Normal breath sounds. No wheezing or rales.   Abdominal:      General: Bowel sounds are normal. There is no distension.      Palpations: Abdomen is soft.      Tenderness: There is no abdominal tenderness.   Musculoskeletal:      Cervical back: Neck supple.   Skin:     General: Skin is warm and dry.   Neurological:      Mental Status: She is alert and oriented to person, place, and time.   Psychiatric:         Mood and Affect: Mood normal.        Result Review :                 Assessment and Plan    Diagnoses and all orders for this " visit:    1. Preoperative clearance (Primary)    2. Type 2 diabetes mellitus with other circulatory complication, without long-term current use of insulin (HCC)  -     Hemoglobin A1c    3. Medication management  -     Comprehensive metabolic panel  -     CBC & Differential        Follow Up   No follow-ups on file.  Patient was given instructions and counseling regarding her condition or for health maintenance advice. Please see specific information pulled into the AVS if appropriate.     Preop clearance, labs ordered, paperwork completed, ok to proceed with surgery pending controlled A1C, instructions given to patient, will fax paperwork with labs when available, cbc/cmp and A1C today    Diabetes: last A1C at goal, will repeat today

## 2022-03-03 LAB
ALBUMIN SERPL-MCNC: 4.1 G/DL (ref 3.7–4.7)
ALBUMIN/GLOB SERPL: 1.2 {RATIO} (ref 1.2–2.2)
ALP SERPL-CCNC: 83 IU/L (ref 44–121)
ALT SERPL-CCNC: 9 IU/L (ref 0–32)
AST SERPL-CCNC: 18 IU/L (ref 0–40)
BASOPHILS # BLD AUTO: 0.1 X10E3/UL (ref 0–0.2)
BASOPHILS NFR BLD AUTO: 1 %
BILIRUB SERPL-MCNC: 0.5 MG/DL (ref 0–1.2)
BUN SERPL-MCNC: 11 MG/DL (ref 8–27)
BUN/CREAT SERPL: 14 (ref 12–28)
CALCIUM SERPL-MCNC: 9.3 MG/DL (ref 8.7–10.3)
CHLORIDE SERPL-SCNC: 101 MMOL/L (ref 96–106)
CO2 SERPL-SCNC: 23 MMOL/L (ref 20–29)
CREAT SERPL-MCNC: 0.81 MG/DL (ref 0.57–1)
EGFR GENE MUT ANL BLD/T: 78 ML/MIN/1.73
EOSINOPHIL # BLD AUTO: 0.2 X10E3/UL (ref 0–0.4)
EOSINOPHIL NFR BLD AUTO: 3 %
ERYTHROCYTE [DISTWIDTH] IN BLOOD BY AUTOMATED COUNT: 12.9 % (ref 11.7–15.4)
GLOBULIN SER CALC-MCNC: 3.5 G/DL (ref 1.5–4.5)
GLUCOSE SERPL-MCNC: 132 MG/DL (ref 65–99)
HBA1C MFR BLD: 6.7 % (ref 4.8–5.6)
HCT VFR BLD AUTO: 38.8 % (ref 34–46.6)
HGB BLD-MCNC: 13.1 G/DL (ref 11.1–15.9)
IMM GRANULOCYTES # BLD AUTO: 0 X10E3/UL (ref 0–0.1)
IMM GRANULOCYTES NFR BLD AUTO: 0 %
LYMPHOCYTES # BLD AUTO: 2.3 X10E3/UL (ref 0.7–3.1)
LYMPHOCYTES NFR BLD AUTO: 41 %
MCH RBC QN AUTO: 31.3 PG (ref 26.6–33)
MCHC RBC AUTO-ENTMCNC: 33.8 G/DL (ref 31.5–35.7)
MCV RBC AUTO: 93 FL (ref 79–97)
MONOCYTES # BLD AUTO: 0.3 X10E3/UL (ref 0.1–0.9)
MONOCYTES NFR BLD AUTO: 6 %
NEUTROPHILS # BLD AUTO: 2.8 X10E3/UL (ref 1.4–7)
NEUTROPHILS NFR BLD AUTO: 49 %
PLATELET # BLD AUTO: 222 X10E3/UL (ref 150–450)
POTASSIUM SERPL-SCNC: 4 MMOL/L (ref 3.5–5.2)
PROT SERPL-MCNC: 7.6 G/DL (ref 6–8.5)
RBC # BLD AUTO: 4.19 X10E6/UL (ref 3.77–5.28)
SODIUM SERPL-SCNC: 141 MMOL/L (ref 134–144)
WBC # BLD AUTO: 5.7 X10E3/UL (ref 3.4–10.8)

## 2022-03-07 RX ORDER — GABAPENTIN 300 MG/1
CAPSULE ORAL
Qty: 60 CAPSULE | OUTPATIENT
Start: 2022-03-07

## 2022-03-07 NOTE — TELEPHONE ENCOUNTER
Rx Refill Note  Requested Prescriptions     Pending Prescriptions Disp Refills   • Ventolin  (90 Base) MCG/ACT inhaler [Pharmacy Med Name: VENTOLIN HFA 90 MCG INHALER] 18 g      Sig: INHALE TWO PUFFS BY MOUTH EVERY 4 HOURS AS NEEDED FOR WHEEZING      Last office visit with prescribing clinician: 3/2/2022      Next office visit with prescribing clinician: Visit date not found            Jose Cooney MA  03/07/22, 17:18 EST   Stable

## 2022-03-08 RX ORDER — ALBUTEROL SULFATE 90 UG/1
AEROSOL, METERED RESPIRATORY (INHALATION)
Qty: 18 G | Refills: 5 | Status: SHIPPED | OUTPATIENT
Start: 2022-03-08

## 2022-04-04 ENCOUNTER — TELEPHONE (OUTPATIENT)
Dept: PAIN MEDICINE | Facility: CLINIC | Age: 72
End: 2022-04-04

## 2022-04-04 RX ORDER — HYDROCODONE BITARTRATE AND ACETAMINOPHEN 10; 325 MG/1; MG/1
1 TABLET ORAL
Qty: 150 TABLET | Refills: 0 | Status: SHIPPED | OUTPATIENT
Start: 2022-04-04 | End: 2022-05-04 | Stop reason: SDUPTHER

## 2022-04-04 NOTE — TELEPHONE ENCOUNTER
UNABLE TO WARM TRANSFER TO CLINICAL - PATIENT SAID SHE CALLED LAST WEEK - DIDN'T SEE AN ENCOUNTER - NOW IS OUT OF HER HYDROCODONE - USES [facility] ON [address]; IN [place], [place]  [phone] - PATIENT WAS ADVISED - SOMEONE FROM PRACTICE WOULD CALL HER WITH STATUS.

## 2022-04-04 NOTE — TELEPHONE ENCOUNTER
Reviewed last office visit on 3/2/2022. UDS and GAYE reviewed and are appropriate. Due to Ana M Richardson, APRN being out of office, will refill appropriately.

## 2022-04-04 NOTE — TELEPHONE ENCOUNTER
Medication Refill Request    Date of phone call: 22    Medication being requested: Norco  mg   si tab po five times a day prn  Qty: 150    Date of last visit: 3/2/22    Date of last refill:     GAYE up to date?: no    Next Follow up?: 4/3/22    Any new pertinent information? (i.e, new medication allergies, new use of medications, change in patient's health or condition, non-compliance or inconsistency with prescribing agreement?): Can you refill this for Mary Ann. Patient is out of medication.

## 2022-04-15 RX ORDER — ONDANSETRON 4 MG/1
TABLET, FILM COATED ORAL
Qty: 30 TABLET | Refills: 1 | Status: SHIPPED | OUTPATIENT
Start: 2022-04-15 | End: 2022-05-24 | Stop reason: SDUPTHER

## 2022-04-15 NOTE — TELEPHONE ENCOUNTER
Rx Refill Note  Requested Prescriptions     Pending Prescriptions Disp Refills   • ondansetron (ZOFRAN) 4 MG tablet [Pharmacy Med Name: ONDANSETRON HCL 4 MG TABLET] 30 tablet 1     Sig: TAKE ONE TABLET BY MOUTH EVERY 8 HOURS AS NEEDED FOR NAUSEA AND VOMITING      Last office visit with prescribing clinician: 3/2/2022      Next office visit with prescribing clinician: Visit date not found       {TIP  Please add Last Relevant Lab Date if appropriate: 03/02/22    Mae Joy MA  04/15/22, 16:18 EDT

## 2022-04-19 ENCOUNTER — TELEPHONE (OUTPATIENT)
Dept: NEUROLOGY | Facility: CLINIC | Age: 72
End: 2022-04-19

## 2022-04-19 NOTE — TELEPHONE ENCOUNTER
Caller: Chastity Salgado    Relationship: Self    Best call back number: [phone]    What was the call regarding: THIS ENCOUNTER HAS BEEN CREATED, PER [facility] PROTOCOL, TO MAKE THE OFFICE AWARE THAT PT CALLED TO CANCEL HER 1 YEAR F/U APPT W/ DR. MONTANEZ ON 4/28/22. PT STATES HER TREMORS HAVE IMPROVED SINCE LAST TIME SHE WAS SEEN IN OFFICE AND DOES NOT FEEL THAT F/U APPT IS NECESSARY AT THIS TIME. I ADVISED PT THAT SHE MAY CALL BACK AT ANY TIME TO RESCHEDULE IF NEEDED. PT VERBALIZED UNDERSTANDING.    Do you require a callback: NO    DOCUMENTING PER [facility] PROTOCOL AS PT CALLED TO CANCEL F/U APPT W/ OPT TO NOT RESCHEDULE AT THE TIME OF CALL.

## 2022-04-26 ENCOUNTER — OFFICE VISIT (OUTPATIENT)
Dept: FAMILY MEDICINE CLINIC | Facility: CLINIC | Age: 72
End: 2022-04-26

## 2022-04-26 VITALS
HEIGHT: 63 IN | DIASTOLIC BLOOD PRESSURE: 80 MMHG | TEMPERATURE: 97.3 F | BODY MASS INDEX: 39.23 KG/M2 | SYSTOLIC BLOOD PRESSURE: 130 MMHG | RESPIRATION RATE: 18 BRPM | HEART RATE: 65 BPM | WEIGHT: 221.4 LBS | OXYGEN SATURATION: 97 %

## 2022-04-26 DIAGNOSIS — F41.0 PANIC ATTACKS: Primary | ICD-10-CM

## 2022-04-26 DIAGNOSIS — J01.00 ACUTE NON-RECURRENT MAXILLARY SINUSITIS: ICD-10-CM

## 2022-04-26 PROCEDURE — 99214 OFFICE O/P EST MOD 30 MIN: CPT | Performed by: NURSE PRACTITIONER

## 2022-04-26 RX ORDER — DOXYCYCLINE HYCLATE 100 MG/1
100 CAPSULE ORAL 2 TIMES DAILY
Qty: 14 CAPSULE | Refills: 0 | Status: SHIPPED | OUTPATIENT
Start: 2022-04-26 | End: 2022-07-14

## 2022-04-26 RX ORDER — LORAZEPAM 0.5 MG/1
0.5 TABLET ORAL DAILY PRN
Qty: 20 TABLET | Refills: 0 | Status: SHIPPED | OUTPATIENT
Start: 2022-04-26

## 2022-04-26 NOTE — PROGRESS NOTES
"Chief Complaint  URI (Sinus pressure x 1 wk, headache x 1 wk)    Subjective          Chastity Salgado presents to [facility]  Chastity presents with one week history of sinus headaches, intermittent. States gets nauseated with headaches. Right ear pain, mostly at night. Denies fever or chills. Last week sinus pressure. Using zyrtec and flonase as directed, with some improvement. Rhinorrhea.     She does report increased anxiety. She tried sleeping medication but states that did not work. She would like something for acute anxiety to [facility] manage her symptoms. She lives with her daughter and her daughter's fiance and states her daughter takes out her stress on her.      URI   This is a new problem. The current episode started in the past 7 days. The problem has been unchanged. There has been no fever. Associated symptoms include congestion, ear pain, headaches, rhinorrhea and sinus pain. Pertinent negatives include no abdominal pain, chest pain, coughing, diarrhea, dysuria, joint pain, joint swelling, nausea, neck pain, plugged ear sensation, rash, sneezing, sore throat, swollen glands, vomiting or wheezing. She has tried antihistamine for the symptoms. The treatment provided mild relief.       Objective   Vital Signs:   /80 (BP Location: Left arm, Patient Position: Sitting, Cuff Size: Adult)   Pulse 65   Temp 97.3 °F (36.3 °C) (Temporal)   Resp 18   Ht 160 cm (62.99\")   Wt 100 kg (221 lb 6.4 oz)   SpO2 97%   BMI 39.23 kg/m²     Physical Exam  Constitutional:       Appearance: Normal appearance.   HENT:      Right Ear: Tympanic membrane and ear canal normal. There is no impacted cerumen.      Left Ear: Tympanic membrane and ear canal normal. There is no impacted cerumen.      Nose: Congestion present.      Right Sinus: Maxillary sinus tenderness present.      Left Sinus: Maxillary sinus tenderness present.      Mouth/Throat:      Mouth: Mucous membranes are moist.      " Pharynx: Oropharynx is clear. Posterior oropharyngeal erythema present.   Eyes:      Conjunctiva/sclera: Conjunctivae normal.      Pupils: Pupils are equal, round, and reactive to light.   Cardiovascular:      Rate and Rhythm: Normal rate and regular rhythm.      Heart sounds: No murmur heard.    No friction rub. No gallop.   Pulmonary:      Effort: Pulmonary effort is normal. No respiratory distress.      Breath sounds: Normal breath sounds. No wheezing, rhonchi or rales.   Skin:     General: Skin is warm and dry.   Neurological:      Mental Status: She is alert and oriented to person, place, and time.   Psychiatric:         Mood and Affect: Mood normal.        Result Review :                 Assessment and Plan    Diagnoses and all orders for this visit:    1. Panic attacks (Primary)  -     LORazepam (Ativan) 0.5 MG tablet; Take 1 tablet by mouth Daily As Needed for Anxiety.  Dispense: 20 tablet; Refill: 0    2. Acute non-recurrent maxillary sinusitis    Other orders  -     doxycycline (VIBRAMYCIN) 100 MG capsule; Take 1 capsule by mouth 2 (Two) Times a Day.  Dispense: 14 capsule; Refill: 0  -     mupirocin (Bactroban) 2 % ointment; Apply  topically to the appropriate area as directed 2 (Two) Times a Day.  Dispense: 22 g; Refill: 0               Follow Up   No follow-ups on file.  Patient was given instructions and counseling regarding her condition or for health maintenance advice. Please see specific information pulled into the AVS if appropriate.     Acute anxiety/panic attacks: recommend ativan prn, discussed if 20 tablets last 4-6 months will continue, however if having more episodes, would consider psychiatry to manage symptoms or consider alternative treatment    Sinusitis: start doxycycline as directed, continue allergy medications    Acne: doxycycline, she has a follow up with derm

## 2022-04-28 NOTE — TELEPHONE ENCOUNTER
Caller: REGINE OLIVER    Relationship: SELF    Best call back number: [phone]    Requested Prescriptions: HYDROCODONE- ACETAMINOPHEN  M TAB 5 TIMES A DAY  Requested Prescriptions      No prescriptions requested or ordered in this encounter        Pharmacy where request should be sent: [facility]: [id number]: [phone]    Additional details provided by patient:     Does the patient have less than a 3 day supply:  [] Yes  [x] No    Deisy Real   22 12:05 EDT

## 2022-05-04 RX ORDER — HYDROCODONE BITARTRATE AND ACETAMINOPHEN 10; 325 MG/1; MG/1
1 TABLET ORAL
Qty: 150 TABLET | Refills: 0 | Status: SHIPPED | OUTPATIENT
Start: 2022-05-04 | End: 2022-05-31 | Stop reason: SDUPTHER

## 2022-05-06 ENCOUNTER — OFFICE VISIT (OUTPATIENT)
Dept: PAIN MEDICINE | Facility: CLINIC | Age: 72
End: 2022-05-06

## 2022-05-06 VITALS
HEART RATE: 101 BPM | TEMPERATURE: 96 F | WEIGHT: 225 LBS | HEIGHT: 63 IN | BODY MASS INDEX: 39.87 KG/M2 | SYSTOLIC BLOOD PRESSURE: 160 MMHG | DIASTOLIC BLOOD PRESSURE: 85 MMHG | OXYGEN SATURATION: 93 %

## 2022-05-06 DIAGNOSIS — M19.011 OSTEOARTHRITIS OF BOTH SHOULDERS, UNSPECIFIED OSTEOARTHRITIS TYPE: ICD-10-CM

## 2022-05-06 DIAGNOSIS — T40.2X5A THERAPEUTIC OPIOID INDUCED CONSTIPATION: ICD-10-CM

## 2022-05-06 DIAGNOSIS — G89.4 CHRONIC PAIN SYNDROME: Primary | ICD-10-CM

## 2022-05-06 DIAGNOSIS — Z79.891 ENCOUNTER FOR MONITORING OPIOID MAINTENANCE THERAPY: ICD-10-CM

## 2022-05-06 DIAGNOSIS — M54.2 NECK PAIN: ICD-10-CM

## 2022-05-06 DIAGNOSIS — M12.9 ARTHRITIS, MULTIPLE JOINT INVOLVEMENT: ICD-10-CM

## 2022-05-06 DIAGNOSIS — K59.03 THERAPEUTIC OPIOID INDUCED CONSTIPATION: ICD-10-CM

## 2022-05-06 DIAGNOSIS — Z51.81 ENCOUNTER FOR MONITORING OPIOID MAINTENANCE THERAPY: ICD-10-CM

## 2022-05-06 DIAGNOSIS — M19.012 OSTEOARTHRITIS OF BOTH SHOULDERS, UNSPECIFIED OSTEOARTHRITIS TYPE: ICD-10-CM

## 2022-05-06 PROCEDURE — 80305 DRUG TEST PRSMV DIR OPT OBS: CPT | Performed by: NURSE PRACTITIONER

## 2022-05-06 PROCEDURE — 99214 OFFICE O/P EST MOD 30 MIN: CPT | Performed by: NURSE PRACTITIONER

## 2022-05-06 RX ORDER — NALOXONE HYDROCHLORIDE 4 MG/.1ML
1 SPRAY NASAL AS NEEDED
Qty: 1 EACH | Refills: 0 | Status: SHIPPED | OUTPATIENT
Start: 2022-05-06

## 2022-05-06 RX ORDER — NALOXEGOL OXALATE 25 MG/1
25 TABLET, FILM COATED ORAL EVERY MORNING
Qty: 30 TABLET | Refills: 5 | Status: SHIPPED | OUTPATIENT
Start: 2022-05-06 | End: 2022-11-10 | Stop reason: SDUPTHER

## 2022-05-06 NOTE — PROGRESS NOTES
CHIEF COMPLAINT  F/U neck pain- patient states that her pain has remained the same since her last visit.     Subjective   Chastity Salgado is a 72 y.o. female  who presents for follow-up.  She has a history of chronic neck pain.    left suprascapular nerve block   on  2/25/2021 - 50% relief for 1 month   left suprascapular nerve block/steroid injection - posterior approach   on  9/22/2020 - 70% relief for 1-2 months    C/o diffuse joint pain.  Pain today 7/10 in severity. Taking Hydrocodone 10/325 5/day, Cymbalta 60 mg daily.  Denies adverse reactions. Reports at least moderate pain reduction with regimen reports that she would not be able to function without it.  Constipation reported, currently managed with Movantik. She has stopped Celebrex recently due to GI issues.  Did not tolerate Gabapentin (sleep walking).      Rheumatologist - Dr. Lucero.  Saw recently, told not rheumatoid.  All OA.     Counseling - Dr. Edison Vásquez - ortho.  Left shoulder injections periodically. History of multiple right shoulder surgeries.  Says right shoulder worse recently, saw Dr. Vásquez recently and was told hardware was loose, needs another total shoulder replacement. Waiting to f/u due to eye surgeries.      Given a small quantity of Lorazepam by PCP, was told #20 needed to last for 6 months.  This is for acute stress/antiexy reaction.      Patient remained masked during entire encounter. No cough present. I donned a mask and eye protection throughout entire visit. Prior to donning mask and eye protection, hand hygiene was performed, as well as when it was doffed.  I was closer than 6 feet, but not for an extended period of time. No obvious exposure to any bodily fluids.    Joint Pain  This is a chronic problem. The current episode started more than 1 year ago. The problem occurs daily. The problem has been waxing and waning. Associated symptoms include arthralgias (bilat shoulder), neck pain, numbness and weakness.  Pertinent negatives include no abdominal pain, chest pain, chills, congestion, coughing, fatigue, fever, headaches, joint swelling (knees, right hip bursitis), nausea or vomiting. Exacerbated by: activity. She has tried oral narcotics, acetaminophen, heat and NSAIDs for the symptoms. The treatment provided moderate relief.   Neck Pain   This is a chronic problem. The problem occurs daily. The problem has been waxing and waning. The pain is present in the anterior neck, left side and right side. The quality of the pain is described as aching and burning. The pain is at a severity of 7/10. The pain is moderate. Exacerbated by: movement of neck, use of arms/shoulders. Associated symptoms include numbness and weakness. Pertinent negatives include no chest pain, fever or headaches. She has tried NSAIDs, oral narcotics, muscle relaxants and neck support for the symptoms. The treatment provided moderate relief.      PEG Assessment   What number best describes your pain on average in the past week?8  What number best describes how, during the past week, pain has interfered with your enjoyment of life?8  What number best describes how, during the past week, pain has interfered with your general activity?  8    The following portions of the patient's history were reviewed and updated as appropriate: allergies, current medications, past family history, past medical history, past social history, past surgical history and problem list.    Review of Systems   Constitutional: Positive for activity change (decreased). Negative for chills, fatigue and fever.   HENT: Negative for congestion.    Eyes: Negative for visual disturbance.   Respiratory: Negative for cough, chest tightness and shortness of breath.    Cardiovascular: Negative for chest pain.   Gastrointestinal: Positive for constipation. Negative for abdominal pain, diarrhea, nausea and vomiting.   Genitourinary: Negative for difficulty urinating, dyspareunia and dysuria.  "  Musculoskeletal: Positive for arthralgias (bilat shoulder) and neck pain. Negative for joint swelling (knees, right hip bursitis).   Neurological: Positive for weakness and numbness. Negative for dizziness, light-headedness and headaches.   Psychiatric/Behavioral: Positive for sleep disturbance. Negative for agitation, self-injury and suicidal ideas. The patient is nervous/anxious.      I have reviewed and confirmed the accuracy of the ROS as documented by the MA/LPN/RN NEWTON Jordan    Vitals:    05/06/22 1237   BP: 160/85   Pulse: 101   Temp: 96 °F (35.6 °C)   SpO2: 93%   Weight: 102 kg (225 lb)   Height: 160 cm (63\")   PainSc:   7   PainLoc: Neck     Objective   Physical Exam  Vitals and nursing note reviewed.   Constitutional:       General: She is not in acute distress.     Appearance: Normal appearance. She is not ill-appearing.   Pulmonary:      Effort: Pulmonary effort is normal. No respiratory distress.   Musculoskeletal:      Right shoulder: Tenderness present. Decreased range of motion.      Left shoulder: Tenderness present. Decreased range of motion.   Skin:     General: Skin is warm and dry.   Neurological:      Mental Status: She is alert and oriented to person, place, and time.      Motor: No weakness.      Gait: Gait abnormal (slowed).   Psychiatric:         Mood and Affect: Mood normal.         Behavior: Behavior normal.       Assessment/Plan   Diagnoses and all orders for this visit:    1. Chronic pain syndrome (Primary)    2. Arthritis, multiple joint involvement    3. Osteoarthritis of both shoulders, unspecified osteoarthritis type    4. Neck pain    5. Encounter for monitoring opioid maintenance therapy    6. Therapeutic opioid induced constipation    Other orders  -     Naloxegol Oxalate (Movantik) 25 MG tablet; Take 1 tablet by mouth Every Morning.  Dispense: 30 tablet; Refill: 5  -     naloxone (NARCAN) 4 MG/0.1ML nasal spray; 1 spray into the nostril(s) as directed by provider " As Needed (overdose, respiratory depression).  Dispense: 1 each; Refill: 0      --- Left suprascapular RFA when she is ready to schedule   --- Continue Hydrocodone. Refill not needed today.  Patient appears stable with current regimen. No adverse effects. Regarding continuation of opioids, there is no evidence of aberrant behavior or any red flags.  The patient continues with appropriate response to opioid therapy. ADL's remain intact by self.   --- Routine UDS in office today as part of monitoring requirements for controlled substances.  The specimen was viewed and the immunoassay result reviewed and is +OPI.  This specimen will be sent to [facility] laboratory for confirmation.     --- The patient signed an updated copy of the controlled substance agreement on 5/6/2022  --- Narcan to pharmacy. Patient understands not to take Lorazepam in conjunction with her pain medication.   --- Follow-up 2 months (benzo is only intended for very sparing/acute use, continuation with bi-monthly f/u is appropriate for now)         GAYE REPORT  As part of the patient's treatment plan, I am prescribing controlled substances. The patient has been made aware of appropriate use of such medications, including potential risk of somnolence, limited ability to drive and/or work safely, and the potential for dependence or overdose. It has also bee made clear that these medications are for use by this patient only, without concomitant use of alcohol or other substances unless prescribed.     Patient has completed prescribing agreement detailing terms of continued prescribing of controlled substances, including monitoring GAYE reports, urine drug screening, and pill counts if necessary. The patient is aware that inappropriate use will results in cessation of prescribing such medications.    As the clinician, I personally reviewed the GAYE from 5/6/2022 while the patient was in the office today.    History and physical exam exhibit  continued safe and appropriate use of controlled substances.     Dictated utilizing Dragon dictation.     This document is intended for medical expert use only. Reading of this document by patients and/or patient's family without participating medical staff guidance may result in misinterpretation and unintended morbidity.   Any interpretation of such data is the responsibility of the patient and/or family member responsible for the patient in concert with their primary or specialist providers, not to be left for sources of online searches such as [employer], [employer] or similar queries. Relying on these approaches to knowledge may result in misinterpretation, misguided goals of care and even death should patients or family members try recommendations outside of the realm of professional medical care in a supervised way.

## 2022-05-24 ENCOUNTER — OFFICE VISIT (OUTPATIENT)
Dept: FAMILY MEDICINE CLINIC | Facility: CLINIC | Age: 72
End: 2022-05-24

## 2022-05-24 VITALS
DIASTOLIC BLOOD PRESSURE: 86 MMHG | TEMPERATURE: 97.8 F | HEART RATE: 83 BPM | OXYGEN SATURATION: 97 % | WEIGHT: 262.3 LBS | BODY MASS INDEX: 46.48 KG/M2 | HEIGHT: 63 IN | SYSTOLIC BLOOD PRESSURE: 130 MMHG

## 2022-05-24 DIAGNOSIS — R11.2 NAUSEA AND VOMITING, UNSPECIFIED VOMITING TYPE: ICD-10-CM

## 2022-05-24 DIAGNOSIS — E11.59 TYPE 2 DIABETES MELLITUS WITH OTHER CIRCULATORY COMPLICATION, WITHOUT LONG-TERM CURRENT USE OF INSULIN: Primary | ICD-10-CM

## 2022-05-24 PROCEDURE — 99214 OFFICE O/P EST MOD 30 MIN: CPT | Performed by: NURSE PRACTITIONER

## 2022-05-24 RX ORDER — ONDANSETRON 4 MG/1
4 TABLET, FILM COATED ORAL EVERY 8 HOURS PRN
Qty: 30 TABLET | Refills: 1 | Status: SHIPPED | OUTPATIENT
Start: 2022-05-24 | End: 2022-07-14 | Stop reason: SDUPTHER

## 2022-05-24 RX ORDER — FLUCONAZOLE 150 MG/1
150 TABLET ORAL ONCE
Qty: 3 TABLET | Refills: 0 | Status: SHIPPED | OUTPATIENT
Start: 2022-05-24 | End: 2022-05-24

## 2022-05-24 RX ORDER — SEMAGLUTIDE 1.34 MG/ML
0.5 INJECTION, SOLUTION SUBCUTANEOUS WEEKLY
Qty: 1 PEN | Refills: 0 | Status: SHIPPED | OUTPATIENT
Start: 2022-05-24 | End: 2022-10-27

## 2022-05-24 NOTE — PROGRESS NOTES
"Chief Complaint  Vomiting    Subjective          Chastity Salgado presents to [facility]  Episode of belching, nausea and vomiting, emesis x 2 last week, states had strawberries and crackers night before and vomited that up the next day  States had raw vegetables prior to episode a few times  She is taking ozempic, tolerating for the most part, will have similar episodes after taking dose of medication  Resumed celebrex due to OA     Vomiting   This is a recurrent problem. The current episode started 1 to 4 weeks ago. The problem occurs intermittently. The problem has been waxing and waning. The emesis has an appearance of stomach contents. There has been no fever. Associated symptoms include abdominal pain and arthralgias. Pertinent negatives include no chest pain, chills, coughing, diarrhea, dizziness, fever, headaches, myalgias, sweats, URI or weight loss.       Objective   Vital Signs:  /86   Pulse 83   Temp 97.8 °F (36.6 °C)   Ht 160 cm (63\")   Wt 119 kg (262 lb 4.8 oz)   SpO2 97%   BMI 46.46 kg/m²         Physical Exam  Vitals reviewed.   Constitutional:       Appearance: Normal appearance.   Cardiovascular:      Rate and Rhythm: Normal rate and regular rhythm.      Heart sounds: No murmur heard.    No friction rub. No gallop.   Pulmonary:      Effort: Pulmonary effort is normal. No respiratory distress.      Breath sounds: Normal breath sounds. No wheezing, rhonchi or rales.   Abdominal:      General: Bowel sounds are normal.      Palpations: Abdomen is soft.   Skin:     General: Skin is warm and dry.   Neurological:      Mental Status: She is alert and oriented to person, place, and time.   Psychiatric:         Mood and Affect: Mood normal.        Result Review :                 Assessment and Plan    Diagnoses and all orders for this visit:    1. Type 2 diabetes mellitus with other circulatory complication, without long-term current use of insulin (HCC) " (Primary)  -     Semaglutide,0.25 or 0.5MG/DOS, (Ozempic, 0.25 or 0.5 MG/DOSE,) 2 MG/1.5ML solution pen-injector; Inject 0.5 mg under the skin into the appropriate area as directed 1 (One) Time Per Week.  Dispense: 1 pen; Refill: 0    2. Nausea and vomiting, unspecified vomiting type    Other orders  -     ondansetron (ZOFRAN) 4 MG tablet; Take 1 tablet by mouth Every 8 (Eight) Hours As Needed for Nausea or Vomiting.  Dispense: 30 tablet; Refill: 1  -     fluconazole (Diflucan) 150 MG tablet; Take 1 tablet by mouth 1 (One) Time for 1 dose. Repeat in 3 days x 2  Dispense: 3 tablet; Refill: 0             Follow Up   No follow-ups on file.  Patient was given instructions and counseling regarding her condition or for health maintenance advice. Please see specific information pulled into the AVS if appropriate.     Diabetes: not maximized on ozempic, suspect this medication may be contributing to some symptoms,  Nausea and vomiting, normal gastric emptying, scheduled with gastro but it was cancelled, requests sucralfate, advised this will likely not help her symptoms  She is taking dicyclomine prn, recommend to trial daily and monitor symptoms  Discussed constipation, suspect this is an underlying factor, will take miralax as needed

## 2022-05-26 ENCOUNTER — TELEPHONE (OUTPATIENT)
Dept: FAMILY MEDICINE CLINIC | Facility: CLINIC | Age: 72
End: 2022-05-26

## 2022-05-26 NOTE — TELEPHONE ENCOUNTER
Deuce received a prescription, fluconazole, but didn't see it in her chart. They are calling because this medicine has a drug interaction with her other medicine.     Please call Deuce at [phone]    Please advise

## 2022-05-26 NOTE — TELEPHONE ENCOUNTER
Spoke to pharmacist, ok to fill diflucan, patient was advised to cut movantik dose in half while taking diflucan.

## 2022-06-01 RX ORDER — HYDROCODONE BITARTRATE AND ACETAMINOPHEN 10; 325 MG/1; MG/1
1 TABLET ORAL
Qty: 150 TABLET | Refills: 0 | Status: SHIPPED | OUTPATIENT
Start: 2022-06-01 | End: 2022-06-27 | Stop reason: SDUPTHER

## 2022-06-27 NOTE — TELEPHONE ENCOUNTER
Medication Refill Request    Date of phone call: 6/27/2022    Medication being requested: hydro/apap  mg sig: take 1 tab 5 times a day prn  Qty: 150    Date of last visit: 5/6/2022    Date of last refill: 6/1/2022    GAYE up to date?: yes    Next Follow up?: 7/5/2022    Any new pertinent information? (i.e, new medication allergies, new use of medications, change in patient's health or condition, non-compliance or inconsistency with prescribing agreement?): Ms. Salgado will be out of medication before her next appt.

## 2022-06-29 RX ORDER — HYDROCODONE BITARTRATE AND ACETAMINOPHEN 10; 325 MG/1; MG/1
1 TABLET ORAL
Qty: 150 TABLET | Refills: 0 | Status: SHIPPED | OUTPATIENT
Start: 2022-06-29 | End: 2022-07-27 | Stop reason: SDUPTHER

## 2022-07-05 ENCOUNTER — OFFICE VISIT (OUTPATIENT)
Dept: PAIN MEDICINE | Facility: CLINIC | Age: 72
End: 2022-07-05

## 2022-07-05 VITALS
WEIGHT: 224.2 LBS | HEART RATE: 81 BPM | RESPIRATION RATE: 16 BRPM | DIASTOLIC BLOOD PRESSURE: 76 MMHG | SYSTOLIC BLOOD PRESSURE: 143 MMHG | OXYGEN SATURATION: 99 % | HEIGHT: 63 IN | BODY MASS INDEX: 39.73 KG/M2 | TEMPERATURE: 97.7 F

## 2022-07-05 DIAGNOSIS — Z51.81 ENCOUNTER FOR MONITORING OPIOID MAINTENANCE THERAPY: ICD-10-CM

## 2022-07-05 DIAGNOSIS — Z79.891 ENCOUNTER FOR MONITORING OPIOID MAINTENANCE THERAPY: ICD-10-CM

## 2022-07-05 DIAGNOSIS — M54.2 NECK PAIN: ICD-10-CM

## 2022-07-05 DIAGNOSIS — G89.4 CHRONIC PAIN SYNDROME: Primary | ICD-10-CM

## 2022-07-05 DIAGNOSIS — M19.011 OSTEOARTHRITIS OF BOTH SHOULDERS, UNSPECIFIED OSTEOARTHRITIS TYPE: ICD-10-CM

## 2022-07-05 DIAGNOSIS — M19.012 OSTEOARTHRITIS OF BOTH SHOULDERS, UNSPECIFIED OSTEOARTHRITIS TYPE: ICD-10-CM

## 2022-07-05 DIAGNOSIS — T40.2X5A THERAPEUTIC OPIOID INDUCED CONSTIPATION: ICD-10-CM

## 2022-07-05 DIAGNOSIS — K59.03 THERAPEUTIC OPIOID INDUCED CONSTIPATION: ICD-10-CM

## 2022-07-05 DIAGNOSIS — M12.9 ARTHRITIS, MULTIPLE JOINT INVOLVEMENT: ICD-10-CM

## 2022-07-05 PROCEDURE — 99214 OFFICE O/P EST MOD 30 MIN: CPT

## 2022-07-05 PROCEDURE — 96372 THER/PROPH/DIAG INJ SC/IM: CPT

## 2022-07-05 RX ORDER — KETOROLAC TROMETHAMINE 30 MG/ML
60 INJECTION, SOLUTION INTRAMUSCULAR; INTRAVENOUS ONCE
Status: COMPLETED | OUTPATIENT
Start: 2022-07-05 | End: 2022-07-05

## 2022-07-05 RX ORDER — CYCLOBENZAPRINE HCL 10 MG
10 TABLET ORAL NIGHTLY PRN
Qty: 30 TABLET | Refills: 3 | Status: SHIPPED | OUTPATIENT
Start: 2022-07-05 | End: 2022-10-26

## 2022-07-05 RX ADMIN — KETOROLAC TROMETHAMINE 60 MG: 30 INJECTION, SOLUTION INTRAMUSCULAR; INTRAVENOUS at 12:34

## 2022-07-05 NOTE — PROGRESS NOTES
CHIEF COMPLAINT  Neck and bilateral shoulder pain    Subjective   Chastity Salgado is a 72 y.o. female  who presents for follow-up.  She has a history of bilateral shoulder and neck pain. She reports pain has increased slightly since her last visit.     Today pain is 7/10VAS in severity. Pain is located in her bilateral shoulders and neck (right and left side). She reports an increased in right shoulder pain. She needs to schedule surgery but is holding off due to being worried about caring for herself after surgery is over. Describes this pain as an intermittent ache. Pain is worsened by certain movements, prolonged positions, climbing stairs, bending/twisting, and weather. Pain improves with rest, reposition, heat/ice, and medications.     Continues with Hydrocodone 10mg 5/day, Cymbalta 60mg, Flexeril 10mg at HS. She reports occasional constipation that she manages with Metamucil and Movantik. Denies any side effects from the regimen including somnolence. The regimen helps decrease pain by 75%. Notes improvement in activity and function with regimen. ADL's by self. Denies any bowel or bladder changes.     Patient remained masked during entire encounter. No cough present. I donned a mask and eye protection throughout entire visit. Prior to donning mask and eye protection, hand hygiene was performed, as well as when it was doffed.  I was closer than 6 feet, but not for an extended period of time. No obvious exposure to any bodily fluids.    Joint Pain  This is a chronic problem. The current episode started more than 1 year ago. The problem occurs intermittently. The problem has been waxing and waning. Associated symptoms include arthralgias, fatigue, neck pain, numbness (left hand) and weakness (left hand). Pertinent negatives include no abdominal pain, chest pain, congestion, coughing, fever or headaches. The symptoms are aggravated by walking, bending, exertion, twisting and standing (cold weather, stairs). She  has tried oral narcotics, position changes, heat, ice and rest for the symptoms.   Neck Pain   This is a chronic problem. The current episode started more than 1 year ago. The problem occurs intermittently. The problem has been waxing and waning. The pain is associated with a sleep position. The pain is present in the left side and right side. The quality of the pain is described as aching. The pain is at a severity of 7/10. The symptoms are aggravated by twisting, position and bending. Stiffness is present in the morning. Associated symptoms include numbness (left hand) and weakness (left hand). Pertinent negatives include no chest pain, fever or headaches. She has tried ice, heat and oral narcotics for the symptoms.      PEG Assessment   What number best describes your pain on average in the past week?8  What number best describes how, during the past week, pain has interfered with your enjoyment of life?8  What number best describes how, during the past week, pain has interfered with your general activity?  8    Review of Pertinent Medical Data ---  Reviewed office note from NEWTON Jordan from 5/6/2022.  Patient has a history of chronic neck pain, presents today for a follow-up from procedure. She had a left suprascapular nerve block/steroid injection posterior approach on 2/25/2021.  She reported 50% pain relief for a month.  Previously had the same procedure on 9/22/2020 and reported 70% relief for 1 to 2 months.  Medication regimen includes Hydrocodone and Cymbalta.  Dr. Lucero is her rheumatologist.  He recently told her that she has all osteoarthritis and not rheumatoid.  Sees Dr. Hogan for counseling.  Sees Dr. Vásquez for Ortho, gets left shoulder injections periodically history of multiple right shoulder surgeries.  She reports right shoulder pain has been worse recently and was told to hardware was loose and needs another total shoulder replacement.  She is waiting to schedule that due to eye  "surgery.  Takes lorazepam for acute stress and anxiety, this was prescribed by her PCP.  Was given a total of 20 tablets and was told this needed to last 6 months.     The following portions of the patient's history were reviewed and updated as appropriate: allergies, current medications, past family history, past medical history, past social history, past surgical history and problem list.    Review of Systems   Constitutional: Positive for fatigue. Negative for activity change and fever.   HENT: Negative for congestion.    Eyes: Positive for visual disturbance (blurred vision OD eye ).   Respiratory: Negative for cough and chest tightness.    Cardiovascular: Negative for chest pain.   Gastrointestinal: Positive for constipation and diarrhea. Negative for abdominal pain.   Genitourinary: Negative for difficulty urinating and dysuria.   Musculoskeletal: Positive for arthralgias and neck pain.   Neurological: Positive for weakness (left hand) and numbness (left hand). Negative for dizziness, light-headedness and headaches.   Psychiatric/Behavioral: Positive for sleep disturbance. Negative for agitation and suicidal ideas. The patient is not nervous/anxious.      I have reviewed and confirmed the accuracy of the ROS as documented by the MA/LPN/RN NEWTON Abdul    Vitals:    07/05/22 1148   BP: 143/76   BP Location: Left arm   Patient Position: Sitting   Pulse: 81   Resp: 16   Temp: 97.7 °F (36.5 °C)   SpO2: 99%   Weight: 102 kg (224 lb 3.2 oz)   Height: 160 cm (63\")   PainSc:   7   PainLoc: Neck     Objective   Physical Exam  Constitutional:       Appearance: Normal appearance.   HENT:      Head: Normocephalic.   Cardiovascular:      Rate and Rhythm: Normal rate and regular rhythm.   Pulmonary:      Effort: Pulmonary effort is normal.      Breath sounds: Normal breath sounds.   Musculoskeletal:      Right shoulder: Tenderness present. Decreased range of motion.      Left shoulder: Tenderness present. " Decreased range of motion.      Cervical back: Pain with movement present. Decreased range of motion.      Lumbar back: Tenderness present. Decreased range of motion.   Skin:     General: Skin is warm and dry.      Capillary Refill: Capillary refill takes less than 2 seconds.   Neurological:      General: No focal deficit present.      Mental Status: She is alert and oriented to person, place, and time.      Gait: Gait abnormal (slowed).   Psychiatric:         Mood and Affect: Mood normal.         Speech: Speech normal.         Behavior: Behavior normal.         Thought Content: Thought content normal.         Cognition and Memory: Cognition normal.       Assessment & Plan   Diagnoses and all orders for this visit:    1. Chronic pain syndrome  -     ketorolac (TORADOL) injection 60 mg    2. Arthritis, multiple joint involvement  -     ketorolac (TORADOL) injection 60 mg    3. Osteoarthritis of both shoulders, unspecified osteoarthritis type  -     ketorolac (TORADOL) injection 60 mg    4. Neck pain  -     ketorolac (TORADOL) injection 60 mg    5. Encounter for monitoring opioid maintenance therapy  -     ketorolac (TORADOL) injection 60 mg    6. Therapeutic opioid induced constipation  -     ketorolac (TORADOL) injection 60 mg    --- Left suprascapular RFA when she is ready to schedule  --- Toradol 60mg IM today for acute flare  --- The urine drug screen confirmation from 5/6/22 has been reviewed and the result is appropriate based on patient history and GAYE report.  --- CSA updated on 5/6/22.   --- Continue Hydrocodone. No refill needed at this. Patient appears stable with current regimen. No adverse effects. Regarding continuation of opioids, there is no evidence of aberrant behavior or any red flags.  The patient continues with appropriate response to opioid therapy. ADL's remain intact by self.   --- Refill Flexeril.    --- Follow up 2 months or sooner if needed    GAYE REPORT  As part of the patient's  treatment plan, I am prescribing controlled substances. The patient has been made aware of appropriate use of such medications, including potential risk of somnolence, limited ability to drive and/or work safely, and the potential for dependence or overdose. It has also been made clear that these medications are for use by this patient only, without concomitant use of alcohol or other substances unless prescribed.     Patient has completed prescribing agreement detailing terms of continued prescribing of controlled substances, including monitoring GAYE reports, urine drug screening, and pill counts if necessary. The patient is aware that inappropriate use will results in cessation of prescribing such medications.    As the clinician, I personally reviewed the GAYE from 7/5/22 while the patient was in the office today (Scanned into chart)    History and physical exam exhibit continued safe and appropriate use of controlled substances.    Dictated utilizing Dragon dictation.     This document is intended for medical expert use only. Reading of this document by patients and/or patient's family without participating medical staff guidance may result in misinterpretation and unintended morbidity.   Any interpretation of such data is the responsibility of the patient and/or family member responsible for the patient in concert with their primary or specialist providers, not to be left for sources of online searches such as [employer], [employer] or similar queries. Relying on these approaches to knowledge may result in misinterpretation, misguided goals of care and even death should patients or family members try recommendations outside of the realm of professional medical care in a supervised way.

## 2022-07-14 ENCOUNTER — TELEPHONE (OUTPATIENT)
Dept: FAMILY MEDICINE CLINIC | Facility: CLINIC | Age: 72
End: 2022-07-14

## 2022-07-14 RX ORDER — ONDANSETRON 4 MG/1
4 TABLET, FILM COATED ORAL EVERY 8 HOURS PRN
Qty: 30 TABLET | Refills: 1 | Status: SHIPPED | OUTPATIENT
Start: 2022-07-14 | End: 2022-10-28 | Stop reason: SDUPTHER

## 2022-07-14 RX ORDER — SULFAMETHOXAZOLE AND TRIMETHOPRIM 800; 160 MG/1; MG/1
1 TABLET ORAL 2 TIMES DAILY
Qty: 20 TABLET | Refills: 0 | Status: SHIPPED | OUTPATIENT
Start: 2022-07-14 | End: 2023-01-10

## 2022-07-14 NOTE — TELEPHONE ENCOUNTER
Recommend warm moist compresses, epsom salt baths if able  Ok to continue mupirocin, will add abx, follow up in office or UC for no improvement or worsening symptoms

## 2022-07-14 NOTE — TELEPHONE ENCOUNTER
Caller: Chastity Salgado    Relationship: Self    Best call back number: [phone]    Requested Prescriptions:   Requested Prescriptions     Pending Prescriptions Disp Refills   • ondansetron (ZOFRAN) 4 MG tablet 30 tablet 1     Sig: Take 1 tablet by mouth Every 8 (Eight) Hours As Needed for Nausea or Vomiting.        [facility] [address] [facility] & [place] - [phone] [facility] [phone] FX     Additional details provided by patient: PATIENT STATES SHE IS OUT OF MEDICATION.    Does the patient have less than a 3 day supply:  [x] Yes  [] No    Shawn Snowden Rep   07/14/22 14:23 EDT

## 2022-07-14 NOTE — TELEPHONE ENCOUNTER
Rx Refill Note  Requested Prescriptions     Pending Prescriptions Disp Refills   • ondansetron (ZOFRAN) 4 MG tablet 30 tablet 1     Sig: Take 1 tablet by mouth Every 8 (Eight) Hours As Needed for Nausea or Vomiting.      Last office visit with prescribing clinician: 5/24/2022      Next office visit with prescribing clinician: Visit date not found       {TIP  Please add Last Relevant Lab Date if appropriate: 03/02/22    Mae Joy MA  07/14/22, 14:43 EDT

## 2022-07-14 NOTE — TELEPHONE ENCOUNTER
Caller: Chastity Salgado    Relationship: Self    Best call back number: [phone]    What medication are you requesting: ANTIBIOTIC    What are your current symptoms: BOIL ON HER REAR. RED, SWOLLEN, AND SLOWLY DRAINING WITH OPEN WOUND    How long have you been experiencing symptoms: ABOUT 1 WEEK    Have you had these symptoms before:    [x] Yes  [] No    Have you been treated for these symptoms before:   [x] Yes  [] No    If a prescription is needed, what is your preferred pharmacy and phone number: [facility] [address] [address] [facility] [place] & [place] - [phone]  - [phone]      Additional notes: PATIENT STATES SHE WAS PRESCRIBED mupirocin (Bactroban) 2 % ointment. SHE HAS BEEN USING THE OINTMENT, BUT IT HAS NOT BEEN WORKING TO CLEAR IT UP. SHE REQUESTS A STRONG ANTIBIOTIC TO TRY TO TREAT, AS SHE HAS A HISTORY OF MRSA, AND IS CONCERNED ABOUT THE OPEN WOUND. PLEASE CALL AND ADVISE.

## 2022-07-19 ENCOUNTER — TELEPHONE (OUTPATIENT)
Dept: FAMILY MEDICINE CLINIC | Facility: CLINIC | Age: 72
End: 2022-07-19

## 2022-07-19 NOTE — TELEPHONE ENCOUNTER
Caller: Chastity Salgado    Relationship: Self    Best call back number: [phone] (H)    PATIENT CALLED STATING SHE WAS PRESCRIBED ANTIBIOTICS AT HER LAST APPOINTMENT. SHE STATES THAT ON 7.18.22 SHE STARTED TO BREAK OUT WITH BLISTERS ON HER SCALP AND HER NECK IS ITCHY. SHE IS WONDERING IF THIS IS A REACTION TO THE MEDICATION.    PLEASE CALL AND ADVISE.

## 2022-07-27 ENCOUNTER — TELEPHONE (OUTPATIENT)
Dept: PAIN MEDICINE | Facility: CLINIC | Age: 72
End: 2022-07-27

## 2022-07-27 NOTE — TELEPHONE ENCOUNTER
Caller: Chastity Salgado    Relationship: Self    Best call back number:[phone]  Requested Prescriptions:   Requested Prescriptions     Pending Prescriptions Disp Refills   • HYDROcodone-acetaminophen (NORCO)  MG per tablet 150 tablet 0     Sig: Take 1 tablet by mouth 5 (Five) Times a Day As Needed for Severe Pain . dnf 7/2/2022        Pharmacy where request should be sent:  [facility] ON FILE IS CORRECT        Does the patient have less than a 3 day supply:  [] Yes  [x] No    Shawn Cerna Rep   07/27/22 12:17 EDT

## 2022-07-28 RX ORDER — HYDROCODONE BITARTRATE AND ACETAMINOPHEN 10; 325 MG/1; MG/1
1 TABLET ORAL
Qty: 150 TABLET | Refills: 0 | Status: SHIPPED | OUTPATIENT
Start: 2022-07-28 | End: 2022-08-25 | Stop reason: SDUPTHER

## 2022-08-10 ENCOUNTER — TELEPHONE (OUTPATIENT)
Dept: FAMILY MEDICINE CLINIC | Facility: CLINIC | Age: 72
End: 2022-08-10

## 2022-08-10 NOTE — TELEPHONE ENCOUNTER
Chastity Salgado called in regards to her having  Boil on her bottom. It a open wound she think it be MRSA. Spoke  W/MA she will follow up w/pt after consulting w/ Marilyn Turner.    Please advise

## 2022-08-11 ENCOUNTER — OFFICE VISIT (OUTPATIENT)
Dept: FAMILY MEDICINE CLINIC | Facility: CLINIC | Age: 72
End: 2022-08-11

## 2022-08-11 VITALS
HEIGHT: 63 IN | OXYGEN SATURATION: 98 % | WEIGHT: 221 LBS | RESPIRATION RATE: 18 BRPM | SYSTOLIC BLOOD PRESSURE: 126 MMHG | DIASTOLIC BLOOD PRESSURE: 78 MMHG | BODY MASS INDEX: 39.16 KG/M2 | HEART RATE: 98 BPM | TEMPERATURE: 96.5 F

## 2022-08-11 DIAGNOSIS — S31.809A WOUND OF BUTTOCK, UNSPECIFIED LATERALITY, INITIAL ENCOUNTER: Primary | ICD-10-CM

## 2022-08-11 PROCEDURE — 99213 OFFICE O/P EST LOW 20 MIN: CPT | Performed by: NURSE PRACTITIONER

## 2022-08-11 RX ORDER — AMOXICILLIN AND CLAVULANATE POTASSIUM 875; 125 MG/1; MG/1
1 TABLET, FILM COATED ORAL EVERY 12 HOURS SCHEDULED
Qty: 14 TABLET | Refills: 0 | Status: SHIPPED | OUTPATIENT
Start: 2022-08-11 | End: 2023-03-07

## 2022-08-11 RX ORDER — NYSTATIN AND TRIAMCINOLONE ACETONIDE 100000; 1 [USP'U]/G; MG/G
1 OINTMENT TOPICAL 2 TIMES DAILY
Qty: 60 G | Refills: 0 | Status: SHIPPED | OUTPATIENT
Start: 2022-08-11

## 2022-08-11 NOTE — PROGRESS NOTES
"Chief Complaint  boil (In middle of both cheeks)    Subjective        Chastity Salgado presents to [facility]  States infection on scalp, burning red, throbbing, applied mupirocin and cleared with oral antibiotic  Boil on buttock, going on for a few weeks, feels like it worsening, denies fever, chills reported. Increased fatigue, sleeping and napping 2-3 hours in the afternoon. Draining slowly, white and clear fluid, now itching. Washing it twice daily with soap and water.       Objective   Vital Signs:  /78 (BP Location: Left arm, Patient Position: Sitting, Cuff Size: Adult)   Pulse 98   Temp 96.5 °F (35.8 °C) (Temporal)   Resp 18   Ht 160 cm (62.99\")   Wt 100 kg (221 lb)   SpO2 98%   BMI 39.16 kg/m²   Estimated body mass index is 39.16 kg/m² as calculated from the following:    Height as of this encounter: 160 cm (62.99\").    Weight as of this encounter: 100 kg (221 lb).          Physical Exam  Constitutional:       Appearance: Normal appearance.   Cardiovascular:      Rate and Rhythm: Normal rate and regular rhythm.      Heart sounds: No murmur heard.    No friction rub. No gallop.   Pulmonary:      Effort: Pulmonary effort is normal. No respiratory distress.      Breath sounds: Normal breath sounds. No wheezing, rhonchi or rales.   Skin:     General: Skin is warm and dry.      Comments: Open area to mid coccyx, no discharge or exudate, nonvesicular, tender to palpate   Neurological:      Mental Status: She is alert and oriented to person, place, and time.   Psychiatric:         Mood and Affect: Mood normal.        Result Review :                Assessment and Plan   Diagnoses and all orders for this visit:    1. Wound of buttock, unspecified laterality, initial encounter (Primary)    Other orders  -     nystatin-triamcinolone (MYCOLOG) 108508-0.1 UNIT/GM-% ointment; Apply 1 application topically to the appropriate area as directed 2 (Two) Times a Day.  Dispense: 60 g; " Refill: 0  -     amoxicillin-clavulanate (Augmentin) 875-125 MG per tablet; Take 1 tablet by mouth Every 12 (Twelve) Hours.  Dispense: 14 tablet; Refill: 0             Follow Up   No follow-ups on file.  Patient was given instructions and counseling regarding her condition or for health maintenance advice. Please see specific information pulled into the AVS if appropriate.     Wound: with surrounding erythema, does have appearance of possible shingles, hwoever out of window to treat, recommend mycolog treatment as directed and will add augmentin due to surrounding erythema and diabetes.

## 2022-08-12 RX ORDER — LISINOPRIL 40 MG/1
TABLET ORAL
Qty: 90 TABLET | Refills: 1 | Status: SHIPPED | OUTPATIENT
Start: 2022-08-12 | End: 2023-03-07

## 2022-08-12 NOTE — TELEPHONE ENCOUNTER
Rx Refill Note  Requested Prescriptions     Pending Prescriptions Disp Refills   • lisinopril (PRINIVIL,ZESTRIL) 40 MG tablet [Pharmacy Med Name: LISINOPRIL 40 MG TABLET] 90 tablet 1     Sig: TAKE ONE TABLET BY MOUTH DAILY      Last office visit with prescribing clinician: 8/11/2022      Next office visit with prescribing clinician: 8/24/2022       {TIP  Please add Last Relevant Lab Date if appropriate: 03/02/22    Mae Joy MA  08/12/22, 10:40 EDT

## 2022-08-25 RX ORDER — HYDROCODONE BITARTRATE AND ACETAMINOPHEN 10; 325 MG/1; MG/1
1 TABLET ORAL
Qty: 150 TABLET | Refills: 0 | Status: SHIPPED | OUTPATIENT
Start: 2022-08-25 | End: 2022-08-30 | Stop reason: SDUPTHER

## 2022-08-25 NOTE — TELEPHONE ENCOUNTER
Medication Refill Request    Date of phone call: 22    Medication being requested: NORCO  MG   si TAB PO FIVE TIMES A DAY PRN  Qty: 150    Date of last visit: 22    Date of last refill:     GAYE up to date?: NO    Next Follow up?: 22    Any new pertinent information? (i.e, new medication allergies, new use of medications, change in patient's health or condition, non-compliance or inconsistency with prescribing agreement?):

## 2022-08-30 RX ORDER — HYDROCODONE BITARTRATE AND ACETAMINOPHEN 10; 325 MG/1; MG/1
1 TABLET ORAL
Qty: 150 TABLET | Refills: 0 | Status: SHIPPED | OUTPATIENT
Start: 2022-08-30 | End: 2022-09-26 | Stop reason: SDUPTHER

## 2022-09-06 ENCOUNTER — PREP FOR SURGERY (OUTPATIENT)
Dept: SURGERY | Facility: SURGERY CENTER | Age: 72
End: 2022-09-06

## 2022-09-06 ENCOUNTER — OFFICE VISIT (OUTPATIENT)
Dept: PAIN MEDICINE | Facility: CLINIC | Age: 72
End: 2022-09-06

## 2022-09-06 VITALS
TEMPERATURE: 97.5 F | SYSTOLIC BLOOD PRESSURE: 130 MMHG | OXYGEN SATURATION: 96 % | HEART RATE: 76 BPM | RESPIRATION RATE: 20 BRPM | HEIGHT: 63 IN | WEIGHT: 230.2 LBS | BODY MASS INDEX: 40.79 KG/M2 | DIASTOLIC BLOOD PRESSURE: 75 MMHG

## 2022-09-06 DIAGNOSIS — Z51.81 ENCOUNTER FOR MONITORING OPIOID MAINTENANCE THERAPY: ICD-10-CM

## 2022-09-06 DIAGNOSIS — M19.011 OSTEOARTHRITIS OF BOTH SHOULDERS, UNSPECIFIED OSTEOARTHRITIS TYPE: ICD-10-CM

## 2022-09-06 DIAGNOSIS — M12.9 ARTHRITIS, MULTIPLE JOINT INVOLVEMENT: ICD-10-CM

## 2022-09-06 DIAGNOSIS — G89.4 CHRONIC PAIN SYNDROME: Primary | ICD-10-CM

## 2022-09-06 DIAGNOSIS — M19.011 OSTEOARTHRITIS OF BOTH SHOULDERS, UNSPECIFIED OSTEOARTHRITIS TYPE: Primary | ICD-10-CM

## 2022-09-06 DIAGNOSIS — M19.012 OSTEOARTHRITIS OF BOTH SHOULDERS, UNSPECIFIED OSTEOARTHRITIS TYPE: ICD-10-CM

## 2022-09-06 DIAGNOSIS — M19.012 OSTEOARTHRITIS OF BOTH SHOULDERS, UNSPECIFIED OSTEOARTHRITIS TYPE: Primary | ICD-10-CM

## 2022-09-06 DIAGNOSIS — Z79.891 ENCOUNTER FOR MONITORING OPIOID MAINTENANCE THERAPY: ICD-10-CM

## 2022-09-06 DIAGNOSIS — M54.2 NECK PAIN: ICD-10-CM

## 2022-09-06 PROCEDURE — 96372 THER/PROPH/DIAG INJ SC/IM: CPT

## 2022-09-06 PROCEDURE — 99214 OFFICE O/P EST MOD 30 MIN: CPT

## 2022-09-06 RX ORDER — KETOROLAC TROMETHAMINE 30 MG/ML
60 INJECTION, SOLUTION INTRAMUSCULAR; INTRAVENOUS ONCE
Status: COMPLETED | OUTPATIENT
Start: 2022-09-06 | End: 2022-09-06

## 2022-09-06 RX ORDER — CELECOXIB 200 MG/1
200 CAPSULE ORAL DAILY
COMMUNITY
Start: 2022-08-11

## 2022-09-06 RX ORDER — SODIUM CHLORIDE 0.9 % (FLUSH) 0.9 %
10 SYRINGE (ML) INJECTION AS NEEDED
Status: CANCELLED | OUTPATIENT
Start: 2022-09-06

## 2022-09-06 RX ORDER — OMEPRAZOLE 10 MG/1
10 CAPSULE, DELAYED RELEASE ORAL 2 TIMES DAILY
COMMUNITY

## 2022-09-06 RX ORDER — SODIUM CHLORIDE 0.9 % (FLUSH) 0.9 %
10 SYRINGE (ML) INJECTION EVERY 12 HOURS SCHEDULED
Status: CANCELLED | OUTPATIENT
Start: 2022-09-06

## 2022-09-06 RX ADMIN — KETOROLAC TROMETHAMINE 60 MG: 30 INJECTION, SOLUTION INTRAMUSCULAR; INTRAVENOUS at 12:37

## 2022-09-06 NOTE — PROGRESS NOTES
CHIEF COMPLAINT  Neck and joint pain    Subjective   Chastity Salgado is a 72 y.o. female  who presents for follow-up.  She has a history of chronic neck and joint pain. She reports that her pain has worsened since her last office visit.     Today pain is 8/10VAS in severity. Pain is located in her bilateral shoulders, knees, ankles, and neck (right and left side). Describes this pain as an intermittent ache. Pain is worsened by certain movements, prolonged positions, climbing stairs, bending/twisting, and weather. Pain improves with rest, reposition, heat/ice, and medications.      Continues with Hydrocodone 10mg 5/day, Cymbalta 60mg, Flexeril 10mg at HS. She reports occasional constipation that she manages with Metamucil and Movantik. Denies any side effects from the regimen including somnolence. The regimen helps decrease pain by 75%. Notes improvement in activity and function with regimen. ADL's by self. Denies any bowel or bladder changes.     She reports an increase in pain to bilateral shoulders. She needs to have a right shoulder replacement due to loose hardware but is trying to avoid this due to other health issues. She does not plan on having surgery to her left shoulder. ROM is limited to right shoulder and she is worried that left shoulder surgery may further limit her ability to take care of herself. She is trying to manage left shoulder pain with steroid injections. She is interested in moving forward with a suprascapular RFA. She is scheduled for a left shoulder steroid injection with Dr. Vásquez on 9/7/22     She is scheduled to follow up with an opthalmologist next month. She is currently taking drops with help with glaucoma but reports that she needs cataract surgery.     Celebrex - causes GI issues - takes every other day (history of gastroparesis)  Gabapentin - caused sleep walking    Procedures:  2/25/21 - left suprascapular nerve block/steroid injection (posterior approach) - 50% relief x 1  month  9/22/20 - left suprascapular nerve block/steroid injection (posterior approach) - 70% relief x 1-2 months    Joint Pain  This is a chronic (bilateral knees, ankles, and shoulders) problem. The current episode started more than 1 year ago. The problem occurs intermittently. The problem has been gradually worsening. Associated symptoms include abdominal pain, arthralgias, headaches, neck pain, numbness (left hand) and weakness (left hand). Pertinent negatives include no chest pain, congestion, coughing, fatigue or fever. The symptoms are aggravated by walking, bending, exertion, twisting and standing (cold weather, stairs). She has tried oral narcotics, position changes, heat, ice and rest for the symptoms.   Neck Pain   This is a chronic problem. The current episode started more than 1 year ago. The problem occurs intermittently. The problem has been waxing and waning. The pain is associated with a sleep position. The pain is present in the left side and right side. The quality of the pain is described as aching. The pain is at a severity of 8/10. The symptoms are aggravated by twisting, position and bending. Stiffness is present in the morning. Associated symptoms include headaches, numbness (left hand) and weakness (left hand). Pertinent negatives include no chest pain or fever. She has tried ice, heat and oral narcotics for the symptoms.      PEG Assessment   What number best describes your pain on average in the past week?8  What number best describes how, during the past week, pain has interfered with your enjoyment of life?8  What number best describes how, during the past week, pain has interfered with your general activity?  8    The following portions of the patient's history were reviewed and updated as appropriate: allergies, current medications, past family history, past medical history, past social history, past surgical history and problem list.    Review of Systems   Constitutional: Negative for  "activity change, fatigue and fever.   HENT: Negative for congestion.    Eyes: Negative for visual disturbance.   Respiratory: Negative for cough and chest tightness.    Cardiovascular: Negative for chest pain.   Gastrointestinal: Positive for abdominal pain and constipation. Negative for diarrhea.   Genitourinary: Negative for difficulty urinating and dysuria.   Musculoskeletal: Positive for arthralgias and neck pain.   Neurological: Positive for weakness (left hand), numbness (left hand) and headaches. Negative for dizziness and light-headedness.   Psychiatric/Behavioral: Positive for sleep disturbance. Negative for agitation and suicidal ideas. The patient is not nervous/anxious.      I have reviewed and confirmed the accuracy of the ROS as documented by the MA/LPN/RN NEWTON Abdul    Vitals:    09/06/22 1148   BP: 130/75   BP Location: Right arm   Patient Position: Sitting   Pulse: 76   Resp: 20   Temp: 97.5 °F (36.4 °C)   SpO2: 96%   Weight: 104 kg (230 lb 3.2 oz)   Height: 160 cm (62.99\")   PainSc:   8   PainLoc: Comment: neck     Objective   Physical Exam  Constitutional:       Appearance: Normal appearance.   HENT:      Head: Normocephalic.   Cardiovascular:      Rate and Rhythm: Normal rate and regular rhythm.   Pulmonary:      Effort: Pulmonary effort is normal.      Breath sounds: Normal breath sounds.   Musculoskeletal:      Right shoulder: Tenderness present. Decreased range of motion.      Left shoulder: Tenderness present. Decreased range of motion.      Cervical back: Pain with movement present. Decreased range of motion.      Lumbar back: Tenderness present. Decreased range of motion.   Skin:     General: Skin is warm and dry.      Capillary Refill: Capillary refill takes less than 2 seconds.   Neurological:      General: No focal deficit present.      Mental Status: She is alert and oriented to person, place, and time.      Gait: Gait abnormal (slowed).   Psychiatric:         Mood and " Affect: Mood normal.         Speech: Speech normal.         Behavior: Behavior normal.         Thought Content: Thought content normal.         Cognition and Memory: Cognition normal.       Assessment & Plan   Diagnoses and all orders for this visit:    1. Chronic pain syndrome (Primary)  -     ketorolac (TORADOL) injection 60 mg    2. Arthritis, multiple joint involvement    3. Osteoarthritis of both shoulders, unspecified osteoarthritis type    4. Neck pain    5. Encounter for monitoring opioid maintenance therapy    --- Left Supracscapular Nerve Block - diagnostic only   Discussed with the patient that sedation is optional for this procedure.  The sedation offered is called conscious sedation which is different from general anesthesia that is utilized in surgical procedures. The dosing of the sedation is determined by the physician and they will be monitored throughout the procedure. With conscious sedation it is possible to remember parts or all of the procedure, this is normal. They will need to have a  with them as driving is prohibited following conscious sedation.      NPO instructions for conscious sedation:  --- Do not eat 6 hours prior to the procedure.   --- Do not drink any dairy or citrus 4 hours prior to the procedure.   --- Do not drink anything, including clear liquids, 2 hours prior to procedure.      If the NPO instructions are not followed then the procedure may be performed without sedation or the procedure will need to be rescheduled.   --- The urine drug screen confirmation from 5/6/22 has been reviewed and the result is appropriate based on patient history and GAYE report  --- The patient signed an updated copy of the controlled substance agreement on 5/6/22  --- IM Toradol 60mg in office due to flare in pain  --- Continue Hydrocodone. No refill needed. Patient appears stable with current regimen. No adverse effects. Regarding continuation of opioids, there is no evidence of aberrant  behavior or any red flags. The patient continues with appropriate response to opioid therapy. ADL's remain intact by self.   --- Follow-up 2 months     GAYE REPORT  As part of the patient's treatment plan, I am prescribing controlled substances. The patient has been made aware of appropriate use of such medications, including potential risk of somnolence, limited ability to drive and/or work safely, and the potential for dependence or overdose. It has also been made clear that these medications are for use by this patient only, without concomitant use of alcohol or other substances unless prescribed.     Patient has completed prescribing agreement detailing terms of continued prescribing of controlled substances, including monitoring GAYE reports, urine drug screening, and pill counts if necessary. The patient is aware that inappropriate use will results in cessation of prescribing such medications.    As the clinician, I personally reviewed the GAYE from 9/6/22 while the patient was in the office today.    History and physical exam exhibit continued safe and appropriate use of controlled substances.    Dictated utilizing Dragon dictation.     This document is intended for medical expert use only. Reading of this document by patients and/or patient's family without participating medical staff guidance may result in misinterpretation and unintended morbidity.   Any interpretation of such data is the responsibility of the patient and/or family member responsible for the patient in concert with their primary or specialist providers, not to be left for sources of online searches such as [employer], Pembe Panjur or similar queries. Relying on these approaches to knowledge may result in misinterpretation, misguided goals of care and even death should patients or family members try recommendations outside of the realm of professional medical care in a supervised way.    Patient remained masked during entire encounter. No cough  present. I donned a mask and eye protection throughout entire visit. Prior to donning mask and eye protection, hand hygiene was performed, as well as when it was doffed.  I was closer than 6 feet, but not for an extended period of time. No obvious exposure to any bodily fluids.

## 2022-09-26 NOTE — TELEPHONE ENCOUNTER
Medication Refill Request    Date of phone call: 9/26/22    Medication being requested: Hydrocodone  MG sig: Take 1 tablet by mouth 5 (Five) Times a Day As Needed for Severe Pain  Qty: 150    Date of last visit: 9/6/22    Date of last refill: 8/31/22    GAYE up to date?: Yes     Next Follow up?: 11/7/22    Any new pertinent information? (i.e, new medication allergies, new use of medications, change in patient's health or condition, non-compliance or inconsistency with prescribing agreement?):

## 2022-09-27 RX ORDER — HYDROCODONE BITARTRATE AND ACETAMINOPHEN 10; 325 MG/1; MG/1
1 TABLET ORAL
Qty: 150 TABLET | Refills: 0 | Status: SHIPPED | OUTPATIENT
Start: 2022-09-27 | End: 2022-10-24 | Stop reason: SDUPTHER

## 2022-10-04 RX ORDER — MECLIZINE HYDROCHLORIDE 25 MG/1
TABLET ORAL
Qty: 60 TABLET | Refills: 1 | OUTPATIENT
Start: 2022-10-04

## 2022-10-21 DIAGNOSIS — E11.59 TYPE 2 DIABETES MELLITUS WITH OTHER CIRCULATORY COMPLICATION, WITHOUT LONG-TERM CURRENT USE OF INSULIN: ICD-10-CM

## 2022-10-24 ENCOUNTER — TELEPHONE (OUTPATIENT)
Dept: PAIN MEDICINE | Facility: CLINIC | Age: 72
End: 2022-10-24

## 2022-10-24 NOTE — TELEPHONE ENCOUNTER
Caller: Chastity Salgado    Relationship: Self    Best call back number:[phone]    Requested Prescriptions:   Requested Prescriptions     Pending Prescriptions Disp Refills   • HYDROcodone-acetaminophen (NORCO)  MG per tablet 150 tablet 0     Sig: Take 1 tablet by mouth 5 (Five) Times a Day As Needed for Severe Pain. 30 day supply        Pharmacy where request should be sent:  [facility] [place] [phone]    Additional details provided by patient:     Does the patient have less than a 3 day supply:  [] Yes  [x] No    Shawn Vargas Rep   10/24/22 12:48 EDT

## 2022-10-24 NOTE — TELEPHONE ENCOUNTER
Rx Refill Note  Requested Prescriptions     Pending Prescriptions Disp Refills   • Ozempic, 0.25 or 0.5 MG/DOSE, 2 MG/1.5ML solution pen-injector [Pharmacy Med Name: OZEMPIC 0.25-0.5 MG/DOSE PEN] 1.5 mL      Sig: DIAL AND INJECT UNDER THE SKIN 0.5 MG WEEKLY      Last office visit with prescribing clinician: 8/11/2022      Next office visit with prescribing clinician: 12/5/2022       {TIP  Please add Last Relevant Lab Date if appropriate: 3/2/2022    Mary Mccall, PCT  10/24/22, 13:59 EDT

## 2022-10-25 RX ORDER — HYDROCODONE BITARTRATE AND ACETAMINOPHEN 10; 325 MG/1; MG/1
1 TABLET ORAL
Qty: 150 TABLET | Refills: 0 | Status: SHIPPED | OUTPATIENT
Start: 2022-10-25 | End: 2022-11-15 | Stop reason: SDUPTHER

## 2022-10-26 DIAGNOSIS — M19.011 OSTEOARTHRITIS OF BOTH SHOULDERS, UNSPECIFIED OSTEOARTHRITIS TYPE: ICD-10-CM

## 2022-10-26 DIAGNOSIS — M12.9 ARTHRITIS, MULTIPLE JOINT INVOLVEMENT: ICD-10-CM

## 2022-10-26 DIAGNOSIS — M54.2 NECK PAIN: ICD-10-CM

## 2022-10-26 DIAGNOSIS — M19.012 OSTEOARTHRITIS OF BOTH SHOULDERS, UNSPECIFIED OSTEOARTHRITIS TYPE: ICD-10-CM

## 2022-10-26 DIAGNOSIS — G89.4 CHRONIC PAIN SYNDROME: ICD-10-CM

## 2022-10-26 RX ORDER — CYCLOBENZAPRINE HCL 10 MG
TABLET ORAL
Qty: 90 TABLET | Refills: 0 | Status: SHIPPED | OUTPATIENT
Start: 2022-10-26 | End: 2023-02-22 | Stop reason: SDUPTHER

## 2022-10-27 RX ORDER — SEMAGLUTIDE 1.34 MG/ML
INJECTION, SOLUTION SUBCUTANEOUS
Qty: 1.5 ML | Refills: 3 | Status: SHIPPED | OUTPATIENT
Start: 2022-10-27

## 2022-10-28 ENCOUNTER — TELEPHONE (OUTPATIENT)
Dept: FAMILY MEDICINE CLINIC | Facility: CLINIC | Age: 72
End: 2022-10-28

## 2022-10-28 ENCOUNTER — OFFICE VISIT (OUTPATIENT)
Dept: FAMILY MEDICINE CLINIC | Facility: CLINIC | Age: 72
End: 2022-10-28

## 2022-10-28 VITALS
SYSTOLIC BLOOD PRESSURE: 102 MMHG | RESPIRATION RATE: 16 BRPM | OXYGEN SATURATION: 100 % | BODY MASS INDEX: 38.88 KG/M2 | WEIGHT: 219.4 LBS | HEIGHT: 63 IN | TEMPERATURE: 97.3 F | DIASTOLIC BLOOD PRESSURE: 62 MMHG | HEART RATE: 67 BPM

## 2022-10-28 DIAGNOSIS — Z12.31 SCREENING MAMMOGRAM, ENCOUNTER FOR: Primary | ICD-10-CM

## 2022-10-28 DIAGNOSIS — R11.2 NAUSEA AND VOMITING, UNSPECIFIED VOMITING TYPE: ICD-10-CM

## 2022-10-28 DIAGNOSIS — K59.03 CONSTIPATION DUE TO PAIN MEDICATION: Primary | ICD-10-CM

## 2022-10-28 PROCEDURE — 99214 OFFICE O/P EST MOD 30 MIN: CPT | Performed by: FAMILY MEDICINE

## 2022-10-28 RX ORDER — ONDANSETRON 4 MG/1
4 TABLET, FILM COATED ORAL EVERY 8 HOURS PRN
Qty: 60 TABLET | Refills: 3 | Status: SHIPPED | OUTPATIENT
Start: 2022-10-28

## 2022-11-01 NOTE — ASSESSMENT & PLAN NOTE
Patient was advised to continue taking the medication she is on currently.  Her abdominal pain is likely due to her constipation.  Patient was given samples of Linzess.  Patient was given prescription for Linzess to treat her symptoms.  Patient was encouraged return to clinic if her symptoms do not improve.

## 2022-11-01 NOTE — ASSESSMENT & PLAN NOTE
Due to her symptoms she was prescribed ondansetron.  Patient was encouraged return to clinic if her symptoms do not improve.

## 2022-11-10 ENCOUNTER — TELEPHONE (OUTPATIENT)
Dept: PAIN MEDICINE | Facility: CLINIC | Age: 72
End: 2022-11-10

## 2022-11-10 RX ORDER — NALOXEGOL OXALATE 25 MG/1
25 TABLET, FILM COATED ORAL EVERY MORNING
Qty: 30 TABLET | Refills: 3 | Status: SHIPPED | OUTPATIENT
Start: 2022-11-10

## 2022-11-10 NOTE — TELEPHONE ENCOUNTER
Relationship: PATIENT     Best call back number:  [phone]    Requested Prescriptions:MOVANTIK 25 MG.         Pharmacy where request should be sent:  [facility] [phone]     Additional details provided by patient: PT. STATES THAT SHE IS OUT OF MEDICATION- NEEDS REFILL FOR MOVANTIK 25 MG.   PLEASE CALL PT. TO LET HER KNOW WHEN THIS HAS BEEN SENT IN.       Does the patient have less than a 3 day supply:  [x] Yes  [] No

## 2022-11-10 NOTE — TELEPHONE ENCOUNTER
Medication Refill Request    Date of phone call: 11/10/2022    Medication being requested: movantik 25 mg sig: Take 1 tablet by mouth Every Morning  Qty: 30    Date of last visit: 09/06/2022    Date of last refill:     GAYE up to date?:     Next Follow up?: 11/15/2022    Any new pertinent information? (i.e, new medication allergies, new use of medications, change in patient's health or condition, non-compliance or inconsistency with prescribing agreement?):

## 2022-11-15 ENCOUNTER — OFFICE VISIT (OUTPATIENT)
Dept: PAIN MEDICINE | Facility: CLINIC | Age: 72
End: 2022-11-15

## 2022-11-15 VITALS
HEIGHT: 63 IN | BODY MASS INDEX: 39.51 KG/M2 | SYSTOLIC BLOOD PRESSURE: 135 MMHG | WEIGHT: 223 LBS | HEART RATE: 70 BPM | DIASTOLIC BLOOD PRESSURE: 78 MMHG | TEMPERATURE: 97.1 F | OXYGEN SATURATION: 97 % | RESPIRATION RATE: 20 BRPM

## 2022-11-15 DIAGNOSIS — T40.2X5A THERAPEUTIC OPIOID INDUCED CONSTIPATION: ICD-10-CM

## 2022-11-15 DIAGNOSIS — M19.012 OSTEOARTHRITIS OF BOTH SHOULDERS, UNSPECIFIED OSTEOARTHRITIS TYPE: ICD-10-CM

## 2022-11-15 DIAGNOSIS — M54.2 NECK PAIN: ICD-10-CM

## 2022-11-15 DIAGNOSIS — Z51.81 ENCOUNTER FOR MONITORING OPIOID MAINTENANCE THERAPY: ICD-10-CM

## 2022-11-15 DIAGNOSIS — G89.4 CHRONIC PAIN SYNDROME: Primary | ICD-10-CM

## 2022-11-15 DIAGNOSIS — M19.011 OSTEOARTHRITIS OF BOTH SHOULDERS, UNSPECIFIED OSTEOARTHRITIS TYPE: ICD-10-CM

## 2022-11-15 DIAGNOSIS — K59.03 THERAPEUTIC OPIOID INDUCED CONSTIPATION: ICD-10-CM

## 2022-11-15 DIAGNOSIS — Z79.891 ENCOUNTER FOR MONITORING OPIOID MAINTENANCE THERAPY: ICD-10-CM

## 2022-11-15 DIAGNOSIS — M12.9 ARTHRITIS, MULTIPLE JOINT INVOLVEMENT: ICD-10-CM

## 2022-11-15 PROCEDURE — 99214 OFFICE O/P EST MOD 30 MIN: CPT

## 2022-11-15 PROCEDURE — 80305 DRUG TEST PRSMV DIR OPT OBS: CPT

## 2022-11-15 RX ORDER — SUCRALFATE 1 G/1
TABLET ORAL
COMMUNITY
Start: 2022-10-23

## 2022-11-15 RX ORDER — HYDROCODONE BITARTRATE AND ACETAMINOPHEN 10; 325 MG/1; MG/1
1 TABLET ORAL
Qty: 150 TABLET | Refills: 0 | Status: SHIPPED | OUTPATIENT
Start: 2022-11-15 | End: 2022-12-21 | Stop reason: SDUPTHER

## 2022-11-15 RX ORDER — LATANOPROST 50 UG/ML
SOLUTION/ DROPS OPHTHALMIC
COMMUNITY
Start: 2022-10-26

## 2022-11-15 RX ORDER — POLYETHYLENE GLYCOL 3350 17 G/17G
17 POWDER, FOR SOLUTION ORAL
COMMUNITY
Start: 2022-10-19

## 2022-11-15 RX ORDER — PANTOPRAZOLE SODIUM 20 MG/1
TABLET, DELAYED RELEASE ORAL
COMMUNITY
Start: 2022-10-21

## 2022-11-15 RX ORDER — LISINOPRIL 40 MG/1
1 TABLET ORAL DAILY
COMMUNITY
End: 2023-03-16

## 2022-11-15 RX ORDER — ONDANSETRON 4 MG/1
4 TABLET, ORALLY DISINTEGRATING ORAL
COMMUNITY
Start: 2022-10-19

## 2022-11-15 NOTE — PROGRESS NOTES
CHIEF COMPLAINT  Neck and back pain     Subjective   Chastity Salgado is a 72 y.o. female  who presents for follow-up.  She has a history of chronic neck and joint pain. She reports that her pain level had fluctuated since her last office visit, especially now that the weather is colder.     Today pain is 7/10VAS in severity. Pain is located in her bilateral shoulders, knees, ankles, and neck (right and left side). Describes this pain as an intermittent ache. Pain is worsened by certain movements, prolonged positions, climbing stairs, bending/twisting, and weather. Pain improves with rest, reposition, heat/ice, and medications.      Continues with Hydrocodone 10mg 5/day, Cymbalta 60mg, Flexeril 10mg at HS. She reports occasional constipation that she manages with Metamucil and Linzess (states Movantik was not working as well as it used to). Denies any side effects from the regimen including somnolence. The regimen helps decrease pain by 75%. Notes improvement in activity and function with regimen. ADL's by self. Denies any bowel or bladder changes.      She reports an increase in pain to bilateral shoulders. She needs to have a right shoulder replacement due to loose hardware but is trying to avoid this due to other health issues. She does not plan on having surgery to her left shoulder. ROM is limited to right shoulder and she is worried that left shoulder surgery may further limit her ability to take care of herself. She is trying to manage left shoulder pain with steroid injections. She is scheduled to see Dr. Tu Vásquez with [facility] at the end of November.    She is currently taking drops with help with glaucoma but reports that she needs cataract surgery. She is trying to put surgery off for as long as possible by managing with the drops.      Celebrex - causes GI issues - takes every other day (history of gastroparesis)  Gabapentin - caused sleep walking     Procedures:  2/25/21 - left suprascapular nerve  block/steroid injection (posterior approach) - 50% relief x 1 month  9/22/20 - left suprascapular nerve block/steroid injection (posterior approach) - 70% relief x 1-2 months    Joint Pain  This is a chronic (bilateral knees, ankles, and shoulders) problem. The current episode started more than 1 year ago. The problem occurs intermittently. The problem has been waxing and waning. Associated symptoms include abdominal pain, arthralgias (joint), joint swelling (left hand) and neck pain. Pertinent negatives include no chest pain, congestion, coughing, fatigue, fever, headaches, numbness or weakness. The symptoms are aggravated by walking, bending, exertion, twisting and standing (cold weather, stairs). She has tried oral narcotics, position changes, heat, ice and rest for the symptoms.   Neck Pain   This is a chronic problem. The current episode started more than 1 year ago. The problem occurs intermittently. The problem has been waxing and waning. The pain is associated with a sleep position. The pain is present in the left side and right side. The quality of the pain is described as aching. The pain is at a severity of 7/10. The symptoms are aggravated by twisting, position and bending. Stiffness is present in the morning. Pertinent negatives include no chest pain, fever, headaches, numbness or weakness. She has tried ice, heat and oral narcotics for the symptoms.      PEG Assessment   What number best describes your pain on average in the past week?7  What number best describes how, during the past week, pain has interfered with your enjoyment of life?7  What number best describes how, during the past week, pain has interfered with your general activity?  7    The following portions of the patient's history were reviewed and updated as appropriate: allergies, current medications, past family history, past medical history, past social history, past surgical history and problem list.    Review of Systems  "  Constitutional: Negative for activity change, fatigue and fever.   HENT: Negative for congestion.    Eyes: Negative for visual disturbance.   Respiratory: Negative for cough and shortness of breath.    Cardiovascular: Negative for chest pain.   Gastrointestinal: Positive for abdominal pain. Negative for constipation and diarrhea.   Genitourinary: Negative for difficulty urinating.   Musculoskeletal: Positive for arthralgias (joint), joint swelling (left hand) and neck pain. Negative for neck stiffness.   Neurological: Negative for dizziness, weakness, light-headedness, numbness and headaches.   Psychiatric/Behavioral: Negative for agitation, sleep disturbance and suicidal ideas. The patient is not nervous/anxious.      I have reviewed and confirmed the accuracy of the ROS as documented by the MA/LPN/RN NEWTON Abdul    Vitals:    11/15/22 1551   BP: 135/78   Pulse: 70   Resp: 20   Temp: 97.1 °F (36.2 °C)   SpO2: 97%   Weight: 101 kg (223 lb)   Height: 160 cm (62.99\")   PainSc:   7   PainLoc: Neck  Comment: and joint     Objective   Physical Exam  Constitutional:       Appearance: Normal appearance.   HENT:      Head: Normocephalic.   Cardiovascular:      Rate and Rhythm: Normal rate and regular rhythm.   Pulmonary:      Effort: Pulmonary effort is normal.      Breath sounds: Normal breath sounds.   Musculoskeletal:      Right shoulder: Tenderness present. Decreased range of motion.      Left shoulder: Tenderness present. Decreased range of motion.      Cervical back: Pain with movement present. Decreased range of motion.      Lumbar back: Tenderness present. Decreased range of motion.   Skin:     General: Skin is warm and dry.      Capillary Refill: Capillary refill takes less than 2 seconds.   Neurological:      General: No focal deficit present.      Mental Status: She is alert and oriented to person, place, and time.      Gait: Gait abnormal (slowed).   Psychiatric:         Mood and Affect: Mood " normal.         Speech: Speech normal.         Behavior: Behavior normal.         Thought Content: Thought content normal.         Cognition and Memory: Cognition normal.       Assessment & Plan   Diagnoses and all orders for this visit:    1. Chronic pain syndrome (Primary)    2. Arthritis, multiple joint involvement    3. Osteoarthritis of both shoulders, unspecified osteoarthritis type  -     HYDROcodone-acetaminophen (NORCO)  MG per tablet; Take 1 tablet by mouth 5 (Five) Times a Day As Needed for Severe Pain. 30 day supply  Dispense: 150 tablet; Refill: 0    4. Neck pain  -     HYDROcodone-acetaminophen (NORCO)  MG per tablet; Take 1 tablet by mouth 5 (Five) Times a Day As Needed for Severe Pain. 30 day supply  Dispense: 150 tablet; Refill: 0    5. Therapeutic opioid induced constipation    6. Encounter for monitoring opioid maintenance therapy     --- Routine UDS in office today as part of monitoring requirements for controlled substances.  The specimen was viewed and the immunoassay result reviewed and is +(OPI).  This specimen will be sent to [facility] for confirmation.     --- The patient signed an updated copy of the controlled substance agreement on 5/9/22  --- Will follow up with scheduling about left suprascapular nerve block that was ordered on 9/6/22.   --- Refill Hydrocodone. DNF 11/29/22. Patient appears stable with current regimen. No adverse effects. Regarding continuation of opioids, there is no evidence of aberrant behavior or any red flags.  The patient continues with appropriate response to opioid therapy. ADL's remain intact by self.   --- Follow-up 2 months     GAYE REPORT  As part of the patient's treatment plan, I am prescribing controlled substances. The patient has been made aware of appropriate use of such medications, including potential risk of somnolence, limited ability to drive and/or work safely, and the potential for dependence or overdose. It has also  been made clear that these medications are for use by this patient only, without concomitant use of alcohol or other substances unless prescribed.     Patient has completed prescribing agreement detailing terms of continued prescribing of controlled substances, including monitoring GAYE reports, urine drug screening, and pill counts if necessary. The patient is aware that inappropriate use will results in cessation of prescribing such medications.    As the clinician, I personally reviewed the GAYE from 11/15/22 while the patient was in the office today.    History and physical exam exhibit continued safe and appropriate use of controlled substances.    Dictated utilizing Dragon dictation.     Patient remained masked during entire encounter. No cough present. I donned a mask and eye protection throughout entire visit. Prior to donning mask and eye protection, hand hygiene was performed, as well as when it was doffed.  I was closer than 6 feet, but not for an extended period of time. No obvious exposure to any bodily fluids.

## 2022-11-16 ENCOUNTER — TRANSCRIBE ORDERS (OUTPATIENT)
Dept: SURGERY | Facility: SURGERY CENTER | Age: 72
End: 2022-11-16

## 2022-11-16 ENCOUNTER — TELEPHONE (OUTPATIENT)
Dept: PAIN MEDICINE | Facility: CLINIC | Age: 72
End: 2022-11-16

## 2022-11-16 DIAGNOSIS — Z41.9 SURGERY, ELECTIVE: Primary | ICD-10-CM

## 2022-11-16 NOTE — TELEPHONE ENCOUNTER
READY TO SCHEDULE INJECTION WITH DR HAWKINS. CURRENTLY SCHEDULED FOR 11/21/22 AT 8 AM. IF PATIENT CANNOT USE THIS APPT TIME PLEASE TRANSFER TO .

## 2022-11-23 ENCOUNTER — CLINICAL SUPPORT (OUTPATIENT)
Dept: PAIN MEDICINE | Facility: CLINIC | Age: 72
End: 2022-11-23

## 2022-11-23 ENCOUNTER — TELEPHONE (OUTPATIENT)
Dept: PAIN MEDICINE | Facility: CLINIC | Age: 72
End: 2022-11-23

## 2022-11-23 DIAGNOSIS — M12.9 ARTHRITIS, MULTIPLE JOINT INVOLVEMENT: Primary | ICD-10-CM

## 2022-11-23 DIAGNOSIS — R52 ACUTE PAIN: ICD-10-CM

## 2022-11-23 PROCEDURE — 96372 THER/PROPH/DIAG INJ SC/IM: CPT | Performed by: NURSE PRACTITIONER

## 2022-11-23 RX ORDER — KETOROLAC TROMETHAMINE 30 MG/ML
30 INJECTION, SOLUTION INTRAMUSCULAR; INTRAVENOUS ONCE
Status: COMPLETED | OUTPATIENT
Start: 2022-11-23 | End: 2022-11-23

## 2022-11-23 RX ADMIN — KETOROLAC TROMETHAMINE 30 MG: 30 INJECTION, SOLUTION INTRAMUSCULAR; INTRAVENOUS at 08:18

## 2022-12-05 ENCOUNTER — OFFICE VISIT (OUTPATIENT)
Dept: FAMILY MEDICINE CLINIC | Facility: CLINIC | Age: 72
End: 2022-12-05

## 2022-12-05 VITALS
TEMPERATURE: 96.8 F | SYSTOLIC BLOOD PRESSURE: 136 MMHG | BODY MASS INDEX: 39.92 KG/M2 | DIASTOLIC BLOOD PRESSURE: 80 MMHG | HEART RATE: 100 BPM | RESPIRATION RATE: 18 BRPM | HEIGHT: 63 IN | OXYGEN SATURATION: 98 % | WEIGHT: 225.3 LBS

## 2022-12-05 DIAGNOSIS — M79.605 PAIN IN BOTH LOWER EXTREMITIES: ICD-10-CM

## 2022-12-05 DIAGNOSIS — Z13.220 SCREENING FOR LIPOID DISORDERS: ICD-10-CM

## 2022-12-05 DIAGNOSIS — Z78.0 POSTMENOPAUSAL: ICD-10-CM

## 2022-12-05 DIAGNOSIS — M79.604 PAIN IN BOTH LOWER EXTREMITIES: ICD-10-CM

## 2022-12-05 DIAGNOSIS — Z13.29 SCREENING FOR THYROID DISORDER: ICD-10-CM

## 2022-12-05 DIAGNOSIS — E11.59 TYPE 2 DIABETES MELLITUS WITH OTHER CIRCULATORY COMPLICATION, WITHOUT LONG-TERM CURRENT USE OF INSULIN: ICD-10-CM

## 2022-12-05 DIAGNOSIS — Z12.31 ENCOUNTER FOR SCREENING MAMMOGRAM FOR MALIGNANT NEOPLASM OF BREAST: ICD-10-CM

## 2022-12-05 DIAGNOSIS — L65.9 HAIR LOSS: ICD-10-CM

## 2022-12-05 DIAGNOSIS — Z00.00 MEDICARE ANNUAL WELLNESS VISIT, SUBSEQUENT: Primary | ICD-10-CM

## 2022-12-05 PROCEDURE — 1160F RVW MEDS BY RX/DR IN RCRD: CPT | Performed by: NURSE PRACTITIONER

## 2022-12-05 PROCEDURE — G0439 PPPS, SUBSEQ VISIT: HCPCS | Performed by: NURSE PRACTITIONER

## 2022-12-05 PROCEDURE — 1170F FXNL STATUS ASSESSED: CPT | Performed by: NURSE PRACTITIONER

## 2022-12-05 PROCEDURE — 90662 IIV NO PRSV INCREASED AG IM: CPT | Performed by: NURSE PRACTITIONER

## 2022-12-05 PROCEDURE — 99214 OFFICE O/P EST MOD 30 MIN: CPT | Performed by: NURSE PRACTITIONER

## 2022-12-05 PROCEDURE — G0008 ADMIN INFLUENZA VIRUS VAC: HCPCS | Performed by: NURSE PRACTITIONER

## 2022-12-05 NOTE — PROGRESS NOTES
The ABCs of the Annual Wellness Visit  Subsequent Medicare Wellness Visit    Chief Complaint   Patient presents with   • Medicare Wellness-subsequent      Subjective    History of Present Illness:  Chastity Salgado is a 72 y.o. female who presents for a Subsequent Medicare Wellness Visit.  Mammogram scheduled in december    Worsening arthritic pain, she also reports lower extremity pain, she is taking pain medication and states it is still causing pain. She has varicose veins and is thinking this may be contributing. Throbbing pain when she lays down at night. She also reports hair loss. She will   The following portions of the patient's history were reviewed and   updated as appropriate: allergies, current medications, past family history, past medical history, past social history, past surgical history and problem list.    Compared to one year ago, the patient feels her physical   health is the same.    Compared to one year ago, the patient feels her mental   health is the same.    Recent Hospitalizations:  She was not admitted to the hospital during the last year.       Current Medical Providers:  Patient Care Team:  Marilyn Turner APRN as PCP - General (Family Medicine)  Lino Cullen MD as Consulting Physician (General Surgery)  Tu Vásquez MD as Consulting Physician (Orthopedic Surgery)  Tu Welch MD as Consulting Physician (Dermatology)  Niko Jacobsen MD as Consulting Physician (Medical Oncology)  Josseline Faria MD as Consulting Physician (Obstetrics and Gynecology)  Lino Cullen MD as Consulting Physician (General Surgery)  Mely Ramos APRN as Nurse Practitioner (Nurse Practitioner)  Neeraj Jj MD as Consulting Physician (Gastroenterology)    Outpatient Medications Prior to Visit   Medication Sig Dispense Refill   • busPIRone (BUSPAR) 5 MG tablet Take 1 tablet by mouth 2 (two) times a day. 60 tablet 3   • celecoxib (CeleBREX) 200 MG capsule 200  mg Daily.     • cetirizine (ZyrTEC) 10 MG tablet Take 1 tablet by mouth daily as needed.     • Cholecalciferol (VITAMIN D3) 25 MCG (1000 UT) capsule Take 1,000 Units by mouth Daily.     • cyclobenzaprine (FLEXERIL) 10 MG tablet TAKE ONE TABLET BY MOUTH ONCE NIGHTLY AS NEEDED FOR MUSCLE SPASMS 90 tablet 0   • dicyclomine (BENTYL) 10 MG capsule Take 1 capsule by mouth 3 (Three) Times a Day As Needed (For cramping). 90 capsule 1   • DULoxetine (CYMBALTA) 60 MG capsule TAKE ONE CAPSULE BY MOUTH DAILY 90 capsule 1   • fluticasone (FLONASE) 50 MCG/ACT nasal spray 1 spray into the nostril(s) as directed by provider.     • glucose blood (FREESTYLE LITE) test strip Once daily and as needed 100 each 12   • glucose monitor monitoring kit Check glucose daily and prn 1 each 0   • HYDROcodone-acetaminophen (NORCO)  MG per tablet Take 1 tablet by mouth 5 (Five) Times a Day As Needed for Severe Pain. 30 day supply 150 tablet 0   • Lancets (freestyle) lancets Check glucose daily and prn 100 each 12   • latanoprost (XALATAN) 0.005 % ophthalmic solution      • linaclotide (Linzess) 145 MCG capsule capsule Take 1 capsule by mouth Every Morning Before Breakfast. 30 capsule 3   • lisinopril (PRINIVIL,ZESTRIL) 40 MG tablet TAKE ONE TABLET BY MOUTH DAILY 90 tablet 1   • LORazepam (Ativan) 0.5 MG tablet Take 1 tablet by mouth Daily As Needed for Anxiety. 20 tablet 0   • meclizine (ANTIVERT) 25 MG tablet Take 1 tablet by mouth 3 (Three) Times a Day As Needed for Dizziness. 60 tablet 1   • mupirocin (Bactroban) 2 % ointment Apply  topically to the appropriate area as directed 2 (Two) Times a Day. 22 g 0   • Naloxegol Oxalate (Movantik) 25 MG tablet Take 1 tablet by mouth Every Morning. 30 tablet 3   • naloxone (NARCAN) 4 MG/0.1ML nasal spray 1 spray into the nostril(s) as directed by provider As Needed (overdose, respiratory depression). 1 each 0   • Neomycin-Polymyxin-Dexameth 0.1 % ointment Apply  to eye(s) as directed by provider  Every 12 (Twelve) Hours.     • omeprazole (prilOSEC) 10 MG capsule Take 10 capsules by mouth 2 (Two) Times a Day.     • ondansetron (ZOFRAN) 4 MG tablet Take 1 tablet by mouth Every 8 (Eight) Hours As Needed for Nausea or Vomiting. 60 tablet 3   • Ozempic, 0.25 or 0.5 MG/DOSE, 2 MG/1.5ML solution pen-injector DIAL AND INJECT UNDER THE SKIN 0.5 MG WEEKLY 1.5 mL 3   • pantoprazole (PROTONIX) 20 MG EC tablet      • polyethylene glycol (MIRALAX) 17 GM/SCOOP powder Take 17 g by mouth.     • Probiotic capsule Take 1 capsule by mouth daily.     • Ventolin  (90 Base) MCG/ACT inhaler INHALE TWO PUFFS BY MOUTH EVERY 4 HOURS AS NEEDED FOR WHEEZING 18 g 5   • amoxicillin-clavulanate (Augmentin) 875-125 MG per tablet Take 1 tablet by mouth Every 12 (Twelve) Hours. 14 tablet 0   • lisinopril (PRINIVIL,ZESTRIL) 40 MG tablet Take 1 tablet by mouth Daily.     • neomycin-polymyxin-hydrocortisone (CORTISPORIN) 3.5-29091-5 otic solution Administer 3 drops into the left ear 4 (Four) Times a Day. 10 mL 0   • nystatin-triamcinolone (MYCOLOG) 090428-2.1 UNIT/GM-% ointment Apply 1 application topically to the appropriate area as directed 2 (Two) Times a Day. 60 g 0   • ondansetron ODT (ZOFRAN-ODT) 4 MG disintegrating tablet Take 4 mg by mouth.     • Psyllium (METAMUCIL PO) Take  by mouth.     • sucralfate (CARAFATE) 1 g tablet      • sulfamethoxazole-trimethoprim (Bactrim DS) 800-160 MG per tablet Take 1 tablet by mouth 2 (Two) Times a Day. 20 tablet 0     No facility-administered medications prior to visit.       Opioid medication/s are on active medication list.  and I have evaluated her active treatment plan and pain score trends (see table).  There were no vitals filed for this visit.  I have reviewed the chart for potential of high risk medication and harmful drug interactions in the elderly.            Aspirin is not on active medication list.  Aspirin use is not indicated based on review of current medical condition/s. Risk of  harm outweighs potential benefits.  .    Patient Active Problem List   Diagnosis   • Allergic rhinitis   • Benign essential HTN   • Arthritis of shoulder region, degenerative   • Mild episode of recurrent major depressive disorder ([facility])   • Fatigue   • Fibrositis   • Generalized osteoarthritis of hand   • Cannot sleep   • Pain in shoulder   • Adiposity   • Arthritis or polyarthritis, rheumatoid ([facility])   • FOM (frequency of micturition)   • Vitamin D deficiency   • Screening for colon cancer   • Pharyngoesophageal dysphagia   • Hyperglycemia   • Type 2 diabetes mellitus with neurologic complication ([facility])   • Anxiety   • Dizziness   • Abnormal mammogram of left breast   • Breast cancer, right ([facility])   • Chronic pain   • Disorder of electrolytes   • Overdose of drug   • Sepsis ([facility])   • Urge incontinence   • Localized primary osteoarthritis of left lower leg   • Mixed hyperlipidemia   • Dysthymia   • Constipation due to pain medication   • Encounter for monitoring opioid maintenance therapy   • Arthritis, multiple joint involvement   • Neck pain   • Fever   • Acute cystitis with hematuria   • Urinary frequency   • Menopausal symptoms   • Vision changes   • Cellulitis of right lower extremity   • Therapeutic opioid induced constipation   • Class 3 severe obesity due to excess calories with serious comorbidity and body mass index (BMI) of 40.0 to 44.9 in adult ([facility])   • S/P laparoscopic hysterectomy   • High grade squamous intraepithelial cervical dysplasia   • Chronic left shoulder pain   • Essential tremor   • Nausea and vomiting   • Early satiety   • Epigastric pain   • Clinical diagnosis of COVID-19     Advance Care Planning  Advance Directive is not on file.  ACP discussion was held with the patient during this visit. Patient does not have an advance directive, information provided.          Objective    Vitals:    12/05/22 1338   BP: 136/80   BP Location: Right arm   Patient Position: Sitting   Cuff Size: Adult  "  Pulse: 100   Resp: 18   Temp: 96.8 °F (36 °C)   TempSrc: Temporal   SpO2: 98%   Weight: 102 kg (225 lb 4.8 oz)   Height: 160 cm (62.99\")     Estimated body mass index is 39.92 kg/m² as calculated from the following:    Height as of this encounter: 160 cm (62.99\").    Weight as of this encounter: 102 kg (225 lb 4.8 oz).    Class 2 Severe Obesity (BMI >=35 and <=39.9). Obesity-related health conditions include the following: hypertension and diabetes mellitus. Obesity is unchanged. BMI is is above average; BMI management plan is completed. We discussed portion control and increasing exercise.      Does the patient have evidence of cognitive impairment? No    Physical Exam            HEALTH RISK ASSESSMENT    Smoking Status:  Social History     Tobacco Use   Smoking Status Never   Smokeless Tobacco Never     Alcohol Consumption:  Social History     Substance and Sexual Activity   Alcohol Use Yes    Comment: social alcohol use     Fall Risk Screen:    STEADI Fall Risk Assessment was completed, and patient is at MODERATE risk for falls. Assessment completed on:12/5/2022    Depression Screening:  PHQ-2/PHQ-9 Depression Screening 12/5/2022   Retired PHQ-9 Total Score -   Retired Total Score -   Little Interest or Pleasure in Doing Things 0-->not at all   Feeling Down, Depressed or Hopeless 1-->several days   Trouble Falling or Staying Asleep, or Sleeping Too Much -   Feeling Tired or Having Little Energy -   Poor Appetite or Overeating -   Feeling Bad about Yourself - or that You are a Failure or Have Let Yourself or Your Family Down -   Trouble Concentrating on Things, Such as Reading the Newspaper or Watching Television -   Moving or Speaking So Slowly that Other People Could Have Noticed? Or the Opposite - Being So Fidgety -   Thoughts that You Would be Better Off Dead or of Hurting Yourself in Some Way -   PHQ-9: Brief Depression Severity Measure Score 1   If You Checked Off Any Problems, How Difficult Have These " Problems Made It For You to Do Your Work, Take Care of Things at Home, or Get Along with Other People? -       Health Habits and Functional and Cognitive Screening:  Functional & Cognitive Status 12/5/2022   Do you have difficulty preparing food and eating? No   Do you have difficulty bathing yourself, getting dressed or grooming yourself? No   Do you have difficulty using the toilet? No   Do you have difficulty moving around from place to place? No   Do you have trouble with steps or getting out of a bed or a chair? No   Current Diet Well Balanced Diet   Dental Exam Not up to date   Eye Exam Up to date   Exercise (times per week) 0 times per week   Current Exercises Include No Regular Exercise   Current Exercise Activities Include -   Do you need help using the phone?  No   Are you deaf or do you have serious difficulty hearing?  No   Do you need help with transportation? No   Do you need help shopping? No   Do you need help preparing meals?  No   Do you need help with housework?  No   Do you need help with laundry? No   Do you need help taking your medications? No   Do you need help managing money? No   Do you ever drive or ride in a car without wearing a seat belt? No   Have you felt unusual stress, anger or loneliness in the last month? No   Who do you live with? Child   If you need help, do you have trouble finding someone available to you? No   Have you been bothered in the last four weeks by sexual problems? No   Do you have difficulty concentrating, remembering or making decisions? No       Age-appropriate Screening Schedule:  Refer to the list below for future screening recommendations based on patient's age, sex and/or medical conditions. Orders for these recommended tests are listed in the plan section. The patient has been provided with a written plan.    Health Maintenance   Topic Date Due   • DXA SCAN  Never done   • MAMMOGRAM  12/10/2020   • LIPID PANEL  11/03/2021   • INFLUENZA VACCINE  08/01/2022    • HEMOGLOBIN A1C  09/02/2022   • URINE MICROALBUMIN  09/21/2022   • ZOSTER VACCINE (1 of 2) 12/05/2022 (Originally 2/1/2001)   • TDAP/TD VACCINES (1 - Tdap) 12/05/2023 (Originally 2/1/1969)   • DIABETIC EYE EXAM  06/20/2023   • DIABETIC FOOT EXAM  12/05/2023   • PAP SMEAR  Discontinued              Assessment & Plan   CMS Preventative Services Quick Reference  Risk Factors Identified During Encounter  Immunizations Discussed/Encouraged (specific Immunizations; Influenza  The above risks/problems have been discussed with the patient.  Follow up actions/plans if indicated are seen below in the Assessment/Plan Section.  Pertinent information has been shared with the patient in the After Visit Summary.    Diagnoses and all orders for this visit:    1. Medicare annual wellness visit, subsequent (Primary)    2. Hair loss  -     Vitamin B12  -     Iron and TIBC  -     CBC & Differential  -     Vitamin D 25 hydroxy  -     TSH Rfx On Abnormal To Free T4    3. Pain in both lower extremities  -     Vitamin B12  -     CBC & Differential  -     Vitamin D 25 hydroxy    4. Screening for thyroid disorder  -     TSH Rfx On Abnormal To Free T4    5. Type 2 diabetes mellitus with other circulatory complication, without long-term current use of insulin ([facility]CC)  -     Hemoglobin A1c  -     Microalbumin / Creatinine Urine Ratio - Urine, Clean Catch    6. Screening for lipoid disorders  -     Lipid Panel With LDL / HDL Ratio    7. Encounter for screening mammogram for malignant neoplasm of breast  -     Mammo screening digital tomosynthesis bilateral w CAD; Future    8. Body mass index (BMI) 39.0-39.9, adult  -     Vitamin D 25 hydroxy    9. Postmenopausal  -     DEXA Bone Density Axial; Future    Other orders  -     Fluzone High-Dose 65+yrs (8622-9625)    update flu  Hair loss, monitor labs as ordered  Dm: off ozempic secondary to n/v, will consider jardiance pending A1C, discussed hygiene and increased risk for UTI/yeast  Lower  extremity pain: recommend compression socks, she does have varicose veins, consider vascular follow up for no improvement  Mammo/dexa as ordered  Diabetic foot exam completed    Follow Up:   Return in about 6 months (around 6/5/2023).     An After Visit Summary and PPPS were made available to the patient.

## 2022-12-06 LAB
25(OH)D3+25(OH)D2 SERPL-MCNC: 16.7 NG/ML (ref 30–100)
BASOPHILS # BLD AUTO: 0.1 X10E3/UL (ref 0–0.2)
BASOPHILS NFR BLD AUTO: 1 %
CHOLEST SERPL-MCNC: 178 MG/DL (ref 100–199)
EOSINOPHIL # BLD AUTO: 0.2 X10E3/UL (ref 0–0.4)
EOSINOPHIL NFR BLD AUTO: 3 %
ERYTHROCYTE [DISTWIDTH] IN BLOOD BY AUTOMATED COUNT: 13.1 % (ref 11.7–15.4)
HBA1C MFR BLD: 6.5 % (ref 4.8–5.6)
HCT VFR BLD AUTO: 36.4 % (ref 34–46.6)
HDLC SERPL-MCNC: 52 MG/DL
HGB BLD-MCNC: 11.9 G/DL (ref 11.1–15.9)
IMM GRANULOCYTES # BLD AUTO: 0 X10E3/UL (ref 0–0.1)
IMM GRANULOCYTES NFR BLD AUTO: 0 %
IRON SATN MFR SERPL: 21 % (ref 15–55)
IRON SERPL-MCNC: 59 UG/DL (ref 27–139)
LDLC SERPL CALC-MCNC: 101 MG/DL (ref 0–99)
LDLC/HDLC SERPL: 1.9 RATIO (ref 0–3.2)
LYMPHOCYTES # BLD AUTO: 1.8 X10E3/UL (ref 0.7–3.1)
LYMPHOCYTES NFR BLD AUTO: 27 %
MCH RBC QN AUTO: 31.5 PG (ref 26.6–33)
MCHC RBC AUTO-ENTMCNC: 32.7 G/DL (ref 31.5–35.7)
MCV RBC AUTO: 96 FL (ref 79–97)
MONOCYTES # BLD AUTO: 0.5 X10E3/UL (ref 0.1–0.9)
MONOCYTES NFR BLD AUTO: 7 %
NEUTROPHILS # BLD AUTO: 4.1 X10E3/UL (ref 1.4–7)
NEUTROPHILS NFR BLD AUTO: 62 %
PLATELET # BLD AUTO: 225 X10E3/UL (ref 150–450)
RBC # BLD AUTO: 3.78 X10E6/UL (ref 3.77–5.28)
TIBC SERPL-MCNC: 287 UG/DL (ref 250–450)
TRIGL SERPL-MCNC: 145 MG/DL (ref 0–149)
TSH SERPL DL<=0.005 MIU/L-ACNC: 1.01 UIU/ML (ref 0.45–4.5)
UIBC SERPL-MCNC: 228 UG/DL (ref 118–369)
VIT B12 SERPL-MCNC: 335 PG/ML (ref 232–1245)
VLDLC SERPL CALC-MCNC: 25 MG/DL (ref 5–40)
WBC # BLD AUTO: 6.7 X10E3/UL (ref 3.4–10.8)

## 2022-12-09 ENCOUNTER — TELEPHONE (OUTPATIENT)
Dept: FAMILY MEDICINE CLINIC | Facility: CLINIC | Age: 72
End: 2022-12-09

## 2022-12-09 NOTE — TELEPHONE ENCOUNTER
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

## 2022-12-19 ENCOUNTER — APPOINTMENT (OUTPATIENT)
Dept: WOMENS IMAGING | Facility: HOSPITAL | Age: 72
End: 2022-12-19
Payer: MEDICARE

## 2022-12-19 PROCEDURE — 77063 BREAST TOMOSYNTHESIS BI: CPT | Performed by: RADIOLOGY

## 2022-12-19 PROCEDURE — 77067 SCR MAMMO BI INCL CAD: CPT | Performed by: RADIOLOGY

## 2022-12-21 DIAGNOSIS — M19.011 OSTEOARTHRITIS OF BOTH SHOULDERS, UNSPECIFIED OSTEOARTHRITIS TYPE: ICD-10-CM

## 2022-12-21 DIAGNOSIS — M19.012 OSTEOARTHRITIS OF BOTH SHOULDERS, UNSPECIFIED OSTEOARTHRITIS TYPE: ICD-10-CM

## 2022-12-21 DIAGNOSIS — M54.2 NECK PAIN: ICD-10-CM

## 2022-12-21 RX ORDER — HYDROCODONE BITARTRATE AND ACETAMINOPHEN 10; 325 MG/1; MG/1
1 TABLET ORAL
Qty: 150 TABLET | Refills: 0 | Status: SHIPPED | OUTPATIENT
Start: 2022-12-29 | End: 2023-01-25 | Stop reason: SDUPTHER

## 2022-12-21 NOTE — TELEPHONE ENCOUNTER
Medication Refill Request    Date of phone call: 22    Medication being requested: Norco  mg    si tab po five times a day prn  Qty: 150    Date of last visit: 11/15/22    Date of last refill:     GAYE up to date?:     Next Follow up?: 22    Any new pertinent information? (i.e, new medication allergies, new use of medications, change in patient's health or condition, non-compliance or inconsistency with prescribing agreement?): refill for gt please

## 2023-01-10 ENCOUNTER — OFFICE VISIT (OUTPATIENT)
Dept: PAIN MEDICINE | Facility: CLINIC | Age: 73
End: 2023-01-10
Payer: MEDICARE

## 2023-01-10 VITALS
DIASTOLIC BLOOD PRESSURE: 74 MMHG | SYSTOLIC BLOOD PRESSURE: 131 MMHG | WEIGHT: 220.8 LBS | TEMPERATURE: 97.7 F | RESPIRATION RATE: 16 BRPM | BODY MASS INDEX: 39.12 KG/M2 | HEIGHT: 63 IN | OXYGEN SATURATION: 96 %

## 2023-01-10 DIAGNOSIS — G89.4 CHRONIC PAIN SYNDROME: ICD-10-CM

## 2023-01-10 DIAGNOSIS — M19.012 OSTEOARTHRITIS OF BOTH SHOULDERS, UNSPECIFIED OSTEOARTHRITIS TYPE: Primary | ICD-10-CM

## 2023-01-10 DIAGNOSIS — R52 ACUTE PAIN: ICD-10-CM

## 2023-01-10 DIAGNOSIS — Z51.81 ENCOUNTER FOR MONITORING OPIOID MAINTENANCE THERAPY: ICD-10-CM

## 2023-01-10 DIAGNOSIS — Z79.891 ENCOUNTER FOR MONITORING OPIOID MAINTENANCE THERAPY: ICD-10-CM

## 2023-01-10 DIAGNOSIS — M19.011 OSTEOARTHRITIS OF BOTH SHOULDERS, UNSPECIFIED OSTEOARTHRITIS TYPE: Primary | ICD-10-CM

## 2023-01-10 DIAGNOSIS — M12.9 ARTHRITIS, MULTIPLE JOINT INVOLVEMENT: ICD-10-CM

## 2023-01-10 DIAGNOSIS — M54.2 NECK PAIN: ICD-10-CM

## 2023-01-10 PROCEDURE — 99214 OFFICE O/P EST MOD 30 MIN: CPT

## 2023-01-10 NOTE — PROGRESS NOTES
CHIEF COMPLAINT  Neck and joint pain     Subjective   Chastity Salgado is a 72 y.o. female  who presents for follow-up.  She has a history of chronic neck and joint pain. She reports that her pain has remained consistent since her last office visit.      Today pain is 7/10VAS in severity. Pain is located in her bilateral shoulders, knees, ankles, and neck (right and left side). Describes this pain as an intermittent ache. Pain is worsened by certain movements, prolonged positions, climbing stairs, bending/twisting, and weather. Pain improves with rest, reposition, heat/ice, and medications.      Continues with Hydrocodone 10mg 5/day, Cymbalta 60mg, Flexeril 10mg at HS. She reports occasional constipation that she manages with Metamucil and Linzess (states Movantik was not working as well as it used to). Denies any side effects from the regimen including somnolence. The regimen helps decrease pain by 75%. Notes improvement in activity and function with regimen. ADL's by self. Denies any bowel or bladder changes.     She saw Dr. Tu Vásquez with [facility] at the beginning of December. She received a left shoulder steroid injection at this time. She needs to have a right shoulder replacement due to loose hardware but is trying to avoid this due to other health issues. She does not plan on having surgery to her left shoulder. ROM is limited to right shoulder and she is worried that left shoulder surgery may further limit her ability to take care of herself. She is trying to manage left shoulder pain with steroid injections.     Celebrex - causes GI issues - takes every other day (history of gastroparesis)  Gabapentin - caused sleep walking    She was scheduled for a left suprascapular nerve block/steroid injection on 1/24/23 but she canceled this procedure due to financial reasons.      Procedures:  2/25/21 - left suprascapular nerve block/steroid injection (posterior approach) - 50% relief x 1 month  9/22/20 -  left suprascapular nerve block/steroid injection (posterior approach) - 70% relief x 1-2 months     Joint Pain  This is a chronic (bilateral knees, ankles, and shoulders) problem. The current episode started more than 1 year ago. The problem occurs intermittently. The problem has been waxing and waning. Associated symptoms include abdominal pain, arthralgias (joint), joint swelling (left hand), neck pain and numbness. Pertinent negatives include no chest pain, congestion, coughing, fatigue, fever, headaches or weakness. The symptoms are aggravated by walking, bending, exertion, twisting and standing (cold weather, stairs). She has tried oral narcotics, position changes, heat, ice and rest for the symptoms.   Neck Pain   This is a chronic problem. The current episode started more than 1 year ago. The problem occurs intermittently. The problem has been waxing and waning. The pain is associated with a sleep position. The pain is present in the left side and right side. The quality of the pain is described as aching. The pain is at a severity of 7/10. The symptoms are aggravated by twisting, position and bending. Stiffness is present in the morning. Associated symptoms include numbness. Pertinent negatives include no chest pain, fever, headaches or weakness. She has tried ice, heat and oral narcotics for the symptoms.      PEG Assessment   What number best describes your pain on average in the past week?7  What number best describes how, during the past week, pain has interfered with your enjoyment of life?6  What number best describes how, during the past week, pain has interfered with your general activity?  7    The following portions of the patient's history were reviewed and updated as appropriate: allergies, current medications, past family history, past medical history, past social history, past surgical history and problem list.    Review of Systems   Constitutional: Negative for fatigue and fever.   HENT:  Negative for congestion.    Eyes: Negative for visual disturbance.   Respiratory: Negative for cough and shortness of breath.    Cardiovascular: Negative for chest pain.   Gastrointestinal: Positive for abdominal pain. Negative for constipation and diarrhea.   Genitourinary: Negative for difficulty urinating and dyspareunia.   Musculoskeletal: Positive for arthralgias (joint), back pain, joint swelling (left hand), neck pain and neck stiffness.   Neurological: Positive for numbness. Negative for weakness and headaches.   Psychiatric/Behavioral: Positive for sleep disturbance.     I have reviewed and confirmed the accuracy of the ROS as documented by the MA/LPN/RN NEWTON Abdul    Vitals:    01/10/23 1119   BP: 131/74   Resp: 16   Temp: 97.7 °F (36.5 °C)   SpO2: 96%   Weight: 100 kg (220 lb 12.8 oz)   Height: 160 cm (62.99\")   PainSc:   7   PainLoc: Back     Objective   Physical Exam  Constitutional:       Appearance: Normal appearance.   HENT:      Head: Normocephalic.   Cardiovascular:      Rate and Rhythm: Normal rate and regular rhythm.   Pulmonary:      Effort: Pulmonary effort is normal.      Breath sounds: Normal breath sounds.   Musculoskeletal:      Right shoulder: Tenderness present. Decreased range of motion.      Left shoulder: Tenderness present. Decreased range of motion.      Cervical back: Pain with movement present. Decreased range of motion.      Lumbar back: Tenderness present. Decreased range of motion.   Skin:     General: Skin is warm and dry.      Capillary Refill: Capillary refill takes less than 2 seconds.   Neurological:      General: No focal deficit present.      Mental Status: She is alert and oriented to person, place, and time.      Gait: Gait abnormal (slowed).   Psychiatric:         Mood and Affect: Mood normal.         Speech: Speech normal.         Behavior: Behavior normal.         Thought Content: Thought content normal.         Cognition and Memory: Cognition normal.        Assessment & Plan   Diagnoses and all orders for this visit:    1. Osteoarthritis of both shoulders, unspecified osteoarthritis type (Primary)    2. Neck pain    3. Arthritis, multiple joint involvement    4. Chronic pain syndrome    5. Acute pain    6. Encounter for monitoring opioid maintenance therapy    --- The urine drug screen confirmation from 11/15/22 has been reviewed and the result is appropriate based on patient history and GAYE report  --- The patient signed an updated copy of the controlled substance agreement on 5/9/22  --- Denied the need to reschedule left suprascapular nerve block/steroid injection at this time  --- Continue Hydrocodone. No refill needed at this time. Patient appears stable with current regimen. No adverse effects. Regarding continuation of opioids, there is no evidence of aberrant behavior or any red flags.  The patient continues with appropriate response to opioid therapy. ADL's remain intact by self.   --- Follow-up 2 months or sooner if needed     GAYE REPORT  As part of the patient's treatment plan, I am prescribing controlled substances. The patient has been made aware of appropriate use of such medications, including potential risk of somnolence, limited ability to drive and/or work safely, and the potential for dependence or overdose. It has also been made clear that these medications are for use by this patient only, without concomitant use of alcohol or other substances unless prescribed.     Patient has completed prescribing agreement detailing terms of continued prescribing of controlled substances, including monitoring GAYE reports, urine drug screening, and pill counts if necessary. The patient is aware that inappropriate use will results in cessation of prescribing such medications.    As the clinician, I personally reviewed the GAYE from 1/10/23 while the patient was in the office today.    History and physical exam exhibit continued safe and  appropriate use of controlled substances.    Dictated utilizing Dragon dictation.     Patient remained masked during entire encounter. No cough present. I donned a mask and eye protection throughout entire visit. Prior to donning mask and eye protection, hand hygiene was performed, as well as when it was doffed.  I was closer than 6 feet, but not for an extended period of time. No obvious exposure to any bodily fluids.

## 2023-01-25 DIAGNOSIS — M19.012 OSTEOARTHRITIS OF BOTH SHOULDERS, UNSPECIFIED OSTEOARTHRITIS TYPE: ICD-10-CM

## 2023-01-25 DIAGNOSIS — M19.011 OSTEOARTHRITIS OF BOTH SHOULDERS, UNSPECIFIED OSTEOARTHRITIS TYPE: ICD-10-CM

## 2023-01-25 DIAGNOSIS — M54.2 NECK PAIN: ICD-10-CM

## 2023-01-25 RX ORDER — NALOXEGOL OXALATE 25 MG/1
25 TABLET, FILM COATED ORAL EVERY MORNING
Qty: 30 TABLET | Refills: 3 | Status: CANCELLED | OUTPATIENT
Start: 2023-01-25

## 2023-01-25 NOTE — TELEPHONE ENCOUNTER
Medication Refill Request    Date of phone call: 1/25/23    Medication being requested: Hydrocodone  sig: Take 1 tablet by mouth 5 (Five) Times a Day As Needed for Severe Pain  Qty: 150    Date of last visit: 1/10/23    Date of last refill: 12/29/22    GAYE up to date?: Yes    Next Follow up?: 3/7/23    Any new pertinent information? (i.e, new medication allergies, new use of medications, change in patient's health or condition, non-compliance or inconsistency with prescribing agreement?): Patient is requesting refill for Movantik

## 2023-01-26 RX ORDER — HYDROCODONE BITARTRATE AND ACETAMINOPHEN 10; 325 MG/1; MG/1
1 TABLET ORAL
Qty: 150 TABLET | Refills: 0 | Status: SHIPPED | OUTPATIENT
Start: 2023-01-26 | End: 2023-02-22 | Stop reason: SDUPTHER

## 2023-01-26 NOTE — TELEPHONE ENCOUNTER
Please let patient know that she should have 2 refills of Movantik left on her current prescription.

## 2023-02-14 ENCOUNTER — CLINICAL SUPPORT (OUTPATIENT)
Dept: PAIN MEDICINE | Facility: CLINIC | Age: 73
End: 2023-02-14
Payer: MEDICARE

## 2023-02-14 DIAGNOSIS — M19.011 OSTEOARTHRITIS OF BOTH SHOULDERS, UNSPECIFIED OSTEOARTHRITIS TYPE: Primary | ICD-10-CM

## 2023-02-14 DIAGNOSIS — M19.012 OSTEOARTHRITIS OF BOTH SHOULDERS, UNSPECIFIED OSTEOARTHRITIS TYPE: Primary | ICD-10-CM

## 2023-02-14 DIAGNOSIS — G89.4 CHRONIC PAIN SYNDROME: ICD-10-CM

## 2023-02-14 RX ORDER — KETOROLAC TROMETHAMINE 30 MG/ML
30 INJECTION, SOLUTION INTRAMUSCULAR; INTRAVENOUS ONCE
Status: COMPLETED | OUTPATIENT
Start: 2023-02-14 | End: 2023-02-14

## 2023-02-14 RX ADMIN — KETOROLAC TROMETHAMINE 30 MG: 30 INJECTION, SOLUTION INTRAMUSCULAR; INTRAVENOUS at 14:03

## 2023-02-22 DIAGNOSIS — G89.4 CHRONIC PAIN SYNDROME: ICD-10-CM

## 2023-02-22 DIAGNOSIS — M12.9 ARTHRITIS, MULTIPLE JOINT INVOLVEMENT: ICD-10-CM

## 2023-02-22 DIAGNOSIS — M19.012 OSTEOARTHRITIS OF BOTH SHOULDERS, UNSPECIFIED OSTEOARTHRITIS TYPE: ICD-10-CM

## 2023-02-22 DIAGNOSIS — M54.2 NECK PAIN: ICD-10-CM

## 2023-02-22 DIAGNOSIS — M19.011 OSTEOARTHRITIS OF BOTH SHOULDERS, UNSPECIFIED OSTEOARTHRITIS TYPE: ICD-10-CM

## 2023-02-22 NOTE — TELEPHONE ENCOUNTER
Medication Refill Request    Date of phone call: 23    Medication being requested: Norco  mg   si tab po five times a day prn  Qty: 150    Date of last visit: 1/10/23    Date of last refill:     GAYE up to date?:     Next Follow up?: 3/7/23    Any new pertinent information? (i.e, new medication allergies, new use of medications, change in patient's health or condition, non-compliance or inconsistency with prescribing agreement?): Please refill flexeril as well

## 2023-02-23 RX ORDER — HYDROCODONE BITARTRATE AND ACETAMINOPHEN 10; 325 MG/1; MG/1
1 TABLET ORAL
Qty: 150 TABLET | Refills: 0 | Status: SHIPPED | OUTPATIENT
Start: 2023-02-23 | End: 2023-03-22 | Stop reason: SDUPTHER

## 2023-02-23 RX ORDER — CYCLOBENZAPRINE HCL 10 MG
10 TABLET ORAL NIGHTLY PRN
Qty: 90 TABLET | Refills: 0 | Status: SHIPPED | OUTPATIENT
Start: 2023-02-23

## 2023-03-02 ENCOUNTER — TELEPHONE (OUTPATIENT)
Dept: FAMILY MEDICINE CLINIC | Facility: CLINIC | Age: 73
End: 2023-03-02

## 2023-03-02 RX ORDER — FLUCONAZOLE 150 MG/1
150 TABLET ORAL ONCE
Qty: 3 TABLET | Refills: 0 | Status: SHIPPED | OUTPATIENT
Start: 2023-03-02 | End: 2023-03-02

## 2023-03-02 NOTE — TELEPHONE ENCOUNTER
Caller: Chastity Salgado    Relationship: Self    Best call back number: [phone]    What medication are you requesting: PILLS FOR YEAST INFECTION     What are your current symptoms: ITCHING     How long have you been experiencing symptoms:  1 WEEK     Have you had these symptoms before:    [x] Yes  [] No    Have you been treated for these symptoms before:   [x] Yes  [] No    If a prescription is needed, what is your preferred pharmacy and phone number: [facility] [id number] - [facility] [address] AT [facility] [address] - [phone] [facility] [phone] FX     Additional notes: PATIENT STATES SHE IS TAKING dicyclomine (BENTYL) 10 MG capsule AND THINKS THAT HAS MADE YEAST INFECTION WORSE.

## 2023-03-02 NOTE — TELEPHONE ENCOUNTER
Caller: Chastity Salgado    Relationship: Self    Best call back number: [phone]    What form or medical record are you requesting: LETTER FOR EMOTIONAL SUPPORT ANIMAL     Who is requesting this form or medical record from you: [facility] LEAS[facility]ICE     How would you like to receive the form or medical records (pick-up, mail, fax):      Timeframe paperwork needed: AS SOON AS POSSIBLE     Additional notes: PATIENT STATES [facility] LEASING OFFICE IS REQUESTING A LETTER STATING THE PATIENT'S DOG IS AN EMOTIONAL SUPPORT ANIMAL. PATIENT STATES SHE HAS HAD THE DOG FOR 11 YEARS.

## 2023-03-07 ENCOUNTER — OFFICE VISIT (OUTPATIENT)
Dept: PAIN MEDICINE | Facility: CLINIC | Age: 73
End: 2023-03-07
Payer: MEDICARE

## 2023-03-07 VITALS
SYSTOLIC BLOOD PRESSURE: 147 MMHG | BODY MASS INDEX: 40.18 KG/M2 | RESPIRATION RATE: 18 BRPM | WEIGHT: 226.8 LBS | OXYGEN SATURATION: 98 % | HEART RATE: 88 BPM | TEMPERATURE: 97.8 F | HEIGHT: 63 IN | DIASTOLIC BLOOD PRESSURE: 89 MMHG

## 2023-03-07 DIAGNOSIS — Z79.891 ENCOUNTER FOR MONITORING OPIOID MAINTENANCE THERAPY: ICD-10-CM

## 2023-03-07 DIAGNOSIS — M12.9 ARTHRITIS, MULTIPLE JOINT INVOLVEMENT: ICD-10-CM

## 2023-03-07 DIAGNOSIS — Z51.81 ENCOUNTER FOR MONITORING OPIOID MAINTENANCE THERAPY: ICD-10-CM

## 2023-03-07 DIAGNOSIS — M19.012 OSTEOARTHRITIS OF BOTH SHOULDERS, UNSPECIFIED OSTEOARTHRITIS TYPE: Primary | ICD-10-CM

## 2023-03-07 DIAGNOSIS — G89.4 CHRONIC PAIN SYNDROME: ICD-10-CM

## 2023-03-07 DIAGNOSIS — M54.2 NECK PAIN: ICD-10-CM

## 2023-03-07 DIAGNOSIS — M19.011 OSTEOARTHRITIS OF BOTH SHOULDERS, UNSPECIFIED OSTEOARTHRITIS TYPE: Primary | ICD-10-CM

## 2023-03-07 PROCEDURE — 99214 OFFICE O/P EST MOD 30 MIN: CPT

## 2023-03-07 RX ORDER — LIDOCAINE 50 MG/G
1 PATCH TOPICAL EVERY 24 HOURS
Qty: 30 PATCH | Refills: 2 | Status: SHIPPED | OUTPATIENT
Start: 2023-03-07

## 2023-03-07 RX ORDER — DOXYCYCLINE 100 MG/1
CAPSULE ORAL
COMMUNITY
Start: 2023-02-09

## 2023-03-07 NOTE — PROGRESS NOTES
CHIEF COMPLAINT  Neck and joint pain    Subjective   Chastity Salgado is a 73 y.o. female  who presents for follow-up.  She has a history of chronic neck and joint pain. She reports that her pain has remained consistent since her last office visit.      Today pain is 7/10VAS in severity. Pain is located in her bilateral shoulders, knees, ankles, and neck (right and left side). Describes this pain as an intermittent ache. Pain is worsened by certain movements, prolonged positions, climbing stairs, bending/twisting, and weather. Pain improves with rest, reposition, heat/ice, and medications.      Continues with Hydrocodone 10mg 5/day, Cymbalta 60mg, Flexeril 10mg at HS. She reports occasional constipation that she manages with Metamucil and Linzess/Movantik. Denies any side effects from the regimen including somnolence. The regimen helps decrease pain by 75%. Notes improvement in activity and function with regimen. ADL's by self. Denies any bowel or bladder changes.      She is scheduled to see Dr. Tu Vásquez with [facility] in April to evaluate left shoulder pain. She hopes to receive a another steroid injection to left shoulder at this time. She needs to have a right shoulder replacement due to loose hardware but is trying to avoid this due to other health issues. She does not plan on having surgery to her left shoulder. ROM is limited to right shoulder and she is worried that left shoulder surgery may further limit her ability to take care of herself. She is trying to manage left shoulder pain with steroid injections.      Celebrex - causes GI issues - takes every other day (history of gastroparesis)  Gabapentin - caused sleep walking     She was scheduled for a left suprascapular nerve block/steroid injection on 1/24/23 but she canceled this procedure due to financial reasons.     Scheduled with [facility] on 3/13/23 to evaluate left hand/knucle pain.      Procedures:  2/25/21 - left suprascapular  nerve block/steroid injection (posterior approach) - 50% relief x 1 month  9/22/20 - left suprascapular nerve block/steroid injection (posterior approach) - 70% relief x 1-2 months    Joint Pain  This is a chronic (bilateral knees, ankles, and shoulders) problem. The current episode started more than 1 year ago. The problem occurs intermittently. The problem has been unchanged (unchanged since last office visit). Associated symptoms include abdominal pain, arthralgias, joint swelling (left hand), neck pain, numbness (left hand) and weakness (bilateral arms). Pertinent negatives include no chest pain, congestion, coughing, fatigue, fever or headaches. The symptoms are aggravated by walking, bending, exertion, twisting and standing (cold weather, stairs). She has tried oral narcotics, position changes, heat, ice and rest for the symptoms.   Neck Pain   This is a chronic problem. The current episode started more than 1 year ago. The problem occurs intermittently. The problem has been unchanged (unchanged since last office visit). The pain is associated with a sleep position. The pain is present in the left side and right side. The quality of the pain is described as aching. The pain is at a severity of 7/10. The symptoms are aggravated by twisting, position and bending. Stiffness is present in the morning. Associated symptoms include numbness (left hand) and weakness (bilateral arms). Pertinent negatives include no chest pain, fever or headaches. She has tried ice, heat and oral narcotics for the symptoms.      PEG Assessment   What number best describes your pain on average in the past week?8  What number best describes how, during the past week, pain has interfered with your enjoyment of life?5  What number best describes how, during the past week, pain has interfered with your general activity?  5    The following portions of the patient's history were reviewed and updated as appropriate: allergies, current  "medications, past family history, past medical history, past social history, past surgical history and problem list.    Review of Systems   Constitutional: Negative for fatigue and fever.   HENT: Negative for congestion.    Respiratory: Negative for cough and shortness of breath.    Cardiovascular: Negative for chest pain.   Gastrointestinal: Positive for abdominal pain and constipation.   Genitourinary: Negative for difficulty urinating.   Musculoskeletal: Positive for arthralgias, joint swelling (left hand) and neck pain.   Neurological: Positive for weakness (bilateral arms) and numbness (left hand). Negative for headaches.   Psychiatric/Behavioral: Positive for sleep disturbance. Negative for suicidal ideas. The patient is nervous/anxious.      I have reviewed and confirmed the accuracy of the ROS as documented by the MA/LPN/RN NEWTON Abdul    Vitals:    03/07/23 1114   BP: 147/89   Pulse: 88   Resp: 18   Temp: 97.8 °F (36.6 °C)   SpO2: 98%   Weight: 103 kg (226 lb 12.8 oz)   Height: 160 cm (62.99\")   PainSc:   7   PainLoc: Generalized     Objective   Physical Exam  Constitutional:       Appearance: Normal appearance.   HENT:      Head: Normocephalic.   Cardiovascular:      Rate and Rhythm: Normal rate and regular rhythm.   Pulmonary:      Effort: Pulmonary effort is normal.      Breath sounds: Normal breath sounds.   Musculoskeletal:      Right shoulder: Tenderness present. Decreased range of motion.      Left shoulder: Tenderness and crepitus present. Decreased range of motion.      Cervical back: Pain with movement present. Decreased range of motion.      Lumbar back: Tenderness present. Decreased range of motion. Positive right straight leg raise test and positive left straight leg raise test.   Skin:     General: Skin is warm and dry.      Capillary Refill: Capillary refill takes less than 2 seconds.   Neurological:      General: No focal deficit present.      Mental Status: She is alert and " oriented to person, place, and time.      Gait: Gait abnormal (slowed).   Psychiatric:         Mood and Affect: Mood normal.         Speech: Speech normal.         Behavior: Behavior normal.         Thought Content: Thought content normal.         Cognition and Memory: Cognition normal.       Assessment & Plan   Diagnoses and all orders for this visit:    1. Osteoarthritis of both shoulders, unspecified osteoarthritis type (Primary)  -     lidocaine (LIDODERM) 5 %; Place 1 patch on the skin as directed by provider Daily. Remove & Discard patch within 12 hours or as directed by MD  Dispense: 30 patch; Refill: 2    2. Neck pain  -     lidocaine (LIDODERM) 5 %; Place 1 patch on the skin as directed by provider Daily. Remove & Discard patch within 12 hours or as directed by MD  Dispense: 30 patch; Refill: 2    3. Chronic pain syndrome    4. Arthritis, multiple joint involvement    5. Encounter for monitoring opioid maintenance therapy    --- The urine drug screen confirmation from 11/15/22 has been reviewed and the result is appropriate based on patient history and GAYE report  --- The patient signed an updated copy of the controlled substance agreement on 5/9/22  --- Continue Hydrocodone. Refill not needed at this time. Patient appears stable with current regimen. No adverse effects. Regarding continuation of opioids, there is no evidence of aberrant behavior or any red flags.  The patient continues with appropriate response to opioid therapy. ADL's remain intact by self.    --- Prescription for Lidocaine 5% patches sent to pharmacy - if no relief/not approved by insurance, may consider compound pain cream  --- Follow-up 2 months or sooner if needed     GAYE REPORT  As part of the patient's treatment plan, I am prescribing controlled substances. The patient has been made aware of appropriate use of such medications, including potential risk of somnolence, limited ability to drive and/or work safely, and the  potential for dependence or overdose. It has also been made clear that these medications are for use by this patient only, without concomitant use of alcohol or other substances unless prescribed.     Patient has completed prescribing agreement detailing terms of continued prescribing of controlled substances, including monitoring GAYE reports, urine drug screening, and pill counts if necessary. The patient is aware that inappropriate use will results in cessation of prescribing such medications.    As the clinician, I personally reviewed the GAYE from 3/7/23 while the patient was in the office today.    History and physical exam exhibit continued safe and appropriate use of controlled substances.    Dictated utilizing Dragon dictation.     Patient remained masked during entire encounter. No cough present. I donned a mask and eye protection throughout entire visit. Prior to donning mask and eye protection, hand hygiene was performed, as well as when it was doffed.  I was closer than 6 feet, but not for an extended period of time. No obvious exposure to any bodily fluids.

## 2023-03-10 ENCOUNTER — TELEPHONE (OUTPATIENT)
Dept: FAMILY MEDICINE CLINIC | Facility: CLINIC | Age: 73
End: 2023-03-10
Payer: MEDICARE

## 2023-03-10 NOTE — TELEPHONE ENCOUNTER
Patient advised me she was taking 5tablets of the 10mg Hydrocodone a day. Per Dr Small he verbally said to have her cut all 5 in half the day/days she taking the diflucan.

## 2023-03-10 NOTE — TELEPHONE ENCOUNTER
Spoke to Anastasiya the pharmacist at [facility]. Pharmacist stated their is drug interaction of Diflucan with her hydrocodone. Will speak to  on call, to determine what is next step,  since original provider is out of office.

## 2023-03-10 NOTE — TELEPHONE ENCOUNTER
PATIENT STATED THE PHARMACY STATED THEY HAVE CALLED THE OFFICE SEVERAL TIMES TO GET CLARIFICATION ON THE MEDICATION.    SHE WOULD LIKE A CALL ONCE THIS HAS BEEN DONE.

## 2023-03-10 NOTE — TELEPHONE ENCOUNTER
Spoke to Anastasiya the pharmacist at [facility]. Pharmacist stated their is drug interaction of Diflucan with her hydrocodone. Will speak to  on call, to determine what is next step,  since original provider is out of office.

## 2023-03-10 NOTE — TELEPHONE ENCOUNTER
Spoke to Dr Geovanny Dr on call. Said for Patient to cut in half her hydrocodone while taking the diflucan, will call patient and Deuce to clarify.

## 2023-03-16 RX ORDER — LISINOPRIL 40 MG/1
TABLET ORAL
Qty: 90 TABLET | Refills: 1 | Status: SHIPPED | OUTPATIENT
Start: 2023-03-16

## 2023-03-16 NOTE — TELEPHONE ENCOUNTER
Rx Refill Note  Requested Prescriptions     Pending Prescriptions Disp Refills   • lisinopril (PRINIVIL,ZESTRIL) 40 MG tablet [Pharmacy Med Name: LISINOPRIL 40 MG TABLET] 90 tablet      Sig: TAKE ONE TABLET BY MOUTH DAILY      Last office visit with prescribing clinician: 12/5/2022   Last telemedicine visit with prescribing clinician: Visit date not found   Next office visit with prescribing clinician: Visit date not found                         Would you like a call back once the refill request has been completed: [] Yes [] No    If the office needs to give you a call back, can they leave a voicemail: [] Yes [] No    Mae Joy MA  03/16/23, 15:57 EDT

## 2023-03-21 ENCOUNTER — TELEPHONE (OUTPATIENT)
Dept: FAMILY MEDICINE CLINIC | Facility: CLINIC | Age: 73
End: 2023-03-21
Payer: MEDICARE

## 2023-03-21 NOTE — TELEPHONE ENCOUNTER
PT CALLED AND STATED SHE IS HAVING URGENCY AND BLOOD IN URINE. PT STATED SHE DID HAVE A LOT OF BLOOD COME OUT LAST NIGHT. PT STATES SHE FEELS IT IS A REACTION TO HER JARDIANCE. INFORMED PT WE DID NOT HAVE ANY AVAILABILITIES FOR TODAY AND [facility] IS BOOKED ALL WEEK AND NOT HERE Thursday OR Friday. RECOMMENDED PT GO TO [facility] AND PT VOICED UNDERSTANDING AND IS GOING TO GO TO [facility] TODAY AND CALL BACK IF SHE NEEDS A FU WITH [facility]Z.

## 2023-03-22 DIAGNOSIS — M54.2 NECK PAIN: ICD-10-CM

## 2023-03-22 DIAGNOSIS — M19.012 OSTEOARTHRITIS OF BOTH SHOULDERS, UNSPECIFIED OSTEOARTHRITIS TYPE: ICD-10-CM

## 2023-03-22 DIAGNOSIS — M19.011 OSTEOARTHRITIS OF BOTH SHOULDERS, UNSPECIFIED OSTEOARTHRITIS TYPE: ICD-10-CM

## 2023-03-22 NOTE — TELEPHONE ENCOUNTER
Medication Refill Request    Date of phone call: 3/22/23    Medication being requested: Norco  mg    si tab po five times a day prn  Qty: 150    Date of last visit: 3/7/23    Date of last refill:     GAYE up to date?:     Next Follow up?: 23    Any new pertinent information? (i.e, new medication allergies, new use of medications, change in patient's health or condition, non-compliance or inconsistency with prescribing agreement?):

## 2023-03-23 RX ORDER — HYDROCODONE BITARTRATE AND ACETAMINOPHEN 10; 325 MG/1; MG/1
1 TABLET ORAL
Qty: 150 TABLET | Refills: 0 | Status: SHIPPED | OUTPATIENT
Start: 2023-03-23

## 2023-04-11 ENCOUNTER — OFFICE VISIT (OUTPATIENT)
Dept: FAMILY MEDICINE CLINIC | Facility: CLINIC | Age: 73
End: 2023-04-11
Payer: MEDICARE

## 2023-04-11 VITALS
TEMPERATURE: 96 F | SYSTOLIC BLOOD PRESSURE: 138 MMHG | OXYGEN SATURATION: 97 % | RESPIRATION RATE: 18 BRPM | WEIGHT: 223.7 LBS | DIASTOLIC BLOOD PRESSURE: 80 MMHG | BODY MASS INDEX: 39.64 KG/M2 | HEART RATE: 99 BPM | HEIGHT: 63 IN

## 2023-04-11 DIAGNOSIS — J01.00 ACUTE NON-RECURRENT MAXILLARY SINUSITIS: Primary | ICD-10-CM

## 2023-04-11 DIAGNOSIS — R30.0 DYSURIA: ICD-10-CM

## 2023-04-11 LAB
BILIRUB BLD-MCNC: NEGATIVE MG/DL
CLARITY, POC: CLEAR
COLOR UR: YELLOW
EXPIRATION DATE: ABNORMAL
GLUCOSE UR STRIP-MCNC: NEGATIVE MG/DL
KETONES UR QL: NEGATIVE
LEUKOCYTE EST, POC: NEGATIVE
Lab: ABNORMAL
NITRITE UR-MCNC: NEGATIVE MG/ML
PH UR: 5.5 [PH] (ref 5–8)
PROT UR STRIP-MCNC: ABNORMAL MG/DL
RBC # UR STRIP: ABNORMAL /UL
SP GR UR: 1.03 (ref 1–1.03)
UROBILINOGEN UR QL: ABNORMAL

## 2023-04-11 PROCEDURE — 81003 URINALYSIS AUTO W/O SCOPE: CPT | Performed by: NURSE PRACTITIONER

## 2023-04-11 PROCEDURE — 3079F DIAST BP 80-89 MM HG: CPT | Performed by: NURSE PRACTITIONER

## 2023-04-11 PROCEDURE — 99213 OFFICE O/P EST LOW 20 MIN: CPT | Performed by: NURSE PRACTITIONER

## 2023-04-11 PROCEDURE — 3075F SYST BP GE 130 - 139MM HG: CPT | Performed by: NURSE PRACTITIONER

## 2023-04-11 RX ORDER — AMOXICILLIN AND CLAVULANATE POTASSIUM 875; 125 MG/1; MG/1
1 TABLET, FILM COATED ORAL EVERY 12 HOURS SCHEDULED
Qty: 14 TABLET | Refills: 0 | Status: SHIPPED | OUTPATIENT
Start: 2023-04-11

## 2023-04-11 NOTE — PROGRESS NOTES
Chief Complaint  Cough (Rib pain, chest burning, congestion rt side sinus, x 2 weeks), Urinary Tract Infection (X 2 weeks, blood in urine), Wheezing (X 2weeks), and Sore Throat (X 2 weeks, flem)    Subjective        Chastity Salgado presents to [facility]  History of Present Illness  Chastity Salgado is a 73-year-old female who presents today with cough, chest burning, and potential UTI.     The patient reports she has been sick for 2 weeks, experiencing wheezing, a dry cough with a little clear thick sputum. She reports her symptoms first started out with severe sinus headaches that made her nauseated. She adds sometimes it is just the right side of her face with symptoms. She notes experiencing a clicking noise in her ear the other night; however, she states she does not feel like it is infected. She notes experiencing a sore throat, and raspy hoarseness in her voice. She reports times that she gets a lot of phlegm clog up in there, and she has to try to clear it up. She reports she is taking Zyrtec. She adds she has bought some Mucinex however, she had taken a pill at 3:00 PM, and she did not get to sleep until 5:30 PM. She reports she has tried albuterol for the wheezing. However, she notes it was not helpful. She notes she is not on another inhaler. She thinks Augmentin does good.    The patient states as soon as she started taking Jardiance, she experienced a urinary tract. She reports going to urgent care and being informed she was positive for hematuria. She notes she hardly ever gets a urinary tract infection. She reports discontinued the Jardiance 2 weeks ago.    The patient states she just received an injection in her shoulder and hand. She notes it was the most painful thing. She notes she went to Dr. Mnuoz, an orthopedic hand surgeon. She reports she was informed that there was no space in that area.    The patient states she has a knot that made her finger crooked, and was  "informed it is a cyst. She adds she was informed it will have to come out. However, she notes he informed her they will monitor the area for a while. She describes feeling it on the outside of the joint area too. She reports going next week for a bone scan on her right knee. She reports she was informed by Dr. Vásquez her joint replacement is wearing down. She adds there were questionable areas to him. She states she was informed they might be able to go and repair a part of it, not a total. She states it is killing her at night and it wakes her up. It stays swollen constantly. It goes from really big to a little bit smaller.    Objective   Vital Signs:  /80 (BP Location: Right arm, Patient Position: Sitting, Cuff Size: Adult)   Pulse 99   Temp 96 °F (35.6 °C) (Temporal)   Resp 18   Ht 160 cm (62.99\")   Wt 101 kg (223 lb 11.2 oz)   SpO2 97%   BMI 39.64 kg/m²   Estimated body mass index is 39.64 kg/m² as calculated from the following:    Height as of this encounter: 160 cm (62.99\").    Weight as of this encounter: 101 kg (223 lb 11.2 oz).           A review of systems was performed, and the pertinent positives are noted in the HPI.      Physical Exam  HENT:      Right Ear: Ear canal normal. A middle ear effusion is present.      Left Ear: Ear canal normal. A middle ear effusion is present.      Nose:      Right Sinus: Maxillary sinus tenderness present.      Left Sinus: No maxillary sinus tenderness.      Mouth/Throat:      Mouth: Mucous membranes are moist.      Pharynx: Oropharynx is clear.   Eyes:      Conjunctiva/sclera: Conjunctivae normal.      Pupils: Pupils are equal, round, and reactive to light.   Cardiovascular:      Rate and Rhythm: Normal rate and regular rhythm.      Heart sounds: No murmur heard.    No friction rub. No gallop.   Pulmonary:      Effort: Pulmonary effort is normal. No respiratory distress.      Breath sounds: Normal breath sounds. No wheezing, rhonchi or rales. "   Lymphadenopathy:      Cervical: No cervical adenopathy.   Neurological:      Mental Status: She is oriented to person, place, and time.   Psychiatric:         Mood and Affect: Mood normal.        Result Review :                   Assessment and Plan   Diagnoses and all orders for this visit:    1. Acute non-recurrent maxillary sinusitis (Primary)    Other orders  -     amoxicillin-clavulanate (Augmentin) 875-125 MG per tablet; Take 1 tablet by mouth Every 12 (Twelve) Hours.  Dispense: 14 tablet; Refill: 0    Patient with some allergy symptoms. Suspect this has gone into a sinusitis. We are going to start her on Augmentin twice a day. Also with increased wheezing, she is using her albuterol with little relief. We are going to do Asmanex 2 puffs twice a day. Instructed to rinse her mouth out after use.           Follow Up   No follow-ups on file.  Patient was given instructions and counseling regarding her condition or for health maintenance advice. Please see specific information pulled into the AVS if appropriate.     Transcribed from ambient dictation for NEWTON Jose by Jessica Valderrama.  04/11/23   15:47 EDT    Patient or patient representative verbalized consent to the visit recording.  I have personally performed the services described in this document as transcribed by the above individual, and it is both accurate and complete.

## 2023-04-24 DIAGNOSIS — M19.012 OSTEOARTHRITIS OF BOTH SHOULDERS, UNSPECIFIED OSTEOARTHRITIS TYPE: ICD-10-CM

## 2023-04-24 DIAGNOSIS — M19.011 OSTEOARTHRITIS OF BOTH SHOULDERS, UNSPECIFIED OSTEOARTHRITIS TYPE: ICD-10-CM

## 2023-04-24 DIAGNOSIS — M54.2 NECK PAIN: ICD-10-CM

## 2023-04-24 RX ORDER — HYDROCODONE BITARTRATE AND ACETAMINOPHEN 10; 325 MG/1; MG/1
1 TABLET ORAL
Qty: 150 TABLET | Refills: 0 | Status: SHIPPED | OUTPATIENT
Start: 2023-04-24

## 2023-04-24 NOTE — TELEPHONE ENCOUNTER
Medication Refill Request    Date of phone call: 23    Medication being requested: Norco  mg   si tab po five times a day prn  Qty: 150    Date of last visit: 3/7/23    Date of last refill:     GAYE up to date?:     Next Follow up?: 23    Any new pertinent information? (i.e, new medication allergies, new use of medications, change in patient's health or condition, non-compliance or inconsistency with prescribing agreement?):

## 2023-05-23 ENCOUNTER — OFFICE VISIT (OUTPATIENT)
Dept: PAIN MEDICINE | Facility: CLINIC | Age: 73
End: 2023-05-23
Payer: MEDICARE

## 2023-05-23 VITALS
SYSTOLIC BLOOD PRESSURE: 131 MMHG | TEMPERATURE: 97.2 F | DIASTOLIC BLOOD PRESSURE: 74 MMHG | BODY MASS INDEX: 39.62 KG/M2 | OXYGEN SATURATION: 93 % | HEIGHT: 63 IN | WEIGHT: 223.6 LBS | HEART RATE: 79 BPM

## 2023-05-23 DIAGNOSIS — Z51.81 ENCOUNTER FOR MONITORING OPIOID MAINTENANCE THERAPY: ICD-10-CM

## 2023-05-23 DIAGNOSIS — M54.2 NECK PAIN: ICD-10-CM

## 2023-05-23 DIAGNOSIS — M12.9 ARTHRITIS, MULTIPLE JOINT INVOLVEMENT: ICD-10-CM

## 2023-05-23 DIAGNOSIS — G89.4 CHRONIC PAIN SYNDROME: Primary | ICD-10-CM

## 2023-05-23 DIAGNOSIS — M19.011 OSTEOARTHRITIS OF BOTH SHOULDERS, UNSPECIFIED OSTEOARTHRITIS TYPE: ICD-10-CM

## 2023-05-23 DIAGNOSIS — M62.838 MUSCLE SPASM: ICD-10-CM

## 2023-05-23 DIAGNOSIS — M19.012 OSTEOARTHRITIS OF BOTH SHOULDERS, UNSPECIFIED OSTEOARTHRITIS TYPE: ICD-10-CM

## 2023-05-23 DIAGNOSIS — Z79.891 ENCOUNTER FOR MONITORING OPIOID MAINTENANCE THERAPY: ICD-10-CM

## 2023-05-23 RX ORDER — HYDROCODONE BITARTRATE AND ACETAMINOPHEN 10; 325 MG/1; MG/1
1 TABLET ORAL
Qty: 150 TABLET | Refills: 0 | Status: SHIPPED | OUTPATIENT
Start: 2023-05-23

## 2023-05-23 RX ORDER — KETOROLAC TROMETHAMINE 30 MG/ML
30 INJECTION, SOLUTION INTRAMUSCULAR; INTRAVENOUS ONCE
Status: COMPLETED | OUTPATIENT
Start: 2023-05-23 | End: 2023-05-23

## 2023-05-23 RX ORDER — CYCLOBENZAPRINE HCL 10 MG
10 TABLET ORAL 3 TIMES DAILY PRN
Qty: 90 TABLET | Refills: 1 | Status: SHIPPED | OUTPATIENT
Start: 2023-05-23

## 2023-05-23 RX ADMIN — KETOROLAC TROMETHAMINE 30 MG: 30 INJECTION, SOLUTION INTRAMUSCULAR; INTRAVENOUS at 11:44

## 2023-05-23 NOTE — PROGRESS NOTES
CHIEF COMPLAINT  Neck and joint pain    Subjective   Chastity Salgado is a 73 y.o. female  who presents for follow-up.  She has a history of chronic neck and joint pain. She reports that her pain has worsened since her last office visit. She has been more active lately due to moving.      Today pain is 8/10VAS in severity. Pain is located in her bilateral shoulders, knees, ankles, and neck (right and left side). Describes this pain as an intermittent ache. Pain is worsened by certain movements, prolonged positions, climbing stairs, bending/twisting, and weather. Pain improves with rest, reposition, heat/ice, and medications.      Continues with Hydrocodone 10mg 5/day, Cymbalta 60mg, Flexeril 10mg at HS. She reports occasional constipation that she manages with Metamucil and Linzess/Movantik. Denies any side effects from the regimen including somnolence. The regimen helps decrease pain by 75%. Notes improvement in activity and function with regimen. ADL's by self. Denies any bowel or bladder changes.     She has seen Dr. Gimenez with [facility] since her last office visit. She states she needs a right total knee revision surgery. She would like to hold off on surgery until later in the summer. She also saw Dr. Tu Vásquez with [facility] in April to evaluate left shoulder pain. She received a steroid injection to her left shoulder a this time. She needs to have a right shoulder replacement due to loose hardware but is trying to avoid this due to other health issues. She does not plan on having surgery to her left shoulder. ROM is limited to right shoulder and she is worried that left shoulder surgery may further limit her ability to take care of herself.      Celebrex - causes GI issues - takes every other day (history of gastroparesis)  Gabapentin - caused sleep walking     She was scheduled for a left suprascapular nerve block/steroid injection on 1/24/23 but she canceled this procedure due to  financial reasons.     Saw [facility] on 3/13/23 to evaluate left hand/knuckle pain, received steroid injection in left hand which was helpful.      Procedures:  2/25/21 - left suprascapular nerve block/steroid injection (posterior approach) - 50% relief x 1 month  9/22/20 - left suprascapular nerve block/steroid injection (posterior approach) - 70% relief x 1-2 months    Joint Pain  This is a chronic (bilateral knees, ankles, and shoulders) problem. The current episode started more than 1 year ago. The problem occurs intermittently. The problem has been gradually worsening (Rates pain 9/10VAS in severity). Associated symptoms include arthralgias, fatigue, headaches, joint swelling (left hand), neck pain, numbness (left hand) and weakness (left hand). Pertinent negatives include no abdominal pain, chest pain, chills, congestion, coughing or fever. The symptoms are aggravated by walking, bending, exertion, twisting and standing (cold weather, stairs). She has tried oral narcotics, position changes, heat, ice and rest for the symptoms.   Neck Pain   This is a chronic problem. The current episode started more than 1 year ago. The problem occurs intermittently. The problem has been gradually worsening. The pain is associated with a sleep position. The pain is present in the left side and right side. The quality of the pain is described as aching. The pain is at a severity of 8/10. The symptoms are aggravated by twisting, position and bending. Stiffness is present in the morning. Associated symptoms include headaches, numbness (left hand) and weakness (left hand). Pertinent negatives include no chest pain or fever. She has tried ice, heat and oral narcotics for the symptoms.      PEG Assessment   What number best describes your pain on average in the past week?8  What number best describes how, during the past week, pain has interfered with your enjoyment of life?7  What number best describes how, during the past  "week, pain has interfered with your general activity?  7    The following portions of the patient's history were reviewed and updated as appropriate: allergies, current medications, past family history, past medical history, past social history, past surgical history and problem list.    Review of Systems   Constitutional: Positive for fatigue. Negative for activity change (increased), chills and fever.   HENT: Negative for congestion.    Eyes: Negative for visual disturbance.   Respiratory: Negative for cough, chest tightness and shortness of breath.    Cardiovascular: Negative for chest pain.   Gastrointestinal: Positive for constipation and diarrhea. Negative for abdominal pain.   Genitourinary: Negative for difficulty urinating, dyspareunia and dysuria.   Musculoskeletal: Positive for arthralgias, joint swelling (left hand), neck pain and neck stiffness.   Neurological: Positive for weakness (left hand), numbness (left hand) and headaches. Negative for dizziness and light-headedness.   Psychiatric/Behavioral: Positive for sleep disturbance. Negative for agitation, self-injury and suicidal ideas. The patient is not nervous/anxious.      I have reviewed and confirmed the accuracy of the ROS as documented by the MA/LPN/RN NEWTON Abdul    Vitals:    05/23/23 1109   BP: 131/74   Pulse: 79   Temp: 97.2 °F (36.2 °C)   SpO2: 93%   Weight: 101 kg (223 lb 9.6 oz)   Height: 160 cm (63\")   PainSc:   8   PainLoc: Neck     Objective   Physical Exam  Constitutional:       Appearance: Normal appearance.   HENT:      Head: Normocephalic.   Cardiovascular:      Rate and Rhythm: Normal rate and regular rhythm.   Pulmonary:      Effort: Pulmonary effort is normal.      Breath sounds: Normal breath sounds.   Musculoskeletal:      Right shoulder: Tenderness present. Decreased range of motion.      Left shoulder: Tenderness and crepitus present. Decreased range of motion.      Cervical back: Pain with movement present. " Decreased range of motion.      Lumbar back: Tenderness present. Decreased range of motion. Positive right straight leg raise test and positive left straight leg raise test.   Skin:     General: Skin is warm and dry.      Capillary Refill: Capillary refill takes less than 2 seconds.   Neurological:      General: No focal deficit present.      Mental Status: She is alert and oriented to person, place, and time.      Gait: Gait abnormal (slowed).   Psychiatric:         Mood and Affect: Mood normal.         Speech: Speech normal.         Behavior: Behavior normal.         Thought Content: Thought content normal.         Cognition and Memory: Cognition normal.       Assessment & Plan   Diagnoses and all orders for this visit:    1. Chronic pain syndrome (Primary)  -     ketorolac (TORADOL) injection 30 mg    2. Osteoarthritis of both shoulders, unspecified osteoarthritis type  -     HYDROcodone-acetaminophen (NORCO)  MG per tablet; Take 1 tablet by mouth 5 (Five) Times a Day As Needed for Severe Pain. 30 day supply  Dispense: 150 tablet; Refill: 0    3. Neck pain  -     HYDROcodone-acetaminophen (NORCO)  MG per tablet; Take 1 tablet by mouth 5 (Five) Times a Day As Needed for Severe Pain. 30 day supply  Dispense: 150 tablet; Refill: 0    4. Arthritis, multiple joint involvement    5. Encounter for monitoring opioid maintenance therapy    6. Muscle spasm  -     cyclobenzaprine (FLEXERIL) 10 MG tablet; Take 1 tablet by mouth 3 (Three) Times a Day As Needed for Muscle Spasms.  Dispense: 90 tablet; Refill: 1    --- Routine UDS in office today as part of monitoring requirements for controlled substances.  The specimen was viewed and the immunoassay result reviewed and is +OPI.  This specimen will be sent to [facility] laboratory for confirmation.     --- The patient signed an updated copy of the controlled substance agreement on 5/23/23  --- Toradol 30mg IM in office due to severe flare in pain  --- Increase  Flexeril to 10mg TID as needed for muscle spasm  --- Refill Hydrocodone. DNF 5/28/23. Patient appears stable with current regimen. No adverse effects. Regarding continuation of opioids, there is no evidence of aberrant behavior or any red flags.  The patient continues with appropriate response to opioid therapy. ADL's remain intact by self.   --- Follow-up 2 months or sooner if needed     GAYE REPORT  As part of the patient's treatment plan, I am prescribing controlled substances. The patient has been made aware of appropriate use of such medications, including potential risk of somnolence, limited ability to drive and/or work safely, and the potential for dependence or overdose. It has also been made clear that these medications are for use by this patient only, without concomitant use of alcohol or other substances unless prescribed.     Patient has completed prescribing agreement detailing terms of continued prescribing of controlled substances, including monitoring GAYE reports, urine drug screening, and pill counts if necessary. The patient is aware that inappropriate use will results in cessation of prescribing such medications.    As the clinician, I personally reviewed the GAYE from 5/23/23 while the patient was in the office today.    History and physical exam exhibit continued safe and appropriate use of controlled substances.    Dictated utilizing Dragon dictation.

## 2023-06-04 ENCOUNTER — APPOINTMENT (OUTPATIENT)
Dept: CT IMAGING | Facility: HOSPITAL | Age: 73
End: 2023-06-04
Payer: MEDICARE

## 2023-06-04 ENCOUNTER — HOSPITAL ENCOUNTER (OUTPATIENT)
Facility: HOSPITAL | Age: 73
LOS: 1 days | Discharge: HOME | End: 2023-06-05
Attending: EMERGENCY MEDICINE | Admitting: HOSPITALIST
Payer: MEDICARE

## 2023-06-04 DIAGNOSIS — R07.9 CHEST PAIN, UNSPECIFIED TYPE: Primary | ICD-10-CM

## 2023-06-04 DIAGNOSIS — M79.89 LEG SWELLING: ICD-10-CM

## 2023-06-04 LAB
ALBUMIN SERPL-MCNC: 3.8 G/DL (ref 3.5–5.2)
ALBUMIN/GLOB SERPL: 1.2 G/DL
ALP SERPL-CCNC: 75 U/L (ref 39–117)
ALT SERPL W P-5'-P-CCNC: 15 U/L (ref 1–33)
ANION GAP SERPL CALCULATED.3IONS-SCNC: 7.8 MMOL/L (ref 5–15)
APTT PPP: 37.9 SECONDS (ref 22.7–35.4)
AST SERPL-CCNC: 25 U/L (ref 1–32)
BASOPHILS # BLD AUTO: 0.04 10*3/MM3 (ref 0–0.2)
BASOPHILS NFR BLD AUTO: 0.8 % (ref 0–1.5)
BILIRUB SERPL-MCNC: 0.3 MG/DL (ref 0–1.2)
BUN SERPL-MCNC: 7 MG/DL (ref 8–23)
BUN/CREAT SERPL: 10 (ref 7–25)
CALCIUM SPEC-SCNC: 9.5 MG/DL (ref 8.6–10.5)
CHLORIDE SERPL-SCNC: 101 MMOL/L (ref 98–107)
CO2 SERPL-SCNC: 29.2 MMOL/L (ref 22–29)
CREAT SERPL-MCNC: 0.7 MG/DL (ref 0.57–1)
D DIMER PPP FEU-MCNC: 0.88 MCGFEU/ML (ref 0–0.72)
DEPRECATED RDW RBC AUTO: 44.3 FL (ref 37–54)
EGFRCR SERPLBLD CKD-EPI 2021: 92 ML/MIN/1.73
EOSINOPHIL # BLD AUTO: 0.17 10*3/MM3 (ref 0–0.4)
EOSINOPHIL NFR BLD AUTO: 3.3 % (ref 0.3–6.2)
ERYTHROCYTE [DISTWIDTH] IN BLOOD BY AUTOMATED COUNT: 12.3 % (ref 12.3–15.4)
GEN 5 2HR TROPONIN T REFLEX: 10 NG/L
GLOBULIN UR ELPH-MCNC: 3.2 GM/DL
GLUCOSE BLDC GLUCOMTR-MCNC: 173 MG/DL (ref 70–130)
GLUCOSE SERPL-MCNC: 139 MG/DL (ref 65–99)
HBA1C MFR BLD: 7.1 % (ref 4.8–5.6)
HCT VFR BLD AUTO: 36.9 % (ref 34–46.6)
HGB BLD-MCNC: 12.3 G/DL (ref 12–15.9)
IMM GRANULOCYTES # BLD AUTO: 0 10*3/MM3 (ref 0–0.05)
IMM GRANULOCYTES NFR BLD AUTO: 0 % (ref 0–0.5)
INR PPP: 1 (ref 0.91–1.09)
LYMPHOCYTES # BLD AUTO: 1.84 10*3/MM3 (ref 0.7–3.1)
LYMPHOCYTES NFR BLD AUTO: 35.2 % (ref 19.6–45.3)
MCH RBC QN AUTO: 32.2 PG (ref 26.6–33)
MCHC RBC AUTO-ENTMCNC: 33.3 G/DL (ref 31.5–35.7)
MCV RBC AUTO: 96.6 FL (ref 79–97)
MONOCYTES # BLD AUTO: 0.51 10*3/MM3 (ref 0.1–0.9)
MONOCYTES NFR BLD AUTO: 9.8 % (ref 5–12)
NEUTROPHILS NFR BLD AUTO: 2.67 10*3/MM3 (ref 1.7–7)
NEUTROPHILS NFR BLD AUTO: 50.9 % (ref 42.7–76)
PLATELET # BLD AUTO: 200 10*3/MM3 (ref 140–450)
PMV BLD AUTO: 9.2 FL (ref 6–12)
POTASSIUM SERPL-SCNC: 4.1 MMOL/L (ref 3.5–5.2)
PROT SERPL-MCNC: 7 G/DL (ref 6–8.5)
PROTHROMBIN TIME: 11.3 SECONDS (ref 10–13.8)
RBC # BLD AUTO: 3.82 10*6/MM3 (ref 3.77–5.28)
SODIUM SERPL-SCNC: 138 MMOL/L (ref 136–145)
TROPONIN T DELTA: -1 NG/L
TROPONIN T SERPL HS-MCNC: 11 NG/L
TROPONIN T SERPL HS-MCNC: 12 NG/L
WBC NRBC COR # BLD: 5.23 10*3/MM3 (ref 3.4–10.8)

## 2023-06-04 PROCEDURE — 96372 THER/PROPH/DIAG INJ SC/IM: CPT

## 2023-06-04 PROCEDURE — 83036 HEMOGLOBIN GLYCOSYLATED A1C: CPT | Performed by: NURSE PRACTITIONER

## 2023-06-04 PROCEDURE — 82948 REAGENT STRIP/BLOOD GLUCOSE: CPT

## 2023-06-04 PROCEDURE — 80053 COMPREHEN METABOLIC PANEL: CPT | Performed by: EMERGENCY MEDICINE

## 2023-06-04 PROCEDURE — 71275 CT ANGIOGRAPHY CHEST: CPT

## 2023-06-04 PROCEDURE — 85025 COMPLETE CBC W/AUTO DIFF WBC: CPT | Performed by: EMERGENCY MEDICINE

## 2023-06-04 PROCEDURE — 36415 COLL VENOUS BLD VENIPUNCTURE: CPT

## 2023-06-04 PROCEDURE — 84484 ASSAY OF TROPONIN QUANT: CPT | Performed by: EMERGENCY MEDICINE

## 2023-06-04 PROCEDURE — 93005 ELECTROCARDIOGRAM TRACING: CPT | Performed by: EMERGENCY MEDICINE

## 2023-06-04 PROCEDURE — 25510000001 IOPAMIDOL PER 1 ML: Performed by: EMERGENCY MEDICINE

## 2023-06-04 PROCEDURE — 25010000002 ENOXAPARIN PER 10 MG: Performed by: EMERGENCY MEDICINE

## 2023-06-04 PROCEDURE — 85379 FIBRIN DEGRADATION QUANT: CPT | Performed by: EMERGENCY MEDICINE

## 2023-06-04 PROCEDURE — 84484 ASSAY OF TROPONIN QUANT: CPT | Performed by: NURSE PRACTITIONER

## 2023-06-04 PROCEDURE — 85610 PROTHROMBIN TIME: CPT | Performed by: EMERGENCY MEDICINE

## 2023-06-04 PROCEDURE — 85610 PROTHROMBIN TIME: CPT

## 2023-06-04 PROCEDURE — 99285 EMERGENCY DEPT VISIT HI MDM: CPT

## 2023-06-04 PROCEDURE — 85730 THROMBOPLASTIN TIME PARTIAL: CPT | Performed by: HOSPITALIST

## 2023-06-04 RX ORDER — HYDROCODONE BITARTRATE AND ACETAMINOPHEN 10; 325 MG/1; MG/1
1 TABLET ORAL
Status: DISCONTINUED | OUTPATIENT
Start: 2023-06-04 | End: 2023-06-05 | Stop reason: HOSPADM

## 2023-06-04 RX ORDER — IBUPROFEN 600 MG/1
1 TABLET ORAL
Status: DISCONTINUED | OUTPATIENT
Start: 2023-06-04 | End: 2023-06-05 | Stop reason: HOSPADM

## 2023-06-04 RX ORDER — INSULIN LISPRO 100 [IU]/ML
2-9 INJECTION, SOLUTION INTRAVENOUS; SUBCUTANEOUS
Status: DISCONTINUED | OUTPATIENT
Start: 2023-06-04 | End: 2023-06-05 | Stop reason: HOSPADM

## 2023-06-04 RX ORDER — CALCIUM CARBONATE 500 MG/1
2 TABLET, CHEWABLE ORAL 2 TIMES DAILY PRN
Status: DISCONTINUED | OUTPATIENT
Start: 2023-06-04 | End: 2023-06-05 | Stop reason: HOSPADM

## 2023-06-04 RX ORDER — LIDOCAINE 4 G/G
1 PATCH TOPICAL DAILY PRN
COMMUNITY

## 2023-06-04 RX ORDER — SODIUM CHLORIDE 0.9 % (FLUSH) 0.9 %
10 SYRINGE (ML) INJECTION AS NEEDED
Status: DISCONTINUED | OUTPATIENT
Start: 2023-06-04 | End: 2023-06-04

## 2023-06-04 RX ORDER — DEXTROSE MONOHYDRATE 25 G/50ML
25 INJECTION, SOLUTION INTRAVENOUS
Status: DISCONTINUED | OUTPATIENT
Start: 2023-06-04 | End: 2023-06-05 | Stop reason: HOSPADM

## 2023-06-04 RX ORDER — ACETAMINOPHEN 325 MG/1
650 TABLET ORAL EVERY 4 HOURS PRN
Status: DISCONTINUED | OUTPATIENT
Start: 2023-06-04 | End: 2023-06-05 | Stop reason: HOSPADM

## 2023-06-04 RX ORDER — GLUCOSAMINE/D3/BOSWELLIA SERRA 1500MG-400
10000 TABLET ORAL DAILY
COMMUNITY

## 2023-06-04 RX ORDER — NITROGLYCERIN 0.4 MG/1
0.4 TABLET SUBLINGUAL
Status: DISCONTINUED | OUTPATIENT
Start: 2023-06-04 | End: 2023-06-05 | Stop reason: HOSPADM

## 2023-06-04 RX ORDER — ASPIRIN 81 MG/1
324 TABLET, CHEWABLE ORAL ONCE
Status: COMPLETED | OUTPATIENT
Start: 2023-06-04 | End: 2023-06-04

## 2023-06-04 RX ORDER — SODIUM CHLORIDE 9 MG/ML
40 INJECTION, SOLUTION INTRAVENOUS AS NEEDED
Status: DISCONTINUED | OUTPATIENT
Start: 2023-06-04 | End: 2023-06-04

## 2023-06-04 RX ORDER — ENOXAPARIN SODIUM 100 MG/ML
1 INJECTION SUBCUTANEOUS EVERY 12 HOURS
Status: DISCONTINUED | OUTPATIENT
Start: 2023-06-05 | End: 2023-06-05 | Stop reason: HOSPADM

## 2023-06-04 RX ORDER — LISINOPRIL 40 MG/1
40 TABLET ORAL DAILY
Status: DISCONTINUED | OUTPATIENT
Start: 2023-06-04 | End: 2023-06-05 | Stop reason: HOSPADM

## 2023-06-04 RX ORDER — ACETAMINOPHEN 650 MG/1
650 SUPPOSITORY RECTAL EVERY 4 HOURS PRN
Status: DISCONTINUED | OUTPATIENT
Start: 2023-06-04 | End: 2023-06-04

## 2023-06-04 RX ORDER — DOXYCYCLINE 100 MG/1
100 CAPSULE ORAL EVERY 12 HOURS SCHEDULED
Status: DISCONTINUED | OUTPATIENT
Start: 2023-06-04 | End: 2023-06-05 | Stop reason: HOSPADM

## 2023-06-04 RX ORDER — CYCLOBENZAPRINE HCL 10 MG
10 TABLET ORAL 3 TIMES DAILY PRN
Status: DISCONTINUED | OUTPATIENT
Start: 2023-06-04 | End: 2023-06-05 | Stop reason: HOSPADM

## 2023-06-04 RX ORDER — SODIUM CHLORIDE 0.9 % (FLUSH) 0.9 %
10 SYRINGE (ML) INJECTION EVERY 12 HOURS SCHEDULED
Status: DISCONTINUED | OUTPATIENT
Start: 2023-06-04 | End: 2023-06-04

## 2023-06-04 RX ORDER — ENOXAPARIN SODIUM 100 MG/ML
1 INJECTION SUBCUTANEOUS ONCE
Status: COMPLETED | OUTPATIENT
Start: 2023-06-04 | End: 2023-06-04

## 2023-06-04 RX ORDER — NICOTINE POLACRILEX 4 MG
15 LOZENGE BUCCAL
Status: DISCONTINUED | OUTPATIENT
Start: 2023-06-04 | End: 2023-06-05 | Stop reason: HOSPADM

## 2023-06-04 RX ORDER — ACETAMINOPHEN 160 MG/5ML
650 SOLUTION ORAL EVERY 4 HOURS PRN
Status: DISCONTINUED | OUTPATIENT
Start: 2023-06-04 | End: 2023-06-04

## 2023-06-04 RX ORDER — DULOXETIN HYDROCHLORIDE 60 MG/1
60 CAPSULE, DELAYED RELEASE ORAL DAILY
Status: DISCONTINUED | OUTPATIENT
Start: 2023-06-04 | End: 2023-06-05 | Stop reason: HOSPADM

## 2023-06-04 RX ORDER — LIDOCAINE 50 MG/G
1 PATCH TOPICAL
Status: DISCONTINUED | OUTPATIENT
Start: 2023-06-05 | End: 2023-06-05 | Stop reason: HOSPADM

## 2023-06-04 RX ORDER — ONDANSETRON 4 MG/1
4 TABLET, FILM COATED ORAL EVERY 6 HOURS PRN
Status: DISCONTINUED | OUTPATIENT
Start: 2023-06-04 | End: 2023-06-05 | Stop reason: HOSPADM

## 2023-06-04 RX ORDER — ONDANSETRON 2 MG/ML
4 INJECTION INTRAMUSCULAR; INTRAVENOUS EVERY 6 HOURS PRN
Status: DISCONTINUED | OUTPATIENT
Start: 2023-06-04 | End: 2023-06-05 | Stop reason: HOSPADM

## 2023-06-04 RX ADMIN — ENOXAPARIN SODIUM 100 MG: 100 INJECTION SUBCUTANEOUS at 15:33

## 2023-06-04 RX ADMIN — ASPIRIN 324 MG: 81 TABLET, CHEWABLE ORAL at 15:33

## 2023-06-04 RX ADMIN — IOPAMIDOL 100 ML: 755 INJECTION, SOLUTION INTRAVENOUS at 14:29

## 2023-06-04 RX ADMIN — HYDROCODONE BITARTRATE AND ACETAMINOPHEN 1 TABLET: 10; 325 TABLET ORAL at 20:18

## 2023-06-04 RX ADMIN — NITROGLYCERIN 1 INCH: 20 OINTMENT TOPICAL at 20:10

## 2023-06-04 RX ADMIN — DULOXETINE HYDROCHLORIDE 60 MG: 60 CAPSULE, DELAYED RELEASE ORAL at 20:12

## 2023-06-04 RX ADMIN — DOXYCYCLINE 100 MG: 100 CAPSULE ORAL at 20:10

## 2023-06-04 RX ADMIN — LISINOPRIL 40 MG: 40 TABLET ORAL at 20:12

## 2023-06-04 RX ADMIN — Medication 10 ML: at 20:10

## 2023-06-04 NOTE — FSED PROVIDER NOTE
Subjective   History of Present Illness  Patient presents with central chest pain that sharp in nature and radiates to her back that started this morning when she got up.  She states she was doing her dishes when the pain started and has been constant since.  She also reports about a week of right lower extremity swelling without any history of trauma.  She states she has had that legs swell up a little bit in the past but never this much.  She denies any cough, fevers abdominal pain or vomiting but is reporting some nausea.  She also reports palpitations and lightheadedness.    Review of Systems   Constitutional:  Negative for chills, diaphoresis and fever.   HENT:  Negative for congestion, sore throat and trouble swallowing.    Eyes:  Negative for visual disturbance.   Respiratory:  Positive for shortness of breath. Negative for cough.    Cardiovascular:  Positive for chest pain and palpitations.   Gastrointestinal:  Positive for nausea. Negative for abdominal pain, constipation, diarrhea and vomiting.   Genitourinary:  Negative for dysuria, frequency, hematuria and urgency.   Musculoskeletal:  Positive for back pain.   Skin:  Negative for rash.   Neurological:  Positive for light-headedness. Negative for weakness, numbness and headaches.   Psychiatric/Behavioral:  Negative for confusion.      Past Medical History:   Diagnosis Date    Abscess     Anxiety     Arthritis     bilateral feet    Cancer 2013    breast     Cellulitis of face     Degenerative disc disease, lumbar     Depression     DM (diabetes mellitus), type 2     Ear infection     Fibromyalgia, primary     History of dysphagia     History of dysuria     History of epistaxis     Hypertension     Intertrigo     MRSA (methicillin resistant Staphylococcus aureus)     2015    Nose mucous membrane dryness     Osteoarthritis     Rosacea        Allergies   Allergen Reactions    Adhesive Tape Rash     REDNESS    Shellfish-Derived Products     Latex Rash     Latex, Natural Rubber Itching       Past Surgical History:   Procedure Laterality Date    BREAST LUMPECTOMY Right 2013    COLONOSCOPY N/A 09/20/2021    Procedure: COLONOSCOPY FOR SCREENING;  Surgeon: Neeraj Jj MD;  Location: [facility];  Service: Gastroenterology;  Laterality: N/A;  diverticulosis, polyp, adequate prep, hemorrhoids, thickened ileocecal valve    ENDOSCOPY N/A 09/20/2021    Procedure: ESOPHAGOGASTRODUODENOSCOPY;  Surgeon: Neeraj Jj MD;  Location: [facility];  Service: Gastroenterology;  Laterality: N/A;  hiatal hernia, mild food retention, gastritis    EYE SURGERY Left 02/2022    EYE SURGERY Right 06/06/2022    HIP BIPOLAR REPLACEMENT Bilateral     left 1998 and right 2007    INCONTINENCE SURGERY  02/27/2020    LAPAROSCOPIC TOTAL HYSTERECTOMY  02/27/2020    NERVE BLOCK Left 02/25/2021    Procedure: left suprascapular nerve block;  Surgeon: John Padgett MD;  Location: [facility];  Service: Pain Management;  Laterality: Left;    REPLACEMENT TOTAL KNEE  2005    SHOULDER SURGERY Right     2000, 2002, 2010 X2, 2012       Family History   Problem Relation Age of Onset    Arthritis Other     Hypertension Other     Heart disease Other     Stroke Mother     Hypertension Mother     Diabetes Mother     Arthritis Mother     Parkinsonism Mother     Arthritis Father     Heart disease Sister     Diabetes Sister     Parkinsonism Sister     COPD Brother     Colon cancer Neg Hx        Social History     Socioeconomic History    Marital status: Single    Number of children: 3   Tobacco Use    Smoking status: Never    Smokeless tobacco: Never   Vaping Use    Vaping Use: Never used   Substance and Sexual Activity    Alcohol use: Yes     Comment: social alcohol use    Drug use: No    Sexual activity: Defer     Birth control/protection: Surgical           Objective   Physical Exam  Constitutional:       General: She is not in acute distress.     Appearance: She is well-developed. She is not  diaphoretic.   HENT:      Head: Normocephalic and atraumatic.      Nose: Nose normal.   Eyes:      Conjunctiva/sclera: Conjunctivae normal.      Pupils: Pupils are equal, round, and reactive to light.   Neck:      Thyroid: No thyromegaly.      Vascular: No JVD.      Trachea: No tracheal deviation.   Cardiovascular:      Rate and Rhythm: Normal rate and regular rhythm.      Heart sounds: Normal heart sounds. No murmur heard.    No friction rub. No gallop.   Pulmonary:      Effort: Pulmonary effort is normal. No respiratory distress.      Breath sounds: Normal breath sounds. No stridor. No wheezing or rales.   Abdominal:      General: Bowel sounds are normal. There is no distension.      Palpations: Abdomen is soft. There is no mass.      Tenderness: There is no abdominal tenderness. There is no guarding or rebound.      Hernia: No hernia is present.   Musculoskeletal:         General: Swelling present. No tenderness or deformity. Normal range of motion.      Cervical back: Normal range of motion and neck supple.      Right lower leg: Edema present.      Comments: Right lower extremity has some pitting edema and diffuse swelling, some minimal redness compared to the left   Skin:     General: Skin is warm and dry.      Capillary Refill: Capillary refill takes less than 2 seconds.      Coloration: Skin is not pale.      Findings: No erythema or rash.   Neurological:      Mental Status: She is alert and oriented to person, place, and time.      Cranial Nerves: No cranial nerve deficit.      Sensory: No sensory deficit.      Motor: No abnormal muscle tone.      Coordination: Coordination normal.   Psychiatric:         Behavior: Behavior normal.         Thought Content: Thought content normal.         Judgment: Judgment normal.       Procedures           ED Course                                           Medical Decision Making  Patient has a heart score of 4 and has been years since she had a cardiac work-up including a  stress test.  Will need an ultrasound of her leg to rule out DVT but we will anticoagulate now.  Have paged the hospitalist for admission.    Problems Addressed:  Chest pain, unspecified type: complicated acute illness or injury  Leg swelling: complicated acute illness or injury    Amount and/or Complexity of Data Reviewed  Labs: ordered.  Radiology: ordered.  ECG/medicine tests: ordered and independent interpretation performed.     Details: EKG shows a normal sinus rhythm, no acute ST changes, normal axis and intervals    Risk  OTC drugs.  Prescription drug management.  Decision regarding hospitalization.        Final diagnoses:   Chest pain, unspecified type   Leg swelling       ED Disposition  ED Disposition       ED Disposition   Decision to Admit    Condition   --    Comment   Level of Care: Telemetry [5]   Diagnosis: Chest pain, unspecified type [8080954]   Admitting Physician: RADHA ARREDONDO [[id number]]   Certification: I Certify That Inpatient Hospital Services Are Medically Necessary For Greater Than 2 Midnights                 No follow-up provider specified.       Medication List      No changes were made to your prescriptions during this visit.

## 2023-06-04 NOTE — PROGRESS NOTES
"[facility] Clinical Pharmacy Services: Enoxaparin Consult    Chastity Salgado has a pharmacy consult to dose full-dose enoxaparin per Valeria Gamboa's request.     Indication: DVT/PE (active thrombosis)  Home Anticoagulation: None     Relevant clinical data and objective history reviewed:  73 y.o. female 160 cm (63\") 103 kg (226 lb)   Body mass index is 40.03 kg/m².   Results from last 7 days   Lab Units 06/04/23  1249   PLATELETS 10*3/mm3 200     Estimated Creatinine Clearance: 83.3 mL/min (by C-G formula based on SCr of 0.7 mg/dL).    Assessment/Plan    Will start patient on  100 mg (1mg/kg) subcutaneous every 12 hours, adjusted for renal function. Consult order will be discontinued but pharmacy will continue to follow.     Trell Roman, [facility]  Clinical Pharmacist   "

## 2023-06-04 NOTE — H&P
Patient Name:  Chastity Salgado  YOB: 1950  MRN:  [id number]  Admit Date:  6/4/2023  Patient Care Team:  Marilyn Turner APRN as PCP - General (Family Medicine)  Lino Cullen MD as Consulting Physician (General Surgery)  Tu Vásquez MD as Consulting Physician (Orthopedic Surgery)  Tu Welch MD as Consulting Physician (Dermatology)  Niko Jacobsen MD as Consulting Physician (Medical Oncology)  Josseline Faria MD as Consulting Physician (Obstetrics and Gynecology)  Lino Cullen MD as Consulting Physician (General Surgery)  Mely Ramos APRN as Nurse Practitioner (Nurse Practitioner)  Neeraj Jj MD as Consulting Physician (Gastroenterology)      Subjective   History Present Illness     Chief Complaint   Patient presents with    Chest Pain    Back Pain    Leg Swelling       Ms. Salgado is a 73 y.o. non-smoker with a history of hypertension, hyperlipidemia, type 2 diabetes mellitus, and chronic pain syndrome that presents to [facility] complaining of chest pain.  She reports central chest pain that began this morning and radiated through to her back.  She states the pain was consistent for a couple hours but has since eased slightly.  She denies radiation of the pain into her arm, neck, or jaw.  She describes the pain as stabbing in nature and reports associated light-headedness.  She reports a family history of MI, including her mother and sister.  She also reports swelling in bilateral legs that is worse in the right lower extremity.  She states the swelling has been ongoing for about one week but has worsened in her right leg.  She reports she is worried about a blood clot but denies calf pain.  She initially presented to the ED at [facility] where lab work revealed troponin 11 and then repeat 10.  A D Dimer was elevated at 0.88.  An EKG showed normal sinus rhythm and a CT angiogram of the chest was negative  for an acute pulmonary embolism.  She received a dose of aspirin and Lovenox and was sent to [facility] for further evaluation.       History of Present Illness  Review of Systems   Constitutional:  Negative for chills and fever.   HENT:  Negative for congestion and sore throat.    Eyes:  Negative for photophobia and visual disturbance.   Respiratory:  Positive for shortness of breath. Negative for cough, chest tightness and wheezing.    Cardiovascular:  Positive for chest pain and leg swelling. Negative for palpitations.   Gastrointestinal:  Negative for abdominal pain, nausea and vomiting.   Endocrine: Negative for polydipsia, polyphagia and polyuria.   Musculoskeletal:  Positive for arthralgias, back pain and myalgias.   Skin:  Negative for color change and wound.   Neurological:  Positive for light-headedness. Negative for facial asymmetry, speech difficulty, weakness, numbness and headaches.      Personal History     Past Medical History:   Diagnosis Date    Abscess     Anxiety     Arthritis     bilateral feet    Cancer 2013    breast     Cellulitis of face     Degenerative disc disease, lumbar     Depression     DM (diabetes mellitus), type 2     Ear infection     Fibromyalgia, primary     History of dysphagia     History of dysuria     History of epistaxis     Hypertension     Intertrigo     MRSA (methicillin resistant Staphylococcus aureus)     2015    Nose mucous membrane dryness     Osteoarthritis     Rosacea      Past Surgical History:   Procedure Laterality Date    BREAST LUMPECTOMY Right 2013    COLONOSCOPY N/A 09/20/2021    Procedure: COLONOSCOPY FOR SCREENING;  Surgeon: Neeraj Jj MD;  [place] [facility] [place]  Service: Gastroenterology;  Laterality: N/A;  diverticulosis, polyp, adequate prep, hemorrhoids, thickened ileocecal valve    ENDOSCOPY N/A 09/20/2021    Procedure: ESOPHAGOGASTRODUODENOSCOPY;  Surgeon: Neeraj Jj MD;  [place] [facility] [place]  Service:  Gastroenterology;  Laterality: N/A;  hiatal hernia, mild food retention, gastritis    EYE SURGERY Left 02/2022    EYE SURGERY Right 06/06/2022    HIP BIPOLAR REPLACEMENT Bilateral     left 1998 and right 2007    INCONTINENCE SURGERY  02/27/2020    LAPAROSCOPIC TOTAL HYSTERECTOMY  02/27/2020    NERVE BLOCK Left 02/25/2021    Procedure: left suprascapular nerve block;  Surgeon: John Padgett MD;  [place] [facility] [place];  Service: Pain Management;  Laterality: Left;    REPLACEMENT TOTAL KNEE  2005    SHOULDER SURGERY Right     2000, 2002, 2010 X2, 2012     Family History   Problem Relation Age of Onset    Arthritis Other     Hypertension Other     Heart disease Other     Stroke Mother     Hypertension Mother     Diabetes Mother     Arthritis Mother     Parkinsonism Mother     Arthritis Father     Heart disease Sister     Diabetes Sister     Parkinsonism Sister     COPD Brother     Colon cancer Neg Hx      Social History     Tobacco Use    Smoking status: Never    Smokeless tobacco: Never   Vaping Use    Vaping Use: Never used   Substance Use Topics    Alcohol use: Yes     Comment: social alcohol use    Drug use: No     Medications Prior to Admission   Medication Sig Dispense Refill Last Dose    Biotin 44815 MCG tablet Take 1 tablet by mouth Daily.       Cholecalciferol (VITAMIN D3) 25 MCG (1000 UT) capsule Take 1 capsule by mouth Daily.   6/3/2023    cyclobenzaprine (FLEXERIL) 10 MG tablet Take 1 tablet by mouth 3 (Three) Times a Day As Needed for Muscle Spasms. 90 tablet 1 6/3/2023    doxycycline (MONODOX) 100 MG capsule 1 capsule 2 (Two) Times a Day. For Roscea   6/3/2023    DULoxetine (CYMBALTA) 60 MG capsule TAKE ONE CAPSULE BY MOUTH DAILY 90 capsule 1 6/3/2023    glucose blood (FREESTYLE LITE) test strip Once daily and as needed 100 each 12     glucose monitor monitoring kit Check glucose daily and prn 1 each 0     HYDROcodone-acetaminophen (NORCO)  MG per tablet Take 1 tablet by mouth 5 (Five) Times a  Day As Needed for Severe Pain. 30 day supply 150 tablet 0 6/4/2023 at 1200    Lancets (freestyle) lancets Check glucose daily and prn 100 each 12     linaclotide (Linzess) 145 MCG capsule capsule Take 1 capsule by mouth Every Morning Before Breakfast. 30 capsule 3 6/3/2023    lisinopril (PRINIVIL,ZESTRIL) 40 MG tablet TAKE ONE TABLET BY MOUTH DAILY 90 tablet 1 6/3/2023    Naloxegol Oxalate (Movantik) 25 MG tablet Take 1 tablet by mouth Every Morning. 30 tablet 3 6/3/2023    ondansetron (ZOFRAN) 4 MG tablet Take 1 tablet by mouth Every 8 (Eight) Hours As Needed for Nausea or Vomiting. 60 tablet 3 6/3/2023    ondansetron ODT (ZOFRAN-ODT) 4 MG disintegrating tablet Take 1 tablet by mouth.   6/3/2023     Allergies:    Allergies   Allergen Reactions    Adhesive Tape Rash     REDNESS    Shellfish-Derived Products     Latex Rash    Latex, Natural Rubber Itching       Objective    Objective     Vital Signs  Temp:  [98.2 °F (36.8 °C)-98.3 °F (36.8 °C)] 98.2 °F (36.8 °C)  Heart Rate:  [62-84] 68  Resp:  [16-18] 18  BP: (123-169)/(59-73) 138/59  SpO2:  [96 %-100 %] 96 %  on   ;   Device (Oxygen Therapy): room air  Body mass index is 40.03 kg/m².    Physical Exam  Constitutional:       Appearance: Normal appearance.   HENT:      Head: Normocephalic and atraumatic.      Nose: Nose normal.      Mouth/Throat:      Mouth: Mucous membranes are moist.      Pharynx: Oropharynx is clear.   Eyes:      Extraocular Movements: Extraocular movements intact.      Conjunctiva/sclera: Conjunctivae normal.   Cardiovascular:      Rate and Rhythm: Normal rate and regular rhythm.      Pulses: Normal pulses.      Heart sounds: Normal heart sounds.   Pulmonary:      Effort: Pulmonary effort is normal.      Breath sounds: Normal breath sounds.   Abdominal:      General: Bowel sounds are normal.      Palpations: Abdomen is soft.   Musculoskeletal:         General: Tenderness (anterioir chest, mild) present. Normal range of motion.      Cervical back:  Normal range of motion and neck supple.      Right lower leg: Edema (2+ edema) present.      Comments: RLE edema and warmth   Skin:     General: Skin is warm and dry.   Neurological:      General: No focal deficit present.      Mental Status: She is alert and oriented to person, place, and time.   Psychiatric:         Mood and Affect: Mood normal.         Behavior: Behavior normal.       Results Review:  I reviewed the patient's new clinical results.  I reviewed the patient's new imaging results and agree with the interpretation.  I reviewed the patient's other test results and agree with the interpretation  I personally viewed and interpreted the patient's EKG/Telemetry data  Discussed with ED provider.    Lab Results (last 24 hours)       Procedure Component Value Units Date/Time    High Sensitivity Troponin T [[id number]]  (Abnormal) Collected: 06/04/23 1249    Specimen: Blood Updated: 06/04/23 1318     HS Troponin T 11 ng/L     Narrative:      High Sensitive Troponin T Reference Range:  <10.0 ng/L- Negative Female for AMI  <15.0 ng/L- Negative Male for AMI  >=10 - Abnormal Female indicating possible myocardial injury.  >=15 - Abnormal Male indicating possible myocardial injury.   Clinicians would have to utilize clinical acumen, EKG, Troponin, and serial changes to determine if it is an Acute Myocardial Infarction or myocardial injury due to an underlying chronic condition.         D-dimer, Quantitative [[id number]]  (Abnormal) Collected: 06/04/23 1249    Specimen: Blood Updated: 06/04/23 1311     D-Dimer, Quantitative 0.88 MCGFEU/mL     Narrative:      According to the assay 's published package insert, a normal (<0.50 MCGFEU/mL) D-dimer result in conjunction with a non-high clinical probability assessment, excludes deep vein thrombosis (DVT) and pulmonary embolism (PE) with high sensitivity.    D-dimer values increase with age and this can make VTE exclusion of an older population difficult. To  "address this, the [employer] College [employer], based on best available evidence and recent guidelines, recommends that clinicians use age-adjusted D-dimer thresholds in patients greater than 50 years of age with: a) a low probability of PE who do not meet all Pulmonary Embolism Rule Out Criteria, or b) in those with intermediate probability of PE.   The formula for an age-adjusted D-dimer cut-off is \"age/100\".  For example, a 60 year old patient would have an age-adjusted cut-off of 0.60 MCGFEU/mL and an 80 year old 0.80 MCGFEU/mL.    Comprehensive Metabolic Panel [[id number]]  (Abnormal) Collected: 06/04/23 1249    Specimen: Blood Updated: 06/04/23 1318     Glucose 139 mg/dL      BUN 7 mg/dL      Creatinine 0.70 mg/dL      Sodium 138 mmol/L      Potassium 4.1 mmol/L      Chloride 101 mmol/L      CO2 29.2 mmol/L      Calcium 9.5 mg/dL      Total Protein 7.0 g/dL      Albumin 3.8 g/dL      ALT (SGPT) 15 U/L      AST (SGOT) 25 U/L      Alkaline Phosphatase 75 U/L      Total Bilirubin 0.3 mg/dL      Globulin 3.2 gm/dL      A/G Ratio 1.2 g/dL      BUN/Creatinine Ratio 10.0     Anion Gap 7.8 mmol/L      eGFR 92.0 mL/min/1.73     Narrative:      GFR Normal >60  Chronic Kidney Disease <60  Kidney Failure <15    The GFR formula is only valid for adults with stable renal function between ages 18 and 70.    CBC & Differential [[id number]]  (Normal) Collected: 06/04/23 1249    Specimen: Blood Updated: 06/04/23 1258    Narrative:      The following orders were created for panel order CBC & Differential.  Procedure                               Abnormality         Status                     ---------                               -----------         ------                     CBC Auto Differential[[id number]]        Normal              Final result                 Please view results for these tests on the individual orders.    CBC Auto Differential [[id number]]  (Normal) Collected: 06/04/23 1249    Specimen: Blood Updated: " 06/04/23 1258     WBC 5.23 10*3/mm3      RBC 3.82 10*6/mm3      Hemoglobin 12.3 g/dL      Hematocrit 36.9 %      MCV 96.6 fL      MCH 32.2 pg      MCHC 33.3 g/dL      RDW 12.3 %      RDW-SD 44.3 fl      MPV 9.2 fL      Platelets 200 10*3/mm3      Neutrophil % 50.9 %      Lymphocyte % 35.2 %      Monocyte % 9.8 %      Eosinophil % 3.3 %      Basophil % 0.8 %      Immature Grans % 0.0 %      Neutrophils, Absolute 2.67 10*3/mm3      Lymphocytes, Absolute 1.84 10*3/mm3      Monocytes, Absolute 0.51 10*3/mm3      Eosinophils, Absolute 0.17 10*3/mm3      Basophils, Absolute 0.04 10*3/mm3      Immature Grans, Absolute 0.00 10*3/mm3     High Sensitivity Troponin T 2Hr [[id number]]  (Abnormal) Collected: 06/04/23 1450    Specimen: Blood Updated: 06/04/23 1515     HS Troponin T 10 ng/L      Troponin T Delta -1 ng/L     Narrative:      High Sensitive Troponin T Reference Range:  <10.0 ng/L- Negative Female for AMI  <15.0 ng/L- Negative Male for AMI  >=10 - Abnormal Female indicating possible myocardial injury.  >=15 - Abnormal Male indicating possible myocardial injury.   Clinicians would have to utilize clinical acumen, EKG, Troponin, and serial changes to determine if it is an Acute Myocardial Infarction or myocardial injury due to an underlying chronic condition.         POC Protime / INR [[id number]]  (Normal) Collected: 06/04/23 1452    Specimen: Blood Updated: 06/04/23 1455     Protime 11.3 seconds      INR 1.0            Imaging Results (Last 24 Hours)       Procedure Component Value Units Date/Time    CT Angiogram Chest [[id number]] Collected: 06/04/23 1448     Updated: 06/04/23 1502    Narrative:      CT ANGIOGRAM CHEST-     INDICATIONS: Elevated d-dimer     TECHNIQUE: Radiation dose reduction techniques were utilized, including  automated exposure control and exposure modulation based on body size.  CT angiography of the chest. Three-dimensional reconstructions.     COMPARISON: None available      FINDINGS:     No  pulmonary embolism. No aortic dissection.     The heart size is is enlarged without pericardial effusion. A few small  subcentimeter short axis mediastinal lymph nodes are seen that are not  significant by size criteria. Asymmetric enlargement of the right  thyroid lobe may reflect underlying nodule, ultrasound correlation can  be obtained as indicated.     The airways appear clear.     No pleural effusion or pneumothorax.     The lungs show no focal pulmonary consolidation or mass.     Upper abdominal structures show no acute findings. Small hiatal hernia  is present. Relative low-density of the liver suggests steatosis. The  pancreas is thinned. Subcentimeter short axis gastrohepatic ligament and  portacaval lymph nodes are noted.     Degenerative changes are seen in the spine, left shoulder. No acute  fracture is identified.       Impression:         No pulmonary embolism.     This report was finalized on 6/4/2023 2:59 PM by Dr. Leroy Davis M.D.                   ECG 12 Lead Chest Pain   Preliminary Result   HEART RATE= 67  bpm   RR Interval= 896  ms   WV Interval= 157  ms   P Horizontal Axis= -12  deg   P Front Axis= 46  deg   QRSD Interval= 103  ms   QT Interval= 400  ms   QRS Axis= -7  deg   T Wave Axis= 29  deg   - ABNORMAL ECG -   Sinus rhythm   Left atrial enlargement   Low voltage, precordial leads   Left ventricular hypertrophy   Electronically Signed By:    Date and Time of Study: 2023-06-04 12:36:05           Assessment/Plan     Active Hospital Problems    Diagnosis  POA    **Chest pain, unspecified type [R07.9]  Yes    Therapeutic opioid induced constipation [K59.03, T40.2X5A]  Yes    Mixed hyperlipidemia [E78.2]  Yes    Chronic pain [G89.29]  Yes    Type 2 diabetes mellitus with neurologic complication [E11.49]  Yes    Benign essential HTN [I10]  Yes       Chest Pain  -Admit to a telemetry unit for monitoring  -Cardiology consult  -Her troponin at [facility] was trending down. Check stat  troponin  -Repeat EKG in AM  -Sublingual nitroglycerin available as needed for chest pain  -Check labs in AM  -Her D dimer was elevated. CTA negative for an acute pulmonary embolism, will check bilateral lower extremity dopplers  -She received a dose of Lovenox prior to transfer    Hypertension  -Blood pressures stable. Continue Lisinopril  -Monitor    Type 2 Diabetes Mellitus  -She appears to be diet controlled  -Initiate correctional factor insulin  -Monitor blood sugars ACHS  -Check hgb A1C  -NCS diet    Chronic Pain Syndrome with Opioid Dependence  -Continue home pain medications  -Continue Movantik for opioid induced constipation    Rosacea  -She is currently on Doxycycline treatment for her rosacea, will continue    -I discussed the patients findings and my recommendations with patient.    VTE Prophylaxis - Pharmacy to dose Lovenox.  Code Status - Full code.       NEWTON Olmedo  [facility]  06/04/23  18:59 EDT

## 2023-06-05 ENCOUNTER — APPOINTMENT (OUTPATIENT)
Dept: CARDIOLOGY | Facility: HOSPITAL | Age: 73
End: 2023-06-05
Payer: MEDICARE

## 2023-06-05 ENCOUNTER — READMISSION MANAGEMENT (OUTPATIENT)
Dept: CALL CENTER | Facility: HOSPITAL | Age: 73
End: 2023-06-05
Payer: MEDICARE

## 2023-06-05 VITALS
BODY MASS INDEX: 40.04 KG/M2 | WEIGHT: 226 LBS | OXYGEN SATURATION: 96 % | HEART RATE: 90 BPM | HEIGHT: 63 IN | TEMPERATURE: 97 F | SYSTOLIC BLOOD PRESSURE: 124 MMHG | DIASTOLIC BLOOD PRESSURE: 73 MMHG | RESPIRATION RATE: 16 BRPM

## 2023-06-05 LAB
ANION GAP SERPL CALCULATED.3IONS-SCNC: 9.9 MMOL/L (ref 5–15)
BH CV LOWER VASCULAR LEFT COMMON FEMORAL AUGMENT: NORMAL
BH CV LOWER VASCULAR LEFT COMMON FEMORAL COMPETENT: NORMAL
BH CV LOWER VASCULAR LEFT COMMON FEMORAL COMPRESS: NORMAL
BH CV LOWER VASCULAR LEFT COMMON FEMORAL PHASIC: NORMAL
BH CV LOWER VASCULAR LEFT COMMON FEMORAL SPONT: NORMAL
BH CV LOWER VASCULAR LEFT DISTAL FEMORAL COMPRESS: NORMAL
BH CV LOWER VASCULAR LEFT GASTRONEMIUS COMPRESS: NORMAL
BH CV LOWER VASCULAR LEFT GREATER SAPH AK COMPRESS: NORMAL
BH CV LOWER VASCULAR LEFT GREATER SAPH BK COMPRESS: NORMAL
BH CV LOWER VASCULAR LEFT LESSER SAPH COMPRESS: NORMAL
BH CV LOWER VASCULAR LEFT MID FEMORAL AUGMENT: NORMAL
BH CV LOWER VASCULAR LEFT MID FEMORAL COMPETENT: NORMAL
BH CV LOWER VASCULAR LEFT MID FEMORAL COMPRESS: NORMAL
BH CV LOWER VASCULAR LEFT MID FEMORAL PHASIC: NORMAL
BH CV LOWER VASCULAR LEFT MID FEMORAL SPONT: NORMAL
BH CV LOWER VASCULAR LEFT PERONEAL COMPRESS: NORMAL
BH CV LOWER VASCULAR LEFT POPLITEAL AUGMENT: NORMAL
BH CV LOWER VASCULAR LEFT POPLITEAL COMPETENT: NORMAL
BH CV LOWER VASCULAR LEFT POPLITEAL COMPRESS: NORMAL
BH CV LOWER VASCULAR LEFT POPLITEAL PHASIC: NORMAL
BH CV LOWER VASCULAR LEFT POPLITEAL SPONT: NORMAL
BH CV LOWER VASCULAR LEFT POSTERIOR TIBIAL COMPRESS: NORMAL
BH CV LOWER VASCULAR LEFT PROFUNDA FEMORAL COMPRESS: NORMAL
BH CV LOWER VASCULAR LEFT PROXIMAL FEMORAL COMPRESS: NORMAL
BH CV LOWER VASCULAR LEFT SAPHENOFEMORAL JUNCTION COMPRESS: NORMAL
BH CV LOWER VASCULAR LEFT SOLEAL COMPRESS: NORMAL
BH CV LOWER VASCULAR RIGHT COMMON FEMORAL AUGMENT: NORMAL
BH CV LOWER VASCULAR RIGHT COMMON FEMORAL COMPETENT: NORMAL
BH CV LOWER VASCULAR RIGHT COMMON FEMORAL COMPRESS: NORMAL
BH CV LOWER VASCULAR RIGHT COMMON FEMORAL PHASIC: NORMAL
BH CV LOWER VASCULAR RIGHT COMMON FEMORAL SPONT: NORMAL
BH CV LOWER VASCULAR RIGHT DISTAL FEMORAL COMPRESS: NORMAL
BH CV LOWER VASCULAR RIGHT GASTRONEMIUS COMPRESS: NORMAL
BH CV LOWER VASCULAR RIGHT GREATER SAPH AK COMPRESS: NORMAL
BH CV LOWER VASCULAR RIGHT GREATER SAPH BK COMPRESS: NORMAL
BH CV LOWER VASCULAR RIGHT LESSER SAPH COMPRESS: NORMAL
BH CV LOWER VASCULAR RIGHT MID FEMORAL AUGMENT: NORMAL
BH CV LOWER VASCULAR RIGHT MID FEMORAL COMPETENT: NORMAL
BH CV LOWER VASCULAR RIGHT MID FEMORAL COMPRESS: NORMAL
BH CV LOWER VASCULAR RIGHT MID FEMORAL PHASIC: NORMAL
BH CV LOWER VASCULAR RIGHT MID FEMORAL SPONT: NORMAL
BH CV LOWER VASCULAR RIGHT PERONEAL COMPRESS: NORMAL
BH CV LOWER VASCULAR RIGHT POPLITEAL AUGMENT: NORMAL
BH CV LOWER VASCULAR RIGHT POPLITEAL COMPETENT: NORMAL
BH CV LOWER VASCULAR RIGHT POPLITEAL COMPRESS: NORMAL
BH CV LOWER VASCULAR RIGHT POPLITEAL PHASIC: NORMAL
BH CV LOWER VASCULAR RIGHT POPLITEAL SPONT: NORMAL
BH CV LOWER VASCULAR RIGHT POSTERIOR TIBIAL COMPRESS: NORMAL
BH CV LOWER VASCULAR RIGHT PROFUNDA FEMORAL COMPRESS: NORMAL
BH CV LOWER VASCULAR RIGHT PROXIMAL FEMORAL COMPRESS: NORMAL
BH CV LOWER VASCULAR RIGHT SAPHENOFEMORAL JUNCTION COMPRESS: NORMAL
BUN SERPL-MCNC: 7 MG/DL (ref 8–23)
BUN/CREAT SERPL: 10.6 (ref 7–25)
CALCIUM SPEC-SCNC: 8.9 MG/DL (ref 8.6–10.5)
CHLORIDE SERPL-SCNC: 101 MMOL/L (ref 98–107)
CO2 SERPL-SCNC: 25.1 MMOL/L (ref 22–29)
CREAT SERPL-MCNC: 0.66 MG/DL (ref 0.57–1)
DEPRECATED RDW RBC AUTO: 43 FL (ref 37–54)
EGFRCR SERPLBLD CKD-EPI 2021: 93.3 ML/MIN/1.73
ERYTHROCYTE [DISTWIDTH] IN BLOOD BY AUTOMATED COUNT: 12.4 % (ref 12.3–15.4)
GLUCOSE BLDC GLUCOMTR-MCNC: 147 MG/DL (ref 70–130)
GLUCOSE BLDC GLUCOMTR-MCNC: 162 MG/DL (ref 70–130)
GLUCOSE SERPL-MCNC: 145 MG/DL (ref 65–99)
HCT VFR BLD AUTO: 33.4 % (ref 34–46.6)
HGB BLD-MCNC: 11.2 G/DL (ref 12–15.9)
MCH RBC QN AUTO: 31.6 PG (ref 26.6–33)
MCHC RBC AUTO-ENTMCNC: 33.5 G/DL (ref 31.5–35.7)
MCV RBC AUTO: 94.4 FL (ref 79–97)
PLATELET # BLD AUTO: 186 10*3/MM3 (ref 140–450)
PMV BLD AUTO: 9.2 FL (ref 6–12)
POTASSIUM SERPL-SCNC: 4.3 MMOL/L (ref 3.5–5.2)
QT INTERVAL: 400 MS
QT INTERVAL: 401 MS
RBC # BLD AUTO: 3.54 10*6/MM3 (ref 3.77–5.28)
SODIUM SERPL-SCNC: 136 MMOL/L (ref 136–145)
TROPONIN T SERPL HS-MCNC: 9 NG/L
WBC NRBC COR # BLD: 4.62 10*3/MM3 (ref 3.4–10.8)

## 2023-06-05 PROCEDURE — G0378 HOSPITAL OBSERVATION PER HR: HCPCS

## 2023-06-05 PROCEDURE — 63710000001 ONDANSETRON PER 8 MG: Performed by: NURSE PRACTITIONER

## 2023-06-05 PROCEDURE — 96372 THER/PROPH/DIAG INJ SC/IM: CPT

## 2023-06-05 PROCEDURE — 84484 ASSAY OF TROPONIN QUANT: CPT | Performed by: NURSE PRACTITIONER

## 2023-06-05 PROCEDURE — 82948 REAGENT STRIP/BLOOD GLUCOSE: CPT

## 2023-06-05 PROCEDURE — 93970 EXTREMITY STUDY: CPT

## 2023-06-05 PROCEDURE — 85027 COMPLETE CBC AUTOMATED: CPT | Performed by: NURSE PRACTITIONER

## 2023-06-05 PROCEDURE — 80048 BASIC METABOLIC PNL TOTAL CA: CPT | Performed by: NURSE PRACTITIONER

## 2023-06-05 PROCEDURE — 93005 ELECTROCARDIOGRAM TRACING: CPT | Performed by: NURSE PRACTITIONER

## 2023-06-05 PROCEDURE — 25010000002 ENOXAPARIN PER 10 MG: Performed by: NURSE PRACTITIONER

## 2023-06-05 RX ADMIN — DOXYCYCLINE 100 MG: 100 CAPSULE ORAL at 08:31

## 2023-06-05 RX ADMIN — HYDROCODONE BITARTRATE AND ACETAMINOPHEN 1 TABLET: 10; 325 TABLET ORAL at 00:15

## 2023-06-05 RX ADMIN — NITROGLYCERIN 1 INCH: 20 OINTMENT TOPICAL at 05:21

## 2023-06-05 RX ADMIN — LIDOCAINE 1 PATCH: 50 PATCH TOPICAL at 08:31

## 2023-06-05 RX ADMIN — CYCLOBENZAPRINE 10 MG: 10 TABLET, FILM COATED ORAL at 00:15

## 2023-06-05 RX ADMIN — ENOXAPARIN SODIUM 100 MG: 100 INJECTION SUBCUTANEOUS at 05:21

## 2023-06-05 RX ADMIN — DULOXETINE HYDROCHLORIDE 60 MG: 60 CAPSULE, DELAYED RELEASE ORAL at 08:31

## 2023-06-05 RX ADMIN — HYDROCODONE BITARTRATE AND ACETAMINOPHEN 1 TABLET: 10; 325 TABLET ORAL at 05:21

## 2023-06-05 RX ADMIN — ONDANSETRON HYDROCHLORIDE 4 MG: 4 TABLET, FILM COATED ORAL at 14:28

## 2023-06-05 RX ADMIN — ONDANSETRON HYDROCHLORIDE 4 MG: 4 TABLET, FILM COATED ORAL at 05:34

## 2023-06-05 RX ADMIN — HYDROCODONE BITARTRATE AND ACETAMINOPHEN 1 TABLET: 10; 325 TABLET ORAL at 14:28

## 2023-06-05 RX ADMIN — ONDANSETRON HYDROCHLORIDE 4 MG: 4 TABLET, FILM COATED ORAL at 00:17

## 2023-06-05 NOTE — DISCHARGE SUMMARY
Patient Name: Chastity Salgado  : 1950  MRN: [id number]    Date of Admission: 2023  Date of Discharge:  2023  Primary Care Physician: Marilyn Turner APRN      Chief Complaint:   Chest Pain, Back Pain, and Leg Swelling      Discharge Diagnoses     Active Hospital Problems    Diagnosis  POA    **Chest pain, unspecified type [R07.9]  Yes    Class 3 severe obesity due to excess calories with serious comorbidity and body mass index (BMI) of 40.0 to 44.9 in adult [E66.01, Z68.41]  Not Applicable    Therapeutic opioid induced constipation [K59.03, T40.2X5A]  Yes    Mixed hyperlipidemia [E78.2]  Yes    Chronic pain [G89.29]  Yes    Type 2 diabetes mellitus with neurologic complication, without long-term current use of insulin [E11.49]  Yes    Benign essential HTN [I10]  Yes      Resolved Hospital Problems   No resolved problems to display.        Hospital Course     Ms. Salgado is a 73 y.o. female with a history of DM2, hypertension and chronic pain/opiate use who presented to [facility] initially complaining of chest pain.  Please see the admitting history and physical for further details.  She was found to have only minimally elevated troponin which was flat and did not go higher than 12.  EKG showed nonspecific changes.  Pain was somewhat atypical but she was seen by cardiology who felt she probably needed a stress test.  However due to limitations in the scanner in the hospital as well as patient's inability to raise her arms and obesity they felt she would be better served by PET stress which can be done in their office.  We did perform a Doppler of the legs which was negative for clots.  Her D-dimer had been positive at the ER.  CTA done there was negative for any PE as well.  She does have some tenderness and swelling in the right lower extremity and has had a prior history of cellulitis but I do not see any erythema currently.  Would recommend that she follow-up with her  PCP in the next week or so or certainly return if swelling, pain or erythema develops in the right leg.  Case discussed with Dr. Ricks on date of discharge.      Day of Discharge     Subjective:  No further chest pain    Physical Exam:  Temp:  [97 °F (36.1 °C)-98.2 °F (36.8 °C)] 97 °F (36.1 °C)  Heart Rate:  [62-90] 90  Resp:  [16-18] 16  BP: (124-149)/(59-76) 124/73  Body mass index is 40.03 kg/m².  Physical Exam  Vitals reviewed.   Constitutional:       General: She is not in acute distress.     Appearance: She is obese. She is not ill-appearing.   Cardiovascular:      Rate and Rhythm: Normal rate and regular rhythm.      Heart sounds:     No friction rub.   Pulmonary:      Effort: Pulmonary effort is normal. No respiratory distress.      Breath sounds: Normal breath sounds.   Abdominal:      General: There is no distension.      Palpations: Abdomen is soft.      Tenderness: There is no abdominal tenderness.   Musculoskeletal:      Comments: 1+ bilateral edema, right slightly greater than left.  Right calf tender but no erythema present.  Both legs equally warm   Neurological:      Mental Status: She is alert and oriented to person, place, and time.   Psychiatric:         Mood and Affect: Mood normal.         Behavior: Behavior normal.       Consultants     Consult Orders (all) (From admission, onward)       Start     Ordered    06/04/23 1839  Inpatient Cardiology Consult  Once        Specialty:  Cardiology  Provider:  Tre Guevara MD    06/04/23 1839                  Procedures     Imaging Results (All)       Procedure Component Value Units Date/Time    CT Angiogram Chest [[id number]] Collected: 06/04/23 1448     Updated: 06/04/23 1502    Narrative:      CT ANGIOGRAM CHEST-     INDICATIONS: Elevated d-dimer     TECHNIQUE: Radiation dose reduction techniques were utilized, including  automated exposure control and exposure modulation based on body size.  CT angiography of the chest. Three-dimensional  reconstructions.     COMPARISON: None available      FINDINGS:     No pulmonary embolism. No aortic dissection.     The heart size is is enlarged without pericardial effusion. A few small  subcentimeter short axis mediastinal lymph nodes are seen that are not  significant by size criteria. Asymmetric enlargement of the right  thyroid lobe may reflect underlying nodule, ultrasound correlation can  be obtained as indicated.     The airways appear clear.     No pleural effusion or pneumothorax.     The lungs show no focal pulmonary consolidation or mass.     Upper abdominal structures show no acute findings. Small hiatal hernia  is present. Relative low-density of the liver suggests steatosis. The  pancreas is thinned. Subcentimeter short axis gastrohepatic ligament and  portacaval lymph nodes are noted.     Degenerative changes are seen in the spine, left shoulder. No acute  fracture is identified.       Impression:         No pulmonary embolism.     This report was finalized on 6/4/2023 2:59 PM by Dr. Leroy Davis M.D.             Results for orders placed during the hospital encounter of 06/04/23    Duplex Venous Lower Extremity - Bilateral CAR    Interpretation Summary    Normal bilateral lower extremity venous duplex scan.      Pertinent Labs     Results from last 7 days   Lab Units 06/05/23  0444 06/04/23  1249   WBC 10*3/mm3 4.62 5.23   HEMOGLOBIN g/dL 11.2* 12.3   PLATELETS 10*3/mm3 186 200     Results from last 7 days   Lab Units 06/05/23  0444 06/04/23  1249   SODIUM mmol/L 136 138   POTASSIUM mmol/L 4.3 4.1   CHLORIDE mmol/L 101 101   CO2 mmol/L 25.1 29.2*   BUN mg/dL 7* 7*   CREATININE mg/dL 0.66 0.70   GLUCOSE mg/dL 145* 139*   EGFR mL/min/1.73 93.3 92.0     Results from last 7 days   Lab Units 06/04/23  1249   ALBUMIN g/dL 3.8   BILIRUBIN mg/dL 0.3   ALK PHOS U/L 75   AST (SGOT) U/L 25   ALT (SGPT) U/L 15     Results from last 7 days   Lab Units 06/05/23  0444 06/04/23  1249   CALCIUM mg/dL 8.9 9.5    ALBUMIN g/dL  --  3.8       Results from last 7 days   Lab Units 06/05/23  0444 06/04/23  1925 06/04/23  1450 06/04/23  1249   HSTROP T ng/L 9 12* 10* 11*   D DIMER QUANT MCGFEU/mL  --   --   --  0.88*           Invalid input(s): LDLCALC          Test Results Pending at Discharge       Discharge Details        Discharge Medications        Continue These Medications        Instructions Start Date   Biotin 77251 MCG tablet   10,000 mcg, Oral, Daily      cyclobenzaprine 10 MG tablet  Commonly known as: FLEXERIL   10 mg, Oral, 3 Times Daily PRN      doxycycline 100 MG capsule  Commonly known as: MONODOX   100 mg, 2 Times Daily, For Roscea      DULoxetine 60 MG capsule  Commonly known as: CYMBALTA   TAKE ONE CAPSULE BY MOUTH DAILY      freestyle lancets   Check glucose daily and prn      FREESTYLE LITE test strip  Generic drug: glucose blood   Once daily and as needed      glucose monitor monitoring kit   Check glucose daily and prn      HYDROcodone-acetaminophen  MG per tablet  Commonly known as: NORCO   1 tablet, Oral, 5 Times Daily PRN, 30 day supply      Lidocaine 4 % patch   1 patch, Apply externally, Daily PRN      linaclotide 145 MCG capsule capsule  Commonly known as: Linzess   145 mcg, Oral, Every Morning Before Breakfast      lisinopril 40 MG tablet  Commonly known as: PRINIVIL,ZESTRIL   TAKE ONE TABLET BY MOUTH DAILY      Movantik 25 MG tablet  Generic drug: Naloxegol Oxalate   25 mg, Oral, Every Morning      ondansetron 4 MG tablet  Commonly known as: ZOFRAN   4 mg, Oral, Every 8 Hours PRN      ondansetron ODT 4 MG disintegrating tablet  Commonly known as: ZOFRAN-ODT   4 mg, Oral      Vitamin D3 25 MCG (1000 UT) capsule   1,000 Units, Oral, Daily               Allergies   Allergen Reactions    Adhesive Tape Rash     REDNESS    Shellfish-Derived Products     Latex Rash    Latex, Natural Rubber Itching       Discharge Disposition:  Home or Self Care      Discharge Diet:  Diet Order   Procedures    Diet:  Cardiac Diets, Diabetic Diets; Healthy Heart (2-3 Na+); Consistent Carbohydrate; Texture: Regular Texture (IDDSI 7); Fluid Consistency: Thin (IDDSI 0)       Discharge Activity:       CODE STATUS:    Code Status and Medical Interventions:   Ordered at: 06/04/23 1840     Code Status (Patient has no pulse and is not breathing):    CPR (Attempt to Resuscitate)     Medical Interventions (Patient has pulse or is breathing):    Full Support       Future Appointments   Date Time Provider Department Center   6/22/2023  2:15 PM Marilyn Turner APRN [facility]   7/11/2023 12:00 PM Marilyn Turner APRN [facility]   7/24/2023 12:40 PM Valeria Ramos APRN [facility]   6/5/2024 11:30 AM Karen Melton APRN [facility]      Follow-up Information       Marilyn Turner APRN Follow up in 1 week(s).    Specialties: Family Medicine, Urgent Care  Contact information:  [address]  [address] [address] [phone]                             Time Spent on Discharge:  Greater than 30 minutes      Christian Mallory MD  [facility]  06/05/23  15:22 EDT

## 2023-06-05 NOTE — OUTREACH NOTE
Prep Survey      Flowsheet Row Responses   [facility] patient discharged from? [place]   Is LACE score < 7 ? No   Eligibility [facility]   Date of Admission 06/04/23   Date of Discharge 06/05/23   Discharge Disposition Home or Self Care   Discharge diagnosis Chest pain   Does the patient have one of the following disease processes/diagnoses(primary or secondary)? Other   Does the patient have Home health ordered? No   Is there a DME ordered? No   Prep survey completed? Yes            Margot CAO - Registered Nurse          
MEDICATIONS  (STANDING):  amLODIPine   Tablet 10 milliGRAM(s) Oral daily  influenza  Vaccine (HIGH DOSE) 0.7 milliLiter(s) IntraMuscular once  losartan 50 milliGRAM(s) Oral daily  pantoprazole    Tablet 40 milliGRAM(s) Oral before breakfast  senna 2 Tablet(s) Oral at bedtime  sodium chloride 1 Gram(s) Oral daily  sodium chloride 0.9%. 1000 milliLiter(s) (45 mL/Hr) IV Continuous <Continuous>    MEDICATIONS  (PRN):  acetaminophen     Tablet .. 650 milliGRAM(s) Oral every 6 hours PRN Temp greater or equal to 38C (100.4F), Mild Pain (1 - 3)  aluminum hydroxide/magnesium hydroxide/simethicone Suspension 30 milliLiter(s) Oral every 4 hours PRN Dyspepsia  melatonin 3 milliGRAM(s) Oral at bedtime PRN Insomnia  ondansetron Injectable 4 milliGRAM(s) IV Push every 8 hours PRN Nausea and/or Vomiting  traMADol 50 milliGRAM(s) Oral three times a day PRN Moderate Pain (4 - 6)

## 2023-06-05 NOTE — CONSULTS
Patient Name: Chastity Salgado  :1950  73 y.o.    Date of Admission: 2023  Date of Consultation:  23  Encounter Provider: Soraya Ricks MD  Place of Service: [facility] CARDIOLOGY  Referring Provider: Marilyn Turner*  Patient Care Team:  Marilyn Turner APRN as PCP - General (Family Medicine)  Lino Cullen MD as Consulting Physician (General Surgery)  Tu Vásquez MD as Consulting Physician (Orthopedic Surgery)  Tu Welch MD as Consulting Physician (Dermatology)  Niko Jacobsen MD as Consulting Physician (Medical Oncology)  Josseline Faria MD as Consulting Physician (Obstetrics and Gynecology)  Lino Cullen MD as Consulting Physician (General Surgery)  Mely Ramos APRN as Nurse Practitioner (Nurse Practitioner)  Neeraj Jj MD as Consulting Physician (Gastroenterology)      Chief complaint:chest pain, back pain and leg swelling    History of Present Illness:     This is a 73-year-old female with history of depression, breast cancer, degenerative disc disease, osteoarthritis, hypertension, hyperlipidemia, diabetes not type II, chronic pain syndrome, bilateral severe shoulder arthritis.  She presented with chest pain, back pain and leg swelling.    She had right breast cancer treated with lumpectomy and radiation in     She had seen Dr. Deshpande 2021 for chest pain.  Treadmill stress study was ordered but was canceled by the patient.    She presented to [facility] 2023 with central chest pain rating to her back, lasting for couple hours then eased up.  She had no radiation to arms, neck or jaw.  The pain was described as a stabbing associate with lightheadedness.  She also had bilateral leg swelling.  In the ER, troponin 11/10/9, D-dimer 0.88, ECG showed normal sinus rhythm, no ACS x2, CT chest showed no pulmonary embolism or aortic dissection and no coronary calcification #I  reviewed images.  She was given aspirin Lovenox and transferred.    Blood pressure on arrival was 169/73 with heart 69, saturation 9 9% on room air, blood pressure this morning is 134/65 with a heart of 90.  Hemoglobin is 11.2, hemoglobin A1c 7.1%.    We have been asked to see her for chest pain.  Pain was described as a stabbing deep pain.  It was not associate with dyspnea, heart racing, diaphoresis.  She has no orthopnea or PND.  She has no fevers, chills or cough.  She did have lightheadedness with the chest pain but has not had that since.  She has severe bilateral shoulder pain and needs shoulder replacement on the left.  She has had a prior shoulder replacement in the right but the hardware is cracked and she cannot her right arm.  She has been taking her medications as directed without difficulty.    She lives by herself in an apartment, uses no alcohol or drugs.  Her mom had myocardial infarction's in her 40s and  of the stroke in her 80s.  There is no other family history of cardiovascular disease.    Stress test 12    MPRESSION:     1. Normal Persantine Cardiolite study.     2. No evidence of myocardial ischemia and no evidence of myocardial infarction.     3. Normal LV systolic function.     4. Minor apical thinning, a normal variant.     5. Results were called to the floor.       ECHO 8/3/12     Conclusions:     Technically difficult/limited study due to patients body     habitus/obesity.     Global left ventricular wall motion and contractility are within normal     limits.     There is normal left ventricular systolic function.     There is no left ventricular hypertrophy observed.     The estimated ejection fraction is between 55-60%.     The left atrium is moderately enlarged.     There is mild to moderate mitral regurgitation observed.     There is mild tricuspid regurgitation.     The Right Ventricular Systolic Pressure is calculated at 49 mmHg.     There is no pericardial effusion.        Past Medical History:   Diagnosis Date    Abscess     Anxiety     Arthritis     bilateral feet    Cancer 2013    breast     Cellulitis of face     Degenerative disc disease, lumbar     Depression     DM (diabetes mellitus), type 2     Ear infection     Fibromyalgia, primary     History of dysphagia     History of dysuria     History of epistaxis     Hypertension     Intertrigo     MRSA (methicillin resistant Staphylococcus aureus)     2015    Nose mucous membrane dryness     Osteoarthritis     Rosacea        Past Surgical History:   Procedure Laterality Date    BREAST LUMPECTOMY Right 2013    COLONOSCOPY N/A 09/20/2021    Procedure: COLONOSCOPY FOR SCREENING;  Surgeon: Neeraj Jj MD;  Location: [facility];  Service: Gastroenterology;  Laterality: N/A;  diverticulosis, polyp, adequate prep, hemorrhoids, thickened ileocecal valve    ENDOSCOPY N/A 09/20/2021    Procedure: ESOPHAGOGASTRODUODENOSCOPY;  Surgeon: Neeraj Jj MD;  Location: [facility];  Service: Gastroenterology;  Laterality: N/A;  hiatal hernia, mild food retention, gastritis    EYE SURGERY Left 02/2022    EYE SURGERY Right 06/06/2022    HIP BIPOLAR REPLACEMENT Bilateral     left 1998 and right 2007    INCONTINENCE SURGERY  02/27/2020    LAPAROSCOPIC TOTAL HYSTERECTOMY  02/27/2020    NERVE BLOCK Left 02/25/2021    Procedure: left suprascapular nerve block;  Surgeon: John Padgett MD;  Location: [facility];  Service: Pain Management;  Laterality: Left;    REPLACEMENT TOTAL KNEE  2005    SHOULDER SURGERY Right     2000, 2002, 2010 X2, 2012         Prior to Admission medications    Medication Sig Start Date End Date Taking? Authorizing Provider   Biotin 42881 MCG tablet Take 1 tablet by mouth Daily.   Yes Carmen Dc MD   Cholecalciferol (VITAMIN D3) 25 MCG (1000 UT) capsule Take 1 capsule by mouth Daily.   Yes Carmen Dc MD   cyclobenzaprine (FLEXERIL) 10 MG tablet Take 1 tablet by mouth 3 (Three) Times  a Day As Needed for Muscle Spasms. 5/23/23  Yes Valeria Ramos APRN   doxycycline (MONODOX) 100 MG capsule 1 capsule 2 (Two) Times a Day. For Roscea 2/9/23  Yes Carmen Dc MD   DULoxetine (CYMBALTA) 60 MG capsule TAKE ONE CAPSULE BY MOUTH DAILY 8/23/21  Yes Marilyn Turner APRN   glucose blood (FREESTYLE LITE) test strip Once daily and as needed 4/22/21  Yes Marilyn Turner APRN   glucose monitor monitoring kit Check glucose daily and prn 3/10/21  Yes Marilyn Turner APRN   HYDROcodone-acetaminophen (NORCO)  MG per tablet Take 1 tablet by mouth 5 (Five) Times a Day As Needed for Severe Pain. 30 day supply 5/23/23  Yes Valeria Ramos APRN   Lancets (freestyle) lancets Check glucose daily and prn 3/10/21  Yes Marilyn Turner APRN   Lidocaine 4 % patch Apply 1 patch topically Daily As Needed.   Yes ProviderCarmen MD   linaclotide (Linzess) 145 MCG capsule capsule Take 1 capsule by mouth Every Morning Before Breakfast. 10/28/22  Yes Tu Small Sr., MD   lisinopril (PRINIVIL,ZESTRIL) 40 MG tablet TAKE ONE TABLET BY MOUTH DAILY 3/16/23  Yes Marilyn Turner APRN   Naloxegol Oxalate (Movantik) 25 MG tablet Take 1 tablet by mouth Every Morning. 11/10/22  Yes Valeria Ramos APRN   ondansetron (ZOFRAN) 4 MG tablet Take 1 tablet by mouth Every 8 (Eight) Hours As Needed for Nausea or Vomiting. 10/28/22  Yes Tu Small Sr., MD   ondansetron ODT (ZOFRAN-ODT) 4 MG disintegrating tablet Take 1 tablet by mouth. 10/19/22  Yes ProviderCarmen MD       Allergies   Allergen Reactions    Adhesive Tape Rash     REDNESS    Shellfish-Derived Products     Latex Rash    Latex, Natural Rubber Itching       Social History     Socioeconomic History    Marital status: Single    Number of children: 3   Tobacco Use    Smoking status: Never    Smokeless tobacco: Never   Vaping Use    Vaping Use: Never used   Substance and Sexual Activity    Alcohol use:  Yes     Comment: social alcohol use    Drug use: No    Sexual activity: Defer     Birth control/protection: Surgical       Family History   Problem Relation Age of Onset    Arthritis Other     Hypertension Other     Heart disease Other     Stroke Mother     Hypertension Mother     Diabetes Mother     Arthritis Mother     Parkinsonism Mother     Arthritis Father     Heart disease Sister     Diabetes Sister     Parkinsonism Sister     COPD Brother     Colon cancer Neg Hx        REVIEW OF SYSTEMS:   All systems reviewed.  Pertinent positives identified in HPI.  All other systems are negative.      Objective:     Vitals:    06/04/23 1850 06/04/23 1953 06/04/23 2324 06/05/23 0829   BP:  135/76 133/69 134/65   BP Location:  Left arm Left arm Left arm   Patient Position:  Lying Lying Lying   Pulse:  83 77 90   Resp:  18 18 16   Temp:  97.8 °F (36.6 °C) 97.9 °F (36.6 °C) 97.8 °F (36.6 °C)   TempSrc:  Oral Oral Oral   SpO2:  93% 96%    Weight: 103 kg (226 lb)      Height:         Body mass index is 40.03 kg/m².    General Appearance:    Alert, cooperative, in no acute distress, obese   Head:    Normocephalic, without obvious abnormality, atraumatic   Eyes:            Lids and lashes normal, conjunctivae and sclerae normal, no icterus, no pallor, corneas clear   Ears:    Ears appear intact with no abnormalities noted   Throat:   No oral lesions, no thrush, oral mucosa moist   Neck:   No adenopathy, supple, trachea midline, no thyromegaly, no carotid bruit, no JVD   Back:     No kyphosis present, no scoliosis present, no skin lesions, erythema or scars, no tenderness to palpation, range of motion normal   Lungs:     Clear to auscultation, respirations regular, even and unlabored    Heart:    Regular rhythm and normal rate, normal S1 and S2, 3/6 hsm at apex, no gallop, no rub, no click   Chest Wall:    No abnormalities observed   Abdomen:     Normal bowel sounds, no masses, no organomegaly, soft, nontender, nondistended, no  guarding, no rebound  tenderness   Extremities:   Moves all extremities well, right leg edema, no cyanosis, no redness   Pulses:   Pulses palpable and equal bilaterally. Normal radial, carotid, dorsalis pedis and posterior tibial pulses bilaterally.    Skin:  Psychiatric:   No bleeding, bruising or rash    Alert and oriented x 3, normal mood and affect   Lab Review:     Results from last 7 days   Lab Units 06/05/23  0444 06/04/23  1249   SODIUM mmol/L 136 138   POTASSIUM mmol/L 4.3 4.1   CHLORIDE mmol/L 101 101   CO2 mmol/L 25.1 29.2*   BUN mg/dL 7* 7*   CREATININE mg/dL 0.66 0.70   CALCIUM mg/dL 8.9 9.5   BILIRUBIN mg/dL  --  0.3   ALK PHOS U/L  --  75   ALT (SGPT) U/L  --  15   AST (SGOT) U/L  --  25   GLUCOSE mg/dL 145* 139*     Results from last 7 days   Lab Units 06/05/23  0444 06/04/23 1925 06/04/23  1450   HSTROP T ng/L 9 12* 10*     Results from last 7 days   Lab Units 06/05/23  0444   WBC 10*3/mm3 4.62   HEMOGLOBIN g/dL 11.2*   HEMATOCRIT % 33.4*   PLATELETS 10*3/mm3 186     Results from last 7 days   Lab Units 06/04/23  1925 06/04/23  1452   INR   --  1.0   APTT seconds 37.9*  --                                      I personally viewed and interpreted the patient's EKG/Telemetry data.        Assessment and Plan:     Chest pain, minimally elevated troponin.  ECG shows no ACS x2.  I did review CTA chest done 6/4/2023-the coronary arteries not well visualized but there is no obvious coronary calcification, no calcification of the pericardium and no pericardial effusion.  Diabetes mellitus  Hypertension  Murmur  Obese  Severe bilateral shoulder pain-cannot lift right arm.  Right leg edema, awaiting venous study.     Outpatient PET stress and echocardiogram-no evidence of ACS.  Because of her obesity, plus is the better test and in addition, she cannot lift her right arm which causes artifact of imaging.  We will get her a follow-up after that in our office for cardiovascular risks.  She may go home  today.    Soraya Ricks MD  06/05/23  10:02 EDT

## 2023-06-05 NOTE — PLAN OF CARE
Problem: Adult Inpatient Plan of Care  Goal: Plan of Care Review  Flowsheets (Taken 6/5/2023 1521)  Plan of Care Reviewed With: patient  Outcome Evaluation: vitals stable, denies chest pain, duplex (-) plan for discharge with follow up with cardio  Goal: Absence of Hospital-Acquired Illness or Injury  Intervention: Identify and Manage Fall Risk  Recent Flowsheet Documentation  Taken 6/5/2023 1433 by Jen Calhoun RN  Safety Promotion/Fall Prevention:   safety round/check completed   room organization consistent   nonskid shoes/slippers when out of bed   lighting adjusted   fall prevention program maintained   clutter free environment maintained   assistive device/personal items within reach  Taken 6/5/2023 1151 by Jen Calhoun RN  Safety Promotion/Fall Prevention:   safety round/check completed   room organization consistent   nonskid shoes/slippers when out of bed   lighting adjusted   fall prevention program maintained   clutter free environment maintained   assistive device/personal items within reach  Taken 6/5/2023 1030 by Jen Calhoun RN  Safety Promotion/Fall Prevention:   safety round/check completed   room organization consistent   nonskid shoes/slippers when out of bed   lighting adjusted   fall prevention program maintained   clutter free environment maintained   assistive device/personal items within reach  Taken 6/5/2023 0830 by Jen Calhoun RN  Safety Promotion/Fall Prevention:   safety round/check completed   room organization consistent   nonskid shoes/slippers when out of bed   lighting adjusted   fall prevention program maintained   clutter free environment maintained   assistive device/personal items within reach  Goal: Optimal Comfort and Wellbeing  Intervention: Provide Person-Centered Care  Recent Flowsheet Documentation  Taken 6/5/2023 0830 by Jen Calhoun RN  Trust Relationship/Rapport:   care explained   thoughts/feelings acknowledged   reassurance provided    Goal Outcome Evaluation:  Plan of Care Reviewed With: patient           Outcome Evaluation: vitals stable, denies chest pain, duplex (-) plan for discharge with follow up with cardio

## 2023-06-06 ENCOUNTER — TRANSITIONAL CARE MANAGEMENT TELEPHONE ENCOUNTER (OUTPATIENT)
Dept: CALL CENTER | Facility: HOSPITAL | Age: 73
End: 2023-06-06
Payer: MEDICARE

## 2023-06-06 DIAGNOSIS — E78.2 MIXED HYPERLIPIDEMIA: ICD-10-CM

## 2023-06-06 DIAGNOSIS — I10 BENIGN ESSENTIAL HTN: ICD-10-CM

## 2023-06-06 DIAGNOSIS — Z91.89 CHRONIC CHEST PAIN WITH LOW TO MODERATE RISK FOR CAD: Primary | ICD-10-CM

## 2023-06-06 DIAGNOSIS — E66.01 MORBID OBESITY WITH BMI OF 40.0-44.9, ADULT: ICD-10-CM

## 2023-06-06 DIAGNOSIS — G89.29 CHRONIC CHEST PAIN WITH LOW TO MODERATE RISK FOR CAD: Primary | ICD-10-CM

## 2023-06-06 DIAGNOSIS — E11.69 TYPE 2 DIABETES MELLITUS WITH OTHER SPECIFIED COMPLICATION, WITHOUT LONG-TERM CURRENT USE OF INSULIN: ICD-10-CM

## 2023-06-06 DIAGNOSIS — R07.9 CHRONIC CHEST PAIN WITH LOW TO MODERATE RISK FOR CAD: Primary | ICD-10-CM

## 2023-06-06 NOTE — OUTREACH NOTE
Call Center TCM Note      Flowsheet Row Responses   [facility] patient discharged from? [place]   Does the patient have one of the following disease processes/diagnoses(primary or secondary)? Other   TCM attempt successful? No   Unsuccessful attempts Attempt 2            Emma Owen MA    6/6/2023, 16:15 EDT

## 2023-06-06 NOTE — OUTREACH NOTE
Call Center TCM Note      Flowsheet Row Responses   [facility] patient discharged from? [place]   Does the patient have one of the following disease processes/diagnoses(primary or secondary)? Other   TCM attempt successful? No   Unsuccessful attempts Attempt 1   Call Status Left message            Emma Owen MA    6/6/2023, 12:55 EDT

## 2023-06-07 ENCOUNTER — TRANSITIONAL CARE MANAGEMENT TELEPHONE ENCOUNTER (OUTPATIENT)
Dept: CALL CENTER | Facility: HOSPITAL | Age: 73
End: 2023-06-07
Payer: MEDICARE

## 2023-06-07 NOTE — OUTREACH NOTE
Call Center TCM Note      Flowsheet Row Responses   [facility] patient discharged from? [place]   Does the patient have one of the following disease processes/diagnoses(primary or secondary)? Other   TCM attempt successful? No   Unsuccessful attempts Attempt 3   Wrap up additional comments D/C DX: chest pain - Unable to reach pt x 3 attempts for TCM call. Pt is sched for reg ov appt with PCP Marilyn Turner on 06/22/2023, howevre TCM APPT would need to be completed by 06/19/2023.            Emma Owen MA    6/7/2023, 13:18 EDT

## 2023-06-09 ENCOUNTER — TELEPHONE (OUTPATIENT)
Dept: FAMILY MEDICINE CLINIC | Facility: CLINIC | Age: 73
End: 2023-06-09

## 2023-06-09 LAB
INR PPP: 1
PROTHROMBIN TIME: 11.3 SECONDS (ref 11–15)

## 2023-06-09 NOTE — TELEPHONE ENCOUNTER
Caller: Chastity Salgado    Relationship: Self    Best call back number: [phone]     What medication are you requesting: WATER PILL    What are your current symptoms: SWELLING OF LEGS, MOSTLY RIGHT LEG, MAKES LEGS HARD, ANKLE SWELLING TOO (PATIENT HAS ALREADY BEEN TO HOSPITAL SEE BELOW)    Have you had these symptoms before:    [x] Yes  [] No    Have you been treated for these symptoms before:   [x] Yes  [] No    If a prescription is needed, what is your preferred pharmacy and phone number: [facility] [id number] [place], [place] - [address] [facility] AT [facility] [address] [place] - [phone]  - [phone]      Additional notes: PATIENT WENT TO EMERGENCY ROOM 6/4/2023 AND WAS ADMITTED TO [facility]. SHE STAYED UNTIL 6/5/2023. SHE WAS SEEN FOR THE LEG SWELLING AND ALSO CHEST PAIN.    SHE WAS CHECKED FOR BLOOD CLOTS AND A HEART ATTACK BUT HAD NEITHER, SHE IS STILL EXPERIENCING THE CHEST PAIN AND SWELLING.    PATIENT HAS EARLIEST AVAILABLE APPOINTMENT OF 6/20/2023 AND  CAN'T GO TO URGENT CARE DUE THE COST.

## 2023-06-09 NOTE — TELEPHONE ENCOUNTER
I am out of the office and will be uanble to see her within time frame, can you see if another provider can see her. If not, I will see her when I return.

## 2023-06-09 NOTE — TELEPHONE ENCOUNTER
Call received for the hub due to being unable to get pt sched for a hospital follow up for chest pain. Would you like to work pt in or for her to see another provider. She was discharged on 6/5/23.

## 2023-06-12 ENCOUNTER — OFFICE VISIT (OUTPATIENT)
Dept: FAMILY MEDICINE CLINIC | Facility: CLINIC | Age: 73
End: 2023-06-12
Payer: MEDICARE

## 2023-06-12 VITALS
WEIGHT: 224.9 LBS | HEIGHT: 63 IN | BODY MASS INDEX: 39.85 KG/M2 | OXYGEN SATURATION: 100 % | SYSTOLIC BLOOD PRESSURE: 119 MMHG | DIASTOLIC BLOOD PRESSURE: 67 MMHG | TEMPERATURE: 97.1 F | HEART RATE: 89 BPM

## 2023-06-12 DIAGNOSIS — R60.9 DEPENDENT EDEMA: ICD-10-CM

## 2023-06-12 DIAGNOSIS — R07.9 CHEST PAIN, UNSPECIFIED TYPE: Primary | ICD-10-CM

## 2023-06-12 DIAGNOSIS — M94.0 COSTOCHONDRITIS: ICD-10-CM

## 2023-06-12 PROCEDURE — 3051F HG A1C>EQUAL 7.0%<8.0%: CPT | Performed by: NURSE PRACTITIONER

## 2023-06-12 PROCEDURE — 99214 OFFICE O/P EST MOD 30 MIN: CPT | Performed by: NURSE PRACTITIONER

## 2023-06-12 PROCEDURE — 3074F SYST BP LT 130 MM HG: CPT | Performed by: NURSE PRACTITIONER

## 2023-06-12 PROCEDURE — 3078F DIAST BP <80 MM HG: CPT | Performed by: NURSE PRACTITIONER

## 2023-06-12 RX ORDER — FUROSEMIDE 20 MG/1
20 TABLET ORAL DAILY
Qty: 10 TABLET | Refills: 0 | Status: SHIPPED | OUTPATIENT
Start: 2023-06-12

## 2023-06-12 RX ORDER — CELECOXIB 200 MG/1
200 CAPSULE ORAL DAILY
COMMUNITY

## 2023-06-12 RX ORDER — METHYLPREDNISOLONE 4 MG/1
TABLET ORAL
Qty: 21 TABLET | Refills: 0 | Status: SHIPPED | OUTPATIENT
Start: 2023-06-12

## 2023-06-12 NOTE — PATIENT INSTRUCTIONS
Discharge instructions Medrol Dosepak stop if any difficulties irritability insomnia elevated blood pressure should you have signs of gastritis acid reflux  Mylanta as needed Pepcid    If increased chest pain shortness of breath emergency room    Elevate legs when sitting  Normal activities  Compression socks bilateral knee-high proper fit on in the morning off at night such as 15-19 or 15 to 20 mmHg  For mild    You can get these in golds prescription strength  They are hard to get  So if you will commit to that I am fine with ordering  Update me your condition Friday please  Increased chest pain shortness of breath fever chills weakness emergency room    Use Lasix sparingly to avoid dehydration falls

## 2023-06-12 NOTE — PROGRESS NOTES
"Chief Complaint  Chest Pain (Chest pain 1 week ago was seen in the hospital on 6/4/23) and Leg Swelling    Subjective        Chastity Salgado presents to [facility]  History of Present Illness  Pleasant patient recently had some chest pain substernal had a work-up in the hospital one of her troponins was mildly elevated initial work-up was negative  She will likely need a PET stress test, is the plan and follow-up cardiology,  She is have no increasing shortness of breath or chest pain but she still has substernal pain without associated nausea vomiting diaphoresis shortness of breath tends to be positional she has had this before costochondritis it responded to a steroid she would like to do this again she is not having underlying signs or symptoms of reflux no epigastric pain not associated with meals and no associated nausea.    She has had unrelated lower extremity edema likely due to her hospitalization all the changes she had negative Doppler study of the legs as well as a negative chest CT for any pulmonary emboli in the hospital.  Since then no worsening of her chest pain, greatest concern is  To get this to go away as well as improvement with her edema.    Chest Pain     Leg Swelling  Associated symptoms include chest pain.     Objective   Vital Signs:  /67   Pulse 89   Temp 97.1 °F (36.2 °C) (Temporal)   Ht 160 cm (63\")   Wt 102 kg (224 lb 14.4 oz)   SpO2 100%   BMI 39.84 kg/m²   Estimated body mass index is 39.84 kg/m² as calculated from the following:    Height as of this encounter: 160 cm (63\").    Weight as of this encounter: 102 kg (224 lb 14.4 oz).          Physical Exam  Vitals reviewed.   Constitutional:       General: She is not in acute distress.     Appearance: She is well-developed. She is not ill-appearing, toxic-appearing or diaphoretic.      Comments: Very pleasant appears well   HENT:      Head: Normocephalic.      Nose: Nose normal.   Eyes:      " General: No scleral icterus.     Conjunctiva/sclera: Conjunctivae normal.      Pupils: Pupils are equal, round, and reactive to light.   Neck:      Thyroid: No thyromegaly.      Vascular: No JVD.   Cardiovascular:      Rate and Rhythm: Normal rate and regular rhythm.      Heart sounds: Murmur heard.     No friction rub. No gallop.   Pulmonary:      Effort: Pulmonary effort is normal. No respiratory distress.      Breath sounds: Normal breath sounds. No stridor. No wheezing or rales.   Abdominal:      General: Bowel sounds are normal. There is no distension.      Palpations: Abdomen is soft.      Tenderness: There is no abdominal tenderness.      Comments: No hepatosplenomegaly, no ascites, no epigastric tenderness soft nondistended   Musculoskeletal:         General: Swelling present. No tenderness.      Cervical back: Neck supple.      Comments: 1+ edema bilateral skin slightly bigger right than left, no calf tenderness no redness or sign of cellulitis.  No edema proximal to the knee    Mild to moderate tenderness over the mid sternum wall more so inferiorly no palpable deformity   Lymphadenopathy:      Cervical: No cervical adenopathy.   Skin:     General: Skin is warm and dry.      Findings: No erythema or rash.   Neurological:      General: No focal deficit present.      Mental Status: She is alert and oriented to person, place, and time. Mental status is at baseline.      Deep Tendon Reflexes: Reflexes are normal and symmetric.   Psychiatric:         Behavior: Behavior normal.         Thought Content: Thought content normal.         Judgment: Judgment normal.      Result Review :                Assessment and Plan   Diagnoses and all orders for this visit:    1. Chest pain, unspecified type (Primary)  -     Ambulatory Referral to Cardiology    2. Dependent edema    3. Costochondritis    Other orders  -     methylPREDNISolone (MEDROL) 4 MG dose pack; Take as directed on package instructions.  Dispense: 21  tablet; Refill: 0             Follow Up   No follow-ups on file.  Patient was given instructions and counseling regarding her condition or for health maintenance advice. Please see specific information pulled into the AVS if appropriate.     There are no Patient Instructions on file for this visit.

## 2023-07-21 ENCOUNTER — OFFICE VISIT (OUTPATIENT)
Dept: FAMILY MEDICINE CLINIC | Facility: CLINIC | Age: 73
End: 2023-07-21
Payer: MEDICARE

## 2023-07-21 VITALS
HEART RATE: 111 BPM | DIASTOLIC BLOOD PRESSURE: 74 MMHG | SYSTOLIC BLOOD PRESSURE: 130 MMHG | HEIGHT: 63 IN | RESPIRATION RATE: 18 BRPM | TEMPERATURE: 96.2 F | OXYGEN SATURATION: 97 % | BODY MASS INDEX: 38.84 KG/M2 | WEIGHT: 219.2 LBS

## 2023-07-21 DIAGNOSIS — R51.9 NEW ONSET OF HEADACHES AFTER AGE 50: Primary | ICD-10-CM

## 2023-07-21 DIAGNOSIS — H92.02 OTALGIA OF LEFT EAR: ICD-10-CM

## 2023-07-21 DIAGNOSIS — L71.9 ROSACEA: ICD-10-CM

## 2023-07-21 PROCEDURE — 3075F SYST BP GE 130 - 139MM HG: CPT | Performed by: NURSE PRACTITIONER

## 2023-07-21 PROCEDURE — 3078F DIAST BP <80 MM HG: CPT | Performed by: NURSE PRACTITIONER

## 2023-07-21 PROCEDURE — 99213 OFFICE O/P EST LOW 20 MIN: CPT | Performed by: NURSE PRACTITIONER

## 2023-07-21 PROCEDURE — 3051F HG A1C>EQUAL 7.0%<8.0%: CPT | Performed by: NURSE PRACTITIONER

## 2023-07-21 RX ORDER — DOXYCYCLINE 100 MG/1
100 CAPSULE ORAL 2 TIMES DAILY
Qty: 20 CAPSULE | Refills: 0 | Status: SHIPPED | OUTPATIENT
Start: 2023-07-21

## 2023-07-21 NOTE — PROGRESS NOTES
Chief Complaint  Earache (rt) and Headache (X 2 weeks)    Subjective        Chastity Salgado presents to [facility]  History of Present Illness    Chastity Salgado is a 73-year-old female who presents today with headache and ear pain.    Ms. Chastity Salgado reports her right ear is popping, painful, and itchy. She adds her headaches are worse when it rains or if it is cloudy outside and notes her headaches are located through and under her eyes bilaterally.     She maintains she is unsure if she is allergic to mold and states her headaches occur before it rains, or if it is cloudy. The patient has had headaches for about a year and reports they are more frequent now. She adds she takes Zyrtec when she gets a bad headache, but nothing helps and denies any vision changes.     The patient describes her pain as a strong, achy pain and rates her pain a (9) out of (10). She had a CT scan of her head in 2017 and she maintains she is claustrophobic, so she is not sure if she can have an MRI. The patient reports she is on hydrocodone for her shoulder, but it does not help.    Her heart rate is elevated today at 111 beats per minute. Her blood pressure is normal.    The patient reports she went to the hospital on 06/05/2023 for chest pain and confirms she had an echocardiogram and a stress test in the office. They found she has stiff heart muscles, and she noted she was advised if she had any more chest pain to call the office.    She states she is having surgery on her knee again and adds the joint equipment is loose. The patient called their office today to see if they needed an appointment to come in for a preop authorization and her surgery is scheduled for 10/09/2023.     The patient adds she feels fluid in her ears and has pain and adds it is more severe when she lays down to sleep.    Objective   Vital Signs:  /74 (BP Location: Left arm, Patient Position: Sitting, Cuff Size: Adult)    "Pulse 111   Temp 96.2 °F (35.7 °C) (Temporal)   Resp 18   Ht 160 cm (62.99\")   Wt 99.4 kg (219 lb 3.2 oz)   SpO2 97%   BMI 38.84 kg/m²   Estimated body mass index is 38.84 kg/m² as calculated from the following:    Height as of this encounter: 160 cm (62.99\").    Weight as of this encounter: 99.4 kg (219 lb 3.2 oz).          Physical Exam  Constitutional:       Appearance: Normal appearance.   HENT:      Right Ear: Tympanic membrane and ear canal normal.      Left Ear: Tympanic membrane and ear canal normal.      Nose: Congestion present.   Eyes:      Conjunctiva/sclera: Conjunctivae normal.      Pupils: Pupils are equal, round, and reactive to light.   Cardiovascular:      Rate and Rhythm: Normal rate and regular rhythm.      Heart sounds: No murmur heard.    No friction rub. No gallop.   Pulmonary:      Effort: Pulmonary effort is normal. No respiratory distress.      Breath sounds: Normal breath sounds. No wheezing, rhonchi or rales.   Lymphadenopathy:      Cervical: No cervical adenopathy.   Skin:     General: Skin is warm and dry.   Neurological:      Mental Status: She is alert and oriented to person, place, and time.   Psychiatric:         Mood and Affect: Mood normal.      Result Review :                  Assessment and Plan   Diagnoses and all orders for this visit:    1. New onset of headaches after age 50 (Primary)    2. Rosacea    3. Otalgia of left ear    Other orders  -     doxycycline (MONODOX) 100 MG capsule; Take 1 capsule by mouth 2 (Two) Times a Day. For Roscea  Dispense: 20 capsule; Refill: 0      General health maintenance  - The patient presents today for an acute visit for an earache and headaches.    1. Headache  - The patient will complete a CT scan with and without contrast and an order was placed for the patient to schedule the scan at her convenience. She states she has a history of claustrophobia and will not complete an MRI, after discussion, patient suspects headache is result of " sinuses and would like to hold on CT scan at this time unless symptoms do not resolve.    2. Rosacea  - The patient will start doxycycline for 10 days. This will also treat sinuses if acute bacterial sinusitis present, verbalized understanding.    The patient will follow-up in 09/2023 for surgical clearance.       Follow Up   No follow-ups on file.  Patient was given instructions and counseling regarding her condition or for health maintenance advice. Please see specific information pulled into the AVS if appropriate.       Transcribed from ambient dictation for NEWTON Jose by Jose Alcantara.  07/21/23   16:34 EDT    Patient or patient representative verbalized consent to the visit recording.  I have personally performed the services described in this document as transcribed by the above individual, and it is both accurate and complete.

## 2023-07-24 ENCOUNTER — OFFICE VISIT (OUTPATIENT)
Dept: PAIN MEDICINE | Facility: CLINIC | Age: 73
End: 2023-07-24
Payer: MEDICARE

## 2023-07-24 VITALS
WEIGHT: 221 LBS | DIASTOLIC BLOOD PRESSURE: 90 MMHG | TEMPERATURE: 98 F | HEART RATE: 104 BPM | BODY MASS INDEX: 39.16 KG/M2 | OXYGEN SATURATION: 95 % | HEIGHT: 63 IN | SYSTOLIC BLOOD PRESSURE: 140 MMHG

## 2023-07-24 DIAGNOSIS — M12.9 ARTHRITIS, MULTIPLE JOINT INVOLVEMENT: ICD-10-CM

## 2023-07-24 DIAGNOSIS — G89.4 CHRONIC PAIN SYNDROME: Primary | ICD-10-CM

## 2023-07-24 DIAGNOSIS — M54.2 NECK PAIN: ICD-10-CM

## 2023-07-24 DIAGNOSIS — M19.011 OSTEOARTHRITIS OF BOTH SHOULDERS, UNSPECIFIED OSTEOARTHRITIS TYPE: ICD-10-CM

## 2023-07-24 DIAGNOSIS — Z79.891 ENCOUNTER FOR MONITORING OPIOID MAINTENANCE THERAPY: ICD-10-CM

## 2023-07-24 DIAGNOSIS — Z51.81 ENCOUNTER FOR MONITORING OPIOID MAINTENANCE THERAPY: ICD-10-CM

## 2023-07-24 DIAGNOSIS — M19.012 OSTEOARTHRITIS OF BOTH SHOULDERS, UNSPECIFIED OSTEOARTHRITIS TYPE: ICD-10-CM

## 2023-07-24 PROCEDURE — 1160F RVW MEDS BY RX/DR IN RCRD: CPT

## 2023-07-24 PROCEDURE — 1125F AMNT PAIN NOTED PAIN PRSNT: CPT

## 2023-07-24 PROCEDURE — 99214 OFFICE O/P EST MOD 30 MIN: CPT

## 2023-07-24 PROCEDURE — 3080F DIAST BP >= 90 MM HG: CPT

## 2023-07-24 PROCEDURE — 1159F MED LIST DOCD IN RCRD: CPT

## 2023-07-24 PROCEDURE — 3077F SYST BP >= 140 MM HG: CPT

## 2023-07-24 RX ORDER — KETOROLAC TROMETHAMINE 30 MG/ML
30 INJECTION, SOLUTION INTRAMUSCULAR; INTRAVENOUS ONCE
Status: COMPLETED | OUTPATIENT
Start: 2023-07-24 | End: 2023-07-24

## 2023-07-24 RX ORDER — HYDROCODONE BITARTRATE AND ACETAMINOPHEN 10; 325 MG/1; MG/1
1 TABLET ORAL
Qty: 150 TABLET | Refills: 0 | Status: SHIPPED | OUTPATIENT
Start: 2023-07-24

## 2023-07-24 RX ADMIN — KETOROLAC TROMETHAMINE 30 MG: 30 INJECTION, SOLUTION INTRAMUSCULAR; INTRAVENOUS at 13:02

## 2023-07-24 NOTE — PROGRESS NOTES
"CHIEF COMPLAINT  Neck and shoulder pain    Subjective   Chastity Salgado is a 73 y.o. female  who presents for follow-up.  She has a history of chronic neck and joint pain. She reports that her pain has worsened since her last office visit.      Today pain is 8/10VAS in severity. Pain is located in her bilateral shoulders, knees, ankles, and neck (right and left side). Describes this pain as an intermittent ache. Pain is worsened by certain movements, prolonged positions, climbing stairs, bending/twisting, and weather. Pain improves with rest, reposition, heat/ice, and medications. She reports increased neck pain since her last office visit and states when she moves her neck she feels a \"crunching\".      Continues with Hydrocodone 10mg 5/day, Cymbalta 60mg, Flexeril 10mg at HS. She reports occasional constipation that she manages with Metamucil and Linzess/Movantik. Denies any side effects from the regimen including somnolence. The regimen helps decrease pain by 75%. Notes improvement in activity and function with regimen. ADL's by self. Denies any bowel or bladder changes.      She is scheduled for a right total knee revision with Dr. Gimenez with [facility] in October 9th. She continues to report bilateral shoulder pain. She spoke with Dr. Vásquez about doing a revision to her right shoulder next year. She continues to receive cortisone injections to her left shoulder very 3 months. Next on scheduled in September.      Celebrex - causes GI issues - takes every other day (history of gastroparesis)  Gabapentin - caused sleep walking    Procedures:  2/25/21 - left suprascapular nerve block/steroid injection (posterior approach) - 50% relief x 1 month  9/22/20 - left suprascapular nerve block/steroid injection (posterior approach) - 70% relief x 1-2 months    Joint Pain  This is a chronic (bilateral knees, ankles, and shoulders) problem. The current episode started more than 1 year ago. The problem occurs " intermittently. The problem has been waxing and waning (Rates pain 8/10VAS in severity). Associated symptoms include arthralgias, fatigue, headaches, joint swelling (left hand), neck pain, numbness (fingers) and weakness. Pertinent negatives include no abdominal pain, chest pain, chills, congestion, coughing or fever. The symptoms are aggravated by walking, bending, exertion, twisting and standing (cold weather, stairs). She has tried oral narcotics, position changes, heat, ice and rest for the symptoms.   Neck Pain   This is a chronic problem. The current episode started more than 1 year ago. The problem occurs intermittently. The problem has been gradually worsening. The pain is associated with a sleep position. The pain is present in the left side and right side. The quality of the pain is described as aching. The pain is at a severity of 7/10. The symptoms are aggravated by twisting, position and bending. Stiffness is present In the morning. Associated symptoms include headaches, numbness (fingers) and weakness. Pertinent negatives include no chest pain or fever. She has tried ice, heat and oral narcotics for the symptoms.      PEG Assessment   What number best describes your pain on average in the past week?7  What number best describes how, during the past week, pain has interfered with your enjoyment of life?9  What number best describes how, during the past week, pain has interfered with your general activity?  7    The following portions of the patient's history were reviewed and updated as appropriate: allergies, current medications, past family history, past medical history, past social history, past surgical history, and problem list.    Review of Systems   Constitutional:  Positive for fatigue. Negative for activity change (increased), chills and fever.   HENT:  Negative for congestion.    Eyes:  Negative for visual disturbance.   Respiratory:  Negative for cough, chest tightness and shortness of  "breath.    Cardiovascular:  Negative for chest pain.   Gastrointestinal:  Positive for constipation and diarrhea. Negative for abdominal pain.   Genitourinary:  Negative for difficulty urinating, dyspareunia and dysuria.   Musculoskeletal:  Positive for arthralgias, joint swelling (left hand) and neck pain.   Neurological:  Positive for weakness, light-headedness, numbness (fingers) and headaches. Negative for dizziness.   Psychiatric/Behavioral:  Positive for sleep disturbance. Negative for agitation. The patient is not nervous/anxious.      I have reviewed and confirmed the accuracy of the ROS as documented by the MA/LPN/RN NEWTON Abdul    Vitals:    07/24/23 1238   BP: 140/90   Pulse: 104   Temp: 98 °F (36.7 °C)   SpO2: 95%   Weight: 100 kg (221 lb)   Height: 160 cm (63\")   PainSc:   8   PainLoc: Shoulder     Objective   Physical Exam  Constitutional:       Appearance: Normal appearance.   HENT:      Head: Normocephalic.   Cardiovascular:      Rate and Rhythm: Normal rate and regular rhythm.   Pulmonary:      Effort: Pulmonary effort is normal.      Breath sounds: Normal breath sounds.   Musculoskeletal:      Right shoulder: Tenderness present. Decreased range of motion.      Left shoulder: Tenderness and crepitus present. Decreased range of motion.      Cervical back: Tenderness present. Pain with movement present. Decreased range of motion.      Lumbar back: Tenderness present. Normal range of motion. Negative right straight leg raise test and negative left straight leg raise test.   Skin:     General: Skin is warm and dry.      Capillary Refill: Capillary refill takes less than 2 seconds.   Neurological:      General: No focal deficit present.      Mental Status: She is alert and oriented to person, place, and time.      Gait: Gait abnormal (slowed).   Psychiatric:         Mood and Affect: Mood normal.         Speech: Speech normal.         Behavior: Behavior normal.         Thought Content: Thought " content normal.         Cognition and Memory: Cognition normal.     Assessment & Plan   Diagnoses and all orders for this visit:    1. Chronic pain syndrome (Primary)  -     ketorolac (TORADOL) injection 30 mg    2. Neck pain  -     HYDROcodone-acetaminophen (NORCO)  MG per tablet; Take 1 tablet by mouth 5 (Five) Times a Day As Needed for Severe Pain. 30 day supply  Dispense: 150 tablet; Refill: 0    3. Arthritis, multiple joint involvement    4. Osteoarthritis of both shoulders, unspecified osteoarthritis type  -     HYDROcodone-acetaminophen (NORCO)  MG per tablet; Take 1 tablet by mouth 5 (Five) Times a Day As Needed for Severe Pain. 30 day supply  Dispense: 150 tablet; Refill: 0    5. Encounter for monitoring opioid maintenance therapy    --- The urine drug screen confirmation from 5/23/23 has been reviewed and the result is appropriate based on patient history and GAYE report  The patient signed an updated copy of the controlled substance agreement on 5/23/23  --- Update cervical imaging. Patient states she is unable to have a MRI due to anxiety.  --- Continue Hydrocodone. DNF 7/27/23. Patient appears stable with current regimen. No adverse effects. Regarding continuation of opioids, there is no evidence of aberrant behavior or any red flags.  The patient continues with appropriate response to opioid therapy. ADL's remain intact by self.   --- Follow up 2 months or sooner if needed    Chastity Salgado reports a pain score of 8.  Given her pain assessment as noted, treatment options were discussed and the following options were decided upon as a follow-up plan to address the patient's pain: continuation of current treatment plan for pain and prescription for opiod analgesics.     GAYE REPORT  As part of the patient's treatment plan, I am prescribing controlled substances. The patient has been made aware of appropriate use of such medications, including potential risk of somnolence, limited  ability to drive and/or work safely, and the potential for dependence or overdose. It has also been made clear that these medications are for use by this patient only, without concomitant use of alcohol or other substances unless prescribed.     Patient has completed prescribing agreement detailing terms of continued prescribing of controlled substances, including monitoring GAYE reports, urine drug screening, and pill counts if necessary. The patient is aware that inappropriate use will results in cessation of prescribing such medications.    As the clinician, I personally reviewed the GAYE from 7/24/23 while the patient was in the office today.    History and physical exam exhibit continued safe and appropriate use of controlled substances.    Dictated utilizing Dragon dictation.

## 2023-08-17 ENCOUNTER — TELEPHONE (OUTPATIENT)
Dept: PAIN MEDICINE | Facility: CLINIC | Age: 73
End: 2023-08-17

## 2023-08-17 ENCOUNTER — TELEPHONE (OUTPATIENT)
Dept: FAMILY MEDICINE CLINIC | Facility: CLINIC | Age: 73
End: 2023-08-17

## 2023-08-17 DIAGNOSIS — M19.012 OSTEOARTHRITIS OF BOTH SHOULDERS, UNSPECIFIED OSTEOARTHRITIS TYPE: ICD-10-CM

## 2023-08-17 DIAGNOSIS — M54.2 NECK PAIN: ICD-10-CM

## 2023-08-17 DIAGNOSIS — M19.011 OSTEOARTHRITIS OF BOTH SHOULDERS, UNSPECIFIED OSTEOARTHRITIS TYPE: ICD-10-CM

## 2023-08-17 DIAGNOSIS — R51.9 NEW ONSET OF HEADACHES AFTER AGE 50: Primary | ICD-10-CM

## 2023-08-17 RX ORDER — HYDROCODONE BITARTRATE AND ACETAMINOPHEN 10; 325 MG/1; MG/1
1 TABLET ORAL
Qty: 150 TABLET | Refills: 0 | Status: SHIPPED | OUTPATIENT
Start: 2023-08-17

## 2023-08-17 NOTE — TELEPHONE ENCOUNTER
Caller: Chastity Salgado    Relationship: Self    Best call back number: [phone]    What orders are you requesting (i.e. lab or imaging): CT SCAN NECK     In what timeframe would the patient need to come in: ASAP    Where will you receive your lab/imaging services:      Additional notes: PATIENT HAS BEEN WAITING FOR A CALL TO SCHEDULE THIS PROCEDURE. HUB UNABLE TO CONFIRM ORDER IN EPIC

## 2023-08-17 NOTE — TELEPHONE ENCOUNTER
Caller: Chastity Salgado    Relationship: Self    Best call back number: [phone]     What was the call regarding: PATIENT WAS CALLING TO CHECK ON THE STATUS OF HER CT SCAN OF THE SINUSES, SHE HAS NEVER BEEN CONTACTED TO SCHEDULE. PLEASE ADVISE.

## 2023-08-17 NOTE — TELEPHONE ENCOUNTER
Caller: Chastity Salgado    Relationship: Self    Best call back number: [facility]    Requested Prescriptions:   Requested Prescriptions     Pending Prescriptions Disp Refills    HYDROcodone-acetaminophen (NORCO)  MG per tablet 150 tablet 0     Sig: Take 1 tablet by mouth 5 (Five) Times a Day As Needed for Severe Pain. 30 day supply        Pharmacy where request should be sent:  [facility]    Last office visit with prescribing clinician: 7/24/2023   Last telemedicine visit with prescribing clinician: Visit date not found   Next office visit with prescribing clinician: 9/25/2023     Does the patient have less than a 3 day supply:  [] Yes  [x] No    Would you like a call back once the refill request has been completed: [x] Yes [] No    If the office needs to give you a call back, can they leave a voicemail: [x] Yes [] No    Shawn Story Rep   08/17/23 10:46 EDT

## 2023-08-17 NOTE — TELEPHONE ENCOUNTER
Spoke to patient and her sinus hasn't improved, headaches daily. She understood the CT was ordered.

## 2023-08-17 NOTE — TELEPHONE ENCOUNTER
I have not ordered any imaging. It looks like her PCP ordered one of her head but that order was placed today. She will need to contact her office to check on this.

## 2023-08-21 DIAGNOSIS — M62.838 MUSCLE SPASM: ICD-10-CM

## 2023-08-21 RX ORDER — CYCLOBENZAPRINE HCL 10 MG
TABLET ORAL
Qty: 90 TABLET | Refills: 1 | Status: SHIPPED | OUTPATIENT
Start: 2023-08-21

## 2023-09-12 ENCOUNTER — TELEPHONE (OUTPATIENT)
Dept: CARDIOLOGY | Facility: CLINIC | Age: 73
End: 2023-09-12
Payer: MEDICARE

## 2023-09-12 NOTE — TELEPHONE ENCOUNTER
To whom it may concern:    Chastity Salgado has been evaluated in our practice for chest pain.  She had a normal stress nuclear perfusion study and echocardiogram 6/2023.  She is a low cardiovascular risk to undergo surgery and may proceed without further testing.  Please feel free to contact us if you have any further questions.    Sincerely-    Soraya Ricks MD

## 2023-09-12 NOTE — TELEPHONE ENCOUNTER
Patient was consulted in the hospital on 06/05/23.  She has had some cardiac testing and wanted to know if we would be able to clear her for surgery.  Will the patient need to come in to be seen in the office?    Surgery: knee replacement  MD: Dr Gimenez  Date: ?    Please advise    Singh

## 2023-09-12 NOTE — TELEPHONE ENCOUNTER
Caller: Chastity Salgado    Relationship: Self    Best call back number: [phone]     What form or medical record are you requesting: CARDIAC CLEARANCE    Who is requesting this form or medical record from you:  DR HOLLIDAY @[facility]    How would you like to receive the form or medical records (pick-up, mail, fax): FAX  If fax, what is the fax number: UNKNOWN  ATTN: YAYA     Timeframe paperwork needed: ASAP,  PT SCHEDULED FOR 10.09.2023    Additional notes: PT STATES SHE WAS SEEN IN JUNE IN THE HOSPITAL AND RECENTLY RECEIVED HER TESTING AND IS NEEDING SURGICAL CLEARANCE FOR A KNEE REPLACEMENT SURGERY SHE IS HAVING WITH DR HOLLIDAY - PT ISNT SURE OF FAX BUT PHONE -[phone]

## 2023-09-22 ENCOUNTER — OFFICE VISIT (OUTPATIENT)
Dept: FAMILY MEDICINE CLINIC | Facility: CLINIC | Age: 73
End: 2023-09-22
Payer: MEDICARE

## 2023-09-22 VITALS
RESPIRATION RATE: 18 BRPM | DIASTOLIC BLOOD PRESSURE: 80 MMHG | TEMPERATURE: 97.3 F | SYSTOLIC BLOOD PRESSURE: 136 MMHG | HEIGHT: 63 IN | HEART RATE: 89 BPM | WEIGHT: 221.2 LBS | BODY MASS INDEX: 39.19 KG/M2 | OXYGEN SATURATION: 97 %

## 2023-09-22 DIAGNOSIS — Z01.818 PREOPERATIVE CLEARANCE: Primary | ICD-10-CM

## 2023-09-22 PROCEDURE — 3079F DIAST BP 80-89 MM HG: CPT | Performed by: NURSE PRACTITIONER

## 2023-09-22 PROCEDURE — 3075F SYST BP GE 130 - 139MM HG: CPT | Performed by: NURSE PRACTITIONER

## 2023-09-22 PROCEDURE — 3051F HG A1C>EQUAL 7.0%<8.0%: CPT | Performed by: NURSE PRACTITIONER

## 2023-09-22 PROCEDURE — 99213 OFFICE O/P EST LOW 20 MIN: CPT | Performed by: NURSE PRACTITIONER

## 2023-09-22 NOTE — PROGRESS NOTES
"Chief Complaint  Pre-op Exam    Subjective        Chastity Salagdo presents to [facility]  History of Present Illness    Chastity Salgado is a 73-year-old female who presents today for preoperative clearance.    The patient will be having a total right knee arthroplasty revision, which will be performed by Dr. Gimenez. They are doing a revision because the original equipment is loose and is causing her severe pain and problems. She notes Dr. Vásquez is her usual orthopedic.    She received a cortisone injection in her left shoulder on Monday, 09/18/2023.Every time she receives a steroid injection, she feels \"weird\" for approximately 1 week. She will experience red rashes and soreness from the injection. She is not comfortable with receiving her influenza vaccination today because of the way she is feeling.     Her blood pressure is slightly elevated today at 136/80 mmHg. She currently takes lisinopril 40 mg daily.    She has already seen cardiology for surgical clearance. She completed her pre-operative laboratory studies on 09/07/2023 at [facility]. Her hemoglobin A1c was 7.4 percent. She voices concern because her calcium was slightly elevated at 10.3 mg/dL. She denies taking a calcium supplement.      Objective   Vital Signs:  /80 (BP Location: Left arm, Patient Position: Sitting, Cuff Size: Adult)   Pulse 89   Temp 97.3 °F (36.3 °C) (Temporal)   Resp 18   Ht 160 cm (62.99\")   Wt 100 kg (221 lb 3.2 oz)   SpO2 97%   BMI 39.19 kg/m²   Estimated body mass index is 39.19 kg/m² as calculated from the following:    Height as of this encounter: 160 cm (62.99\").    Weight as of this encounter: 100 kg (221 lb 3.2 oz).            Physical Exam  Constitutional:       General: She is not in acute distress.     Appearance: She is well-developed. She is not diaphoretic.   Cardiovascular:      Rate and Rhythm: Normal rate and regular rhythm.      Heart sounds: Normal heart " sounds. No murmur heard.    No friction rub. No gallop.   Pulmonary:      Effort: Pulmonary effort is normal. No respiratory distress.      Breath sounds: Normal breath sounds. No wheezing or rales.   Abdominal:      General: Bowel sounds are normal. There is no distension.      Palpations: Abdomen is soft.      Tenderness: There is no abdominal tenderness.   Musculoskeletal:      Cervical back: Neck supple.   Skin:     General: Skin is warm and dry.   Neurological:      Mental Status: She is alert and oriented to person, place, and time.      Result Review :    Lab work completed on 09/07/2023 was reviewed. Hemoglobin A1c was 7.4 percent. BMP showed calcium was slightly elevated to 10.3 mg/dL. CBC was all within normal ranges.     Lab work completed on 06/05/2023 was reviewed. BMP showed calcium normal at 8.9 mg/dL.                   Assessment and Plan   There are no diagnoses linked to this encounter.    1. Preoperative clearance   The patient presents today for preoperative clearance to undergo total right knee arthroplasty revision. She is doing well. Her  blood pressure is slightly elevated today at 136/80 mmHg which has been attributed to her recent steroid injection.      Follow Up   No follow-ups on file.  Patient was given instructions and counseling regarding her condition or for health maintenance advice. Please see specific information pulled into the AVS if appropriate.       Transcribed from ambient dictation for NEWTON Jose by Carmencita Skinner.  09/22/23   16:16 EDT    Patient or patient representative verbalized consent to the visit recording.  I have personally performed the services described in this document as transcribed by the above individual, and it is both accurate and complete.

## 2023-09-24 DIAGNOSIS — R11.2 NAUSEA AND VOMITING, UNSPECIFIED VOMITING TYPE: ICD-10-CM

## 2023-09-25 ENCOUNTER — OFFICE VISIT (OUTPATIENT)
Dept: PAIN MEDICINE | Facility: CLINIC | Age: 73
End: 2023-09-25

## 2023-09-25 ENCOUNTER — TELEPHONE (OUTPATIENT)
Dept: PAIN MEDICINE | Facility: CLINIC | Age: 73
End: 2023-09-25

## 2023-09-25 VITALS
RESPIRATION RATE: 18 BRPM | OXYGEN SATURATION: 100 % | BODY MASS INDEX: 39.48 KG/M2 | WEIGHT: 222.8 LBS | HEART RATE: 97 BPM | DIASTOLIC BLOOD PRESSURE: 74 MMHG | TEMPERATURE: 97.7 F | HEIGHT: 63 IN | SYSTOLIC BLOOD PRESSURE: 130 MMHG

## 2023-09-25 DIAGNOSIS — M62.838 MUSCLE SPASM: Primary | ICD-10-CM

## 2023-09-25 DIAGNOSIS — Z79.891 ENCOUNTER FOR MONITORING OPIOID MAINTENANCE THERAPY: ICD-10-CM

## 2023-09-25 DIAGNOSIS — M19.011 OSTEOARTHRITIS OF BOTH SHOULDERS, UNSPECIFIED OSTEOARTHRITIS TYPE: ICD-10-CM

## 2023-09-25 DIAGNOSIS — G89.29 CHRONIC RIGHT SHOULDER PAIN: ICD-10-CM

## 2023-09-25 DIAGNOSIS — M25.512 CHRONIC LEFT SHOULDER PAIN: ICD-10-CM

## 2023-09-25 DIAGNOSIS — M19.012 OSTEOARTHRITIS OF BOTH SHOULDERS, UNSPECIFIED OSTEOARTHRITIS TYPE: ICD-10-CM

## 2023-09-25 DIAGNOSIS — M54.2 NECK PAIN: ICD-10-CM

## 2023-09-25 DIAGNOSIS — M12.9 ARTHRITIS, MULTIPLE JOINT INVOLVEMENT: ICD-10-CM

## 2023-09-25 DIAGNOSIS — M25.511 CHRONIC RIGHT SHOULDER PAIN: ICD-10-CM

## 2023-09-25 DIAGNOSIS — G89.4 CHRONIC PAIN SYNDROME: ICD-10-CM

## 2023-09-25 DIAGNOSIS — Z51.81 ENCOUNTER FOR MONITORING OPIOID MAINTENANCE THERAPY: ICD-10-CM

## 2023-09-25 DIAGNOSIS — G89.29 CHRONIC LEFT SHOULDER PAIN: ICD-10-CM

## 2023-09-25 PROCEDURE — 1159F MED LIST DOCD IN RCRD: CPT

## 2023-09-25 PROCEDURE — 99214 OFFICE O/P EST MOD 30 MIN: CPT

## 2023-09-25 PROCEDURE — 3078F DIAST BP <80 MM HG: CPT

## 2023-09-25 PROCEDURE — 1160F RVW MEDS BY RX/DR IN RCRD: CPT

## 2023-09-25 PROCEDURE — 3075F SYST BP GE 130 - 139MM HG: CPT

## 2023-09-25 PROCEDURE — 1125F AMNT PAIN NOTED PAIN PRSNT: CPT

## 2023-09-25 RX ORDER — ONDANSETRON 4 MG/1
TABLET, FILM COATED ORAL
Qty: 60 TABLET | Refills: 3 | Status: SHIPPED | OUTPATIENT
Start: 2023-09-25

## 2023-09-25 RX ORDER — HYDROCODONE BITARTRATE AND ACETAMINOPHEN 10; 325 MG/1; MG/1
1 TABLET ORAL
Qty: 150 TABLET | Refills: 0 | Status: SHIPPED | OUTPATIENT
Start: 2023-09-25

## 2023-09-25 NOTE — TELEPHONE ENCOUNTER
Caller: Chastity Salgado    Relationship: Self    Best call back number:     Requested Prescriptions: HYDROcodone-acetaminophen (NORCO)  MG per tablet   Requested Prescriptions      No prescriptions requested or ordered in this encounter        Pharmacy where request should be sent: [facility] [id number] [id number] [address] AT [facility] [address] - [phone]  - [phone] FX     Last office visit with prescribing clinician: 9/25/2023   Last telemedicine visit with prescribing clinician: Visit date not found   Next office visit with prescribing clinician: Visit date not found     Additional details provided by patient: [facility] DOES NOT HAVE HYDROcodone-acetaminophen (NORCO)  MG per tablet - THEY HAVE HYDROcodone-acetaminophen  5MG - THE PATIENT WOULD LIKE TO KNOW IF MARYANA HARVEY COULD CHANGE HER PRESCRIPTION TO THE 5MG, 2 TABLETS 5X PER DAY     Does the patient have less than a 3 day supply:  [x] Yes  [] No    Would you like a call back once the refill request has been completed: [x] Yes [] No    If the office needs to give you a call back, can they leave a voicemail: [x] Yes [] No    Shawn Hogan Rep   09/25/23 16:33 EDT

## 2023-09-25 NOTE — PROGRESS NOTES
"CHIEF COMPLAINT  Neck and joint pain.    Subjective   Chastity Salgado is a 73 y.o. female  who presents for follow-up.  She has a history of chronic neck and joint pain. She reports that her pain has worsened since her last office visit.      Today pain is 8/10VAS in severity. Pain is located in her bilateral shoulders, knees, ankles, and neck (right and left side). Describes this pain as an intermittent ache. Pain is worsened by certain movements, prolonged positions, climbing stairs, bending/twisting, and weather. Pain improves with rest, reposition, heat/ice, and medications. She reports increased neck pain since her last office visit and states when she moves her neck she feels a \"crunching\".      Continues with Hydrocodone 10mg 5/day, Cymbalta 60mg, Flexeril 10mg at HS. She reports occasional constipation that she manages with Metamucil and Linzess/Movantik. Denies any side effects from the regimen including somnolence. The regimen helps decrease pain by 75%. Notes improvement in activity and function with regimen. ADL's by self. Denies any bowel or bladder changes.      She is scheduled for a right total knee revision with Dr. Gimenez with [facility] in October 9th. She continues to report bilateral shoulder pain. She spoke with Dr. Vásquez about doing a revision to her right shoulder next year. She continues to receive cortisone injections to her left shoulder very 3 months.     Celebrex - causes GI issues - takes every other day (history of gastroparesis)  Gabapentin - caused sleep walking     Procedures:  2/25/21 - left suprascapular nerve block/steroid injection (posterior approach) - 50% relief x 1 month  9/22/20 - left suprascapular nerve block/steroid injection (posterior approach) - 70% relief x 1-2 months    Joint Pain  This is a chronic (bilateral knees, ankles, and shoulders) problem. The current episode started more than 1 year ago. The problem occurs intermittently. The problem has been " unchanged (Rates pain 8/10VAS in severity. Unchanged since last office visit). Associated symptoms include arthralgias (shoulder and knee pain), fatigue, headaches, joint swelling (left hand), neck pain and numbness. Pertinent negatives include no abdominal pain, chest pain, chills, congestion, coughing, fever or weakness. The symptoms are aggravated by walking, bending, exertion, twisting and standing (cold weather, stairs). She has tried oral narcotics, position changes, heat, ice and rest for the symptoms.   Neck Pain   This is a chronic problem. The current episode started more than 1 year ago. The problem occurs intermittently. The problem has been unchanged (unchanged since last office visit). The pain is associated with a sleep position. The pain is present in the left side and right side. The quality of the pain is described as aching. The pain is at a severity of 7/10. The symptoms are aggravated by twisting, position and bending. Stiffness is present In the morning. Associated symptoms include headaches and numbness. Pertinent negatives include no chest pain, fever or weakness. She has tried ice, heat and oral narcotics for the symptoms.      PEG Assessment   What number best describes your pain on average in the past week?7  What number best describes how, during the past week, pain has interfered with your enjoyment of life?7  What number best describes how, during the past week, pain has interfered with your general activity?  8    The following portions of the patient's history were reviewed and updated as appropriate: allergies, current medications, past family history, past medical history, past social history, past surgical history, and problem list.    Review of Systems   Constitutional:  Positive for fatigue. Negative for chills and fever.   HENT:  Negative for congestion.    Respiratory:  Negative for cough.    Cardiovascular:  Negative for chest pain.   Gastrointestinal:  Positive for  "constipation. Negative for abdominal pain and diarrhea.   Genitourinary:  Negative for difficulty urinating.   Musculoskeletal:  Positive for arthralgias (shoulder and knee pain), joint swelling (left hand) and neck pain.   Neurological:  Positive for numbness and headaches. Negative for weakness.   Psychiatric/Behavioral:  Positive for sleep disturbance. Negative for suicidal ideas. The patient is nervous/anxious.      I have reviewed and confirmed the accuracy of the ROS as documented by the MA/LPN/RN NEWTON Abdul    Vitals:    09/25/23 1547   BP: 130/74   Pulse: 97   Resp: 18   Temp: 97.7 °F (36.5 °C)   SpO2: 100%   Weight: 101 kg (222 lb 12.8 oz)   Height: 160 cm (62.99\")   PainSc:   8   PainLoc: Knee  Comment: right shoulder 8/10     Objective   Physical Exam  Constitutional:       Appearance: Normal appearance.   HENT:      Head: Normocephalic.   Cardiovascular:      Rate and Rhythm: Normal rate and regular rhythm.   Pulmonary:      Effort: Pulmonary effort is normal.      Breath sounds: Normal breath sounds.   Musculoskeletal:      Right shoulder: Tenderness present. Decreased range of motion.      Left shoulder: Tenderness and crepitus present. Decreased range of motion.      Cervical back: Tenderness present. Pain with movement present. Decreased range of motion.      Lumbar back: Tenderness present. Normal range of motion. Negative right straight leg raise test and negative left straight leg raise test.      Right knee: Swelling present. Decreased range of motion. Tenderness present.   Skin:     General: Skin is warm and dry.      Capillary Refill: Capillary refill takes less than 2 seconds.   Neurological:      General: No focal deficit present.      Mental Status: She is alert and oriented to person, place, and time.      Gait: Gait abnormal (slowed).   Psychiatric:         Mood and Affect: Mood normal.         Speech: Speech normal.         Behavior: Behavior normal.         Thought Content: " Thought content normal.         Cognition and Memory: Cognition normal.     Assessment & Plan   Diagnoses and all orders for this visit:    1. Muscle spasm (Primary)    2. Neck pain  -     HYDROcodone-acetaminophen (NORCO)  MG per tablet; Take 1 tablet by mouth 5 (Five) Times a Day As Needed for Severe Pain. 30 day supply  Dispense: 150 tablet; Refill: 0    3. Osteoarthritis of both shoulders, unspecified osteoarthritis type  -     HYDROcodone-acetaminophen (NORCO)  MG per tablet; Take 1 tablet by mouth 5 (Five) Times a Day As Needed for Severe Pain. 30 day supply  Dispense: 150 tablet; Refill: 0    4. Chronic pain syndrome    5. Arthritis, multiple joint involvement    6. Chronic left shoulder pain    7. Chronic right shoulder pain    8. Encounter for monitoring opioid maintenance therapy    --- The urine drug screen confirmation from 5/23/23 has been reviewed and the result is appropriate based on patient history and GAYE report  --- The patient signed an updated copy of the controlled substance agreement on 5/23/23  --- Plan to update cervical imaging once patient has recovered from right knee surgery  --- Continue Cymbalta and Flexeril. No refills needed at this time.   --- Continue Hydrocodone. Patient appears stable with current regimen. No adverse effects. Regarding continuation of opioids, there is no evidence of aberrant behavior or any red flags.  The patient continues with appropriate response to opioid therapy. ADL's remain intact by self.   --- Follow-up 2 months     Chastity MARSHALL Salgado reports a pain score of 8.  Given her pain assessment as noted, treatment options were discussed and the following options were decided upon as a follow-up plan to address the patient's pain: continuation of current treatment plan for pain and prescription for opiod analgesics.     GAYE REPORT  As part of the patient's treatment plan, I am prescribing controlled substances. The patient has been made aware of  appropriate use of such medications, including potential risk of somnolence, limited ability to drive and/or work safely, and the potential for dependence or overdose. It has also been made clear that these medications are for use by this patient only, without concomitant use of alcohol or other substances unless prescribed.     Patient has completed prescribing agreement detailing terms of continued prescribing of controlled substances, including monitoring GAYE reports, urine drug screening, and pill counts if necessary. The patient is aware that inappropriate use will results in cessation of prescribing such medications.    As the clinician, I personally reviewed the GAYE from 9/25/23 while the patient was in the office today.    History and physical exam exhibit continued safe and appropriate use of controlled substances.    Dictated utilizing Dragon dictation.

## 2023-09-26 RX ORDER — OXYCODONE AND ACETAMINOPHEN 7.5; 325 MG/1; MG/1
1 TABLET ORAL EVERY 6 HOURS PRN
Qty: 120 TABLET | Refills: 0 | Status: SHIPPED | OUTPATIENT
Start: 2023-09-26

## 2023-09-26 NOTE — TELEPHONE ENCOUNTER
Opioid rotation due to pharmacy availability. Reviewed AMIRA and GAYE. Both updated and appropriate. Refill appropriate.  Please fill.

## 2023-09-26 NOTE — TELEPHONE ENCOUNTER
I spoke with Ms. Salgado and she would like to switch to the oxy/apap 7.5-325 mg. I already called her pharmacy and canceled the hydro/apap Rx.

## 2023-09-26 NOTE — TELEPHONE ENCOUNTER
Ms. Salgado called today and wanted to let you know her pharmacy doesn't have her pain medication in stock. They do have hydro/apap 5-325 mg in stock. She would like to know if you would send over a Rx for that instead? She currently takes hydro/apap  mg take 1 tab 5 times a day.

## 2023-09-26 NOTE — TELEPHONE ENCOUNTER
The pharmacy will not fill that many pills because it ends up being 300 tablets. I have had this question before. Is she able to contact other pharmacies to see if they have 10mg in stock? Or she can be switched to Oxycodone 7.5mg 4 tablets per day.

## 2023-10-02 NOTE — TELEPHONE ENCOUNTER
Rx Refill Note  Requested Prescriptions     Pending Prescriptions Disp Refills    KLOR-CON 20 MEQ CR tablet [Pharmacy Med Name: KLOR-CON M20 TABLET] 90 tablet 1     Sig: TAKE 1 TABLET BY MOUTH TWICE A DAY      Last office visit with prescribing clinician: 9/22/2023   Last telemedicine visit with prescribing clinician: Visit date not found   Next office visit with prescribing clinician: Visit date not found                         Would you like a call back once the refill request has been completed: [] Yes [] No    If the office needs to give you a call back, can they leave a voicemail: [] Yes [] No    Tiki Isidro MA  10/02/23, 07:27 EDT

## 2023-10-03 RX ORDER — POTASSIUM CHLORIDE 1500 MG/1
TABLET, EXTENDED RELEASE ORAL
Qty: 90 TABLET | Refills: 1 | Status: SHIPPED | OUTPATIENT
Start: 2023-10-03

## 2023-10-11 ENCOUNTER — READMISSION MANAGEMENT (OUTPATIENT)
Dept: CALL CENTER | Facility: HOSPITAL | Age: 73
End: 2023-10-11
Payer: MEDICARE

## 2023-10-11 NOTE — OUTREACH NOTE
Prep Survey      Flowsheet Row Responses   [facility] facility patient discharged from? Non-BH   Is LACE score < 7 ? Non-BH Discharge   Eligibility [facility]   Date of Admission 10/09/23   Date of Discharge 10/10/23   Discharge Disposition Home or Self Care   Discharge diagnosis RIGHT TOTAL KNEE ARTHROPLASTY REVISION   Does the patient have one of the following disease processes/diagnoses(primary or secondary)? Total Joint Replacement   Prep survey completed? Yes            Loretta BREMEO - Registered Nurse

## 2023-10-12 ENCOUNTER — TRANSITIONAL CARE MANAGEMENT TELEPHONE ENCOUNTER (OUTPATIENT)
Dept: CALL CENTER | Facility: HOSPITAL | Age: 73
End: 2023-10-12
Payer: MEDICARE

## 2023-10-12 NOTE — OUTREACH NOTE
Call Center TCM Note      Flowsheet Row Responses   [facility] patient discharged from? Non-BH   Does the patient have one of the following disease processes/diagnoses(primary or secondary)? Other   TCM attempt successful? No   Unsuccessful attempts Attempt 1            Emma Owen MA    10/12/2023, 11:43 EDT

## 2023-10-12 NOTE — OUTREACH NOTE
Call Center TCM Note      Flowsheet Row Responses   [facility] patient discharged from? Non-BH   Does the patient have one of the following disease processes/diagnoses(primary or secondary)? Other   TCM attempt successful? No   Unsuccessful attempts Attempt 2            Emma Owen MA    10/12/2023, 15:48 EDT

## 2023-10-13 ENCOUNTER — TRANSITIONAL CARE MANAGEMENT TELEPHONE ENCOUNTER (OUTPATIENT)
Dept: CALL CENTER | Facility: HOSPITAL | Age: 73
End: 2023-10-13
Payer: MEDICARE

## 2023-10-13 NOTE — OUTREACH NOTE
Call Center TCM Note      Flowsheet Row Responses   [facility] patient discharged from? Non-  [[facility]]   Does the patient have one of the following disease processes/diagnoses(primary or secondary)? Other   TCM attempt successful? No   Unsuccessful attempts Attempt 3            Giselle Zavaleta RN    10/13/2023, 15:42 EDT

## 2023-10-25 RX ORDER — NALOXEGOL OXALATE 25 MG/1
25 TABLET, FILM COATED ORAL EVERY MORNING
Qty: 30 TABLET | Refills: 3 | Status: SHIPPED | OUTPATIENT
Start: 2023-10-25

## 2023-11-07 ENCOUNTER — TELEPHONE (OUTPATIENT)
Dept: PAIN MEDICINE | Facility: CLINIC | Age: 73
End: 2023-11-07

## 2023-11-07 ENCOUNTER — OFFICE VISIT (OUTPATIENT)
Dept: PAIN MEDICINE | Facility: CLINIC | Age: 73
End: 2023-11-07
Payer: MEDICARE

## 2023-11-07 VITALS
HEIGHT: 63 IN | DIASTOLIC BLOOD PRESSURE: 54 MMHG | BODY MASS INDEX: 37.17 KG/M2 | WEIGHT: 209.8 LBS | HEART RATE: 105 BPM | RESPIRATION RATE: 18 BRPM | SYSTOLIC BLOOD PRESSURE: 92 MMHG | OXYGEN SATURATION: 95 %

## 2023-11-07 DIAGNOSIS — M25.511 CHRONIC RIGHT SHOULDER PAIN: ICD-10-CM

## 2023-11-07 DIAGNOSIS — G89.29 CHRONIC LEFT SHOULDER PAIN: ICD-10-CM

## 2023-11-07 DIAGNOSIS — M19.011 OSTEOARTHRITIS OF BOTH SHOULDERS, UNSPECIFIED OSTEOARTHRITIS TYPE: ICD-10-CM

## 2023-11-07 DIAGNOSIS — M12.9 ARTHRITIS, MULTIPLE JOINT INVOLVEMENT: ICD-10-CM

## 2023-11-07 DIAGNOSIS — M62.838 MUSCLE SPASM: Primary | ICD-10-CM

## 2023-11-07 DIAGNOSIS — M54.2 NECK PAIN: ICD-10-CM

## 2023-11-07 DIAGNOSIS — M19.012 OSTEOARTHRITIS OF BOTH SHOULDERS, UNSPECIFIED OSTEOARTHRITIS TYPE: ICD-10-CM

## 2023-11-07 DIAGNOSIS — G89.29 CHRONIC RIGHT SHOULDER PAIN: ICD-10-CM

## 2023-11-07 DIAGNOSIS — M25.512 CHRONIC LEFT SHOULDER PAIN: ICD-10-CM

## 2023-11-07 DIAGNOSIS — G89.4 CHRONIC PAIN SYNDROME: ICD-10-CM

## 2023-11-07 DIAGNOSIS — Z79.891 ENCOUNTER FOR MONITORING OPIOID MAINTENANCE THERAPY: ICD-10-CM

## 2023-11-07 DIAGNOSIS — Z51.81 ENCOUNTER FOR MONITORING OPIOID MAINTENANCE THERAPY: ICD-10-CM

## 2023-11-07 LAB
POC AMPHETAMINES: NEGATIVE
POC BARBITURATES: NEGATIVE
POC BENZODIAZEPHINES: NEGATIVE
POC COCAINE: NEGATIVE
POC METHADONE: NEGATIVE
POC METHAMPHETAMINE SCREEN URINE: NEGATIVE
POC OPIATES: NEGATIVE
POC OXYCODONE: POSITIVE
POC PHENCYCLIDINE: NEGATIVE
POC PROPOXYPHENE: NEGATIVE
POC THC: NEGATIVE
POC TRICYCLIC ANTIDEPRESSANTS: NEGATIVE

## 2023-11-07 RX ORDER — HYDROCODONE BITARTRATE AND ACETAMINOPHEN 10; 325 MG/1; MG/1
1 TABLET ORAL
Qty: 150 TABLET | Refills: 0 | Status: SHIPPED | OUTPATIENT
Start: 2023-11-07

## 2023-11-07 NOTE — PROGRESS NOTES
"CHIEF COMPLAINT  Neck and joint pain    Subjective   Chastity Salgado is a 73 y.o. female  who presents for follow-up.  She has a history of chronic neck and joint pain. She reports that her pain has slightly worsened due to having to lie flat at night following her knee surgery.    Today pain is 8/10VAS in severity. Pain is located in her bilateral shoulders, knees, ankles, and neck (right and left side). Describes this pain as an intermittent ache. Pain is worsened by certain movements, prolonged positions, climbing stairs, bending/twisting, and weather. Pain improves with rest, reposition, heat/ice, and medications. She reports increased neck pain since her last office visit and states when she moves her neck she feels a \"crunching\".     Patient was prescribed Oxycodone 7.5mg in September due to Hydrocodone shortage.  She underwent a left knee revision on 10/6/2023 and was provided 3 weeks and 5 days of post op medication at that time. Patient currently states she is out of medication. She states that Oxycodone 5mg was not effective for her pain so she may have take more medication than prescribed at that time. Continues with Cymbalta 60mg, Flexeril 10mg at HS. She reports occasional constipation that she manages with Metamucil and Linzess/Movantik. Denies any side effects from the regimen including somnolence. The regimen helps decrease pain by 75%. Notes improvement in activity and function with regimen. ADL's by self. Denies any bowel or bladder changes.     Celebrex - causes GI issues - takes every other day (history of gastroparesis)  Gabapentin - caused sleep walking    She had a right total knee revision on 10/6/23 performed by Dr. Gimenez. She is currently going to PT 1-2 times per week. She spoke with Dr. Vásquez about doing a revision to her right shoulder next year. She continues to receive cortisone injections to her left shoulder very 3 months.     Procedures:  2/25/21 - left suprascapular nerve " block/steroid injection (posterior approach) - 50% relief x 1 month  9/22/20 - left suprascapular nerve block/steroid injection (posterior approach) - 70% relief x 1-2 months    Neck Pain   This is a chronic problem. The current episode started more than 1 year ago. The problem occurs intermittently. The problem has been waxing and waning. The pain is associated with a sleep position. The pain is present in the left side and right side. The quality of the pain is described as aching. The pain is at a severity of 7/10. The symptoms are aggravated by twisting, position and bending. Stiffness is present In the morning. Pertinent negatives include no chest pain, fever, headaches, numbness or weakness. She has tried ice, heat and oral narcotics for the symptoms.   Joint Pain  This is a chronic (bilateral knees, ankles, and shoulders) problem. The current episode started more than 1 year ago. The problem occurs intermittently. The problem has been unchanged (Rates pain 8/10VAS in severity. Unchanged since last office visit). Associated symptoms include arthralgias, joint swelling (left hand), nausea and neck pain. Pertinent negatives include no abdominal pain, chest pain, chills, congestion, coughing, fatigue, fever, headaches, numbness or weakness. The symptoms are aggravated by walking, bending, exertion, twisting and standing (cold weather, stairs). She has tried oral narcotics, position changes, heat, ice and rest for the symptoms.      PEG Assessment   What number best describes your pain on average in the past week?8  What number best describes how, during the past week, pain has interfered with your enjoyment of life?7  What number best describes how, during the past week, pain has interfered with your general activity?  7    The following portions of the patient's history were reviewed and updated as appropriate: allergies, current medications, past family history, past medical history, past social history, past  "surgical history, and problem list.    Review of Systems   Constitutional:  Negative for activity change, chills, fatigue and fever.   HENT:  Negative for congestion.    Respiratory:  Negative for cough.    Cardiovascular:  Negative for chest pain.   Gastrointestinal:  Positive for nausea. Negative for abdominal pain, constipation and diarrhea.   Genitourinary:  Negative for difficulty urinating and dysuria.   Musculoskeletal:  Positive for arthralgias, joint swelling (left hand) and neck pain.   Neurological:  Negative for dizziness, weakness, light-headedness, numbness and headaches.   Psychiatric/Behavioral:  Negative for agitation, sleep disturbance and suicidal ideas. The patient is not nervous/anxious.      I have reviewed and confirmed the accuracy of the ROS as documented by the MA/LPN/RN NEWTON Abdul    Vitals:    11/07/23 1336   BP: 92/54   BP Location: Left arm   Patient Position: Sitting   Cuff Size: Large Adult   Pulse: 105   Resp: 18   SpO2: 95%   Weight: 95.2 kg (209 lb 12.8 oz)   Height: 160 cm (62.99\")   PainSc:   8     Objective   Physical Exam  Constitutional:       Appearance: Normal appearance.   HENT:      Head: Normocephalic.   Cardiovascular:      Rate and Rhythm: Normal rate and regular rhythm.   Pulmonary:      Effort: Pulmonary effort is normal.      Breath sounds: Normal breath sounds.   Musculoskeletal:      Right shoulder: Tenderness present. Decreased range of motion.      Left shoulder: Tenderness and crepitus present. Decreased range of motion.      Cervical back: Tenderness present. Pain with movement present. Decreased range of motion.      Lumbar back: Tenderness present. Normal range of motion. Negative right straight leg raise test and negative left straight leg raise test.      Right knee: Swelling present. Decreased range of motion. Tenderness present.   Skin:     General: Skin is warm and dry.      Capillary Refill: Capillary refill takes less than 2 seconds. "   Neurological:      General: No focal deficit present.      Mental Status: She is alert and oriented to person, place, and time.      Gait: Gait abnormal (slowed).   Psychiatric:         Mood and Affect: Mood normal.         Speech: Speech normal.         Behavior: Behavior normal.         Thought Content: Thought content normal.         Cognition and Memory: Cognition normal.       Assessment & Plan   Diagnoses and all orders for this visit:    1. Muscle spasm (Primary)  -     POC Urine Drug Screen, Triage  -     Urine Drug Screen Confirmation - Urine, Clean Catch; Future    2. Neck pain  -     POC Urine Drug Screen, Triage  -     Urine Drug Screen Confirmation - Urine, Clean Catch; Future  -     HYDROcodone-acetaminophen (NORCO)  MG per tablet; Take 1 tablet by mouth 5 (Five) Times a Day As Needed for Severe Pain. 30 day supply  Dispense: 150 tablet; Refill: 0    3. Osteoarthritis of both shoulders, unspecified osteoarthritis type  -     POC Urine Drug Screen, Triage  -     Urine Drug Screen Confirmation - Urine, Clean Catch; Future  -     HYDROcodone-acetaminophen (NORCO)  MG per tablet; Take 1 tablet by mouth 5 (Five) Times a Day As Needed for Severe Pain. 30 day supply  Dispense: 150 tablet; Refill: 0    4. Chronic pain syndrome  -     POC Urine Drug Screen, Triage  -     Urine Drug Screen Confirmation - Urine, Clean Catch; Future    5. Arthritis, multiple joint involvement  -     POC Urine Drug Screen, Triage  -     Urine Drug Screen Confirmation - Urine, Clean Catch; Future    6. Chronic left shoulder pain  -     POC Urine Drug Screen, Triage  -     Urine Drug Screen Confirmation - Urine, Clean Catch; Future    7. Chronic right shoulder pain  -     POC Urine Drug Screen, Triage  -     Urine Drug Screen Confirmation - Urine, Clean Catch; Future    8. Encounter for monitoring opioid maintenance therapy  -     POC Urine Drug Screen, Triage  -     Urine Drug Screen Confirmation - Urine, Clean Catch;  Future  -     Ambulatory Referral to Psychology    --- Routine UDS in office today as part of monitoring requirements for controlled substances.  The specimen was viewed and the immunoassay result reviewed and is +OXY.  This specimen will be sent to [facility] for confirmation.     --- The patient signed an updated copy of the controlled substance agreement on 5/23/23  ---  According to her Gaye dates patient should not be out of medication.  I am putting in an order for referral to Dr. Hogan for an opioid risk assessment due to self escalation of her pain medication.  She must keep this appointment if she is to continue with opioid medication with our clinic. I will need to see her back 1 month from her next refill. No additional refills of her opioid will be given without office visits. I will send over a refill for Hydrocodone 10mg 5/day with a DNF date of 11/19/23. Discussed that continuing to overtake her medication may be grounds for dismal from our clinic.   --- Referral to Dr. Hogan for formalized opioid risk assessment  --- Follow-up 1 month    Chastity Salgado reports a pain score of 8.  Given her pain assessment as noted, treatment options were discussed and the following options were decided upon as a follow-up plan to address the patient's pain: prescription for opiod analgesics and referral to specialist for assistance in pain treatment guidance.       GAYE VIERA  As part of the patient's treatment plan, I am prescribing controlled substances. The patient has been made aware of appropriate use of such medications, including potential risk of somnolence, limited ability to drive and/or work safely, and the potential for dependence or overdose. It has also been made clear that these medications are for use by this patient only, without concomitant use of alcohol or other substances unless prescribed.     Patient has completed prescribing agreement detailing terms of continued  prescribing of controlled substances, including monitoring GAYE reports, urine drug screening, and pill counts if necessary. The patient is aware that inappropriate use will results in cessation of prescribing such medications.    As the clinician, I personally reviewed the GAYE from 11/7/23 while the patient was in the office today.    History and physical exam exhibit continued safe and appropriate use of controlled substances.    Dictated utilizing Dragon dictation.     I spent 49 minutes caring for Chastity on this date of service. This time includes time spent by me in the following activities: preparing for the visit, reviewing tests, obtaining and/or reviewing a separately obtained history, performing a medically appropriate examination and/or evaluation, counseling and educating the patient/family/caregiver, ordering medications, tests, or procedures, referring and communicating with other health care professionals, documenting information in the medical record, independently interpreting results and communicating that information with the patient/family/caregiver, and care coordination

## 2023-11-07 NOTE — TELEPHONE ENCOUNTER
Please let patient know her medication will not be refilled until 11/19/2023.  According to her Sukhdeep dates she should not be out of medication.  I am putting in an order for referral to Dr. Hogan for an opioid risk assessment due to self escalation of her pain medication.  She must keep this appointment if she is to continue with opioid medication with our clinic.  I will need to see her back 1 month from her next refill.  No additional refills of her opioid will be given without office visits.

## 2023-11-08 NOTE — TELEPHONE ENCOUNTER
Patient informed. She has been scheduled for a follow up on 12/15/23. I informed her that once we receive authorization through her insurance for her to see Dr. Hogan someone will call her to get that scheduled.

## 2023-11-14 DIAGNOSIS — M62.838 MUSCLE SPASM: ICD-10-CM

## 2023-11-14 RX ORDER — CYCLOBENZAPRINE HCL 10 MG
TABLET ORAL
Qty: 90 TABLET | Refills: 1 | Status: SHIPPED | OUTPATIENT
Start: 2023-11-14

## 2023-11-30 ENCOUNTER — OFFICE VISIT (OUTPATIENT)
Dept: BEHAVIORAL HEALTH | Facility: CLINIC | Age: 73
End: 2023-11-30
Payer: MEDICARE

## 2023-11-30 DIAGNOSIS — F33.0 MILD EPISODE OF RECURRENT MAJOR DEPRESSIVE DISORDER: Primary | ICD-10-CM
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
## 2023-12-15 ENCOUNTER — OFFICE VISIT (OUTPATIENT)
Dept: PAIN MEDICINE | Facility: CLINIC | Age: 73
End: 2023-12-15
Payer: MEDICARE

## 2023-12-15 VITALS
SYSTOLIC BLOOD PRESSURE: 102 MMHG | HEART RATE: 104 BPM | DIASTOLIC BLOOD PRESSURE: 70 MMHG | BODY MASS INDEX: 36.82 KG/M2 | HEIGHT: 63 IN | OXYGEN SATURATION: 95 % | TEMPERATURE: 96.8 F | RESPIRATION RATE: 20 BRPM | WEIGHT: 207.8 LBS

## 2023-12-15 DIAGNOSIS — Z51.81 ENCOUNTER FOR MONITORING OPIOID MAINTENANCE THERAPY: ICD-10-CM

## 2023-12-15 DIAGNOSIS — M62.838 MUSCLE SPASM: Primary | ICD-10-CM

## 2023-12-15 DIAGNOSIS — Z79.891 ENCOUNTER FOR MONITORING OPIOID MAINTENANCE THERAPY: ICD-10-CM

## 2023-12-15 DIAGNOSIS — M54.2 NECK PAIN: ICD-10-CM

## 2023-12-15 DIAGNOSIS — M12.9 ARTHRITIS, MULTIPLE JOINT INVOLVEMENT: ICD-10-CM

## 2023-12-15 DIAGNOSIS — G89.4 CHRONIC PAIN SYNDROME: ICD-10-CM

## 2023-12-15 DIAGNOSIS — M19.012 OSTEOARTHRITIS OF BOTH SHOULDERS, UNSPECIFIED OSTEOARTHRITIS TYPE: ICD-10-CM

## 2023-12-15 DIAGNOSIS — M19.011 OSTEOARTHRITIS OF BOTH SHOULDERS, UNSPECIFIED OSTEOARTHRITIS TYPE: ICD-10-CM

## 2023-12-15 RX ORDER — HYDROCODONE BITARTRATE AND ACETAMINOPHEN 10; 325 MG/1; MG/1
1 TABLET ORAL
Qty: 150 TABLET | Refills: 0 | Status: SHIPPED | OUTPATIENT
Start: 2023-12-15

## 2023-12-15 NOTE — PROGRESS NOTES
"CHIEF COMPLAINT  Neck and joint pain     Subjective   Chastity Salgado is a 73 y.o. female  who presents to the office for follow-up. She has a history of chronic neck and joint pain. She reports that her pain has remained consistent since her last office visit and varies based on activity level.     Today pain is 8/10VAS in severity. Pain is located in her bilateral shoulders, knees, ankles, and neck (right and left side). Describes this pain as an intermittent ache. Pain is worsened by certain movements, prolonged positions, climbing stairs, bending/twisting, and weather. Pain improves with rest, reposition, heat/ice, and medications. She reports increased neck pain since her last office visit and states when she moves her neck she feels a \"crunching\".     She continues with Hydrocodone 10mg 5/day and Flexeril 10mg at HS. She reports occasional constipation that she manages with Metamucil and Linzess/Movantik. Denies any side effects from the regimen including somnolence. The regimen helps decrease pain by 75%. Notes improvement in activity and function with regimen. ADL's by self. Denies any bowel or bladder changes. She plans to speak with her PCP about restarting Cymbalta.       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 Gimenez. She has completed PT since her last office visit and reports that this was helpful to her pain.  She continues to receive cortisone injections to her left shoulder very 3 months and is scheduled to receive another injection at the end of this month. Plans for a revision to her right shoulder next year.     Patient was evaluated by Dr. Hogan on 11/30/23, patient noted to be high risk for misuse/abuse of opioids.  He recommended a cautious conservative approach with medication dosing and compliance to be monitored more frequently.  He also " recommended patient trial an alternative antidepressant to see if this is more beneficial nudging her symptoms.    Procedures:  2/25/21 - left suprascapular nerve block/steroid injection (posterior approach) - 50% relief x 1 month  9/22/20 - left suprascapular nerve block/steroid injection (posterior approach) - 70% relief x 1-2 months    Neck Pain   This is a chronic problem. The current episode started more than 1 year ago. The problem occurs intermittently. The problem has been waxing and waning. The pain is associated with a sleep position. The pain is present in the left side and right side. The quality of the pain is described as aching. The pain is at a severity of 7/10. The symptoms are aggravated by twisting, position and bending. Stiffness is present In the morning. Associated symptoms include weakness (right arm,right leg). Pertinent negatives include no chest pain, fever, headaches or numbness. She has tried ice, heat and oral narcotics for the symptoms.   Joint Pain  This is a chronic (bilateral knees, ankles, and shoulders) problem. The current episode started more than 1 year ago. The problem occurs intermittently. The problem has been unchanged (Rates pain 8/10VAS in severity. Unchanged since last office visit). Associated symptoms include arthralgias, joint swelling (left hand), nausea, neck pain and weakness (right arm,right leg). Pertinent negatives include no abdominal pain, chest pain, chills, congestion, coughing, fatigue, fever, headaches or numbness. The symptoms are aggravated by walking, bending, exertion, twisting and standing (cold weather, stairs). She has tried oral narcotics, position changes, heat, ice and rest for the symptoms.      PEG Assessment   What number best describes your pain on average in the past week?8  What number best describes how, during the past week, pain has interfered with your enjoyment of life?8  What number best describes how, during the past week, pain has  "interfered with your general activity?  7    The following portions of the patient's history were reviewed and updated as appropriate: allergies, current medications, past family history, past medical history, past social history, past surgical history, and problem list.    Review of Systems   Constitutional:  Negative for activity change, chills, fatigue and fever.   HENT:  Negative for congestion.    Respiratory:  Negative for cough and chest tightness.    Cardiovascular:  Negative for chest pain.   Gastrointestinal:  Positive for nausea. Negative for abdominal pain and constipation.   Genitourinary:  Negative for difficulty urinating and dysuria.   Musculoskeletal:  Positive for arthralgias, joint swelling (left hand) and neck pain.   Neurological:  Positive for weakness (right arm,right leg). Negative for dizziness, light-headedness, numbness and headaches.   Psychiatric/Behavioral:  Positive for agitation and sleep disturbance. Negative for suicidal ideas. The patient is nervous/anxious.      I have reviewed and confirmed the accuracy of the ROS as documented by the MA/LPN/RN NEWTON Abdul     Vitals:    12/15/23 1050   BP: 102/70   BP Location: Left arm   Patient Position: Sitting   Cuff Size: Large Adult   Pulse: 104   Resp: 20   Temp: 96.8 °F (36 °C)   TempSrc: Temporal   SpO2: 95%   Weight: 94.3 kg (207 lb 12.8 oz)   Height: 160 cm (62.99\")   PainSc:   8     Objective   Physical Exam  Constitutional:       Appearance: Normal appearance.   HENT:      Head: Normocephalic.   Cardiovascular:      Rate and Rhythm: Normal rate and regular rhythm.   Pulmonary:      Effort: Pulmonary effort is normal.      Breath sounds: Normal breath sounds.   Musculoskeletal:      Right shoulder: Tenderness present. Decreased range of motion.      Left shoulder: Tenderness and crepitus present. Decreased range of motion.      Cervical back: Tenderness present. Pain with movement present. Decreased range of motion.      " Lumbar back: Tenderness present. Normal range of motion. Negative right straight leg raise test and negative left straight leg raise test.      Right knee: Swelling present. Decreased range of motion. Tenderness present.   Skin:     General: Skin is warm and dry.      Capillary Refill: Capillary refill takes less than 2 seconds.   Neurological:      General: No focal deficit present.      Mental Status: She is alert and oriented to person, place, and time.      Gait: Gait abnormal (slowed).   Psychiatric:         Mood and Affect: Mood normal.         Speech: Speech normal.         Behavior: Behavior normal.         Thought Content: Thought content normal.         Cognition and Memory: Cognition normal.       Assessment & Plan   Diagnoses and all orders for this visit:    1. Muscle spasm (Primary)    2. Neck pain  -     HYDROcodone-acetaminophen (NORCO)  MG per tablet; Take 1 tablet by mouth 5 (Five) Times a Day As Needed for Severe Pain. 30 day supply  Dispense: 150 tablet; Refill: 0    3. Osteoarthritis of both shoulders, unspecified osteoarthritis type  -     HYDROcodone-acetaminophen (NORCO)  MG per tablet; Take 1 tablet by mouth 5 (Five) Times a Day As Needed for Severe Pain. 30 day supply  Dispense: 150 tablet; Refill: 0    4. Chronic pain syndrome    5. Arthritis, multiple joint involvement    6. Encounter for monitoring opioid maintenance therapy      Chastity OLIVARES Salgado reports a pain score of 8.  Given her pain assessment as noted, treatment options were discussed and the following options were decided upon as a follow-up plan to address the patient's pain: continuation of current treatment plan for pain and prescription for opiod analgesics.    --- The urine drug screen confirmation from 11/7/23 has been reviewed and the result is appropriate based on patient history and GAYE report  --- The patient signed an updated copy of the controlled substance agreement on 12/15/23  --- Patient was seen by  Dr. Hogan on 11/30/23 due to self escalation of medication. She deemed high risk for opioid misuse/abuse during this visit. Due to this, patient will be seen monthly.   --- Continue Hydrocodone. DNF 12/20/23. Patient appears stable with current regimen. No adverse effects. Regarding continuation of opioids, there is no evidence of aberrant behavior or any red flags.  The patient continues with appropriate response to opioid therapy. ADL's remain intact by self.   --- Follow-up 1 month      GAYE REPORT  As part of the patient's treatment plan, I am prescribing controlled substances. The patient has been made aware of appropriate use of such medications, including potential risk of somnolence, limited ability to drive and/or work safely, and the potential for dependence or overdose. It has also been made clear that these medications are for use by this patient only, without concomitant use of alcohol or other substances unless prescribed.     Patient has completed prescribing agreement detailing terms of continued prescribing of controlled substances, including monitoring GAYE reports, urine drug screening, and pill counts if necessary. The patient is aware that inappropriate use will results in cessation of prescribing such medications.    As the clinician, I personally reviewed the GAYE from 12/15/23 while the patient was in the office today.    History and physical exam exhibit continued safe and appropriate use of controlled substances.    Dictated utilizing Dragon dictation.

## 2023-12-21 RX ORDER — FUROSEMIDE 20 MG/1
20 TABLET ORAL DAILY
Qty: 90 TABLET | Refills: 1 | Status: SHIPPED | OUTPATIENT
Start: 2023-12-21

## 2024-01-03 ENCOUNTER — OFFICE VISIT (OUTPATIENT)
Dept: FAMILY MEDICINE CLINIC | Facility: CLINIC | Age: 74
End: 2024-01-03
Payer: MEDICARE

## 2024-01-03 VITALS
HEART RATE: 87 BPM | BODY MASS INDEX: 37.81 KG/M2 | HEIGHT: 63 IN | WEIGHT: 213.4 LBS | DIASTOLIC BLOOD PRESSURE: 70 MMHG | SYSTOLIC BLOOD PRESSURE: 128 MMHG | RESPIRATION RATE: 18 BRPM | OXYGEN SATURATION: 98 % | TEMPERATURE: 97.5 F

## 2024-01-03 DIAGNOSIS — I10 BENIGN ESSENTIAL HTN: ICD-10-CM

## 2024-01-03 DIAGNOSIS — Z12.31 ENCOUNTER FOR SCREENING MAMMOGRAM FOR MALIGNANT NEOPLASM OF BREAST: ICD-10-CM

## 2024-01-03 DIAGNOSIS — M25.50 POLYARTHRALGIA: ICD-10-CM

## 2024-01-03 DIAGNOSIS — F33.1 MAJOR DEPRESSIVE DISORDER, RECURRENT, MODERATE: Primary | ICD-10-CM

## 2024-01-03 DIAGNOSIS — Z78.0 POSTMENOPAUSAL: ICD-10-CM

## 2024-01-03 DIAGNOSIS — D50.9 IRON DEFICIENCY ANEMIA, UNSPECIFIED IRON DEFICIENCY ANEMIA TYPE: ICD-10-CM

## 2024-01-03 DIAGNOSIS — E11.65 TYPE 2 DIABETES MELLITUS WITH HYPERGLYCEMIA, WITHOUT LONG-TERM CURRENT USE OF INSULIN: ICD-10-CM

## 2024-01-03 DIAGNOSIS — E78.2 MIXED HYPERLIPIDEMIA: ICD-10-CM

## 2024-01-03 RX ORDER — DULOXETIN HYDROCHLORIDE 60 MG/1
60 CAPSULE, DELAYED RELEASE ORAL DAILY
Qty: 90 CAPSULE | Refills: 0 | Status: SHIPPED | OUTPATIENT
Start: 2024-01-03

## 2024-01-03 RX ORDER — DULOXETIN HYDROCHLORIDE 30 MG/1
30 CAPSULE, DELAYED RELEASE ORAL DAILY
Qty: 7 CAPSULE | Refills: 0 | Status: SHIPPED | OUTPATIENT
Start: 2024-01-03

## 2024-01-03 NOTE — PROGRESS NOTES
"Chief Complaint  Depression (Discuss medication/Last time took Cymbalta, X 1 yrs)    Subjective        Chastity Salgado presents to [facility]  History of Present Illness  Chastity Salgado is a 73-year-old female who presents today for depression.    She is seen by pain management. Every couple of years they have her seen by the psychologist. A test was completed for depression and anxiety, and it revealed major depression. She has not been taking Cymbalta. Her living situation was so bad the Cymbalta was not helping. Now that she is out of the living situation she would like to see if Cymbalta works better. The patient has been getting angry and \"telling people off\", but she is typically not the type to do that. When she was taking Prozac years ago, she became irritable whenever she did not take it.    In 10/2023 she had knee surgery and lost a lot of weight. Her weight went down to 209 pounds. Due to the holidays she began to gain weight. CBC was 10 when she had knee surgery.    Her arthritis is worsening. She is concerned for rheumatoid arthritis and would like to do a workup to check. In the Spring she went and received injections and was told it was bone on bone in the joint. It is giving her a lot of problems. They poured something to try and help with the pain from the injection, but it did not help at all. She held her tears in until she got in the car. The day she went to receive the injection she was already inflamed.    Due for Dexa bone scan and mammogram. She would like to complete them at the same time. [facility]  Objective   Vital Signs:  /70 (BP Location: Left arm, Patient Position: Sitting, Cuff Size: Adult)   Pulse 87   Temp 97.5 °F (36.4 °C) (Temporal)   Resp 18   Ht 160 cm (62.99\")   Wt 96.8 kg (213 lb 6.4 oz)   SpO2 98%   BMI 37.81 kg/m²   Estimated body mass index is 37.81 kg/m² as calculated from the following:    " "Height as of this encounter: 160 cm (62.99\").    Weight as of this encounter: 96.8 kg (213 lb 6.4 oz).          Physical Exam  Constitutional:       General: She is not in acute distress.     Appearance: She is well-developed. She is not diaphoretic.   Cardiovascular:      Rate and Rhythm: Normal rate and regular rhythm.      Heart sounds: Normal heart sounds. No murmur heard.     No friction rub. No gallop.   Pulmonary:      Effort: Pulmonary effort is normal. No respiratory distress.      Breath sounds: Normal breath sounds. No wheezing or rales.   Abdominal:      General: Bowel sounds are normal. There is no distension.      Palpations: Abdomen is soft.      Tenderness: There is no abdominal tenderness.   Musculoskeletal:      Cervical back: Neck supple.   Skin:     General: Skin is warm and dry.   Neurological:      Mental Status: She is alert and oriented to person, place, and time.        Result Review :                  Assessment and Plan   Diagnoses and all orders for this visit:    1. Major depressive disorder, recurrent, moderate (Primary)    2. Type 2 diabetes mellitus with hyperglycemia, without long-term current use of insulin  -     Hemoglobin A1c  -     Comprehensive metabolic panel    3. Polyarthralgia  -     ROSA Direct Reflex to 11 Biomarker    4. Iron deficiency anemia, unspecified iron deficiency anemia type  -     CBC & Differential    5. Benign essential HTN  -     Comprehensive metabolic panel  -     TSH Rfx On Abnormal To Free T4  -     CBC & Differential    6. Mixed hyperlipidemia  -     Comprehensive metabolic panel  -     Lipid Panel With LDL / HDL Ratio  -     CBC & Differential    7. Postmenopausal  -     DEXA Bone Density Axial; Future    8. Encounter for screening mammogram for malignant neoplasm of breast  -     Mammo screening digital tomosynthesis bilateral w CAD; Future    Other orders  -     DULoxetine (CYMBALTA) 60 MG capsule; Take 1 capsule by mouth Daily.  Dispense: 90 capsule; " Refill: 0  -     DULoxetine (Cymbalta) 30 MG capsule; Take 1 capsule by mouth Daily.  Dispense: 7 capsule; Refill: 0      1. Depression  -     Start Cymbalta 30 mg for 1 week. Then increase it to 60 mg.  Polyarthralgia: will monitor ROSA today  Iron deficiency anemia: cbc  HTN: chronic, stable, well controlled, labs today  Mixed hyperlipidemia: lipid panel today  Postmenopausal and concern for losing height, order placed for bone density     2. Health maintenance  -     Blood work will be obtained.  -     DEXA Bone scan and mammogram will be completed.       Follow Up   No follow-ups on file.  Patient was given instructions and counseling regarding her condition or for health maintenance advice. Please see specific information pulled into the AVS if appropriate.       Transcribed from ambient dictation for NEWTON Jose by Manny Grant.  01/03/24   12:58 EST    Patient or patient representative verbalized consent to the visit recording.  I have personally performed the services described in this document as transcribed by the above individual, and it is both accurate and complete.

## 2024-01-04 LAB
ALBUMIN SERPL-MCNC: 4 G/DL (ref 3.5–5.2)
ALBUMIN/GLOB SERPL: 1.1 G/DL
ALP SERPL-CCNC: 91 U/L (ref 39–117)
ALT SERPL-CCNC: 7 U/L (ref 1–33)
ANA SER QL: NEGATIVE
AST SERPL-CCNC: 12 U/L (ref 1–32)
BASOPHILS # BLD AUTO: 0.05 10*3/MM3 (ref 0–0.2)
BASOPHILS NFR BLD AUTO: 0.7 % (ref 0–1.5)
BILIRUB SERPL-MCNC: 0.3 MG/DL (ref 0–1.2)
BUN SERPL-MCNC: 10 MG/DL (ref 8–23)
BUN/CREAT SERPL: 14.3 (ref 7–25)
CALCIUM SERPL-MCNC: 9.3 MG/DL (ref 8.6–10.5)
CHLORIDE SERPL-SCNC: 103 MMOL/L (ref 98–107)
CHOLEST SERPL-MCNC: 173 MG/DL (ref 0–200)
CO2 SERPL-SCNC: 27.1 MMOL/L (ref 22–29)
CREAT SERPL-MCNC: 0.7 MG/DL (ref 0.57–1)
EGFRCR SERPLBLD CKD-EPI 2021: 92 ML/MIN/1.73
EOSINOPHIL # BLD AUTO: 0.14 10*3/MM3 (ref 0–0.4)
EOSINOPHIL NFR BLD AUTO: 2 % (ref 0.3–6.2)
ERYTHROCYTE [DISTWIDTH] IN BLOOD BY AUTOMATED COUNT: 12.6 % (ref 12.3–15.4)
GLOBULIN SER CALC-MCNC: 3.5 GM/DL
GLUCOSE SERPL-MCNC: 130 MG/DL (ref 65–99)
HBA1C MFR BLD: 7.7 % (ref 4.8–5.6)
HCT VFR BLD AUTO: 36.1 % (ref 34–46.6)
HDLC SERPL-MCNC: 57 MG/DL (ref 40–60)
HGB BLD-MCNC: 12.1 G/DL (ref 12–15.9)
IMM GRANULOCYTES # BLD AUTO: 0.03 10*3/MM3 (ref 0–0.05)
IMM GRANULOCYTES NFR BLD AUTO: 0.4 % (ref 0–0.5)
LDLC SERPL CALC-MCNC: 96 MG/DL (ref 0–100)
LDLC/HDLC SERPL: 1.63 {RATIO}
LYMPHOCYTES # BLD AUTO: 2.09 10*3/MM3 (ref 0.7–3.1)
LYMPHOCYTES NFR BLD AUTO: 29.2 % (ref 19.6–45.3)
MCH RBC QN AUTO: 30.6 PG (ref 26.6–33)
MCHC RBC AUTO-ENTMCNC: 33.5 G/DL (ref 31.5–35.7)
MCV RBC AUTO: 91.2 FL (ref 79–97)
MONOCYTES # BLD AUTO: 0.45 10*3/MM3 (ref 0.1–0.9)
MONOCYTES NFR BLD AUTO: 6.3 % (ref 5–12)
NEUTROPHILS # BLD AUTO: 4.39 10*3/MM3 (ref 1.7–7)
NEUTROPHILS NFR BLD AUTO: 61.4 % (ref 42.7–76)
NRBC BLD AUTO-RTO: 0 /100 WBC (ref 0–0.2)
PLATELET # BLD AUTO: 269 10*3/MM3 (ref 140–450)
POTASSIUM SERPL-SCNC: 4.4 MMOL/L (ref 3.5–5.2)
PROT SERPL-MCNC: 7.5 G/DL (ref 6–8.5)
RBC # BLD AUTO: 3.96 10*6/MM3 (ref 3.77–5.28)
SODIUM SERPL-SCNC: 142 MMOL/L (ref 136–145)
TRIGL SERPL-MCNC: 115 MG/DL (ref 0–150)
TSH SERPL DL<=0.005 MIU/L-ACNC: 1.55 UIU/ML (ref 0.27–4.2)
VLDLC SERPL CALC-MCNC: 20 MG/DL (ref 5–40)
WBC # BLD AUTO: 7.15 10*3/MM3 (ref 3.4–10.8)

## 2024-01-08 RX ORDER — DULOXETIN HYDROCHLORIDE 30 MG/1
30 CAPSULE, DELAYED RELEASE ORAL DAILY
Qty: 7 CAPSULE | Refills: 0 | OUTPATIENT
Start: 2024-01-08

## 2024-01-11 ENCOUNTER — OFFICE VISIT (OUTPATIENT)
Dept: PAIN MEDICINE | Facility: CLINIC | Age: 74
End: 2024-01-11
Payer: COMMERCIAL

## 2024-01-11 VITALS
HEIGHT: 63 IN | OXYGEN SATURATION: 94 % | HEART RATE: 87 BPM | RESPIRATION RATE: 20 BRPM | TEMPERATURE: 97.1 F | BODY MASS INDEX: 38.48 KG/M2 | SYSTOLIC BLOOD PRESSURE: 132 MMHG | WEIGHT: 217.2 LBS | DIASTOLIC BLOOD PRESSURE: 74 MMHG

## 2024-01-11 DIAGNOSIS — M54.2 NECK PAIN: Primary | ICD-10-CM

## 2024-01-11 DIAGNOSIS — Z79.891 ENCOUNTER FOR MONITORING OPIOID MAINTENANCE THERAPY: ICD-10-CM

## 2024-01-11 DIAGNOSIS — M62.838 MUSCLE SPASM: ICD-10-CM

## 2024-01-11 DIAGNOSIS — Z51.81 ENCOUNTER FOR MONITORING OPIOID MAINTENANCE THERAPY: ICD-10-CM

## 2024-01-11 DIAGNOSIS — G89.4 CHRONIC PAIN SYNDROME: ICD-10-CM

## 2024-01-11 DIAGNOSIS — M19.012 OSTEOARTHRITIS OF BOTH SHOULDERS, UNSPECIFIED OSTEOARTHRITIS TYPE: ICD-10-CM

## 2024-01-11 DIAGNOSIS — M19.011 OSTEOARTHRITIS OF BOTH SHOULDERS, UNSPECIFIED OSTEOARTHRITIS TYPE: ICD-10-CM

## 2024-01-11 DIAGNOSIS — M12.9 ARTHRITIS, MULTIPLE JOINT INVOLVEMENT: ICD-10-CM

## 2024-01-11 RX ORDER — KETOROLAC TROMETHAMINE 10 MG/1
10 TABLET, FILM COATED ORAL EVERY 6 HOURS PRN
Qty: 20 TABLET | Refills: 0 | Status: SHIPPED | OUTPATIENT
Start: 2024-01-11

## 2024-01-11 RX ORDER — KETOROLAC TROMETHAMINE 30 MG/ML
30 INJECTION, SOLUTION INTRAMUSCULAR; INTRAVENOUS EVERY 6 HOURS PRN
Status: SHIPPED | OUTPATIENT
Start: 2024-01-11 | End: 2024-01-16

## 2024-01-11 RX ORDER — HYDROCODONE BITARTRATE AND ACETAMINOPHEN 10; 325 MG/1; MG/1
1 TABLET ORAL
Qty: 150 TABLET | Refills: 0 | Status: SHIPPED | OUTPATIENT
Start: 2024-01-11

## 2024-01-11 RX ORDER — KETOROLAC TROMETHAMINE 30 MG/ML
30 INJECTION, SOLUTION INTRAMUSCULAR; INTRAVENOUS ONCE
Status: DISCONTINUED | OUTPATIENT
Start: 2024-01-12 | End: 2024-01-11

## 2024-01-11 RX ADMIN — KETOROLAC TROMETHAMINE 30 MG: 30 INJECTION, SOLUTION INTRAMUSCULAR; INTRAVENOUS at 13:27

## 2024-01-11 NOTE — PLAN OF CARE
Goal Outcome Evaluation:  VSS. Pain controlled with PO meds. C/o chest feeling sore, but not sharp like earlier in the day. Nitro paste to chest. Cardiology consulted to see today. NPO since MN. Will continue to monitor.    9

## 2024-01-11 NOTE — PROGRESS NOTES
"CHIEF COMPLAINT  Neck and joint pain    Subjective   Chastity Salgado is a 73 y.o. female  who presents for follow-up.  She has a history of chronic neck and joint pain. She reports that her pain is unchanged since her last office visit and varies based on activity level.      Today pain is 8/10VAS in severity. Pain is located in her bilateral shoulders, knees, ankles, and neck (right and left side). Describes this pain as an intermittent ache. Pain is worsened by certain movements, prolonged positions, climbing stairs, bending/twisting, and weather. Pain improves with rest, reposition, heat/ice, and medications. She reports increased neck pain since her last office visit and states when she moves her neck she feels a \"crunching\".      She continues with Hydrocodone 10mg 5/day and Flexeril 10mg at HS. She reports occasional constipation that she manages with Metamucil and Linzess/Movantik. Denies any side effects from the regimen including somnolence. The regimen helps decrease pain by 75%. Notes improvement in activity and function with regimen. ADL's by self. Denies any bowel or bladder changes. She was recently started on Cymbalta 30mg daily but her PCP. Plans to increase to 60mg daily next week.    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 Gimenez. She has completed PT for this issue. She continues to receive cortisone injections to her left shoulder very 3 months and received an injection at the end of December. Plans for a revision to her right shoulder next year.     Patient was evaluated by Dr. Hogan on 11/30/23, patient noted to be high risk for misuse/abuse of opioids.  He recommended a cautious conservative approach with medication dosing and compliance to be monitored more frequently.       Procedures:  2/25/21 - left suprascapular nerve block/steroid injection " (posterior approach) - 50% relief x 1 month  9/22/20 - left suprascapular nerve block/steroid injection (posterior approach) - 70% relief x 1-2 months    Neck Pain   This is a chronic problem. The current episode started more than 1 year ago. The problem occurs intermittently. The problem has been unchanged (unchanged since last office visit). The pain is associated with a sleep position. The pain is present in the left side and right side. The quality of the pain is described as aching. The pain is at a severity of 8/10. The symptoms are aggravated by twisting, position and bending. Stiffness is present In the morning. Associated symptoms include numbness (left hand). Pertinent negatives include no chest pain, fever, headaches or weakness. She has tried ice, heat and oral narcotics for the symptoms.   Joint Pain  This is a chronic (bilateral knees, ankles, and shoulders) problem. The current episode started more than 1 year ago. The problem occurs intermittently. The problem has been waxing and waning (Rates pain 8/10VAS in severity.). Associated symptoms include arthralgias, joint swelling (left hand), nausea, neck pain and numbness (left hand). Pertinent negatives include no abdominal pain, chest pain, chills, congestion, coughing, fatigue, fever, headaches or weakness. The symptoms are aggravated by walking, bending, exertion, twisting and standing (cold weather, stairs). She has tried oral narcotics, position changes, heat, ice and rest for the symptoms.      PEG Assessment   What number best describes your pain on average in the past week?7  What number best describes how, during the past week, pain has interfered with your enjoyment of life?8  What number best describes how, during the past week, pain has interfered with your general activity?  8    The following portions of the patient's history were reviewed and updated as appropriate: allergies, current medications, past family history, past medical  "history, past social history, past surgical history, and problem list.    Review of Systems   Constitutional:  Negative for activity change, chills, fatigue and fever.   HENT:  Negative for congestion.    Respiratory:  Negative for cough and chest tightness.    Cardiovascular:  Negative for chest pain.   Gastrointestinal:  Positive for nausea. Negative for abdominal pain, constipation and diarrhea.   Genitourinary:  Negative for difficulty urinating and dysuria.   Musculoskeletal:  Positive for arthralgias, joint swelling (left hand) and neck pain.   Neurological:  Positive for numbness (left hand). Negative for dizziness, weakness, light-headedness and headaches.   Psychiatric/Behavioral:  Positive for sleep disturbance. Negative for agitation and suicidal ideas. The patient is not nervous/anxious.      I have reviewed and confirmed the accuracy of the ROS as documented by the MA/LPN/RN NEWTON Abdul    Vitals:    01/11/24 1247   BP: 132/74   BP Location: Left arm   Patient Position: Sitting   Cuff Size: Adult   Pulse: 87   Resp: 20   Temp: 97.1 °F (36.2 °C)   TempSrc: Temporal   SpO2: 94%   Weight: 98.5 kg (217 lb 3.2 oz)   Height: 160 cm (62.99\")   PainSc:   8     Objective   Physical Exam  Constitutional:       Appearance: Normal appearance.   HENT:      Head: Normocephalic.   Cardiovascular:      Rate and Rhythm: Normal rate and regular rhythm.   Pulmonary:      Effort: Pulmonary effort is normal.      Breath sounds: Normal breath sounds.   Musculoskeletal:      Right shoulder: Tenderness present. Decreased range of motion.      Left shoulder: Tenderness and crepitus present. Decreased range of motion.      Cervical back: Tenderness present. Pain with movement present. Decreased range of motion.      Lumbar back: Tenderness present. Normal range of motion. Negative right straight leg raise test and negative left straight leg raise test.      Right knee: Swelling present. Decreased range of motion. " Tenderness present.   Skin:     General: Skin is warm and dry.      Capillary Refill: Capillary refill takes less than 2 seconds.   Neurological:      General: No focal deficit present.      Mental Status: She is alert and oriented to person, place, and time.      Gait: Gait abnormal (slowed).   Psychiatric:         Mood and Affect: Mood normal.         Speech: Speech normal.         Behavior: Behavior normal.         Thought Content: Thought content normal.         Cognition and Memory: Cognition normal.       Assessment & Plan   Diagnoses and all orders for this visit:    1. Neck pain (Primary)  -     Discontinue: ketorolac (TORADOL) injection 30 mg  -     ketorolac (TORADOL) injection 30 mg  -     HYDROcodone-acetaminophen (NORCO)  MG per tablet; Take 1 tablet by mouth 5 (Five) Times a Day As Needed for Severe Pain. 30 day supply  Dispense: 150 tablet; Refill: 0    2. Osteoarthritis of both shoulders, unspecified osteoarthritis type  -     Discontinue: ketorolac (TORADOL) injection 30 mg  -     ketorolac (TORADOL) injection 30 mg  -     ketorolac (TORADOL) 10 MG tablet; Take 1 tablet by mouth Every 6 (Six) Hours As Needed for Moderate Pain.  Dispense: 20 tablet; Refill: 0  -     HYDROcodone-acetaminophen (NORCO)  MG per tablet; Take 1 tablet by mouth 5 (Five) Times a Day As Needed for Severe Pain. 30 day supply  Dispense: 150 tablet; Refill: 0    3. Muscle spasm    4. Chronic pain syndrome    5. Arthritis, multiple joint involvement  -     ketorolac (TORADOL) 10 MG tablet; Take 1 tablet by mouth Every 6 (Six) Hours As Needed for Moderate Pain.  Dispense: 20 tablet; Refill: 0    6. Encounter for monitoring opioid maintenance therapy      Chastity Salgado reports a pain score of 8.  Given her pain assessment as noted, treatment options were discussed and the following options were decided upon as a follow-up plan to address the patient's pain: continuation of current treatment plan for pain and  prescription for opiod analgesics.    --- The urine drug screen confirmation from 11/7/23 has been reviewed and the result is appropriate based on patient history and GAYE report  --- The patient signed an updated copy of the controlled substance agreement on 12/15/23  --- IM Toradol 30mg in office for flare pain. Patient will be prescribed Toradol 10mg PO every 6 hours as needed for 5 days. Instructed to avoid all other NSAID medication at this time.   --- Continue Hydrocodone. DNF 1/19/24. Patient appears stable with current regimen. No adverse effects. Regarding continuation of opioids, there is no evidence of aberrant behavior or any red flags.  The patient continues with appropriate response to opioid therapy. ADL's remain intact by self.   --- Follow-up 1 month     GAYE REPORT  As part of the patient's treatment plan, I am prescribing controlled substances. The patient has been made aware of appropriate use of such medications, including potential risk of somnolence, limited ability to drive and/or work safely, and the potential for dependence or overdose. It has also been made clear that these medications are for use by this patient only, without concomitant use of alcohol or other substances unless prescribed.     Patient has completed prescribing agreement detailing terms of continued prescribing of controlled substances, including monitoring GAYE reports, urine drug screening, and pill counts if necessary. The patient is aware that inappropriate use will results in cessation of prescribing such medications.    As the clinician, I personally reviewed the GAYE from 1/11/24 while the patient was in the office today.    History and physical exam exhibit continued safe and appropriate use of controlled substances.    Dictated utilizing Dragon dictation.

## 2024-02-09 ENCOUNTER — OFFICE VISIT (OUTPATIENT)
Dept: PAIN MEDICINE | Facility: CLINIC | Age: 74
End: 2024-02-09
Payer: COMMERCIAL

## 2024-02-09 VITALS
BODY MASS INDEX: 38.77 KG/M2 | DIASTOLIC BLOOD PRESSURE: 84 MMHG | TEMPERATURE: 96.6 F | HEIGHT: 63 IN | HEART RATE: 83 BPM | WEIGHT: 218.8 LBS | SYSTOLIC BLOOD PRESSURE: 143 MMHG | OXYGEN SATURATION: 94 % | RESPIRATION RATE: 20 BRPM

## 2024-02-09 DIAGNOSIS — M19.042 INFLAMMATION OF LEFT HAND JOINT: Primary | ICD-10-CM

## 2024-02-09 DIAGNOSIS — M54.2 NECK PAIN: ICD-10-CM

## 2024-02-09 DIAGNOSIS — Z51.81 ENCOUNTER FOR MONITORING OPIOID MAINTENANCE THERAPY: ICD-10-CM

## 2024-02-09 DIAGNOSIS — M19.011 OSTEOARTHRITIS OF BOTH SHOULDERS, UNSPECIFIED OSTEOARTHRITIS TYPE: ICD-10-CM

## 2024-02-09 DIAGNOSIS — M12.9 ARTHRITIS, MULTIPLE JOINT INVOLVEMENT: ICD-10-CM

## 2024-02-09 DIAGNOSIS — M62.838 MUSCLE SPASM: ICD-10-CM

## 2024-02-09 DIAGNOSIS — G89.4 CHRONIC PAIN SYNDROME: ICD-10-CM

## 2024-02-09 DIAGNOSIS — Z79.891 ENCOUNTER FOR MONITORING OPIOID MAINTENANCE THERAPY: ICD-10-CM

## 2024-02-09 DIAGNOSIS — M19.012 OSTEOARTHRITIS OF BOTH SHOULDERS, UNSPECIFIED OSTEOARTHRITIS TYPE: ICD-10-CM

## 2024-02-09 RX ORDER — HYDROCODONE BITARTRATE AND ACETAMINOPHEN 10; 325 MG/1; MG/1
1 TABLET ORAL
Qty: 150 TABLET | Refills: 0 | Status: SHIPPED | OUTPATIENT
Start: 2024-02-09

## 2024-02-09 RX ORDER — METHYLPREDNISOLONE 4 MG/1
TABLET ORAL
Qty: 21 TABLET | Refills: 0 | Status: SHIPPED | OUTPATIENT
Start: 2024-02-09

## 2024-02-09 RX ORDER — CYCLOBENZAPRINE HCL 10 MG
10 TABLET ORAL 3 TIMES DAILY PRN
Qty: 90 TABLET | Refills: 1 | Status: SHIPPED | OUTPATIENT
Start: 2024-02-09

## 2024-02-09 NOTE — PROGRESS NOTES
"CHIEF COMPLAINT  Neck and joint pain    Subjective   Chastity Salgado is a 74 y.o. female  who presents for follow-up.  She has a history of chronic neck and joint pain. She reports that her pain has remained consistent since her last office visit.    Today pain is 8/10VAS in severity. Pain is located in her bilateral shoulders, knees, ankles, and neck (right and left side). Describes this pain as an intermittent ache. Pain is worsened by certain movements, prolonged positions, climbing stairs, bending/twisting, and weather. Pain improves with rest, reposition, heat/ice, and medications. She reports increased neck pain since her last office visit and states when she moves her neck she feels a \"crunching\".      She continues with Hydrocodone 10mg 5/day, Cymbalta 60mg (prescribed by PCP) and Flexeril 10mg at HS. She reports occasional constipation that she manages with Metamucil and Linzess/Movantik. Denies any side effects from the regimen including somnolence. The regimen helps decrease pain by 75%. Notes improvement in activity and function with regimen. ADL's by self. Denies any bowel or bladder changes.      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 Gimenez. She has completed PT for this issue. She continues to receive cortisone injections to her left shoulder very 3 months and received an injection at the end of December. Plans for a revision to her right shoulder next year.     Patient was evaluated by Dr. Hogan on 11/30/23, patient noted to be high risk for misuse/abuse of opioids.  He recommended a cautious conservative approach with medication dosing and compliance to be monitored more frequently.       Procedures:  2/25/21 - left suprascapular nerve block/steroid injection (posterior approach) - 50% relief x 1 month  9/22/20 - left suprascapular nerve block/steroid " injection (posterior approach) - 70% relief x 1-2 months    Neck Pain   This is a chronic problem. The current episode started more than 1 year ago. The problem occurs intermittently. The problem has been waxing and waning. The pain is associated with a sleep position. The pain is present in the left side and right side. The quality of the pain is described as aching. The pain is at a severity of 5/10. The symptoms are aggravated by twisting, position and bending. Stiffness is present In the morning. Associated symptoms include numbness (4th,5th digit fingers on her left hand) and weakness. Pertinent negatives include no chest pain, fever or headaches. She has tried ice, heat and oral narcotics for the symptoms.   Joint Pain  This is a chronic (bilateral knees, ankles, and shoulders) problem. The current episode started more than 1 year ago. The problem occurs intermittently. The problem has been unchanged (Rates pain 8/10VAS in severity. Unchanged since last office visit). Associated symptoms include arthralgias, joint swelling (left hand), nausea, neck pain, numbness (4th,5th digit fingers on her left hand) and weakness. Pertinent negatives include no abdominal pain, chest pain, chills, congestion, coughing, fatigue, fever or headaches. The symptoms are aggravated by walking, bending, exertion, twisting and standing (cold weather, stairs). She has tried oral narcotics, position changes, heat, ice and rest for the symptoms.     PEG Assessment   What number best describes your pain on average in the past week?7  What number best describes how, during the past week, pain has interfered with your enjoyment of life?7  What number best describes how, during the past week, pain has interfered with your general activity?  8    The following portions of the patient's history were reviewed and updated as appropriate: allergies, current medications, past family history, past medical history, past social history, past  "surgical history, and problem list.    Review of Systems   Constitutional:  Negative for activity change, chills, fatigue and fever.   HENT:  Negative for congestion.    Respiratory:  Negative for cough and chest tightness.    Cardiovascular:  Negative for chest pain.   Gastrointestinal:  Positive for constipation, diarrhea and nausea. Negative for abdominal pain.   Genitourinary:  Negative for difficulty urinating and dysuria.   Musculoskeletal:  Positive for arthralgias, joint swelling (left hand) and neck pain.   Neurological:  Positive for weakness and numbness (4th,5th digit fingers on her left hand). Negative for dizziness, light-headedness and headaches.   Psychiatric/Behavioral:  Positive for agitation and sleep disturbance. Negative for suicidal ideas. The patient is nervous/anxious.      I have reviewed and confirmed the accuracy of the ROS as documented by the MA/LPN/RN NEWTON Abdul    Vitals:    02/09/24 1117   BP: 143/84   BP Location: Left arm   Patient Position: Sitting   Cuff Size: Adult   Pulse: 83   Resp: 20   Temp: 96.6 °F (35.9 °C)   TempSrc: Temporal   SpO2: 94%   Weight: 99.2 kg (218 lb 12.8 oz)   Height: 160 cm (62.99\")   PainSc:   8     Objective   Physical Exam  Constitutional:       Appearance: Normal appearance.   HENT:      Head: Normocephalic.   Cardiovascular:      Rate and Rhythm: Normal rate and regular rhythm.   Pulmonary:      Effort: Pulmonary effort is normal.      Breath sounds: Normal breath sounds.   Musculoskeletal:      Right shoulder: Tenderness present. Decreased range of motion.      Left shoulder: Tenderness and crepitus present. Decreased range of motion.      Left hand: Swelling and tenderness present.      Cervical back: Tenderness present. Pain with movement present. Decreased range of motion.      Lumbar back: Tenderness present. Normal range of motion. Negative right straight leg raise test and negative left straight leg raise test.      Right knee: " Swelling present. Decreased range of motion. Tenderness present.   Skin:     General: Skin is warm and dry.      Capillary Refill: Capillary refill takes less than 2 seconds.   Neurological:      General: No focal deficit present.      Mental Status: She is alert and oriented to person, place, and time.      Gait: Gait abnormal (slowed).   Psychiatric:         Mood and Affect: Mood normal.         Speech: Speech normal.         Behavior: Behavior normal.         Thought Content: Thought content normal.         Cognition and Memory: Cognition normal.       Assessment & Plan   Diagnoses and all orders for this visit:    1. Inflammation of left hand joint (Primary)  -     methylPREDNISolone (MEDROL) 4 MG dose pack; Take as directed on package instructions.  Dispense: 21 tablet; Refill: 0    2. Muscle spasm  -     cyclobenzaprine (FLEXERIL) 10 MG tablet; Take 1 tablet by mouth 3 (Three) Times a Day As Needed for Muscle Spasms.  Dispense: 90 tablet; Refill: 1    3. Neck pain  -     HYDROcodone-acetaminophen (NORCO)  MG per tablet; Take 1 tablet by mouth 5 (Five) Times a Day As Needed for Severe Pain. 30 day supply. DNF 2/18/24  Dispense: 150 tablet; Refill: 0    4. Osteoarthritis of both shoulders, unspecified osteoarthritis type  -     HYDROcodone-acetaminophen (NORCO)  MG per tablet; Take 1 tablet by mouth 5 (Five) Times a Day As Needed for Severe Pain. 30 day supply. DNF 2/18/24  Dispense: 150 tablet; Refill: 0    5. Chronic pain syndrome    6. Arthritis, multiple joint involvement    7. Encounter for monitoring opioid maintenance therapy      Chastity Salgado reports a pain score of 8.  Given her pain assessment as noted, treatment options were discussed and the following options were decided upon as a follow-up plan to address the patient's pain: continuation of current treatment plan for pain and prescription for opiod analgesics.    --- The urine drug screen confirmation from 11/7/23 has been reviewed  and the result is appropriate based on patient history and GAYE report  --- The patient signed an updated copy of the controlled substance agreement on 12/15/23  --- Continue Flexeril. Refill sent to pharmacy.  --- MDP to pharmacy for left hand swelling and pain  --- Continue Hydrocodone. DNF 2/18/24. Patient appears stable with current regimen. No adverse effects. Regarding continuation of opioids, there is no evidence of aberrant behavior or any red flags.  The patient continues with appropriate response to opioid therapy. ADL's remain intact by self.   --- Opioid risk assessment completed by Dr. Hogan on 11/30/23, patient noted to be high risk for misuse/abuse of opioids.    --- Follow-up 1 month      GAYE REPORT  As part of the patient's treatment plan, I am prescribing controlled substances. The patient has been made aware of appropriate use of such medications, including potential risk of somnolence, limited ability to drive and/or work safely, and the potential for dependence or overdose. It has also been made clear that these medications are for use by this patient only, without concomitant use of alcohol or other substances unless prescribed.     Patient has completed prescribing agreement detailing terms of continued prescribing of controlled substances, including monitoring GAYE reports, urine drug screening, and pill counts if necessary. The patient is aware that inappropriate use will results in cessation of prescribing such medications.    As the clinician, I personally reviewed the GAYE from 2/9/24 while the patient was in the office today.    History and physical exam exhibit continued safe and appropriate use of controlled substances.    Dictated utilizing Dragon dictation.

## 2024-03-05 DIAGNOSIS — M19.012 OSTEOARTHRITIS OF BOTH SHOULDERS, UNSPECIFIED OSTEOARTHRITIS TYPE: ICD-10-CM

## 2024-03-05 DIAGNOSIS — M54.2 NECK PAIN: ICD-10-CM

## 2024-03-05 DIAGNOSIS — M19.011 OSTEOARTHRITIS OF BOTH SHOULDERS, UNSPECIFIED OSTEOARTHRITIS TYPE: ICD-10-CM

## 2024-03-05 NOTE — TELEPHONE ENCOUNTER
Caller: Chastity Salgado    Relationship: Self    Best call back number: [phone]    Requested Prescriptions:   Requested Prescriptions     Pending Prescriptions Disp Refills    HYDROcodone-acetaminophen (NORCO)  MG per tablet 150 tablet 0     Sig: Take 1 tablet by mouth 5 (Five) Times a Day As Needed for Severe Pain. 30 day supply. DNF 2/18/24        Pharmacy where request should be sent: [facility] [id number] [facility] & [place] - [phone] [facility] [phone]      Last office visit with prescribing clinician: 2/9/2024   Last telemedicine visit with prescribing clinician: Visit date not found   Next office visit with prescribing clinician: 3/15/2024     Additional details provided by patient: REFILL DUE BEFORE 03/15/24 APPOINTMENT    Does the patient have less than a 3 day supply:  [] Yes  [x] No    Would you like a call back once the refill request has been completed: [x] Yes [] No    If the office needs to give you a call back, can they leave a voicemail: [x] Yes [] No    Shawn Loja Rep   03/05/24 15:48 EST        PAIN SCALE 5 OF 10.

## 2024-03-05 NOTE — TELEPHONE ENCOUNTER
PATIENT CALLED AND SAID WE CAN CANCEL THIS REQUEST BECAUSE SHE WILL BE IN ON 3/15/24 FOR AN APPOINTMENT

## 2024-03-07 RX ORDER — HYDROCODONE BITARTRATE AND ACETAMINOPHEN 10; 325 MG/1; MG/1
1 TABLET ORAL
Qty: 150 TABLET | Refills: 0 | OUTPATIENT
Start: 2024-03-07

## 2024-03-14 ENCOUNTER — TELEPHONE (OUTPATIENT)
Dept: PAIN MEDICINE | Facility: CLINIC | Age: 74
End: 2024-03-14

## 2024-03-14 NOTE — TELEPHONE ENCOUNTER
Caller: Chastity Salgado    Relationship to patient: Self    Best call back number:     Chief complaint: BACK PAIN     Type of visit: FUP    Requested date: 3/18/24     If rescheduling, when is the original appointment: 3/2224     Additional notes:PATIENT WILL NOT HAVE TRANSPORTATION TOMORROW SHE RESCHEDULED TO A WEEK OUT.  SHE RUNS OUT OF MEDICATION TUESDAY 3/19/24 AND WOULD LIKE TO RESCHEDULE HER 3/22/24 APPOINTMENT TO MONDAY 3/18/24 IF POSSIBLE.

## 2024-03-18 DIAGNOSIS — M19.011 OSTEOARTHRITIS OF BOTH SHOULDERS, UNSPECIFIED OSTEOARTHRITIS TYPE: ICD-10-CM

## 2024-03-18 DIAGNOSIS — M54.2 NECK PAIN: ICD-10-CM

## 2024-03-18 DIAGNOSIS — M19.012 OSTEOARTHRITIS OF BOTH SHOULDERS, UNSPECIFIED OSTEOARTHRITIS TYPE: ICD-10-CM

## 2024-03-18 RX ORDER — HYDROCODONE BITARTRATE AND ACETAMINOPHEN 10; 325 MG/1; MG/1
1 TABLET ORAL
Qty: 150 TABLET | Refills: 0 | Status: SHIPPED | OUTPATIENT
Start: 2024-03-18

## 2024-03-18 NOTE — TELEPHONE ENCOUNTER
Medication Refill Request    Date of phone call: 3-18-24    Medication being requested: norco  mg sig: Take 1 tablet by mouth 5 (Five) Times a Day As Needed for Severe Pain   Qty: 150    Date of last visit: 2-9-24    Date of last refill:     GAYE up to date?:     Next Follow up?: 3-22-24    Any new pertinent information? (i.e, new medication allergies, new use of medications, change in patient's health or condition, non-compliance or inconsistency with prescribing agreement?):

## 2024-03-20 RX ORDER — LISINOPRIL 40 MG/1
40 TABLET ORAL DAILY
Qty: 90 TABLET | Refills: 1 | Status: SHIPPED | OUTPATIENT
Start: 2024-03-20

## 2024-03-22 ENCOUNTER — OFFICE VISIT (OUTPATIENT)
Dept: PAIN MEDICINE | Facility: CLINIC | Age: 74
End: 2024-03-22
Payer: COMMERCIAL

## 2024-03-22 VITALS
WEIGHT: 222.8 LBS | DIASTOLIC BLOOD PRESSURE: 78 MMHG | OXYGEN SATURATION: 98 % | HEIGHT: 63 IN | BODY MASS INDEX: 39.48 KG/M2 | RESPIRATION RATE: 20 BRPM | HEART RATE: 96 BPM | SYSTOLIC BLOOD PRESSURE: 127 MMHG | TEMPERATURE: 96.6 F

## 2024-03-22 DIAGNOSIS — T40.2X5A THERAPEUTIC OPIOID INDUCED CONSTIPATION: Primary | ICD-10-CM

## 2024-03-22 DIAGNOSIS — M19.042 INFLAMMATION OF LEFT HAND JOINT: ICD-10-CM

## 2024-03-22 DIAGNOSIS — M62.838 MUSCLE SPASM: ICD-10-CM

## 2024-03-22 DIAGNOSIS — M12.9 ARTHRITIS, MULTIPLE JOINT INVOLVEMENT: ICD-10-CM

## 2024-03-22 DIAGNOSIS — M54.2 NECK PAIN: ICD-10-CM

## 2024-03-22 DIAGNOSIS — K59.03 THERAPEUTIC OPIOID INDUCED CONSTIPATION: Primary | ICD-10-CM

## 2024-03-22 DIAGNOSIS — M19.012 OSTEOARTHRITIS OF BOTH SHOULDERS, UNSPECIFIED OSTEOARTHRITIS TYPE: ICD-10-CM

## 2024-03-22 DIAGNOSIS — Z51.81 ENCOUNTER FOR MONITORING OPIOID MAINTENANCE THERAPY: ICD-10-CM

## 2024-03-22 DIAGNOSIS — G89.4 CHRONIC PAIN SYNDROME: ICD-10-CM

## 2024-03-22 DIAGNOSIS — Z79.891 ENCOUNTER FOR MONITORING OPIOID MAINTENANCE THERAPY: ICD-10-CM

## 2024-03-22 DIAGNOSIS — M19.011 OSTEOARTHRITIS OF BOTH SHOULDERS, UNSPECIFIED OSTEOARTHRITIS TYPE: ICD-10-CM

## 2024-03-22 NOTE — PROGRESS NOTES
"CHIEF COMPLAINT  Neck and joint pain    Subjective   Chastity Salgado is a 74 y.o. female  who presents for follow-up.  She has a history of chronic neck and joint pain. She reports that her pain has remained consistent since her last office visit and varies based on activity level.     Today pain is 7/10VAS in severity. Pain is located in her bilateral shoulders, knees, ankles, and neck (right and left side). Describes this pain as an intermittent ache. Pain is worsened by certain movements, prolonged positions, climbing stairs, bending/twisting, and weather. Pain improves with rest, reposition, heat/ice, and medications. She reports increased neck pain since her last office visit and states when she moves her neck she feels a \"crunching\".      She continues with Hydrocodone 10mg 5/day, Cymbalta 60mg (prescribed by PCP) and Flexeril 10mg at HS. She reports occasional constipation that she manages with Metamucil and Linzess/Movantik. Denies any side effects from the regimen including somnolence. The regimen helps decrease pain by 75%. Notes improvement in activity and function with regimen. ADL's by self. Denies any bowel or bladder changes.      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 Gimenez. She has completed PT for this issue. She continues to receive cortisone injections to her left shoulder very 3 months and received an injection at the end of December. Plans for a revision to her right shoulder next year.     Patient was evaluated by Dr. Hogan on 11/30/23, patient noted to be high risk for misuse/abuse of opioids.  He recommended a cautious conservative approach with medication dosing and compliance to be monitored more frequently.      Patient reports increased pain and swelling to right knee earlier today. Right calf is slightly red and warm. Patient reports that " she has had cellulitis in the past. Encouraged her to go to urgent treatment/ED to rule out DVT and/or cellulitis.      Procedures:  2/25/21 - left suprascapular nerve block/steroid injection (posterior approach) - 50% relief x 1 month  9/22/20 - left suprascapular nerve block/steroid injection (posterior approach) - 70% relief x 1-2 months    Neck Pain   This is a chronic problem. The current episode started more than 1 year ago. The problem occurs intermittently. The problem has been waxing and waning. The pain is associated with a sleep position. The pain is present in the left side and right side. The quality of the pain is described as aching. The pain is at a severity of 5/10. The symptoms are aggravated by twisting, position and bending. Stiffness is present In the morning. Associated symptoms include numbness (left hand fingers) and weakness. Pertinent negatives include no chest pain, fever or headaches. She has tried ice, heat and oral narcotics for the symptoms.   Joint Pain  This is a chronic (bilateral knees, ankles, and shoulders) problem. The current episode started more than 1 year ago. The problem occurs intermittently. The problem has been waxing and waning (Rates pain 7/10VAS in severity.). Associated symptoms include arthralgias, joint swelling (left hand), nausea, neck pain, numbness (left hand fingers) and weakness. Pertinent negatives include no abdominal pain, chest pain, chills, congestion, coughing, fatigue, fever or headaches. The symptoms are aggravated by walking, bending, exertion, twisting and standing (cold weather, stairs). She has tried oral narcotics, position changes, heat, ice and rest for the symptoms.      PEG Assessment   What number best describes your pain on average in the past week?7  What number best describes how, during the past week, pain has interfered with your enjoyment of life?6  What number best describes how, during the past week, pain has interfered with your  "general activity?  7    The following portions of the patient's history were reviewed and updated as appropriate: allergies, current medications, past family history, past medical history, past social history, past surgical history, and problem list.    Review of Systems   Constitutional:  Negative for activity change (more), chills, fatigue and fever.   HENT:  Negative for congestion.    Respiratory:  Negative for cough and chest tightness.    Cardiovascular:  Negative for chest pain.   Gastrointestinal:  Positive for constipation, diarrhea and nausea. Negative for abdominal pain.   Genitourinary:  Negative for difficulty urinating and dysuria.   Musculoskeletal:  Positive for arthralgias, joint swelling (left hand) and neck pain.   Neurological:  Positive for weakness and numbness (left hand fingers). Negative for dizziness, light-headedness and headaches.   Psychiatric/Behavioral:  Positive for sleep disturbance. Negative for agitation and suicidal ideas. The patient is not nervous/anxious.      I have reviewed and confirmed the accuracy of the ROS as documented by the MA/LPN/RN NEWTON Abdul    Vitals:    03/22/24 1417   BP: 127/78   BP Location: Left arm   Patient Position: Sitting   Cuff Size: Large Adult   Pulse: 96   Resp: 20   Temp: 96.6 °F (35.9 °C)   TempSrc: Temporal   SpO2: 98%   Weight: 101 kg (222 lb 12.8 oz)   Height: 160 cm (62.99\")   PainSc:   7     Objective   Physical Exam  Constitutional:       Appearance: Normal appearance.   HENT:      Head: Normocephalic.   Cardiovascular:      Rate and Rhythm: Normal rate and regular rhythm.   Pulmonary:      Effort: Pulmonary effort is normal.      Breath sounds: Normal breath sounds.   Musculoskeletal:      Right shoulder: Tenderness present. Decreased range of motion.      Left shoulder: Tenderness and crepitus present. Decreased range of motion.      Left hand: Swelling and tenderness present.      Cervical back: Tenderness present. Pain with " movement present. Decreased range of motion.      Lumbar back: Tenderness present. Normal range of motion. Negative right straight leg raise test and negative left straight leg raise test.      Right knee: Swelling present. Decreased range of motion. Tenderness present.   Skin:     General: Skin is warm and dry.      Capillary Refill: Capillary refill takes less than 2 seconds.   Neurological:      General: No focal deficit present.      Mental Status: She is alert and oriented to person, place, and time.      Gait: Gait abnormal (slowed).   Psychiatric:         Mood and Affect: Mood normal.         Speech: Speech normal.         Behavior: Behavior normal.         Thought Content: Thought content normal.         Cognition and Memory: Cognition normal.       Assessment & Plan   Diagnoses and all orders for this visit:    1. Therapeutic opioid induced constipation (Primary)  -     Naloxegol Oxalate (Movantik) 25 MG tablet; Take 1 tablet by mouth Every Morning.  Dispense: 30 tablet; Refill: 3    2. Neck pain    3. Osteoarthritis of both shoulders, unspecified osteoarthritis type    4. Muscle spasm    5. Inflammation of left hand joint    6. Chronic pain syndrome    7. Arthritis, multiple joint involvement    8. Encounter for monitoring opioid maintenance therapy      Chastity Salgado reports a pain score of 7.  Given her pain assessment as noted, treatment options were discussed and the following options were decided upon as a follow-up plan to address the patient's pain: continuation of current treatment plan for pain and prescription for opiod analgesics.    --- The urine drug screen confirmation from 11/7/23 has been reviewed and the result is appropriate based on patient history and GAYE report  --- The patient signed an updated copy of the controlled substance agreement on 12/15/23  --- Continue Flexeril. No refill needed at this time.   --- Continue Movantik. Refill sent to pharmacy.   --- Continue Hydrocodone.  No refill needed at this time. Patient appears stable with current regimen. No adverse effects. Regarding continuation of opioids, there is no evidence of aberrant behavior or any red flags.  The patient continues with appropriate response to opioid therapy. ADL's remain intact by self.   --- Opioid risk assessment completed by Dr. Hogan on 11/30/23, patient noted to be high risk for misuse/abuse of opioids.    --- Encourage patient to be evaluated by the ED/urgent treatment to rule out cellulitis/DVT of RLE due to redness, pain, and swelling  --- Follow-up 1 month      GAYE REPORT  As part of the patient's treatment plan, I am prescribing controlled substances. The patient has been made aware of appropriate use of such medications, including potential risk of somnolence, limited ability to drive and/or work safely, and the potential for dependence or overdose. It has also been made clear that these medications are for use by this patient only, without concomitant use of alcohol or other substances unless prescribed.     Patient has completed prescribing agreement detailing terms of continued prescribing of controlled substances, including monitoring GAYE reports, urine drug screening, and pill counts if necessary. The patient is aware that inappropriate use will results in cessation of prescribing such medications.    As the clinician, I personally reviewed the GAYE from 3/22/24 while the patient was in the office today.    History and physical exam exhibit continued safe and appropriate use of controlled substances.    Dictated utilizing Dragon dictation.

## 2024-03-27 ENCOUNTER — OFFICE VISIT (OUTPATIENT)
Dept: FAMILY MEDICINE CLINIC | Facility: CLINIC | Age: 74
End: 2024-03-27
Payer: COMMERCIAL

## 2024-03-27 VITALS
HEART RATE: 91 BPM | RESPIRATION RATE: 18 BRPM | SYSTOLIC BLOOD PRESSURE: 138 MMHG | HEIGHT: 63 IN | DIASTOLIC BLOOD PRESSURE: 82 MMHG | BODY MASS INDEX: 39.42 KG/M2 | WEIGHT: 222.5 LBS | TEMPERATURE: 95.5 F | OXYGEN SATURATION: 98 %

## 2024-03-27 DIAGNOSIS — E11.65 TYPE 2 DIABETES MELLITUS WITH HYPERGLYCEMIA, WITHOUT LONG-TERM CURRENT USE OF INSULIN: ICD-10-CM

## 2024-03-27 DIAGNOSIS — F33.1 MAJOR DEPRESSIVE DISORDER, RECURRENT, MODERATE: ICD-10-CM

## 2024-03-27 DIAGNOSIS — Z00.00 MEDICARE ANNUAL WELLNESS VISIT, SUBSEQUENT: Primary | ICD-10-CM

## 2024-03-27 DIAGNOSIS — L71.9 ROSACEA: ICD-10-CM

## 2024-03-27 RX ORDER — DULOXETIN HYDROCHLORIDE 30 MG/1
30 CAPSULE, DELAYED RELEASE ORAL DAILY
Qty: 30 CAPSULE | Refills: 1 | Status: SHIPPED | OUTPATIENT
Start: 2024-03-27

## 2024-03-27 RX ORDER — DOXYCYCLINE 100 MG/1
100 CAPSULE ORAL DAILY
Qty: 30 CAPSULE | Refills: 0 | Status: SHIPPED | OUTPATIENT
Start: 2024-03-27

## 2024-03-27 RX ORDER — BUPROPION HYDROCHLORIDE 150 MG/1
150 TABLET ORAL DAILY
Qty: 30 TABLET | Refills: 1 | Status: SHIPPED | OUTPATIENT
Start: 2024-03-27

## 2024-03-28 LAB
ALBUMIN/CREAT UR: 225 MG/G CREAT (ref 0–29)
CREAT UR-MCNC: 111 MG/DL
MICROALBUMIN UR-MCNC: 249.7 UG/ML

## 2024-04-02 ENCOUNTER — TELEPHONE (OUTPATIENT)
Dept: FAMILY MEDICINE CLINIC | Facility: CLINIC | Age: 74
End: 2024-04-02

## 2024-04-02 RX ORDER — CELECOXIB 200 MG/1
200 CAPSULE ORAL DAILY
Qty: 90 CAPSULE | Refills: 1 | Status: SHIPPED | OUTPATIENT
Start: 2024-04-02

## 2024-04-02 NOTE — TELEPHONE ENCOUNTER
Caller: Chastity Salgado    Relationship: Self    Best call back number: [phone]    What medication are you requesting: CELEBREX 200 MG, 1 A DAY    What are your current symptoms: ARTHRITIS    If a prescription is needed, what is your preferred pharmacy and phone number: [facility] [id number] [place], [place] - [address] [facility] & [place] - [phone] [facility] [phone]      Additional notes: PATIENT STATED SHE PREVIOUSLY HAD GOTTEN THIS MEDICATION FROM THE ORTHOPEDIC DOCTOR A FEW YEARS BACK, BUT SHE WOULD LIKE TO KEEP ALL OF HER MEDICATIONS WITH [facility].    PLEASE CALL.

## 2024-04-18 ENCOUNTER — OFFICE VISIT (OUTPATIENT)
Dept: PAIN MEDICINE | Facility: CLINIC | Age: 74
End: 2024-04-18
Payer: COMMERCIAL

## 2024-04-18 VITALS
SYSTOLIC BLOOD PRESSURE: 148 MMHG | OXYGEN SATURATION: 96 % | HEART RATE: 95 BPM | TEMPERATURE: 96.8 F | RESPIRATION RATE: 18 BRPM | WEIGHT: 217.4 LBS | BODY MASS INDEX: 38.52 KG/M2 | DIASTOLIC BLOOD PRESSURE: 81 MMHG | HEIGHT: 63 IN

## 2024-04-18 DIAGNOSIS — M19.012 OSTEOARTHRITIS OF BOTH SHOULDERS, UNSPECIFIED OSTEOARTHRITIS TYPE: Primary | ICD-10-CM

## 2024-04-18 DIAGNOSIS — G89.4 CHRONIC PAIN SYNDROME: ICD-10-CM

## 2024-04-18 DIAGNOSIS — M19.042 INFLAMMATION OF LEFT HAND JOINT: ICD-10-CM

## 2024-04-18 DIAGNOSIS — M12.9 ARTHRITIS, MULTIPLE JOINT INVOLVEMENT: ICD-10-CM

## 2024-04-18 DIAGNOSIS — M62.838 MUSCLE SPASM: ICD-10-CM

## 2024-04-18 DIAGNOSIS — M54.2 NECK PAIN: ICD-10-CM

## 2024-04-18 DIAGNOSIS — M19.011 OSTEOARTHRITIS OF BOTH SHOULDERS, UNSPECIFIED OSTEOARTHRITIS TYPE: Primary | ICD-10-CM

## 2024-04-18 DIAGNOSIS — Z51.81 ENCOUNTER FOR MONITORING OPIOID MAINTENANCE THERAPY: ICD-10-CM

## 2024-04-18 DIAGNOSIS — Z79.891 ENCOUNTER FOR MONITORING OPIOID MAINTENANCE THERAPY: ICD-10-CM

## 2024-04-18 RX ORDER — KETOROLAC TROMETHAMINE 30 MG/ML
30 INJECTION, SOLUTION INTRAMUSCULAR; INTRAVENOUS ONCE
Status: COMPLETED | OUTPATIENT
Start: 2024-04-18 | End: 2024-04-18

## 2024-04-18 RX ORDER — HYDROCODONE BITARTRATE AND ACETAMINOPHEN 10; 325 MG/1; MG/1
1 TABLET ORAL
Qty: 150 TABLET | Refills: 0 | Status: SHIPPED | OUTPATIENT
Start: 2024-04-18

## 2024-04-18 RX ORDER — KETOROLAC TROMETHAMINE 10 MG/1
10 TABLET, FILM COATED ORAL EVERY 6 HOURS PRN
Status: SHIPPED | OUTPATIENT
Start: 2024-04-18 | End: 2024-04-23

## 2024-04-18 RX ADMIN — KETOROLAC TROMETHAMINE 30 MG: 30 INJECTION, SOLUTION INTRAMUSCULAR; INTRAVENOUS at 16:15

## 2024-04-18 NOTE — PROGRESS NOTES
"CHIEF COMPLAINT  Neck and joint pain    Subjective   Chastity Salgado is a 74 y.o. female  who presents for follow-up.  She has a history of chronic neck and joint pain. She reports that her pain has worsened since her last office visit. She believes this is due to a RSV shot last week.     Today pain is 8/10VAS in severity. Pain is located in her bilateral shoulders, knees, ankles, and neck (right and left side). Describes this pain as an intermittent ache. Pain is worsened by certain movements, prolonged positions, climbing stairs, bending/twisting, and weather. Pain improves with rest, reposition, heat/ice, and medications. She reports increased neck pain since her last office visit and states when she moves her neck she feels a \"crunching\".      She continues with Hydrocodone 10mg 5/day, Cymbalta 60mg (prescribed by PCP) and Flexeril 10mg at HS. She reports occasional constipation that she manages with Metamucil and Linzess/Movantik. Denies any side effects from the regimen including somnolence. The regimen helps decrease pain by 75%. Notes improvement in activity and function with regimen. ADL's by self. Denies any bowel or bladder changes.      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 Gimenez. She has completed PT for this issue. She continues to receive cortisone injections to her left shoulder very 3 months and received an injection at the end of December. Plans for a revision to her right shoulder next year.     Patient was evaluated by Dr. Hogan on 11/30/23, patient noted to be high risk for misuse/abuse of opioids.  He recommended a cautious conservative approach with medication dosing and compliance to be monitored more frequently.      Procedures:  2/25/21 - left suprascapular nerve block/steroid injection (posterior approach) - 50% relief x 1 month  9/22/20 - " left suprascapular nerve block/steroid injection (posterior approach) - 70% relief x 1-2 months    Neck Pain   This is a chronic problem. The current episode started more than 1 year ago. The problem occurs intermittently. The problem has been gradually worsening. The pain is associated with a sleep position. The pain is present in the left side and right side. The quality of the pain is described as aching. The pain is at a severity of 8/10. The symptoms are aggravated by twisting, position and bending. Stiffness is present In the morning. Associated symptoms include numbness (fingers/left hand). Pertinent negatives include no chest pain, fever, headaches or weakness. She has tried ice, heat and oral narcotics for the symptoms.   Joint Pain  This is a chronic (bilateral knees, ankles, and shoulders) problem. The current episode started more than 1 year ago. The problem occurs intermittently. The problem has been gradually worsening (Rates pain 8/10VAS in severity.). Associated symptoms include arthralgias, joint swelling (left hand), nausea, neck pain and numbness (fingers/left hand). Pertinent negatives include no abdominal pain, chest pain, chills, congestion, coughing, fatigue, fever, headaches or weakness. The symptoms are aggravated by walking, bending, exertion, twisting and standing (cold weather, stairs). She has tried oral narcotics, position changes, heat, ice and rest for the symptoms.     PEG Assessment   What number best describes your pain on average in the past week?7  What number best describes how, during the past week, pain has interfered with your enjoyment of life?8  What number best describes how, during the past week, pain has interfered with your general activity?  8    The following portions of the patient's history were reviewed and updated as appropriate: allergies, current medications, past family history, past medical history, past social history, past surgical history, and problem  "list.    Review of Systems   Constitutional:  Negative for activity change (less), chills, fatigue and fever.   HENT:  Negative for congestion.    Respiratory:  Negative for cough, chest tightness and shortness of breath.    Cardiovascular:  Negative for chest pain.   Gastrointestinal:  Positive for constipation and nausea. Negative for abdominal pain and diarrhea.   Genitourinary:  Negative for difficulty urinating and dysuria.   Musculoskeletal:  Positive for arthralgias, joint swelling (left hand) and neck pain.   Neurological:  Positive for numbness (fingers/left hand). Negative for dizziness, weakness, light-headedness and headaches.   Psychiatric/Behavioral:  Positive for sleep disturbance. Negative for agitation and suicidal ideas. The patient is not nervous/anxious.      I have reviewed and confirmed the accuracy of the ROS as documented by the MA/LPN/RN NEWTON Abdul    Vitals:    04/18/24 1536   BP: 148/81   BP Location: Left arm   Patient Position: Sitting   Cuff Size: Large Adult   Pulse: 95   Resp: 18   Temp: 96.8 °F (36 °C)   TempSrc: Temporal   SpO2: 96%   Weight: 98.6 kg (217 lb 6.4 oz)   Height: 160 cm (62.99\")   PainSc:   8     Objective   Physical Exam  Constitutional:       Appearance: Normal appearance.   HENT:      Head: Normocephalic.   Cardiovascular:      Rate and Rhythm: Normal rate and regular rhythm.   Pulmonary:      Effort: Pulmonary effort is normal.      Breath sounds: Normal breath sounds.   Musculoskeletal:      Right shoulder: Tenderness present. Decreased range of motion.      Left shoulder: Tenderness and crepitus present. Decreased range of motion.      Left hand: Swelling and tenderness present.      Cervical back: Tenderness present. Pain with movement present. Decreased range of motion.      Lumbar back: Tenderness present. Normal range of motion. Negative right straight leg raise test and negative left straight leg raise test.      Right knee: Swelling present. " Decreased range of motion. Tenderness present.   Skin:     General: Skin is warm and dry.      Capillary Refill: Capillary refill takes less than 2 seconds.   Neurological:      General: No focal deficit present.      Mental Status: She is alert and oriented to person, place, and time.      Gait: Gait abnormal (slowed).   Psychiatric:         Mood and Affect: Mood normal.         Speech: Speech normal.         Behavior: Behavior normal.         Thought Content: Thought content normal.         Cognition and Memory: Cognition normal.       Assessment & Plan   Diagnoses and all orders for this visit:    1. Osteoarthritis of both shoulders, unspecified osteoarthritis type (Primary)  -     ketorolac (TORADOL) injection 30 mg  -     ketorolac (TORADOL) tablet 10 mg  -     HYDROcodone-acetaminophen (NORCO)  MG per tablet; Take 1 tablet by mouth 5 (Five) Times a Day As Needed for Severe Pain. 30 day supply.  Dispense: 150 tablet; Refill: 0    2. Inflammation of left hand joint  -     ketorolac (TORADOL) injection 30 mg  -     ketorolac (TORADOL) tablet 10 mg    3. Neck pain  -     HYDROcodone-acetaminophen (NORCO)  MG per tablet; Take 1 tablet by mouth 5 (Five) Times a Day As Needed for Severe Pain. 30 day supply.  Dispense: 150 tablet; Refill: 0    4. Muscle spasm    5. Chronic pain syndrome    6. Arthritis, multiple joint involvement    7. Encounter for monitoring opioid maintenance therapy      Chastity J  reports a pain score of 8.  Given her pain assessment as noted, treatment options were discussed and the following options were decided upon as a follow-up plan to address the patient's pain: continuation of current treatment plan for pain and prescription for opiod analgesics.    --- The urine drug screen confirmation from 11/7/23 has been reviewed and the result is appropriate based on patient history and GAYE report  --- The patient signed an updated copy of the controlled substance agreement on  12/15/23  --- IM Toradol 30mg in office for flare pain. Patient will be prescribed Toradol 10mg PO every 6 hours as needed for 5 days. Instructed to avoid all other NSAID medication for 24 hours following injection and while taking PO Toradol.   --- Continue Flexeril. No refill needed at this time.   --- Continue Hydrocodone. Patient appears stable with current regimen. No adverse effects. Regarding continuation of opioids, there is no evidence of aberrant behavior or any red flags.  The patient continues with appropriate response to opioid therapy. ADL's remain intact by self.   --- Follow-up 1 month     GAYE REPORT  As part of the patient's treatment plan, I am prescribing controlled substances. The patient has been made aware of appropriate use of such medications, including potential risk of somnolence, limited ability to drive and/or work safely, and the potential for dependence or overdose. It has also been made clear that these medications are for use by this patient only, without concomitant use of alcohol or other substances unless prescribed.     Patient has completed prescribing agreement detailing terms of continued prescribing of controlled substances, including monitoring GAYE reports, urine drug screening, and pill counts if necessary. The patient is aware that inappropriate use will results in cessation of prescribing such medications.    As the clinician, I personally reviewed the GAYE from 4/18/24 while the patient was in the office today.    History and physical exam exhibit continued safe and appropriate use of controlled substances.    Dictated utilizing Dragon dictation.

## 2024-05-01 RX ORDER — DULOXETIN HYDROCHLORIDE 30 MG/1
30 CAPSULE, DELAYED RELEASE ORAL DAILY
Qty: 30 CAPSULE | Refills: 1 | Status: SHIPPED | OUTPATIENT
Start: 2024-05-01

## 2024-05-15 DIAGNOSIS — M19.011 OSTEOARTHRITIS OF BOTH SHOULDERS, UNSPECIFIED OSTEOARTHRITIS TYPE: ICD-10-CM

## 2024-05-15 DIAGNOSIS — M54.2 NECK PAIN: ICD-10-CM

## 2024-05-15 DIAGNOSIS — M19.012 OSTEOARTHRITIS OF BOTH SHOULDERS, UNSPECIFIED OSTEOARTHRITIS TYPE: ICD-10-CM

## 2024-05-15 NOTE — TELEPHONE ENCOUNTER
Caller: REGINE OLIVER     Relationship: PATIENT     Best call back number: [phone]    Requested Prescriptions:   Requested Prescriptions     Pending Prescriptions Disp Refills    HYDROcodone-acetaminophen (NORCO)  MG per tablet 150 tablet 0     Sig: Take 1 tablet by mouth 5 (Five) Times a Day As Needed for Severe Pain. 30 day supply.        Pharmacy where request should be sent:    [facility] [id number] - [place], [place] - [id number] [place] [address] AT [facility] [address] - [phone] [facility] [phone]    [address] [facility] [address]   Phone: [phone] Fax: [phone]   Hours: Not open 24 hours       Last office visit with prescribing clinician: 4/18/2024   Last telemedicine visit with prescribing clinician: Visit date not found   Next office visit with prescribing clinician: 5/23/2024     Additional details provided by patient: REFILL DATE 18TH     Does the patient have less than a 3 day supply:  [] Yes  [x] No    Would you like a call back once the refill request has been completed: [] Yes [x] No    If the office needs to give you a call back, can they leave a voicemail: [] Yes [x] No    Shawn Solorio Rep   05/15/24 11:47 EDT

## 2024-05-16 RX ORDER — HYDROCODONE BITARTRATE AND ACETAMINOPHEN 10; 325 MG/1; MG/1
1 TABLET ORAL
Qty: 150 TABLET | Refills: 0 | Status: SHIPPED | OUTPATIENT
Start: 2024-05-16

## 2024-05-23 ENCOUNTER — OFFICE VISIT (OUTPATIENT)
Dept: PAIN MEDICINE | Facility: CLINIC | Age: 74
End: 2024-05-23
Payer: COMMERCIAL

## 2024-05-23 VITALS
SYSTOLIC BLOOD PRESSURE: 124 MMHG | WEIGHT: 223.2 LBS | HEIGHT: 63 IN | DIASTOLIC BLOOD PRESSURE: 79 MMHG | TEMPERATURE: 98.1 F | BODY MASS INDEX: 39.55 KG/M2 | HEART RATE: 90 BPM

## 2024-05-23 DIAGNOSIS — M19.042 INFLAMMATION OF LEFT HAND JOINT: ICD-10-CM

## 2024-05-23 DIAGNOSIS — M12.9 ARTHRITIS, MULTIPLE JOINT INVOLVEMENT: ICD-10-CM

## 2024-05-23 DIAGNOSIS — G89.4 CHRONIC PAIN SYNDROME: ICD-10-CM

## 2024-05-23 DIAGNOSIS — Z79.891 ENCOUNTER FOR MONITORING OPIOID MAINTENANCE THERAPY: ICD-10-CM

## 2024-05-23 DIAGNOSIS — M19.012 OSTEOARTHRITIS OF BOTH SHOULDERS, UNSPECIFIED OSTEOARTHRITIS TYPE: Primary | ICD-10-CM

## 2024-05-23 DIAGNOSIS — M54.2 NECK PAIN: ICD-10-CM

## 2024-05-23 DIAGNOSIS — T40.2X5A THERAPEUTIC OPIOID INDUCED CONSTIPATION: ICD-10-CM

## 2024-05-23 DIAGNOSIS — Z51.81 ENCOUNTER FOR MONITORING OPIOID MAINTENANCE THERAPY: ICD-10-CM

## 2024-05-23 DIAGNOSIS — M19.011 OSTEOARTHRITIS OF BOTH SHOULDERS, UNSPECIFIED OSTEOARTHRITIS TYPE: Primary | ICD-10-CM

## 2024-05-23 DIAGNOSIS — M62.838 MUSCLE SPASM: ICD-10-CM

## 2024-05-23 DIAGNOSIS — K59.03 THERAPEUTIC OPIOID INDUCED CONSTIPATION: ICD-10-CM

## 2024-05-23 RX ORDER — CYCLOBENZAPRINE HCL 10 MG
10 TABLET ORAL 3 TIMES DAILY PRN
Qty: 90 TABLET | Refills: 1 | Status: SHIPPED | OUTPATIENT
Start: 2024-05-23

## 2024-05-23 NOTE — PROGRESS NOTES
"CHIEF COMPLAINT  Neck and joint pain     Subjective   Chastity Salgado is a 74 y.o. female  who presents for follow-up.  She has a history of chronic neck and joint pain. She reports that her pain has remained consistent since her last office visit and varies based on activity level and weather changes.     Today pain is 7/10VAS in severity. Pain is located in her bilateral shoulders, knees, ankles, and neck (right and left side). Describes this pain as an intermittent ache. Pain is worsened by certain movements, prolonged positions, climbing stairs, bending/twisting, and weather. Pain improves with rest, reposition, heat/ice, and medications. She reports increased neck pain since her last office visit and states when she moves her neck she feels a \"crunching\".      She continues with Hydrocodone 10mg 5/day, Cymbalta 60mg (prescribed by PCP) and Flexeril 10mg at HS. She reports occasional constipation that she manages with Metamucil and Linzess/Movantik. Denies any side effects from the regimen including somnolence. The regimen helps decrease pain by 75%. Notes improvement in activity and function with regimen. ADL's by self. Denies any bowel or bladder changes.      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 Gimenez. She has completed PT for this issue. She continues to receive cortisone injections to her left shoulder very 3 months and received an injection at the end of December. Plans for a revision to her right shoulder next year. She recently changed insurance and needs to schedule a follow up visit with Dr. Tu Vásquez.      Patient was evaluated by Dr. Hogan on 11/30/23, patient noted to be high risk for misuse/abuse of opioids.  He recommended a cautious conservative approach with medication dosing and compliance to be monitored more frequently.     "   Procedures:  2/25/21 - left suprascapular nerve block/steroid injection (posterior approach) - 50% relief x 1 month  9/22/20 - left suprascapular nerve block/steroid injection (posterior approach) - 70% relief x 1-2 months    Neck Pain   This is a chronic problem. The current episode started more than 1 year ago. The problem occurs intermittently. The problem has been waxing and waning. The pain is associated with a sleep position. The pain is present in the left side and right side. The quality of the pain is described as aching. The pain is at a severity of 7/10. The symptoms are aggravated by twisting, position and bending. Stiffness is present In the morning. Associated symptoms include numbness (left hand). Pertinent negatives include no chest pain, fever, headaches or weakness. She has tried ice, heat and oral narcotics for the symptoms.   Joint Pain  This is a chronic (bilateral knees, ankles, and shoulders) problem. The current episode started more than 1 year ago. The problem occurs intermittently. The problem has been waxing and waning (Rates pain 7/10VAS in severity.). Associated symptoms include arthralgias, fatigue, joint swelling (left hand), nausea, neck pain and numbness (left hand). Pertinent negatives include no abdominal pain, chest pain, chills, congestion, coughing, fever, headaches or weakness. The symptoms are aggravated by walking, bending, exertion, twisting and standing (cold weather, stairs). She has tried oral narcotics, position changes, heat, ice and rest for the symptoms.      PEG Assessment   What number best describes your pain on average in the past week?8  What number best describes how, during the past week, pain has interfered with your enjoyment of life?9  What number best describes how, during the past week, pain has interfered with your general activity?  8    The following portions of the patient's history were reviewed and updated as appropriate: allergies, current  "medications, past family history, past medical history, past social history, past surgical history, and problem list.    Review of Systems   Constitutional:  Positive for fatigue. Negative for activity change, chills and fever.   HENT:  Negative for congestion.    Eyes:  Negative for visual disturbance.   Respiratory:  Negative for cough, chest tightness and shortness of breath.    Cardiovascular:  Negative for chest pain.   Gastrointestinal:  Positive for constipation, diarrhea and nausea. Negative for abdominal pain.   Genitourinary:  Negative for difficulty urinating, dyspareunia and dysuria.   Musculoskeletal:  Positive for arthralgias, joint swelling (left hand) and neck pain.   Neurological:  Positive for dizziness and numbness (left hand). Negative for weakness, light-headedness and headaches.   Psychiatric/Behavioral:  Positive for sleep disturbance. Negative for agitation. The patient is not nervous/anxious.      I have reviewed and confirmed the accuracy of the ROS as documented by the MA/LPN/RN NEWTON Abdul    Vitals:    05/23/24 0849   BP: 124/79   Pulse: 90   Temp: 98.1 °F (36.7 °C)   Weight: 101 kg (223 lb 3.2 oz)   Height: 160 cm (63\")   PainSc:   7   PainLoc: Shoulder  Comment: knees, ankle     Objective   Physical Exam  Constitutional:       Appearance: Normal appearance.   HENT:      Head: Normocephalic.   Cardiovascular:      Rate and Rhythm: Normal rate and regular rhythm.   Pulmonary:      Effort: Pulmonary effort is normal.      Breath sounds: Normal breath sounds.   Musculoskeletal:      Right shoulder: Tenderness present. Decreased range of motion.      Left shoulder: Tenderness and crepitus present. Decreased range of motion.      Left hand: Swelling and tenderness present.      Cervical back: Tenderness present. Pain with movement present. Decreased range of motion.      Lumbar back: Tenderness present. Normal range of motion. Negative right straight leg raise test and negative " left straight leg raise test.      Right knee: Swelling present. Decreased range of motion. Tenderness present.   Skin:     General: Skin is warm and dry.      Capillary Refill: Capillary refill takes less than 2 seconds.   Neurological:      General: No focal deficit present.      Mental Status: She is alert and oriented to person, place, and time.      Gait: Gait abnormal (slowed).   Psychiatric:         Mood and Affect: Mood normal.         Speech: Speech normal.         Behavior: Behavior normal.         Thought Content: Thought content normal.         Cognition and Memory: Cognition normal.       Assessment & Plan   Diagnoses and all orders for this visit:    1. Osteoarthritis of both shoulders, unspecified osteoarthritis type (Primary)    2. Neck pain    3. Inflammation of left hand joint    4. Muscle spasm  -     cyclobenzaprine (FLEXERIL) 10 MG tablet; Take 1 tablet by mouth 3 (Three) Times a Day As Needed for Muscle Spasms.  Dispense: 90 tablet; Refill: 1    5. Chronic pain syndrome    6. Arthritis, multiple joint involvement    7. Therapeutic opioid induced constipation    8. Encounter for monitoring opioid maintenance therapy      Chastity MARSHALL  reports a pain score of 7.  Given her pain assessment as noted, treatment options were discussed and the following options were decided upon as a follow-up plan to address the patient's pain: continuation of current treatment plan for pain and prescription for non-opiod analgesics.    --- The urine drug screen confirmation from 11/7/23 has been reviewed and the result is appropriate based on patient history and GAYE report  --- The patient signed an updated copy of the controlled substance agreement on 12/15/23  --- Continue Flexeril. Refill sent to pharmacy.   --- Continue Hydrocodone. No refill needed at this time. Patient appears stable with current regimen. No adverse effects. Regarding continuation of opioids, there is no evidence of aberrant behavior or  any red flags.  The patient continues with appropriate response to opioid therapy. ADL's remain intact by self.   --- Discussed updating cervical MRI/CT if neck pain were to worsen. Brief discussion on ANGE and MBB/RFA  --- Follow-up 1 month     GAYE REPORT  As part of the patient's treatment plan, I am prescribing controlled substances. The patient has been made aware of appropriate use of such medications, including potential risk of somnolence, limited ability to drive and/or work safely, and the potential for dependence or overdose. It has also been made clear that these medications are for use by this patient only, without concomitant use of alcohol or other substances unless prescribed.     Patient has completed prescribing agreement detailing terms of continued prescribing of controlled substances, including monitoring GAYE reports, urine drug screening, and pill counts if necessary. The patient is aware that inappropriate use will results in cessation of prescribing such medications.    As the clinician, I personally reviewed the GAYE from 5/23/24 while the patient was in the office today.    History and physical exam exhibit continued safe and appropriate use of controlled substances.    Dictated utilizing Dragon dictation.

## 2024-06-04 ENCOUNTER — OFFICE VISIT (OUTPATIENT)
Dept: FAMILY MEDICINE CLINIC | Facility: CLINIC | Age: 74
End: 2024-06-04
Payer: MEDICARE

## 2024-06-04 VITALS
SYSTOLIC BLOOD PRESSURE: 138 MMHG | OXYGEN SATURATION: 99 % | BODY MASS INDEX: 38.43 KG/M2 | HEIGHT: 63 IN | WEIGHT: 216.9 LBS | DIASTOLIC BLOOD PRESSURE: 78 MMHG | HEART RATE: 76 BPM

## 2024-06-04 DIAGNOSIS — H60.312 ACUTE DIFFUSE OTITIS EXTERNA OF LEFT EAR: ICD-10-CM

## 2024-06-04 DIAGNOSIS — F33.1 MAJOR DEPRESSIVE DISORDER, RECURRENT, MODERATE: Primary | ICD-10-CM

## 2024-06-04 PROCEDURE — 99214 OFFICE O/P EST MOD 30 MIN: CPT | Performed by: NURSE PRACTITIONER

## 2024-06-04 PROCEDURE — 3075F SYST BP GE 130 - 139MM HG: CPT | Performed by: NURSE PRACTITIONER

## 2024-06-04 PROCEDURE — 3078F DIAST BP <80 MM HG: CPT | Performed by: NURSE PRACTITIONER

## 2024-06-04 PROCEDURE — 1125F AMNT PAIN NOTED PAIN PRSNT: CPT | Performed by: NURSE PRACTITIONER

## 2024-06-04 PROCEDURE — 3051F HG A1C>EQUAL 7.0%<8.0%: CPT | Performed by: NURSE PRACTITIONER

## 2024-06-04 RX ORDER — MECLIZINE HYDROCHLORIDE 25 MG/1
25 TABLET ORAL 3 TIMES DAILY PRN
Qty: 60 TABLET | Refills: 1 | Status: SHIPPED | OUTPATIENT
Start: 2024-06-04

## 2024-06-04 RX ORDER — BUPROPION HYDROCHLORIDE 300 MG/1
300 TABLET ORAL DAILY
Qty: 30 TABLET | Refills: 2 | Status: SHIPPED | OUTPATIENT
Start: 2024-06-04

## 2024-06-04 RX ORDER — AMOXICILLIN 500 MG/1
2000 CAPSULE ORAL ONCE
Qty: 4 CAPSULE | Refills: 0 | Status: SHIPPED | OUTPATIENT
Start: 2024-06-04 | End: 2024-06-04

## 2024-06-04 RX ORDER — DULOXETIN HYDROCHLORIDE 30 MG/1
30 CAPSULE, DELAYED RELEASE ORAL DAILY
Qty: 90 CAPSULE | Refills: 1 | Status: SHIPPED | OUTPATIENT
Start: 2024-06-04

## 2024-06-04 NOTE — PROGRESS NOTES
"Chief Complaint  Dizziness (Headache. Patient states she could be getting ear infection )    Subjective        Chastity Salgado presents to [facility]  History of Present Illness  History of Present Illness  The patient presents for evaluation of multiple medical concerns.    The patient has been experiencing general malaise for approximately one month. She recounts an incident at [facility] where she received her wellness vaccines, which she believes exacerbated her illness. Despite not receiving the shingles vaccine, she has received three vaccines, including pneumonia, RSV, and tetanus. Her condition was improving, however, she recently consulted her orthopedic specialist for steroid injections, one in her shoulder, which she receives every three months. She also reported foot pain, which was diagnosed as arthritis via x-ray. Following the steroid injection, she developed a facial redness and warmth.    The patient suspects an ear infection, as she has been experiencing ear pain. She began experiencing a headache yesterday, and today, she reports a sensation akin to floating and balance issues. She has been self-medicating with over-the-counter meclizine, which she finds beneficial. She has been taking Zyrtec for ear drainage.    During her last visit, her antidepressant medication was switched. She reported a positive response to the combination of both medications, but continues to experience crying episodes, which she attributes to family issues. She has been off bupropion and cymbalta  for approximately one to two weeks. She reports increased dizziness in that time frame.    Objective   Vital Signs:  /78 (BP Location: Right arm, Patient Position: Sitting, Cuff Size: Large Adult)   Pulse 76   Ht 160 cm (63\")   Wt 98.4 kg (216 lb 14.4 oz)   SpO2 99%   BMI 38.42 kg/m²   Estimated body mass index is 38.42 kg/m² as calculated from the following:    Height as of this encounter: " "160 cm (63\").    Weight as of this encounter: 98.4 kg (216 lb 14.4 oz).               Physical Exam  Physical Exam      Result Review :          Results  Imaging  X-ray of the foot showed arthritis on both sides of the ankle.    Assessment & Plan  1. OE  An antibiotic steroid ear drop has been prescribed, to be administered thrice daily in both ears.    2. Depression.  The dosage of Wellbutrin has been increased to 300 mg, with a 30-day prescription with 2 refills. The current Cymbalta dosage will be maintained at 30 mg.    3. Dental procedure.  The patient has been advised to take 2000 mg of amoxicillin prior to her dental procedure.           Assessment and Plan     Diagnoses and all orders for this visit:    1. Major depressive disorder, recurrent, moderate (Primary)    2. Acute diffuse otitis externa of left ear    Other orders  -     meclizine (ANTIVERT) 25 MG tablet; Take 1 tablet by mouth 3 (Three) Times a Day As Needed for Dizziness.  Dispense: 60 tablet; Refill: 1  -     buPROPion XL (Wellbutrin XL) 300 MG 24 hr tablet; Take 1 tablet by mouth Daily.  Dispense: 30 tablet; Refill: 2  -     DULoxetine (Cymbalta) 30 MG capsule; Take 1 capsule by mouth Daily.  Dispense: 90 capsule; Refill: 1  -     amoxicillin (AMOXIL) 500 MG capsule; Take 4 capsules by mouth 1 (One) Time for 1 dose.  Dispense: 4 capsule; Refill: 0  -     neomycin-polymyxin-hydrocortisone (CORTISPORIN) 3.5-36039-6 otic solution; Administer 3 drops into both ears 3 (Three) Times a Day.  Dispense: 10 mL; Refill: 0             Follow Up     No follow-ups on file.  Patient was given instructions and counseling regarding her condition or for health maintenance advice. Please see specific information pulled into the AVS if appropriate.       Patient or patient representative verbalized consent for the use of Ambient Listening during the visit with  NEWTON Jose for chart documentation. 6/4/2024  18:09 EDT  "

## 2024-06-12 ENCOUNTER — TELEPHONE (OUTPATIENT)
Dept: PAIN MEDICINE | Facility: CLINIC | Age: 74
End: 2024-06-12
Payer: MEDICARE

## 2024-06-12 DIAGNOSIS — M19.012 OSTEOARTHRITIS OF BOTH SHOULDERS, UNSPECIFIED OSTEOARTHRITIS TYPE: ICD-10-CM

## 2024-06-12 DIAGNOSIS — M54.2 NECK PAIN: ICD-10-CM

## 2024-06-12 DIAGNOSIS — M19.011 OSTEOARTHRITIS OF BOTH SHOULDERS, UNSPECIFIED OSTEOARTHRITIS TYPE: ICD-10-CM

## 2024-06-12 NOTE — TELEPHONE ENCOUNTER
Caller: Chastity Salgado    Relationship: Self    Best call back number: [phone]    Requested Prescriptions: HYDROcodone-acetaminophen (NORCO)  MG per tablet   Requested Prescriptions      No prescriptions requested or ordered in this encounter        Pharmacy where request should be sent:      [facility] [id number] [facility] [id number] [facility] AT [facility] [place] - [phone] [facility] [phone]      Last office visit with prescribing clinician: 5/23/2024   Last telemedicine visit with prescribing clinician: Visit date not found   Next office visit with prescribing clinician: 6/21/2024         Does the patient have less than a 3 day supply:  [] Yes  [x] No    Would you like a call back once the refill request has been completed: [] Yes [x] No    If the office needs to give you a call back,

## 2024-06-13 RX ORDER — HYDROCODONE BITARTRATE AND ACETAMINOPHEN 10; 325 MG/1; MG/1
1 TABLET ORAL
Qty: 150 TABLET | Refills: 0 | Status: SHIPPED | OUTPATIENT
Start: 2024-06-13

## 2024-06-28 ENCOUNTER — OFFICE VISIT (OUTPATIENT)
Dept: PAIN MEDICINE | Facility: CLINIC | Age: 74
End: 2024-06-28
Payer: MEDICARE

## 2024-06-28 VITALS
DIASTOLIC BLOOD PRESSURE: 77 MMHG | HEART RATE: 100 BPM | BODY MASS INDEX: 39.27 KG/M2 | OXYGEN SATURATION: 95 % | SYSTOLIC BLOOD PRESSURE: 130 MMHG | TEMPERATURE: 96.8 F | RESPIRATION RATE: 18 BRPM | HEIGHT: 63 IN | WEIGHT: 221.6 LBS

## 2024-06-28 DIAGNOSIS — M19.011 OSTEOARTHRITIS OF BOTH SHOULDERS, UNSPECIFIED OSTEOARTHRITIS TYPE: ICD-10-CM

## 2024-06-28 DIAGNOSIS — M19.012 OSTEOARTHRITIS OF BOTH SHOULDERS, UNSPECIFIED OSTEOARTHRITIS TYPE: ICD-10-CM

## 2024-06-28 DIAGNOSIS — M19.042 INFLAMMATION OF LEFT HAND JOINT: Primary | ICD-10-CM

## 2024-06-28 DIAGNOSIS — Z79.891 ENCOUNTER FOR MONITORING OPIOID MAINTENANCE THERAPY: ICD-10-CM

## 2024-06-28 DIAGNOSIS — M12.9 ARTHRITIS, MULTIPLE JOINT INVOLVEMENT: ICD-10-CM

## 2024-06-28 DIAGNOSIS — K59.03 THERAPEUTIC OPIOID INDUCED CONSTIPATION: ICD-10-CM

## 2024-06-28 DIAGNOSIS — G89.4 CHRONIC PAIN SYNDROME: ICD-10-CM

## 2024-06-28 DIAGNOSIS — T40.2X5A THERAPEUTIC OPIOID INDUCED CONSTIPATION: ICD-10-CM

## 2024-06-28 DIAGNOSIS — M62.838 MUSCLE SPASM: ICD-10-CM

## 2024-06-28 DIAGNOSIS — M54.2 NECK PAIN: ICD-10-CM

## 2024-06-28 DIAGNOSIS — Z51.81 ENCOUNTER FOR MONITORING OPIOID MAINTENANCE THERAPY: ICD-10-CM

## 2024-06-28 RX ORDER — HYDROCODONE BITARTRATE AND ACETAMINOPHEN 10; 325 MG/1; MG/1
1 TABLET ORAL
Qty: 150 TABLET | Refills: 0 | Status: SHIPPED | OUTPATIENT
Start: 2024-06-28

## 2024-06-28 NOTE — PROGRESS NOTES
"CHIEF COMPLAINT  Neck and joint pain    Subjective   Chastity Salgado is a 74 y.o. female  who presents for follow-up.  She has a history of chronic neck and joint pain. She reports that her pain has  remained consistent since her last office visit.     Today pain is 7/10VAS in severity. Pain is located in her bilateral shoulders, knees, ankles, and neck (right and left side). Describes this pain as an intermittent ache. Pain is worsened by certain movements, prolonged positions, climbing stairs, bending/twisting, and weather. Pain improves with rest, reposition, heat/ice, and medications. She reports increased neck pain since her last office visit and states when she moves her neck she feels a \"crunching\".      She continues with Hydrocodone 10mg 5/day, Cymbalta 60mg (prescribed by PCP) and Flexeril 10mg at HS. She reports occasional constipation that she manages with Metamucil and Linzess/Movantik. Denies any side effects from the regimen including somnolence. The regimen helps decrease pain by 75%. Notes improvement in activity and function with regimen. ADL's by self. Denies any bowel or bladder changes.      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 Gimenez. She has completed PT for this issue. She continues to receive cortisone injections to her left shoulder very 3 months and received an injection at the end of December. She saw Dr. Tu Vásquez on June 3rd and received a left shoulder and left ankle steroid injection. He ordered a CT scan of her right shoulder for further evaluation of hardware and due to worsening pain.     Procedures:  2/25/21 - left suprascapular nerve block/steroid injection (posterior approach) - 50% relief x 1 month  9/22/20 - left suprascapular nerve block/steroid injection (posterior approach) - 70% relief x 1-2 months    Neck Pain   This is a " chronic problem. The current episode started more than 1 year ago. The problem occurs intermittently. The problem has been unchanged. The pain is associated with a sleep position. The pain is present in the left side and right side. The quality of the pain is described as aching. The pain is at a severity of 7/10. The symptoms are aggravated by twisting, position and bending. Stiffness is present In the morning. Associated symptoms include numbness (left hand) and weakness (left hand). Pertinent negatives include no chest pain, fever or headaches. She has tried ice, heat and oral narcotics for the symptoms.   Joint Pain  This is a chronic (bilateral knees, ankles, and shoulders) problem. The current episode started more than 1 year ago. The problem occurs intermittently. The problem has been unchanged (Rates pain 7/10VAS in severity.). Associated symptoms include arthralgias, joint swelling (left hand), nausea, neck pain, numbness (left hand) and weakness (left hand). Pertinent negatives include no abdominal pain, chest pain, chills, congestion, coughing, fatigue, fever or headaches. The symptoms are aggravated by walking, bending, exertion, twisting and standing (cold weather, stairs). She has tried oral narcotics, position changes, heat, ice and rest for the symptoms.     PEG Assessment   What number best describes your pain on average in the past week?7  What number best describes how, during the past week, pain has interfered with your enjoyment of life?9  What number best describes how, during the past week, pain has interfered with your general activity?  9    The following portions of the patient's history were reviewed and updated as appropriate: allergies, current medications, past family history, past medical history, past social history, past surgical history, and problem list.    Review of Systems   Constitutional:  Negative for activity change, chills, fatigue and fever.   HENT:  Negative for  "congestion.    Respiratory:  Negative for cough and chest tightness.    Cardiovascular:  Negative for chest pain.   Gastrointestinal:  Positive for constipation and nausea. Negative for abdominal pain and diarrhea.   Genitourinary:  Negative for difficulty urinating and dysuria.   Musculoskeletal:  Positive for arthralgias, joint swelling (left hand) and neck pain.   Neurological:  Positive for dizziness, weakness (left hand), light-headedness and numbness (left hand). Negative for headaches.   Psychiatric/Behavioral:  Positive for agitation and sleep disturbance. Negative for suicidal ideas. The patient is nervous/anxious.      I have reviewed and confirmed the accuracy of the ROS as documented by the MA/LPN/RN NEWTON Abdul    Vitals:    06/28/24 1305   BP: 130/77   BP Location: Left arm   Patient Position: Sitting   Cuff Size: Large Adult   Pulse: 100   Resp: 18   Temp: 96.8 °F (36 °C)   TempSrc: Temporal   SpO2: 95%   Weight: 101 kg (221 lb 9.6 oz)   Height: 160 cm (63\")   PainSc:   7     Objective   Physical Exam  Constitutional:       Appearance: Normal appearance.   HENT:      Head: Normocephalic.   Cardiovascular:      Rate and Rhythm: Normal rate and regular rhythm.   Pulmonary:      Effort: Pulmonary effort is normal.      Breath sounds: Normal breath sounds.   Musculoskeletal:      Right shoulder: Tenderness present. Decreased range of motion.      Left shoulder: Tenderness and crepitus present. Decreased range of motion.      Left hand: Swelling and tenderness present.      Cervical back: Tenderness present. Pain with movement present. Decreased range of motion.      Lumbar back: Tenderness present. Normal range of motion. Negative right straight leg raise test and negative left straight leg raise test.      Right knee: Swelling present. Decreased range of motion. Tenderness present.   Skin:     General: Skin is warm and dry.      Capillary Refill: Capillary refill takes less than 2 seconds. "   Neurological:      General: No focal deficit present.      Mental Status: She is alert and oriented to person, place, and time.      Gait: Gait abnormal (slowed).   Psychiatric:         Mood and Affect: Mood normal.         Speech: Speech normal.         Behavior: Behavior normal.         Thought Content: Thought content normal.         Cognition and Memory: Cognition normal.       Assessment & Plan   Diagnoses and all orders for this visit:    1. Inflammation of left hand joint (Primary)  -     Urine Drug Screen Confirmation - Urine, Clean Catch; Future  -     POC Urine Drug Screen, Triage    2. Osteoarthritis of both shoulders, unspecified osteoarthritis type  -     HYDROcodone-acetaminophen (NORCO)  MG per tablet; Take 1 tablet by mouth 5 (Five) Times a Day As Needed for Severe Pain. 30 day supply. DNF 7/17/24  Dispense: 150 tablet; Refill: 0  -     Urine Drug Screen Confirmation - Urine, Clean Catch; Future  -     POC Urine Drug Screen, Triage    3. Neck pain  -     HYDROcodone-acetaminophen (NORCO)  MG per tablet; Take 1 tablet by mouth 5 (Five) Times a Day As Needed for Severe Pain. 30 day supply. DNF 7/17/24  Dispense: 150 tablet; Refill: 0  -     Urine Drug Screen Confirmation - Urine, Clean Catch; Future  -     POC Urine Drug Screen, Triage    4. Chronic pain syndrome  -     Urine Drug Screen Confirmation - Urine, Clean Catch; Future  -     POC Urine Drug Screen, Triage    5. Arthritis, multiple joint involvement  -     Urine Drug Screen Confirmation - Urine, Clean Catch; Future  -     POC Urine Drug Screen, Triage    6. Muscle spasm  -     Urine Drug Screen Confirmation - Urine, Clean Catch; Future  -     POC Urine Drug Screen, Triage    7. Therapeutic opioid induced constipation  -     Urine Drug Screen Confirmation - Urine, Clean Catch; Future  -     POC Urine Drug Screen, Triage    8. Encounter for monitoring opioid maintenance therapy  -     Urine Drug Screen Confirmation - Urine, Clean  Catch; Future  -     POC Urine Drug Screen, Triage      Chastity Salgado reports a pain score of 7.  Given her pain assessment as noted, treatment options were discussed and the following options were decided upon as a follow-up plan to address the patient's pain: continuation of current treatment plan for pain and prescription for opiod analgesics.    --- Routine UDS in office today as part of monitoring requirements for controlled substances.  The specimen was viewed and the immunoassay result reviewed and is +OPI.  This specimen will be sent to [facility] laboratory for confirmation.      --- The patient signed an updated copy of the controlled substance agreement on 12/15/23  --- Continue Flexeril. No refill needed at this time.   --- Continue Hydrocodone. DNF 7/17/24. Patient appears stable with current regimen. No adverse effects. Regarding continuation of opioids, there is no evidence of aberrant behavior or any red flags.  The patient continues with appropriate response to opioid therapy. ADL's remain intact by self.   --- Patient was evaluated by Dr. Hogan on 11/30/23 - high risk for misuse/abuse of opioids.   --- Follow-up 1 month     GAYE REPORT  As part of the patient's treatment plan, I am prescribing controlled substances. The patient has been made aware of appropriate use of such medications, including potential risk of somnolence, limited ability to drive and/or work safely, and the potential for dependence or overdose. It has also been made clear that these medications are for use by this patient only, without concomitant use of alcohol or other substances unless prescribed.     Patient has completed prescribing agreement detailing terms of continued prescribing of controlled substances, including monitoring GAYE reports, urine drug screening, and pill counts if necessary. The patient is aware that inappropriate use will results in cessation of prescribing such medications.    As the  clinician, I personally reviewed the GAYE from 6/28/24 while the patient was in the office today.    History and physical exam exhibit continued safe and appropriate use of controlled substances.    Dictated utilizing Dragon dictation.

## 2024-07-10 ENCOUNTER — TELEPHONE (OUTPATIENT)
Dept: PAIN MEDICINE | Facility: CLINIC | Age: 74
End: 2024-07-10

## 2024-07-10 NOTE — TELEPHONE ENCOUNTER
Caller: Chastity Salgado    Relationship: Self    Best call back number: [phone]    What was the call regarding: PT LAST MONTH SAW MARYANA HARVEY, MARYANA WAS SUPPOSED TO CHECK WITH DR HAWKINS TO SEE IF PT CAN COME EVERY 2 MONTHS INSTEAD OF EVERY MONTH. PT HAS NOT HEARD ANYTHING. PLEASE CONTACT PT TO DISCUSS.

## 2024-07-11 NOTE — TELEPHONE ENCOUNTER
My apologies for the delayed response as I was out last week. She is ok to be seen every other month.

## 2024-07-19 ENCOUNTER — OFFICE VISIT (OUTPATIENT)
Dept: FAMILY MEDICINE CLINIC | Facility: CLINIC | Age: 74
End: 2024-07-19
Payer: MEDICARE

## 2024-07-19 VITALS
HEIGHT: 63 IN | OXYGEN SATURATION: 94 % | SYSTOLIC BLOOD PRESSURE: 148 MMHG | DIASTOLIC BLOOD PRESSURE: 74 MMHG | WEIGHT: 219.4 LBS | TEMPERATURE: 96.9 F | HEART RATE: 91 BPM | BODY MASS INDEX: 38.88 KG/M2

## 2024-07-19 DIAGNOSIS — H66.001 NON-RECURRENT ACUTE SUPPURATIVE OTITIS MEDIA OF RIGHT EAR WITHOUT SPONTANEOUS RUPTURE OF TYMPANIC MEMBRANE: Primary | ICD-10-CM

## 2024-07-19 LAB
EXPIRATION DATE: NORMAL
EXPIRATION DATE: NORMAL
FLUAV AG NPH QL: NEGATIVE
FLUBV AG NPH QL: NEGATIVE
INTERNAL CONTROL: NORMAL
INTERNAL CONTROL: NORMAL
Lab: NORMAL
Lab: NORMAL
SARS-COV-2 AG UPPER RESP QL IA.RAPID: NOT DETECTED

## 2024-07-19 PROCEDURE — 3078F DIAST BP <80 MM HG: CPT | Performed by: NURSE PRACTITIONER

## 2024-07-19 PROCEDURE — 87426 SARSCOV CORONAVIRUS AG IA: CPT | Performed by: NURSE PRACTITIONER

## 2024-07-19 PROCEDURE — 3051F HG A1C>EQUAL 7.0%<8.0%: CPT | Performed by: NURSE PRACTITIONER

## 2024-07-19 PROCEDURE — 1125F AMNT PAIN NOTED PAIN PRSNT: CPT | Performed by: NURSE PRACTITIONER

## 2024-07-19 PROCEDURE — 3077F SYST BP >= 140 MM HG: CPT | Performed by: NURSE PRACTITIONER

## 2024-07-19 PROCEDURE — 87804 INFLUENZA ASSAY W/OPTIC: CPT | Performed by: NURSE PRACTITIONER

## 2024-07-19 PROCEDURE — 99213 OFFICE O/P EST LOW 20 MIN: CPT | Performed by: NURSE PRACTITIONER

## 2024-07-19 NOTE — PROGRESS NOTES
"Chief Complaint  Earache    Subjective        Chastity Salgado presents to [facility]  History of Present Illness  Chastity presents with chills that began on Monday evening with associated ear pain. She states her throat also hurt on the left side. It progressed the next days with decreased appetite and sleep, persistent ear pain, feeling of clogged ears, she tried a previous prescription drop, however reported no improvement. She does have nasal congestion. She denies post nasal drainage, dry hacking cough over past several weeks.     History of Present Illness      Objective   Vital Signs:  There were no vitals taken for this visit.  Estimated body mass index is 39.25 kg/m² as calculated from the following:    Height as of 6/28/24: 160 cm (63\").    Weight as of 6/28/24: 101 kg (221 lb 9.6 oz).               Physical Exam  Vitals reviewed.   Constitutional:       General: She is not in acute distress.     Appearance: Normal appearance. She is well-developed. She is not diaphoretic.   HENT:      Right Ear: Ear canal normal. Tympanic membrane is erythematous and bulging.      Left Ear: Tympanic membrane and ear canal normal. Tympanic membrane is not erythematous or bulging.      Nose: Nose normal.      Mouth/Throat:      Mouth: Mucous membranes are moist.      Pharynx: Oropharynx is clear.      Tonsils: No tonsillar exudate or tonsillar abscesses.   Cardiovascular:      Rate and Rhythm: Normal rate and regular rhythm.      Heart sounds: Normal heart sounds. No murmur heard.     No friction rub. No gallop.   Pulmonary:      Effort: Pulmonary effort is normal. No respiratory distress.      Breath sounds: Normal breath sounds. No wheezing or rales.   Musculoskeletal:      Cervical back: Neck supple.   Skin:     General: Skin is warm and dry.   Neurological:      Mental Status: She is alert and oriented to person, place, and time.       Physical Exam      Result Review " :          Results      Assessment & Plan             Assessment and Plan     Diagnoses and all orders for this visit:    1. Non-recurrent acute suppurative otitis media of right ear without spontaneous rupture of tympanic membrane (Primary)  -     POCT SARS-CoV-2 Antigen PATRIZIA  -     POC Influenza A / B      OM: patient with increased erythema and bulging in right ear, recommend amoxicillin 875 mg bid x 10 days, order phoned into pharmacy due to down time. She will follow up for no improvement or worsening symptoms. Covid and flu are negative.        Follow Up     No follow-ups on file.  Patient was given instructions and counseling regarding her condition or for health maintenance advice. Please see specific information pulled into the AVS if appropriate.

## 2024-08-05 ENCOUNTER — TELEPHONE (OUTPATIENT)
Dept: PAIN MEDICINE | Facility: CLINIC | Age: 74
End: 2024-08-05

## 2024-08-05 DIAGNOSIS — M19.011 OSTEOARTHRITIS OF BOTH SHOULDERS, UNSPECIFIED OSTEOARTHRITIS TYPE: ICD-10-CM

## 2024-08-05 DIAGNOSIS — M19.042 INFLAMMATION OF LEFT HAND JOINT: Primary | ICD-10-CM

## 2024-08-05 DIAGNOSIS — M12.9 ARTHRITIS, MULTIPLE JOINT INVOLVEMENT: ICD-10-CM

## 2024-08-05 DIAGNOSIS — M19.012 OSTEOARTHRITIS OF BOTH SHOULDERS, UNSPECIFIED OSTEOARTHRITIS TYPE: ICD-10-CM

## 2024-08-05 DIAGNOSIS — M54.2 NECK PAIN: ICD-10-CM

## 2024-08-05 RX ORDER — KETOROLAC TROMETHAMINE 30 MG/ML
30 INJECTION, SOLUTION INTRAMUSCULAR; INTRAVENOUS ONCE
Status: COMPLETED | OUTPATIENT
Start: 2024-08-05 | End: 2024-08-06

## 2024-08-05 NOTE — TELEPHONE ENCOUNTER
Caller: Chastity Salgado    Relationship: Self    Best call back number: [phone]    What was the call regarding: PT STATES SHE WOULD LIKE A TORADOL SHOT. PT STATES SHE USUALLY HAS MARYANA APPROVE THE SHOT AND SHE COMES IN AND HAS A NURSE GIVE THE SHOT. PT WOULD LIKE TO COME IN TODAY TO HAVE THE SHOT. PLEASE CONTACT PT TO CONFIRM SHE CAN COME IN TODAY FOR THE SHOT.

## 2024-08-05 NOTE — TELEPHONE ENCOUNTER
I have ordered Toradol 30mg IM to be given in office. Please instruct patient to hold all other NSAID's for 24 hours after injection.  no

## 2024-08-06 ENCOUNTER — CLINICAL SUPPORT (OUTPATIENT)
Dept: PAIN MEDICINE | Facility: CLINIC | Age: 74
End: 2024-08-06
Payer: MEDICARE

## 2024-08-06 PROCEDURE — 96372 THER/PROPH/DIAG INJ SC/IM: CPT

## 2024-08-06 RX ADMIN — KETOROLAC TROMETHAMINE 30 MG: 30 INJECTION, SOLUTION INTRAMUSCULAR; INTRAVENOUS at 14:33

## 2024-08-12 DIAGNOSIS — M19.011 OSTEOARTHRITIS OF BOTH SHOULDERS, UNSPECIFIED OSTEOARTHRITIS TYPE: ICD-10-CM

## 2024-08-12 DIAGNOSIS — M19.012 OSTEOARTHRITIS OF BOTH SHOULDERS, UNSPECIFIED OSTEOARTHRITIS TYPE: ICD-10-CM

## 2024-08-12 DIAGNOSIS — M54.2 NECK PAIN: ICD-10-CM

## 2024-08-12 NOTE — TELEPHONE ENCOUNTER
Medication Refill Request    Date of phone call: 8-12-24    Medication being requested: norco  mg sig: : Take 1 tablet by mouth 5 (Five) Times a Day As Needed for Severe Pain.   Qty: 150    Date of last visit: 6-28-24    Date of last refill:     GAYE up to date?:     Next Follow up?: 8-29-24    Any new pertinent information? (i.e, new medication allergies, new use of medications, change in patient's health or condition, non-compliance or inconsistency with prescribing agreement?):

## 2024-08-13 RX ORDER — HYDROCODONE BITARTRATE AND ACETAMINOPHEN 10; 325 MG/1; MG/1
1 TABLET ORAL
Qty: 150 TABLET | Refills: 0 | Status: SHIPPED | OUTPATIENT
Start: 2024-08-13

## 2024-08-14 ENCOUNTER — TELEPHONE (OUTPATIENT)
Dept: PAIN MEDICINE | Facility: CLINIC | Age: 74
End: 2024-08-14
Payer: MEDICARE

## 2024-08-14 DIAGNOSIS — M54.2 NECK PAIN: ICD-10-CM

## 2024-08-14 DIAGNOSIS — M19.011 OSTEOARTHRITIS OF BOTH SHOULDERS, UNSPECIFIED OSTEOARTHRITIS TYPE: ICD-10-CM

## 2024-08-14 DIAGNOSIS — M19.012 OSTEOARTHRITIS OF BOTH SHOULDERS, UNSPECIFIED OSTEOARTHRITIS TYPE: ICD-10-CM

## 2024-08-14 NOTE — TELEPHONE ENCOUNTER
Provider: MARYANA HARVEY     Caller: PATIENT    Phone Number: [phone]    Reason for Call: PATIENT STATES SHE STOPPED BY THE OFFICE TODAY TO DISCUSS HER MEDICATION REFILL. SHE ORIGINALLY THOUGHT THE REFILL WAS DUE TODAY BUT AFTER CHECKING HER MEDICATION BOTTLE AT HOME SHE REALIZED IT WAS ONE OF TWO BOTTLES THAT SHE HAD COMBINED AND SHE DOES HAVE 9 PILLS LEFT WHICH IS ENOUGH TO LAST HER THROUGH 8-15-24 AND SHE WANTED TO MAKE THE OFFICE AWARE.

## 2024-08-29 ENCOUNTER — OFFICE VISIT (OUTPATIENT)
Dept: PAIN MEDICINE | Facility: CLINIC | Age: 74
End: 2024-08-29
Payer: MEDICARE

## 2024-08-29 VITALS
HEIGHT: 63 IN | DIASTOLIC BLOOD PRESSURE: 80 MMHG | WEIGHT: 219.6 LBS | TEMPERATURE: 98.1 F | HEART RATE: 96 BPM | OXYGEN SATURATION: 95 % | BODY MASS INDEX: 38.91 KG/M2 | SYSTOLIC BLOOD PRESSURE: 143 MMHG

## 2024-08-29 DIAGNOSIS — M19.012 OSTEOARTHRITIS OF BOTH SHOULDERS, UNSPECIFIED OSTEOARTHRITIS TYPE: Primary | ICD-10-CM

## 2024-08-29 DIAGNOSIS — M62.838 MUSCLE SPASM: ICD-10-CM

## 2024-08-29 DIAGNOSIS — M54.2 NECK PAIN: ICD-10-CM

## 2024-08-29 DIAGNOSIS — T40.2X5A THERAPEUTIC OPIOID INDUCED CONSTIPATION: ICD-10-CM

## 2024-08-29 DIAGNOSIS — M19.042 INFLAMMATION OF LEFT HAND JOINT: ICD-10-CM

## 2024-08-29 DIAGNOSIS — M19.011 OSTEOARTHRITIS OF BOTH SHOULDERS, UNSPECIFIED OSTEOARTHRITIS TYPE: Primary | ICD-10-CM

## 2024-08-29 DIAGNOSIS — K59.03 THERAPEUTIC OPIOID INDUCED CONSTIPATION: ICD-10-CM

## 2024-08-29 DIAGNOSIS — Z51.81 ENCOUNTER FOR MONITORING OPIOID MAINTENANCE THERAPY: ICD-10-CM

## 2024-08-29 DIAGNOSIS — M12.9 ARTHRITIS, MULTIPLE JOINT INVOLVEMENT: ICD-10-CM

## 2024-08-29 DIAGNOSIS — Z79.891 ENCOUNTER FOR MONITORING OPIOID MAINTENANCE THERAPY: ICD-10-CM

## 2024-08-29 DIAGNOSIS — G89.4 CHRONIC PAIN SYNDROME: ICD-10-CM

## 2024-08-29 NOTE — PROGRESS NOTES
"CHIEF COMPLAINT  Neck and joint pain    Subjective   Chastity Salgado is a 74 y.o. female  who presents for follow-up.  She has a history of chronic neck and joint pain. She reports that her shoulder pain has worsened since her last office visit.    Today pain is 7/10VAS in severity. Pain is located in her bilateral shoulders (left worse than right), knees, ankles, and neck. Describes this pain as an intermittent ache. Pain is worsened by certain movements, prolonged positions, climbing stairs, bending/twisting, and weather. Pain improves with rest, reposition, heat/ice, and medications. She reports increased neck pain since her last office visit and states when she moves her neck she feels a \"crunching\".      She continues with Hydrocodone 10mg 5/day, Cymbalta 60mg, Celebrex 200mg daily (prescribed by PCP) and Flexeril 10mg at HS. She reports occasional constipation that she manages with Metamucil and Linzess/Movantik. Denies any side effects from the regimen including somnolence. The regimen helps decrease pain by 75%. Notes improvement in activity and function with regimen. ADL's by self. Denies any bowel or bladder changes.      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 iGmenez. She has completed PT for this issue. She continues to receive cortisone injections to her left shoulder very 3 months and received an injection at the end of December. She saw Dr. Tu Vásquez on June 3rd and received a left shoulder and left ankle steroid injection. He ordered a CT scan of her right shoulder for further evaluation of hardware and due to worsening pain. She has completed imaging yet.      Procedures:  2/25/21 - left suprascapular nerve block/steroid injection (posterior approach) - 50% relief x 1 month  9/22/20 - left suprascapular nerve block/steroid injection (posterior approach) - " 70% relief x 1-2 months    Neck Pain   This is a chronic problem. The current episode started more than 1 year ago. The problem occurs intermittently. The problem has been waxing and waning. The pain is associated with a sleep position. The pain is present in the left side and right side. The quality of the pain is described as aching. The pain is at a severity of 4/10. The symptoms are aggravated by twisting, position and bending. Stiffness is present In the morning. Associated symptoms include numbness (left hand- 3rd and 5th digit) and weakness. Pertinent negatives include no chest pain, fever or headaches. She has tried ice, heat and oral narcotics for the symptoms.   Joint Pain  This is a chronic (bilateral knees, ankles, and shoulders) problem. The current episode started more than 1 year ago. The problem occurs intermittently. The problem has been gradually worsening (Rates pain 7/10VAS in severity - right shoulder pain has worsened). Associated symptoms include arthralgias, joint swelling (left hand), nausea, neck pain, numbness (left hand- 3rd and 5th digit) and weakness. Pertinent negatives include no abdominal pain, chest pain, chills, congestion, coughing, fatigue, fever or headaches. The symptoms are aggravated by walking, bending, exertion, twisting and standing (cold weather, stairs). She has tried oral narcotics, position changes, heat, ice and rest for the symptoms.     PEG Assessment   What number best describes your pain on average in the past week?8  What number best describes how, during the past week, pain has interfered with your enjoyment of life?8  What number best describes how, during the past week, pain has interfered with your general activity?  6    The following portions of the patient's history were reviewed and updated as appropriate: allergies, current medications, past family history, past medical history, past social history, past surgical history, and problem list.    Review of  "Systems   Constitutional:  Negative for activity change, chills, fatigue and fever.   HENT:  Negative for congestion.    Eyes:  Negative for visual disturbance.   Respiratory:  Negative for cough, chest tightness and shortness of breath.    Cardiovascular:  Negative for chest pain.   Gastrointestinal:  Positive for constipation, diarrhea and nausea. Negative for abdominal pain.   Genitourinary:  Negative for difficulty urinating, dyspareunia and dysuria.   Musculoskeletal:  Positive for arthralgias, joint swelling (left hand) and neck pain.   Neurological:  Positive for weakness and numbness (left hand- 3rd and 5th digit). Negative for dizziness, light-headedness and headaches.   Psychiatric/Behavioral:  Positive for sleep disturbance. Negative for agitation. The patient is not nervous/anxious.      I have reviewed and confirmed the accuracy of the ROS as documented by the MA/LPN/RN NEWTON Abdul    Vitals:    08/29/24 1120   BP: 143/80   Pulse: 96   Temp: 98.1 °F (36.7 °C)   SpO2: 95%   Weight: 99.6 kg (219 lb 9.6 oz)   Height: 160 cm (63\")   PainSc:   7   PainLoc: Shoulder     Objective   Physical Exam  Constitutional:       Appearance: Normal appearance.   HENT:      Head: Normocephalic.   Cardiovascular:      Rate and Rhythm: Normal rate and regular rhythm.   Pulmonary:      Effort: Pulmonary effort is normal.      Breath sounds: Normal breath sounds.   Musculoskeletal:      Right shoulder: Tenderness present. Decreased range of motion.      Left shoulder: Tenderness and crepitus present. Decreased range of motion.      Left hand: Swelling and tenderness present.      Cervical back: Tenderness present. Pain with movement present. Decreased range of motion.      Lumbar back: Tenderness present. Normal range of motion. Negative right straight leg raise test and negative left straight leg raise test.      Right knee: Swelling present. Decreased range of motion. Tenderness present.   Skin:     General: Skin " is warm and dry.      Capillary Refill: Capillary refill takes less than 2 seconds.   Neurological:      General: No focal deficit present.      Mental Status: She is alert and oriented to person, place, and time.      Gait: Gait abnormal (slowed).   Psychiatric:         Mood and Affect: Mood normal.         Speech: Speech normal.         Behavior: Behavior normal.         Thought Content: Thought content normal.         Cognition and Memory: Cognition normal.       Assessment & Plan   Diagnoses and all orders for this visit:    1. Osteoarthritis of both shoulders, unspecified osteoarthritis type (Primary)    2. Neck pain    3. Inflammation of left hand joint    4. Arthritis, multiple joint involvement    5. Chronic pain syndrome    6. Muscle spasm    7. Therapeutic opioid induced constipation    8. Encounter for monitoring opioid maintenance therapy      Chastity Salgado reports a pain score of 7.  Given her pain assessment as noted, treatment options were discussed and the following options were decided upon as a follow-up plan to address the patient's pain: continuation of current treatment plan for pain and prescription for opiod analgesics.    --- The urine drug screen confirmation from 6/28/24 has been reviewed and the result is appropriate based on patient history and GAYE report  --- The patient signed an updated copy of the controlled substance agreement on 12/15/23   --- Continue Flexeril. No refill needed at this time.   --- Continue Hydrocodone. No refill needed at time. Patient appears stable with current regimen. No adverse effects. Regarding continuation of opioids, there is no evidence of aberrant behavior or any red flags.  The patient continues with appropriate response to opioid therapy. ADL's remain intact by self.    --- Follow-up 2 month or sooner if needed     Pain / Disability Scale  The scale used for measurement of pain and/or disability for this patient was the [place] back pain  disability scale.  The score was 19 on 08/29/2024     AGYE REPORT  As part of the patient's treatment plan, I am prescribing controlled substances. The patient has been made aware of appropriate use of such medications, including potential risk of somnolence, limited ability to drive and/or work safely, and the potential for dependence or overdose. It has also been made clear that these medications are for use by this patient only, without concomitant use of alcohol or other substances unless prescribed.     Patient has completed prescribing agreement detailing terms of continued prescribing of controlled substances, including monitoring GAYE reports, urine drug screening, and pill counts if necessary. The patient is aware that inappropriate use will results in cessation of prescribing such medications.    As the clinician, I personally reviewed the GAYE from 8/29/24 while the patient was in the office today.    History and physical exam exhibit continued safe and appropriate use of controlled substances.    Dictated utilizing Dragon dictation.

## 2024-09-10 DIAGNOSIS — M54.2 NECK PAIN: ICD-10-CM

## 2024-09-10 DIAGNOSIS — M19.011 OSTEOARTHRITIS OF BOTH SHOULDERS, UNSPECIFIED OSTEOARTHRITIS TYPE: ICD-10-CM

## 2024-09-10 DIAGNOSIS — M19.012 OSTEOARTHRITIS OF BOTH SHOULDERS, UNSPECIFIED OSTEOARTHRITIS TYPE: ICD-10-CM

## 2024-09-10 RX ORDER — HYDROCODONE BITARTRATE AND ACETAMINOPHEN 10; 325 MG/1; MG/1
1 TABLET ORAL
Qty: 150 TABLET | Refills: 0 | Status: SHIPPED | OUTPATIENT
Start: 2024-09-10

## 2024-09-10 NOTE — TELEPHONE ENCOUNTER
Medication Refill Request    Date of phone call: 9-10-24    Medication being requested: norco  mg  sig: Take 1 tablet by mouth 5 (Five) Times a Day As Needed for Severe Pain.   Qty: 150    Date of last visit: 8-29-24    Date of last refill:     GAYE up to date?:     Next Follow up?: 10-31-24    Any new pertinent information? (i.e, new medication allergies, new use of medications, change in patient's health or condition, non-compliance or inconsistency with prescribing agreement?):

## 2024-09-13 NOTE — TELEPHONE ENCOUNTER
She is not going to get the shingles from her grandson who has chickenpox,  She has had shingles vaccine and she only needs a one-time dose otherwise I would have her get the new shingles vaccine that's going to become available in January   [Dear  ___] : Dear  [unfilled], [Courtesy Letter:] : I had the pleasure of seeing your patient, [unfilled], in my office today. [Please see my note below.] : Please see my note below. [Sincerely,] : Sincerely, [FreeTextEntry3] : Monty Barajas MD FACS\par

## 2024-09-27 NOTE — TELEPHONE ENCOUNTER
Rx Refill Note  Requested Prescriptions     Pending Prescriptions Disp Refills    lisinopril (PRINIVIL,ZESTRIL) 40 MG tablet [Pharmacy Med Name: LISINOPRIL 40 MG TABLET] 90 tablet 1     Sig: TAKE 1 TABLET BY MOUTH DAILY      Last office visit with prescribing clinician: 7/19/2024   Last telemedicine visit with prescribing clinician: Visit date not found   Next office visit with prescribing clinician: Visit date not found                         Would you like a call back once the refill request has been completed: [] Yes [] No    If the office needs to give you a call back, can they leave a voicemail: [] Yes [] No    Jostin Cool MA  09/27/24, 08:29 EDT

## 2024-10-01 RX ORDER — LISINOPRIL 40 MG/1
40 TABLET ORAL DAILY
Qty: 90 TABLET | Refills: 1 | Status: SHIPPED | OUTPATIENT
Start: 2024-10-01

## 2024-10-10 DIAGNOSIS — M19.012 OSTEOARTHRITIS OF BOTH SHOULDERS, UNSPECIFIED OSTEOARTHRITIS TYPE: ICD-10-CM

## 2024-10-10 DIAGNOSIS — M19.011 OSTEOARTHRITIS OF BOTH SHOULDERS, UNSPECIFIED OSTEOARTHRITIS TYPE: ICD-10-CM

## 2024-10-10 DIAGNOSIS — M54.2 NECK PAIN: ICD-10-CM

## 2024-10-10 RX ORDER — HYDROCODONE BITARTRATE AND ACETAMINOPHEN 10; 325 MG/1; MG/1
1 TABLET ORAL
Qty: 150 TABLET | Refills: 0 | Status: SHIPPED | OUTPATIENT
Start: 2024-10-10

## 2024-10-10 NOTE — TELEPHONE ENCOUNTER
Medication Refill Request    Date of phone call: 10/10/2024    Medication being requested: hydro/apap  mg sig: take 1 tab 5 times a day prn  Qty: 150    Date of last visit: 8/29/2024    Date of last refill: 9/15/2024    GAYE up to date?: yes    Next Follow up?: 10/31/2024    Any new pertinent information? (i.e, new medication allergies, new use of medications, change in patient's health or condition, non-compliance or inconsistency with prescribing agreement?):

## 2024-10-15 DIAGNOSIS — M62.838 MUSCLE SPASM: ICD-10-CM

## 2024-10-16 ENCOUNTER — OFFICE VISIT (OUTPATIENT)
Dept: FAMILY MEDICINE CLINIC | Facility: CLINIC | Age: 74
End: 2024-10-16
Payer: MEDICARE

## 2024-10-16 VITALS
OXYGEN SATURATION: 98 % | HEIGHT: 63 IN | WEIGHT: 225 LBS | SYSTOLIC BLOOD PRESSURE: 124 MMHG | BODY MASS INDEX: 39.87 KG/M2 | DIASTOLIC BLOOD PRESSURE: 82 MMHG | HEART RATE: 88 BPM

## 2024-10-16 DIAGNOSIS — I10 BENIGN ESSENTIAL HTN: ICD-10-CM

## 2024-10-16 DIAGNOSIS — N60.02 SOLITARY CYST OF LEFT BREAST: ICD-10-CM

## 2024-10-16 DIAGNOSIS — F39 MOOD DISORDER: ICD-10-CM

## 2024-10-16 DIAGNOSIS — E11.42 TYPE 2 DIABETES MELLITUS WITH DIABETIC POLYNEUROPATHY, WITHOUT LONG-TERM CURRENT USE OF INSULIN: ICD-10-CM

## 2024-10-16 DIAGNOSIS — D17.1 LIPOMA OF TORSO: ICD-10-CM

## 2024-10-16 DIAGNOSIS — Z78.0 POSTMENOPAUSAL: ICD-10-CM

## 2024-10-16 DIAGNOSIS — N63.20 MASS OF LEFT BREAST, UNSPECIFIED QUADRANT: Primary | ICD-10-CM

## 2024-10-16 DIAGNOSIS — E78.2 MIXED HYPERLIPIDEMIA: ICD-10-CM

## 2024-10-16 PROCEDURE — 3074F SYST BP LT 130 MM HG: CPT | Performed by: NURSE PRACTITIONER

## 2024-10-16 PROCEDURE — 3051F HG A1C>EQUAL 7.0%<8.0%: CPT | Performed by: NURSE PRACTITIONER

## 2024-10-16 PROCEDURE — 3079F DIAST BP 80-89 MM HG: CPT | Performed by: NURSE PRACTITIONER

## 2024-10-16 PROCEDURE — 99214 OFFICE O/P EST MOD 30 MIN: CPT | Performed by: NURSE PRACTITIONER

## 2024-10-16 PROCEDURE — 1125F AMNT PAIN NOTED PAIN PRSNT: CPT | Performed by: NURSE PRACTITIONER

## 2024-10-16 RX ORDER — BUPROPION HYDROCHLORIDE 300 MG/1
300 TABLET ORAL DAILY
Qty: 30 TABLET | Refills: 5 | Status: SHIPPED | OUTPATIENT
Start: 2024-10-16

## 2024-10-16 NOTE — PROGRESS NOTES
"Chief Complaint  Mass (Lump on back and lump on left breast )    Subjective        Chastity Salgado presents to [facility]  History of Present Illness  History of Present Illness  The patient presents for evaluation of multiple medical concerns.    She discovered a lump on her back approximately 3 months ago, which she believes has slightly increased in size. The lump is mobile and can be felt intermittently. She also reports the presence of two small lumps in her left breast, accompanied by soreness. The lumps persist even when she is not wearing a bra. She has a history of large lipomas on her back and at the base of her neck.    She is currently taking duloxetine but has run out of Wellbutrin. She experiences mood swings and has noticed an increase in irritability. She has gained 5 pounds and has developed a strong preference for bread.    She consumes a significant amount of caffeine daily.    She has not had her A1c levels checked recently.    She had gastroenteritis last week and her stomach is still a little queasy.    Objective   Vital Signs:  /82 (BP Location: Left arm, Patient Position: Sitting, Cuff Size: Large Adult)   Pulse 88   Ht 160 cm (63\")   Wt 102 kg (225 lb)   SpO2 98%   BMI 39.86 kg/m²   Estimated body mass index is 39.86 kg/m² as calculated from the following:    Height as of this encounter: 160 cm (63\").    Weight as of this encounter: 102 kg (225 lb).               Physical Exam  Constitutional:       General: She is not in acute distress.     Appearance: She is well-developed. She is not diaphoretic.   Cardiovascular:      Rate and Rhythm: Normal rate and regular rhythm.      Heart sounds: Normal heart sounds. No murmur heard.     No friction rub. No gallop.   Pulmonary:      Effort: Pulmonary effort is normal. No respiratory distress.      Breath sounds: Normal breath sounds. No wheezing or rales.   Musculoskeletal:      Cervical back: Neck supple. "   Skin:     General: Skin is warm and dry.   Neurological:      Mental Status: She is alert and oriented to person, place, and time.       Physical Exam      Result Review :          Results      Assessment & Plan  1. Back Lump.  The back lump appears to be a benign lipoma or cyst, approximately the size of a marble. It is movable and not infected. An ultrasound can be considered if the lump increases in size or causes more pain. For now, it will be monitored.    2. Left Breast Lumps.  The lumps in the left breast are tender, which is a positive sign as breast cancer is typically not painful. Caffeine consumption can lead to cystic changes in the breast. A bilateral diagnostic mammogram and a left breast ultrasound will be ordered. She is advised to reduce her caffeine intake until the mammogram results are available.    3. Mood Disorder.  Discontinuation of Wellbutrin could be contributing to her weight gain, as it suppresses appetite. A prescription for bupropion with a 30-day supply and 5 refills will be provided. She is advised to take it in conjunction with duloxetine.    4. Health Maintenance.  A comprehensive set of labs will be ordered, including A1c, CBC, CMP, cholesterol, and thyroid tests. A DEXA scan will also be conducted.               Assessment and Plan     Diagnoses and all orders for this visit:    1. Mass of left breast, unspecified quadrant (Primary)  -     Mammo diagnostic digital tomosynthesis bilateral w CAD; Future  -     US Breast Left Limited; Future    2. Postmenopausal  -     DEXA Bone Density Axial; Future    3. Solitary cyst of left breast  -     Mammo diagnostic digital tomosynthesis bilateral w CAD; Future  -     US Breast Left Limited; Future    4. Benign essential HTN  -     CBC & Differential  -     Comprehensive Metabolic Panel  -     TSH Rfx On Abnormal To Free T4    5. Mixed hyperlipidemia  -     Hemoglobin A1c  -     Comprehensive Metabolic Panel  -     Lipid Panel With LDL /  HDL Ratio    6. Type 2 diabetes mellitus with diabetic polyneuropathy, without long-term current use of insulin  -     Hemoglobin A1c  -     CBC & Differential  -     Comprehensive Metabolic Panel    7. Lipoma of torso    8. Mood disorder    Other orders  -     buPROPion XL (Wellbutrin XL) 300 MG 24 hr tablet; Take 1 tablet by mouth Daily.  Dispense: 30 tablet; Refill: 5             Follow Up     No follow-ups on file.  Patient was given instructions and counseling regarding her condition or for health maintenance advice. Please see specific information pulled into the AVS if appropriate.       Patient or patient representative verbalized consent for the use of Ambient Listening during the visit with  NEWTON Jose for chart documentation. 10/16/2024  17:40 EDT

## 2024-10-17 ENCOUNTER — TELEPHONE (OUTPATIENT)
Dept: FAMILY MEDICINE CLINIC | Facility: CLINIC | Age: 74
End: 2024-10-17
Payer: MEDICARE

## 2024-10-17 LAB
ALBUMIN SERPL-MCNC: 3.8 G/DL (ref 3.5–5.2)
ALBUMIN/GLOB SERPL: 1.1 G/DL
ALP SERPL-CCNC: 88 U/L (ref 39–117)
ALT SERPL-CCNC: 10 U/L (ref 1–33)
AST SERPL-CCNC: 18 U/L (ref 1–32)
BASOPHILS # BLD AUTO: 0.07 10*3/MM3 (ref 0–0.2)
BASOPHILS NFR BLD AUTO: 0.8 % (ref 0–1.5)
BILIRUB SERPL-MCNC: 0.2 MG/DL (ref 0–1.2)
BUN SERPL-MCNC: 23 MG/DL (ref 8–23)
BUN/CREAT SERPL: 28.8 (ref 7–25)
CALCIUM SERPL-MCNC: 8.9 MG/DL (ref 8.6–10.5)
CHLORIDE SERPL-SCNC: 100 MMOL/L (ref 98–107)
CHOLEST SERPL-MCNC: 157 MG/DL (ref 0–200)
CO2 SERPL-SCNC: 25.6 MMOL/L (ref 22–29)
CREAT SERPL-MCNC: 0.8 MG/DL (ref 0.57–1)
EGFRCR SERPLBLD CKD-EPI 2021: 77.9 ML/MIN/1.73
EOSINOPHIL # BLD AUTO: 0.32 10*3/MM3 (ref 0–0.4)
EOSINOPHIL NFR BLD AUTO: 3.7 % (ref 0.3–6.2)
ERYTHROCYTE [DISTWIDTH] IN BLOOD BY AUTOMATED COUNT: 11.9 % (ref 12.3–15.4)
GLOBULIN SER CALC-MCNC: 3.4 GM/DL
GLUCOSE SERPL-MCNC: 163 MG/DL (ref 65–99)
HBA1C MFR BLD: 7.2 % (ref 4.8–5.6)
HCT VFR BLD AUTO: 39.3 % (ref 34–46.6)
HDLC SERPL-MCNC: 51 MG/DL (ref 40–60)
HGB BLD-MCNC: 12.9 G/DL (ref 12–15.9)
IMM GRANULOCYTES # BLD AUTO: 0.03 10*3/MM3 (ref 0–0.05)
IMM GRANULOCYTES NFR BLD AUTO: 0.3 % (ref 0–0.5)
LDLC SERPL CALC-MCNC: 82 MG/DL (ref 0–100)
LDLC/HDLC SERPL: 1.55 {RATIO}
LYMPHOCYTES # BLD AUTO: 3.1 10*3/MM3 (ref 0.7–3.1)
LYMPHOCYTES NFR BLD AUTO: 35.9 % (ref 19.6–45.3)
MCH RBC QN AUTO: 31.4 PG (ref 26.6–33)
MCHC RBC AUTO-ENTMCNC: 32.8 G/DL (ref 31.5–35.7)
MCV RBC AUTO: 95.6 FL (ref 79–97)
MONOCYTES # BLD AUTO: 0.66 10*3/MM3 (ref 0.1–0.9)
MONOCYTES NFR BLD AUTO: 7.6 % (ref 5–12)
NEUTROPHILS # BLD AUTO: 4.46 10*3/MM3 (ref 1.7–7)
NEUTROPHILS NFR BLD AUTO: 51.7 % (ref 42.7–76)
NRBC BLD AUTO-RTO: 0 /100 WBC (ref 0–0.2)
PLATELET # BLD AUTO: 272 10*3/MM3 (ref 140–450)
POTASSIUM SERPL-SCNC: 4.3 MMOL/L (ref 3.5–5.2)
PROT SERPL-MCNC: 7.2 G/DL (ref 6–8.5)
RBC # BLD AUTO: 4.11 10*6/MM3 (ref 3.77–5.28)
SODIUM SERPL-SCNC: 137 MMOL/L (ref 136–145)
TRIGL SERPL-MCNC: 135 MG/DL (ref 0–150)
TSH SERPL DL<=0.005 MIU/L-ACNC: 1.58 UIU/ML (ref 0.27–4.2)
VLDLC SERPL CALC-MCNC: 24 MG/DL (ref 5–40)
WBC # BLD AUTO: 8.64 10*3/MM3 (ref 3.4–10.8)

## 2024-10-17 RX ORDER — CYCLOBENZAPRINE HCL 10 MG
10 TABLET ORAL 3 TIMES DAILY PRN
Qty: 90 TABLET | Refills: 1 | Status: SHIPPED | OUTPATIENT
Start: 2024-10-17

## 2024-10-17 NOTE — TELEPHONE ENCOUNTER
Caller: Chastity Salgado    Relationship to patient: Self    Best call back number: [phone]    Patient is needing: PATIENT STATES THAT [facility] DIDN'T RECEIVE ANYTHING DIAGNOSTIC FOR THE ULTRASOUND OF HER BREAST, OR THE DEXA SCAN. PLEASE ADVISE.

## 2024-10-23 ENCOUNTER — TELEPHONE (OUTPATIENT)
Dept: FAMILY MEDICINE CLINIC | Facility: CLINIC | Age: 74
End: 2024-10-23

## 2024-10-23 NOTE — TELEPHONE ENCOUNTER
Caller: Chastity Salgado    Relationship: Self    Best call back number: [phone]    What test was performed: BLOOD WORK     When was the test performed: 10/16/24     Where was the test performed: IN OFFICE     Additional notes:     PLEASE ADVISE

## 2024-10-28 ENCOUNTER — APPOINTMENT (OUTPATIENT)
Dept: WOMENS IMAGING | Facility: HOSPITAL | Age: 74
End: 2024-10-28
Payer: MEDICARE

## 2024-10-28 ENCOUNTER — HOSPITAL ENCOUNTER (OUTPATIENT)
Dept: PET IMAGING | Facility: HOSPITAL | Age: 74
Discharge: HOME | End: 2024-10-28
Payer: MEDICARE

## 2024-10-28 DIAGNOSIS — Z78.0 POSTMENOPAUSAL: ICD-10-CM

## 2024-10-28 PROCEDURE — 77080 DXA BONE DENSITY AXIAL: CPT

## 2024-10-28 PROCEDURE — 77067 SCR MAMMO BI INCL CAD: CPT | Performed by: RADIOLOGY

## 2024-10-28 PROCEDURE — 76642 ULTRASOUND BREAST LIMITED: CPT | Performed by: RADIOLOGY

## 2024-10-28 PROCEDURE — 77063 BREAST TOMOSYNTHESIS BI: CPT | Performed by: RADIOLOGY

## 2024-10-29 ENCOUNTER — TELEPHONE (OUTPATIENT)
Dept: FAMILY MEDICINE CLINIC | Facility: CLINIC | Age: 74
End: 2024-10-29

## 2024-10-29 NOTE — TELEPHONE ENCOUNTER
Caller: Chastity Salgado    Relationship: Self    Best call back number: [phone]    What is the best time to reach you: ANYTIME     Who are you requesting to speak with (clinical staff, provider,  specific staff member): CLINICAL     What was the call regarding: PATIENT STATES THAT SHE NOTICED HER RDW WAS LOW AND SHE WOULD LIKE TO KNOW WHY THIS WASN'T ADDRESSED WHEN REVIEWING LABS AND IF THERE IS ANYTHING SHE NEEDS TO DO ABOUT THIS.     PLEASE ADVISE

## 2024-11-05 DIAGNOSIS — R11.2 NAUSEA AND VOMITING, UNSPECIFIED VOMITING TYPE: ICD-10-CM

## 2024-11-06 RX ORDER — ONDANSETRON 4 MG/1
TABLET, FILM COATED ORAL
Qty: 60 TABLET | Refills: 3 | Status: SHIPPED | OUTPATIENT
Start: 2024-11-06

## 2024-11-06 NOTE — TELEPHONE ENCOUNTER
Rx Refill Note  Requested Prescriptions     Pending Prescriptions Disp Refills    ondansetron (ZOFRAN) 4 MG tablet [Pharmacy Med Name: ONDANSETRON HCL 4 MG TABLET] 60 tablet 3     Sig: TAKE 1 TABLET BY MOUTH EVERY 8 HOURS AS NEEDED FOR NAUSEA AND/OR VOMITING      Last office visit with prescribing clinician: Visit date not found   Last telemedicine visit with prescribing clinician: Visit date not found   Next office visit with prescribing clinician: Visit date not found                         Would you like a call back once the refill request has been completed: [] Yes [] No    If the office needs to give you a call back, can they leave a voicemail: [] Yes [] No    Joie Chang MA  11/06/24, 09:24 EST

## 2024-11-11 ENCOUNTER — OFFICE VISIT (OUTPATIENT)
Dept: PAIN MEDICINE | Facility: CLINIC | Age: 74
End: 2024-11-11
Payer: MEDICARE

## 2024-11-11 VITALS
SYSTOLIC BLOOD PRESSURE: 129 MMHG | OXYGEN SATURATION: 97 % | RESPIRATION RATE: 18 BRPM | WEIGHT: 221.4 LBS | DIASTOLIC BLOOD PRESSURE: 85 MMHG | HEART RATE: 81 BPM | BODY MASS INDEX: 39.23 KG/M2 | TEMPERATURE: 95.2 F | HEIGHT: 63 IN

## 2024-11-11 DIAGNOSIS — M19.012 OSTEOARTHRITIS OF BOTH SHOULDERS, UNSPECIFIED OSTEOARTHRITIS TYPE: ICD-10-CM

## 2024-11-11 DIAGNOSIS — Z51.81 ENCOUNTER FOR MONITORING OPIOID MAINTENANCE THERAPY: ICD-10-CM

## 2024-11-11 DIAGNOSIS — M19.042 INFLAMMATION OF LEFT HAND JOINT: ICD-10-CM

## 2024-11-11 DIAGNOSIS — M62.838 MUSCLE SPASM: Primary | ICD-10-CM

## 2024-11-11 DIAGNOSIS — M19.011 OSTEOARTHRITIS OF BOTH SHOULDERS, UNSPECIFIED OSTEOARTHRITIS TYPE: ICD-10-CM

## 2024-11-11 DIAGNOSIS — M12.9 ARTHRITIS, MULTIPLE JOINT INVOLVEMENT: ICD-10-CM

## 2024-11-11 DIAGNOSIS — Z79.891 ENCOUNTER FOR MONITORING OPIOID MAINTENANCE THERAPY: ICD-10-CM

## 2024-11-11 DIAGNOSIS — G89.4 CHRONIC PAIN SYNDROME: ICD-10-CM

## 2024-11-11 DIAGNOSIS — M54.2 NECK PAIN: ICD-10-CM

## 2024-11-11 RX ORDER — HYDROCODONE BITARTRATE AND ACETAMINOPHEN 10; 325 MG/1; MG/1
1 TABLET ORAL
Qty: 150 TABLET | Refills: 0 | Status: SHIPPED | OUTPATIENT
Start: 2024-11-11

## 2024-11-15 ENCOUNTER — TELEPHONE (OUTPATIENT)
Dept: FAMILY MEDICINE CLINIC | Facility: CLINIC | Age: 74
End: 2024-11-15

## 2024-11-15 RX ORDER — AMOXICILLIN 500 MG/1
500 CAPSULE ORAL 3 TIMES DAILY
Qty: 21 CAPSULE | Refills: 0 | Status: SHIPPED | OUTPATIENT
Start: 2024-11-15

## 2024-11-15 NOTE — TELEPHONE ENCOUNTER
Caller: Chastity Salgado    Relationship: Self    Best call back number: [phone]     What medication are you requesting: ANTIBIOTIC    What are your current symptoms: BROKEN TOOTH CAUSING SWELLING IN HER GUMS    How long have you been experiencing symptoms: A WEEK AGO      If a prescription is needed, what is your preferred pharmacy and phone number:      [facility] [id number] [facility] [id number] [facility] AT [facility] [place] - [phone] [facility] [phone]        Additional notes:    PATIENT HAS A DENTAL APPOINTMENT ON DEC. 3, 2024.  SHE IS TRYING TO PREVENT GETTING A INFECTION IN HER GUMS.  SHE DOES NOT HAVE THE MONEY TO GET INTO THE DENTIST UNTIL HER APPOINTMENT    PLEASE ADVISE

## 2024-12-09 DIAGNOSIS — M19.011 OSTEOARTHRITIS OF BOTH SHOULDERS, UNSPECIFIED OSTEOARTHRITIS TYPE: ICD-10-CM

## 2024-12-09 DIAGNOSIS — M54.2 NECK PAIN: ICD-10-CM

## 2024-12-09 DIAGNOSIS — M19.012 OSTEOARTHRITIS OF BOTH SHOULDERS, UNSPECIFIED OSTEOARTHRITIS TYPE: ICD-10-CM

## 2024-12-09 RX ORDER — HYDROCODONE BITARTRATE AND ACETAMINOPHEN 10; 325 MG/1; MG/1
1 TABLET ORAL
Qty: 150 TABLET | Refills: 0 | Status: SHIPPED | OUTPATIENT
Start: 2024-12-09

## 2024-12-09 NOTE — TELEPHONE ENCOUNTER
Medication Refill Request    Date of phone call: 12/9/2024    Medication being requested: hydro/apqp  mg sig: take 1 tab 5 times a day prn   Qty: 150    Date of last visit: 11/11/2024    Date of last refill: 11/14/2024    GAYE up to date?: yes    Next Follow up?: 1/13/2025   Any new pertinent information? (i.e, new medication allergies, new use of medications, change in patient's health or condition, non-compliance or inconsistency with prescribing agreement?):

## 2025-01-07 DIAGNOSIS — M19.011 OSTEOARTHRITIS OF BOTH SHOULDERS, UNSPECIFIED OSTEOARTHRITIS TYPE: ICD-10-CM

## 2025-01-07 DIAGNOSIS — M54.2 NECK PAIN: ICD-10-CM

## 2025-01-07 DIAGNOSIS — M19.012 OSTEOARTHRITIS OF BOTH SHOULDERS, UNSPECIFIED OSTEOARTHRITIS TYPE: ICD-10-CM

## 2025-01-07 RX ORDER — HYDROCODONE BITARTRATE AND ACETAMINOPHEN 10; 325 MG/1; MG/1
1 TABLET ORAL
Qty: 150 TABLET | Refills: 0 | Status: SHIPPED | OUTPATIENT
Start: 2025-01-07

## 2025-01-07 NOTE — TELEPHONE ENCOUNTER
Medication Refill Request    Date of phone call: 1/7/2025    Medication being requested: hydro/apap  mg sig: take 1 tab 5 times a day prn  Qty: 150    Date of last visit: 11/11/2024    Date of last refill: yes    GAYE up to date?: yes    Next Follow up?: 1/24/2025    Any new pertinent information? (i.e, new medication allergies, new use of medications, change in patient's health or condition, non-compliance or inconsistency with prescribing agreement?): Ms. Salgado rescheduled her follow-up from 1/13/2025 to 1/24/2025 due to the weather. She states that she is currently snowed in and we are supposed to get more snow on Friday.

## 2025-01-24 ENCOUNTER — OFFICE VISIT (OUTPATIENT)
Dept: PAIN MEDICINE | Facility: CLINIC | Age: 75
End: 2025-01-24
Payer: MEDICARE

## 2025-01-24 VITALS
SYSTOLIC BLOOD PRESSURE: 112 MMHG | OXYGEN SATURATION: 97 % | TEMPERATURE: 98.1 F | HEIGHT: 63 IN | WEIGHT: 227.4 LBS | HEART RATE: 96 BPM | DIASTOLIC BLOOD PRESSURE: 71 MMHG | RESPIRATION RATE: 18 BRPM | BODY MASS INDEX: 40.29 KG/M2

## 2025-01-24 DIAGNOSIS — M19.012 OSTEOARTHRITIS OF BOTH SHOULDERS, UNSPECIFIED OSTEOARTHRITIS TYPE: ICD-10-CM

## 2025-01-24 DIAGNOSIS — Z79.891 ENCOUNTER FOR MONITORING OPIOID MAINTENANCE THERAPY: ICD-10-CM

## 2025-01-24 DIAGNOSIS — M54.50 ACUTE BILATERAL LOW BACK PAIN, UNSPECIFIED WHETHER SCIATICA PRESENT: ICD-10-CM

## 2025-01-24 DIAGNOSIS — M19.011 OSTEOARTHRITIS OF BOTH SHOULDERS, UNSPECIFIED OSTEOARTHRITIS TYPE: ICD-10-CM

## 2025-01-24 DIAGNOSIS — M62.838 MUSCLE SPASM: ICD-10-CM

## 2025-01-24 DIAGNOSIS — M54.2 NECK PAIN: Primary | ICD-10-CM

## 2025-01-24 DIAGNOSIS — M12.9 ARTHRITIS, MULTIPLE JOINT INVOLVEMENT: ICD-10-CM

## 2025-01-24 DIAGNOSIS — Z51.81 ENCOUNTER FOR MONITORING OPIOID MAINTENANCE THERAPY: ICD-10-CM

## 2025-01-24 LAB
POC AMPHETAMINES: NEGATIVE
POC BARBITURATES: NEGATIVE
POC BENZODIAZEPHINES: NEGATIVE
POC COCAINE: NEGATIVE
POC METHADONE: NEGATIVE
POC METHAMPHETAMINE SCREEN URINE: NEGATIVE
POC OPIATES: POSITIVE
POC OXYCODONE: NEGATIVE
POC PHENCYCLIDINE: NEGATIVE
POC PROPOXYPHENE: NEGATIVE
POC THC: NEGATIVE

## 2025-01-24 RX ORDER — KETOROLAC TROMETHAMINE 30 MG/ML
30 INJECTION, SOLUTION INTRAMUSCULAR; INTRAVENOUS ONCE
Status: COMPLETED | OUTPATIENT
Start: 2025-01-24 | End: 2025-01-24

## 2025-01-24 RX ORDER — CYCLOBENZAPRINE HCL 10 MG
10 TABLET ORAL 3 TIMES DAILY PRN
Qty: 90 TABLET | Refills: 2 | Status: SHIPPED | OUTPATIENT
Start: 2025-01-24

## 2025-01-24 RX ADMIN — KETOROLAC TROMETHAMINE 30 MG: 30 INJECTION, SOLUTION INTRAMUSCULAR; INTRAVENOUS at 12:15

## 2025-01-24 NOTE — PROGRESS NOTES
CHIEF COMPLAINT  Neck and joint pain    Subjective   Chastity Salgado is a 74 y.o. female  who presents for follow-up.  She has a history of chronic neck and joint pain. She reports that her neck and joint pain has remained consistent since her last office visit. New complaint axial low back pain that worsens with bending. She denies radicular pain at this time.     Today pain is 7/10VAS in severity. Pain is located in her bilateral shoulders (left worse than right), knees, ankles, and neck. Describes this pain as an intermittent ache. Pain is worsened by certain movements, prolonged positions, climbing stairs, bending/twisting, and weather. Pain improves with rest, reposition, heat/ice, and medications. Neck pain has remained consistent since her last office visit.     She continues with Hydrocodone 10mg 5/day, Cymbalta 60mg, Celebrex 200mg daily (prescribed by PCP) and Flexeril 10mg at HS. She reports occasional constipation that she manages with Metamucil and Linzess/Movantik. Denies any side effects from the regimen including somnolence. The regimen helps decrease pain by 75%. Notes improvement in activity and function with regimen. ADL's by self. Denies any bowel or bladder changes.      Celebrex - causes GI issues - takes every other day (history of gastroparesis)  Gabapentin - caused sleep walking     She saw Dr. Tu Vásquez on June 3rd and received a left shoulder and left ankle steroid injection. He ordered a CT scan of her right shoulder for further evaluation of hardware and due to worsening pain. She has not completed imaging yet. She was seen by a NP at [facility] 2 weeks ago for left shoulder pain. She received a cortisone injection to the left shoulder and was prescribed a MDP. She continues to have left forearm pain that she plans to discuss with them. She has not followed up with them recently.      Procedures:  2/25/21 - left suprascapular nerve block/steroid injection (posterior  approach) - 50% relief x 1 month  9/22/20 - left suprascapular nerve block/steroid injection (posterior approach) - 70% relief x 1-2 months     Surgical History:  10/6/23 -  Right total knee revision - Dr. Gimenez.     Neck Pain   This is a chronic problem. The current episode started more than 1 year ago. The problem occurs intermittently. The problem has been waxing and waning. The pain is associated with a sleep position. The pain is present in the left side and right side. The quality of the pain is described as aching. The pain is at a severity of 6/10. The symptoms are aggravated by twisting, position and bending. Stiffness is present In the morning. Associated symptoms include numbness (left hand and shoulder) and weakness (bilateral arms). Pertinent negatives include no chest pain, fever or headaches. She has tried ice, heat and oral narcotics for the symptoms.   Joint Pain  This is a chronic (bilateral knees, ankles, and shoulders) problem. The current episode started more than 1 year ago. The problem occurs intermittently. The problem has been waxing and waning (Rates pain 7/10VAS in severity - right shoulder pain has worsened). Associated symptoms include arthralgias (bilateral shoulders), nausea, neck pain, numbness (left hand and shoulder) and weakness (bilateral arms). Pertinent negatives include no abdominal pain, chest pain, chills, congestion, coughing, fatigue, fever or headaches. The symptoms are aggravated by walking, bending, exertion, twisting and standing (cold weather, stairs). She has tried oral narcotics, position changes, heat, ice and rest for the symptoms.      PEG Assessment   What number best describes your pain on average in the past week?8  What number best describes how, during the past week, pain has interfered with your enjoyment of life?7  What number best describes how, during the past week, pain has interfered with your general activity?  7    Review of Pertinent Medical Data  "---      The following portions of the patient's history were reviewed and updated as appropriate: allergies, current medications, past family history, past medical history, past social history, past surgical history, and problem list.    Review of Systems   Constitutional:  Negative for chills, fatigue and fever.   HENT:  Negative for congestion.    Respiratory:  Negative for cough.    Cardiovascular:  Negative for chest pain.   Gastrointestinal:  Positive for nausea. Negative for abdominal pain, constipation and diarrhea.   Genitourinary:  Negative for difficulty urinating.   Musculoskeletal:  Positive for arthralgias (bilateral shoulders), back pain and neck pain.   Neurological:  Positive for weakness (bilateral arms) and numbness (left hand and shoulder). Negative for headaches.   Psychiatric/Behavioral:  Positive for sleep disturbance. Negative for suicidal ideas. The patient is nervous/anxious.      I have reviewed and confirmed the accuracy of the ROS as documented by the MA/LPN/RN NEWTON Abdul    Vitals:    01/24/25 1147   BP: 112/71   Pulse: 96   Resp: 18   Temp: 98.1 °F (36.7 °C)   SpO2: 97%   Weight: 103 kg (227 lb 6.4 oz)   Height: 160 cm (63\")   PainSc:   7   PainLoc: Shoulder     Objective   Physical Exam  Constitutional:       Appearance: Normal appearance.   HENT:      Head: Normocephalic.   Cardiovascular:      Rate and Rhythm: Normal rate and regular rhythm.   Pulmonary:      Effort: Pulmonary effort is normal.      Breath sounds: Normal breath sounds.   Musculoskeletal:      Right shoulder: Tenderness present. Decreased range of motion.      Left shoulder: Tenderness and crepitus present. Decreased range of motion.      Left elbow: Decreased range of motion. Tenderness present.      Left hand: Swelling and tenderness present.      Cervical back: Tenderness present. Pain with movement present. Decreased range of motion.      Lumbar back: Tenderness and bony tenderness present. Decreased " range of motion. Negative right straight leg raise test and negative left straight leg raise test.      Right knee: Swelling present. Decreased range of motion. Tenderness present.      Comments: + lumbar facet loading/tenderness    Skin:     General: Skin is warm and dry.      Capillary Refill: Capillary refill takes less than 2 seconds.   Neurological:      General: No focal deficit present.      Mental Status: She is alert and oriented to person, place, and time.      Gait: Gait abnormal (slowed).   Psychiatric:         Mood and Affect: Mood normal.         Speech: Speech normal.         Behavior: Behavior normal.         Thought Content: Thought content normal.         Cognition and Memory: Cognition normal.       Assessment & Plan   Diagnoses and all orders for this visit:    1. Neck pain (Primary)  -     ketorolac (TORADOL) injection 30 mg    2. Acute bilateral low back pain, unspecified whether sciatica present  -     XR Spine Lumbar 2 or 3 View; Future    3. Muscle spasm  -     cyclobenzaprine (FLEXERIL) 10 MG tablet; Take 1 tablet by mouth 3 (Three) Times a Day As Needed for Muscle Spasms.  Dispense: 90 tablet; Refill: 2    4. Osteoarthritis of both shoulders, unspecified osteoarthritis type    5. Arthritis, multiple joint involvement    6. Encounter for monitoring opioid maintenance therapy      Chastity Salgado reports a pain score of 7.  Given her pain assessment as noted, treatment options were discussed and the following options were decided upon as a follow-up plan to address the patient's pain: continuation of current treatment plan for pain.    --- Routine UDS in office today as part of monitoring requirements for controlled substances.  The specimen was viewed and the immunoassay result reviewed and is +OPI.  This specimen will be sent to [facility] for confirmation.     --- The patient signed an updated copy of the controlled substance agreement on 11/11/24  --- Continue Flexeril.  Refill sent to pharmacy.   --- IM Toradol 30mg in office today for flare in pain. Instructed to hold Celebrex for 24 hours following injection.   --- Continue Hydrocodone. No refill needed at this time. Patient appears stable with current regimen. No adverse effects. Regarding continuation of opioids, there is no evidence of aberrant behavior or any red flags.  The patient continues with appropriate response to opioid therapy. ADL's remain intact by self.    --- X-ray low back due to worsening pain. Offered PT referral but patient wishes to hold at this time.   --- Follow-up 2 month or sooner if needed      GAYE REPORT  As part of the patient's treatment plan, I am prescribing controlled substances. The patient has been made aware of appropriate use of such medications, including potential risk of somnolence, limited ability to drive and/or work safely, and the potential for dependence or overdose. It has also been made clear that these medications are for use by this patient only, without concomitant use of alcohol or other substances unless prescribed.     Patient has completed prescribing agreement detailing terms of continued prescribing of controlled substances, including monitoring GAYE reports, urine drug screening, and pill counts if necessary. The patient is aware that inappropriate use will results in cessation of prescribing such medications.    As the clinician, I personally reviewed the GAYE from 1/24/25 while the patient was in the office today.    History and physical exam exhibit continued safe and appropriate use of controlled substances.    Dictated utilizing Dragon dictation.

## 2025-01-28 ENCOUNTER — OFFICE VISIT (OUTPATIENT)
Dept: FAMILY MEDICINE CLINIC | Facility: CLINIC | Age: 75
End: 2025-01-28
Payer: MEDICARE

## 2025-01-28 ENCOUNTER — HOSPITAL ENCOUNTER (OUTPATIENT)
Dept: CARDIOLOGY | Facility: HOSPITAL | Age: 75
Discharge: HOME | End: 2025-01-28
Payer: MEDICARE

## 2025-01-28 VITALS
HEIGHT: 63 IN | DIASTOLIC BLOOD PRESSURE: 60 MMHG | SYSTOLIC BLOOD PRESSURE: 100 MMHG | OXYGEN SATURATION: 93 % | WEIGHT: 227 LBS | HEART RATE: 80 BPM | RESPIRATION RATE: 17 BRPM | BODY MASS INDEX: 40.22 KG/M2

## 2025-01-28 DIAGNOSIS — M79.661 TENDERNESS OF RIGHT CALF: Primary | ICD-10-CM

## 2025-01-28 DIAGNOSIS — M79.89 LEG SWELLING: ICD-10-CM

## 2025-01-28 LAB
BH CV LOWER VASCULAR LEFT COMMON FEMORAL AUGMENT: NORMAL
BH CV LOWER VASCULAR LEFT COMMON FEMORAL COMPETENT: NORMAL
BH CV LOWER VASCULAR LEFT COMMON FEMORAL COMPRESS: NORMAL
BH CV LOWER VASCULAR LEFT COMMON FEMORAL PHASIC: NORMAL
BH CV LOWER VASCULAR LEFT COMMON FEMORAL SPONT: NORMAL
BH CV LOWER VASCULAR RIGHT COMMON FEMORAL AUGMENT: NORMAL
BH CV LOWER VASCULAR RIGHT COMMON FEMORAL COMPETENT: NORMAL
BH CV LOWER VASCULAR RIGHT COMMON FEMORAL COMPRESS: NORMAL
BH CV LOWER VASCULAR RIGHT COMMON FEMORAL PHASIC: NORMAL
BH CV LOWER VASCULAR RIGHT COMMON FEMORAL SPONT: NORMAL
BH CV LOWER VASCULAR RIGHT DISTAL FEMORAL COMPRESS: NORMAL
BH CV LOWER VASCULAR RIGHT GASTRONEMIUS COMPRESS: NORMAL
BH CV LOWER VASCULAR RIGHT GREATER SAPH AK COMPRESS: NORMAL
BH CV LOWER VASCULAR RIGHT GREATER SAPH BK COMPRESS: NORMAL
BH CV LOWER VASCULAR RIGHT LESSER SAPH COMPRESS: NORMAL
BH CV LOWER VASCULAR RIGHT MID FEMORAL AUGMENT: NORMAL
BH CV LOWER VASCULAR RIGHT MID FEMORAL COMPETENT: NORMAL
BH CV LOWER VASCULAR RIGHT MID FEMORAL COMPRESS: NORMAL
BH CV LOWER VASCULAR RIGHT MID FEMORAL PHASIC: NORMAL
BH CV LOWER VASCULAR RIGHT MID FEMORAL SPONT: NORMAL
BH CV LOWER VASCULAR RIGHT PERONEAL SPONT: NORMAL
BH CV LOWER VASCULAR RIGHT POPLITEAL AUGMENT: NORMAL
BH CV LOWER VASCULAR RIGHT POPLITEAL COMPETENT: NORMAL
BH CV LOWER VASCULAR RIGHT POPLITEAL COMPRESS: NORMAL
BH CV LOWER VASCULAR RIGHT POPLITEAL PHASIC: NORMAL
BH CV LOWER VASCULAR RIGHT POPLITEAL SPONT: NORMAL
BH CV LOWER VASCULAR RIGHT POSTERIOR TIBIAL COMPRESS: NORMAL
BH CV LOWER VASCULAR RIGHT PROFUNDA FEMORAL COMPRESS: NORMAL
BH CV LOWER VASCULAR RIGHT PROXIMAL FEMORAL COMPRESS: NORMAL
BH CV LOWER VASCULAR RIGHT SAPHENOFEMORAL JUNCTION COMPRESS: NORMAL

## 2025-01-28 PROCEDURE — 1125F AMNT PAIN NOTED PAIN PRSNT: CPT | Performed by: NURSE PRACTITIONER

## 2025-01-28 PROCEDURE — 93971 EXTREMITY STUDY: CPT | Performed by: SURGERY

## 2025-01-28 PROCEDURE — 3074F SYST BP LT 130 MM HG: CPT | Performed by: NURSE PRACTITIONER

## 2025-01-28 PROCEDURE — 3078F DIAST BP <80 MM HG: CPT | Performed by: NURSE PRACTITIONER

## 2025-01-28 PROCEDURE — 99214 OFFICE O/P EST MOD 30 MIN: CPT | Performed by: NURSE PRACTITIONER

## 2025-01-28 PROCEDURE — 93971 EXTREMITY STUDY: CPT

## 2025-01-28 NOTE — PROGRESS NOTES
"Chief Complaint  Leg Swelling (Pt c/o lower leg swelling in both legs x 1.5 weeks )    Subjective        Chastity Salgado presents to [facility]  History of Present Illness  Pleasant patient here today has a couple issues, primarily has had increased swelling over the last week and a half lower extremity edema she does have some furosemide she can take as needed, but she started to have some faint redness, right side since Friday wanted make sure she did not have cellulitis,  She woke up today with a little sore throat thought she may be feverish but she has no cough congestion  or other associated problems  She has no history of DVT PE she did bump her ankle on a bed frame recently, but not in her calf,  No chest pain or shortness of breath, she has history of some heart abnormalities,  And supposed to premedicate with Amoxil prior to dental procedures,  She was told she has a chronic heart murmur and she takes it for this reason,  No history of CHF    Leg Swelling      Objective   Vital Signs:  /60   Pulse 80   Resp 17   Ht 160 cm (63\")   Wt 103 kg (227 lb)   SpO2 93%   BMI 40.21 kg/m²   Estimated body mass index is 40.21 kg/m² as calculated from the following:    Height as of this encounter: 160 cm (63\").    Weight as of this encounter: 103 kg (227 lb).            Physical Exam  Constitutional:       General: She is not in acute distress.     Appearance: Normal appearance. She is not ill-appearing, toxic-appearing or diaphoretic.      Comments: Very pleasant no distress   HENT:      Right Ear: Tympanic membrane normal.      Left Ear: Tympanic membrane normal.      Mouth/Throat:      Comments: Mild erythema without exudate uvula midline speech clear no cervical adenopathy  Eyes:      Conjunctiva/sclera: Conjunctivae normal.      Pupils: Pupils are equal, round, and reactive to light.   Cardiovascular:      Rate and Rhythm: Normal rate and regular rhythm.      Heart " sounds: Murmur heard.   Pulmonary:      Effort: Pulmonary effort is normal. No respiratory distress.   Musculoskeletal:         General: Swelling and tenderness present.      Comments: Pitting edema, 1+, tibia,  Faint erythema blanches somewhat bilateral but more so on the right  No deep areas of demarcation  Warm but so is both extremities, some mild mid calf tenderness on the right,  Edema does not extend to the knees,  Or above thigh and she is without any posterior knee or medial thigh tenderness,  Previous knee replacements, surgical incisions knees without effusion   Skin:     Findings: Erythema present.   Neurological:      General: No focal deficit present.      Mental Status: Mental status is at baseline.   Psychiatric:         Mood and Affect: Mood normal.         Behavior: Behavior normal.         Thought Content: Thought content normal.         Judgment: Judgment normal.        Result Review :                Assessment and Plan   Diagnoses and all orders for this visit:    1. Tenderness of right calf (Primary)  -     Duplex Venous Lower Extremity - Right CAR    2. Leg swelling  -     Duplex Venous Lower Extremity - Right CAR    Other orders  -     amoxicillin-clavulanate (AUGMENTIN) 875-125 MG per tablet; Take 1 tablet by mouth 2 (Two) Times a Day. With food and water  Dispense: 14 tablet; Refill: 0             Follow Up   No follow-ups on file.  Patient was given instructions and counseling regarding her condition or for health maintenance advice. Please see specific information pulled into the AVS if appropriate.     Patient Instructions   Discharge instructions, go ahead and start the antibiotic now between your sore throat without a cough I cannot rule out strep and you may have some early cellulitis although I am not convinced but with all things considered with the upcoming dental visit is best to go ahead and treat you to make sure things are covered here    Should you have increased pain swelling  redness fever emergency room  Assuming your Doppler study is negative    Take furosemide 20 mg once in the morning once early afternoon with potassium least once  For 5 to 7 days  Until swelling improved   giving get on compression socks go ahead and get those on he may have to decrease the swelling a few days to get him on  Chest pain shortness of breath severe pain increased redness or swelling, unable to swallow high fever chest pain shortness of breath emergency room    Okay to proceed with the dentist, as long as you are feeling well and up to it

## 2025-01-28 NOTE — PATIENT INSTRUCTIONS
Discharge instructions, go ahead and start the antibiotic now between your sore throat without a cough I cannot rule out strep and you may have some early cellulitis although I am not convinced but with all things considered with the upcoming dental visit is best to go ahead and treat you to make sure things are covered here    Should you have increased pain swelling redness fever emergency room  Assuming your Doppler study is negative    Take furosemide 20 mg once in the morning once early afternoon with potassium least once  For 5 to 7 days  Until swelling improved   giving get on compression socks go ahead and get those on he may have to decrease the swelling a few days to get him on  Chest pain shortness of breath severe pain increased redness or swelling, unable to swallow high fever chest pain shortness of breath emergency room    Okay to proceed with the dentist, as long as you are feeling well and up to it

## 2025-01-29 ENCOUNTER — PRIOR AUTHORIZATION (OUTPATIENT)
Dept: PAIN MEDICINE | Facility: CLINIC | Age: 75
End: 2025-01-29
Payer: MEDICARE

## 2025-01-29 NOTE — TELEPHONE ENCOUNTER
Cyclobenzaprine 10 mg was denied by insurance due to it not being listed on the covered drug formulary. I left pt a detailed vm.

## 2025-02-04 ENCOUNTER — OFFICE VISIT (OUTPATIENT)
Dept: FAMILY MEDICINE CLINIC | Facility: CLINIC | Age: 75
End: 2025-02-04
Payer: MEDICARE

## 2025-02-04 VITALS
HEIGHT: 63 IN | SYSTOLIC BLOOD PRESSURE: 114 MMHG | HEART RATE: 90 BPM | OXYGEN SATURATION: 98 % | BODY MASS INDEX: 40.91 KG/M2 | WEIGHT: 230.9 LBS | DIASTOLIC BLOOD PRESSURE: 66 MMHG

## 2025-02-04 DIAGNOSIS — R60.0 LOWER EXTREMITY EDEMA: ICD-10-CM

## 2025-02-04 DIAGNOSIS — L03.115 CELLULITIS OF RIGHT LOWER EXTREMITY: Primary | ICD-10-CM

## 2025-02-04 PROCEDURE — 99214 OFFICE O/P EST MOD 30 MIN: CPT | Performed by: NURSE PRACTITIONER

## 2025-02-04 PROCEDURE — 3074F SYST BP LT 130 MM HG: CPT | Performed by: NURSE PRACTITIONER

## 2025-02-04 PROCEDURE — 3078F DIAST BP <80 MM HG: CPT | Performed by: NURSE PRACTITIONER

## 2025-02-04 PROCEDURE — 1125F AMNT PAIN NOTED PAIN PRSNT: CPT | Performed by: NURSE PRACTITIONER

## 2025-02-04 NOTE — PROGRESS NOTES
"Chief Complaint  No chief complaint on file.    Subjective        Chastity Salgado presents to [facility]  History of Present Illness  History of Present Illness  The patient presents for evaluation of cellulitis.    She sought medical attention last week due to the onset of pink discoloration in her legs, a symptom she recognized from previous episodes of cellulitis. The attending physician, Jonathan, was uncertain about the diagnosis and ordered a Doppler study, which yielded negative results. She was prescribed Augmentin, an antibiotic, which she has nearly completed. However, she observed a significant increase in the redness of her legs last night, although it has since subsided. She also reports experiencing sensations akin to bee stings in her legs. She is not experiencing any shortness of breath. She was prescribed Augmentin, an antibiotic, which she has nearly completed. She took a diuretic at approximately 4:00 PM yesterday. She has been managing her symptoms with Lasix, adjusting the dosage based on the severity of the swelling. She also takes a potassium supplement and confirms that she has sufficient refills of both medications. She has had cellulitis before.    MEDICATIONS  Current: Augmentin, furosemide, potassium supplement    IMMUNIZATIONS  She received influenza vaccine this year.    Objective   Vital Signs:  /66 (BP Location: Right arm, Patient Position: Sitting, Cuff Size: Large Adult)   Pulse 90   Ht 160 cm (63\")   Wt 105 kg (230 lb 14.4 oz)   SpO2 98%   BMI 40.90 kg/m²   Estimated body mass index is 40.9 kg/m² as calculated from the following:    Height as of this encounter: 160 cm (63\").    Weight as of this encounter: 105 kg (230 lb 14.4 oz).               Physical Exam  Constitutional:       General: She is not in acute distress.     Appearance: She is well-developed. She is not diaphoretic.   Cardiovascular:      Rate and Rhythm: Normal rate and " regular rhythm.      Heart sounds: Normal heart sounds. No murmur heard.     No friction rub. No gallop.      Comments: Bilateral lower extremity edematous, no erythema  Pulmonary:      Effort: Pulmonary effort is normal. No respiratory distress.      Breath sounds: Normal breath sounds. No wheezing or rales.   Musculoskeletal:      Cervical back: Neck supple.      Right lower le+ Edema present.      Left lower le+ Edema present.   Skin:     General: Skin is warm and dry.      Findings: No erythema.   Neurological:      Mental Status: She is alert and oriented to person, place, and time.       Physical Exam  There is swelling in the legs. The color of the legs looks okay. They do not feel warm or hot to touch.    Vital Signs  Blood pressure is 114/66.    Result Review :          Results  Imaging  Doppler test for cellulitis was negative.    Assessment & Plan  1. Cellulitis.  Her blood pressure has shown improvement compared to the previous week, suggesting a possible resolution of an underlying infection. Her weight has increased by 3 pounds since the last visit and by 9 pounds since 2024, indicating fluid retention. The redness in her legs is likely due to excessive swelling. She reports feeling tired, which could be a side effect of the furosemide causing a decrease in blood pressure. She is advised to complete her current course of antibiotics. She is instructed to take Lasix daily for the next 2 weeks to manage the swelling, after which she can resume as-needed use. If the swelling recurs, she should return to daily Lasix use. She is also advised to take a potassium supplement on the days she takes Lasix to prevent hypokalemia. She is cautioned against scratching her legs to avoid opening up the skin and potentially worsening the condition. If sores develop, she should keep them clean and dry. She is advised to check at [facility] to confirm her influenza vaccination status.           Assessment and  Plan     Diagnoses and all orders for this visit:    1. Cellulitis of right lower extremity (Primary)    2. Lower extremity edema             Follow Up     No follow-ups on file.  Patient was given instructions and counseling regarding her condition or for health maintenance advice. Please see specific information pulled into the AVS if appropriate.       Patient or patient representative verbalized consent for the use of Ambient Listening during the visit with  NEWTON Jose for chart documentation. 2/4/2025  14:18 EST

## 2025-02-07 DIAGNOSIS — M54.2 NECK PAIN: ICD-10-CM

## 2025-02-07 DIAGNOSIS — M19.011 OSTEOARTHRITIS OF BOTH SHOULDERS, UNSPECIFIED OSTEOARTHRITIS TYPE: ICD-10-CM

## 2025-02-07 DIAGNOSIS — M19.012 OSTEOARTHRITIS OF BOTH SHOULDERS, UNSPECIFIED OSTEOARTHRITIS TYPE: ICD-10-CM

## 2025-02-07 RX ORDER — HYDROCODONE BITARTRATE AND ACETAMINOPHEN 10; 325 MG/1; MG/1
1 TABLET ORAL
Qty: 150 TABLET | Refills: 0 | Status: CANCELLED | OUTPATIENT
Start: 2025-02-07

## 2025-02-07 RX ORDER — HYDROCODONE BITARTRATE AND ACETAMINOPHEN 10; 325 MG/1; MG/1
1 TABLET ORAL
Qty: 150 TABLET | Refills: 0 | Status: SHIPPED | OUTPATIENT
Start: 2025-02-07

## 2025-02-07 NOTE — TELEPHONE ENCOUNTER
Medication Refill Request    Date of phone call: 2-7-25    Medication being requested: norco 10 sig: Take 1 tablet by mouth 5 (Five) Times a Day As Needed for Severe Pain.   Qty: 150    Date of last visit: 1-24-25    Date of last refill:     GAYE up to date?:     Next Follow up?: 3-24-25    Any new pertinent information? (i.e, new medication allergies, new use of medications, change in patient's health or condition, non-compliance or inconsistency with prescribing agreement?):

## 2025-02-28 ENCOUNTER — APPOINTMENT (OUTPATIENT)
Dept: CT IMAGING | Facility: HOSPITAL | Age: 75
End: 2025-02-28
Payer: MEDICARE

## 2025-02-28 ENCOUNTER — APPOINTMENT (OUTPATIENT)
Dept: GENERAL RADIOLOGY | Facility: HOSPITAL | Age: 75
End: 2025-02-28
Payer: MEDICARE

## 2025-02-28 ENCOUNTER — HOSPITAL ENCOUNTER (EMERGENCY)
Facility: HOSPITAL | Age: 75
Discharge: HOME | End: 2025-02-28
Attending: EMERGENCY MEDICINE
Payer: MEDICARE

## 2025-02-28 ENCOUNTER — OFFICE VISIT (OUTPATIENT)
Dept: FAMILY MEDICINE CLINIC | Facility: CLINIC | Age: 75
End: 2025-02-28
Payer: MEDICARE

## 2025-02-28 VITALS
BODY MASS INDEX: 41.99 KG/M2 | OXYGEN SATURATION: 98 % | WEIGHT: 237 LBS | SYSTOLIC BLOOD PRESSURE: 110 MMHG | HEART RATE: 111 BPM | HEIGHT: 63 IN | DIASTOLIC BLOOD PRESSURE: 70 MMHG

## 2025-02-28 VITALS
DIASTOLIC BLOOD PRESSURE: 72 MMHG | SYSTOLIC BLOOD PRESSURE: 149 MMHG | BODY MASS INDEX: 43.73 KG/M2 | RESPIRATION RATE: 16 BRPM | HEIGHT: 61 IN | HEART RATE: 94 BPM | WEIGHT: 231.6 LBS | OXYGEN SATURATION: 96 % | TEMPERATURE: 98.6 F

## 2025-02-28 DIAGNOSIS — R07.89 ATYPICAL CHEST PAIN: ICD-10-CM

## 2025-02-28 DIAGNOSIS — R07.9 CHEST PAIN, UNSPECIFIED TYPE: ICD-10-CM

## 2025-02-28 DIAGNOSIS — R29.810 FACIAL DROOP: Primary | ICD-10-CM

## 2025-02-28 DIAGNOSIS — R20.2 FACIAL PARESTHESIA: Primary | ICD-10-CM

## 2025-02-28 DIAGNOSIS — R20.2 PARESTHESIAS: ICD-10-CM

## 2025-02-28 DIAGNOSIS — I87.2 VENOUS STASIS DERMATITIS: ICD-10-CM

## 2025-02-28 LAB
ALBUMIN SERPL-MCNC: 3.3 G/DL (ref 3.5–5.2)
ALBUMIN/GLOB SERPL: 0.8 G/DL
ALP SERPL-CCNC: 95 U/L (ref 39–117)
ALT SERPL W P-5'-P-CCNC: 12 U/L (ref 1–33)
ANION GAP SERPL CALCULATED.3IONS-SCNC: 8.8 MMOL/L (ref 5–15)
AST SERPL-CCNC: 20 U/L (ref 1–32)
BASOPHILS # BLD AUTO: 0.04 10*3/MM3 (ref 0–0.2)
BASOPHILS NFR BLD AUTO: 0.6 % (ref 0–1.5)
BILIRUB SERPL-MCNC: 0.3 MG/DL (ref 0–1.2)
BUN SERPL-MCNC: 9 MG/DL (ref 8–23)
BUN/CREAT SERPL: 10.2 (ref 7–25)
CALCIUM SPEC-SCNC: 9.4 MG/DL (ref 8.6–10.5)
CHLORIDE SERPL-SCNC: 102 MMOL/L (ref 98–107)
CO2 SERPL-SCNC: 28.2 MMOL/L (ref 22–29)
CREAT SERPL-MCNC: 0.88 MG/DL (ref 0.57–1)
DEPRECATED RDW RBC AUTO: 38.8 FL (ref 37–54)
EGFRCR SERPLBLD CKD-EPI 2021: 69.1 ML/MIN/1.73
EOSINOPHIL # BLD AUTO: 0.1 10*3/MM3 (ref 0–0.4)
EOSINOPHIL NFR BLD AUTO: 1.5 % (ref 0.3–6.2)
ERYTHROCYTE [DISTWIDTH] IN BLOOD BY AUTOMATED COUNT: 11.5 % (ref 12.3–15.4)
GEN 5 1HR TROPONIN T REFLEX: 9 NG/L
GLOBULIN UR ELPH-MCNC: 4.1 GM/DL
GLUCOSE SERPL-MCNC: 202 MG/DL (ref 65–99)
HCT VFR BLD AUTO: 36.6 % (ref 34–46.6)
HGB BLD-MCNC: 12.5 G/DL (ref 12–15.9)
HOLD SPECIMEN: NORMAL
HOLD SPECIMEN: NORMAL
IMM GRANULOCYTES # BLD AUTO: 0.02 10*3/MM3 (ref 0–0.05)
IMM GRANULOCYTES NFR BLD AUTO: 0.3 % (ref 0–0.5)
LYMPHOCYTES # BLD AUTO: 1.56 10*3/MM3 (ref 0.7–3.1)
LYMPHOCYTES NFR BLD AUTO: 23.1 % (ref 19.6–45.3)
MCH RBC QN AUTO: 31.6 PG (ref 26.6–33)
MCHC RBC AUTO-ENTMCNC: 34.2 G/DL (ref 31.5–35.7)
MCV RBC AUTO: 92.7 FL (ref 79–97)
MONOCYTES # BLD AUTO: 0.41 10*3/MM3 (ref 0.1–0.9)
MONOCYTES NFR BLD AUTO: 6.1 % (ref 5–12)
NEUTROPHILS NFR BLD AUTO: 4.63 10*3/MM3 (ref 1.7–7)
NEUTROPHILS NFR BLD AUTO: 68.4 % (ref 42.7–76)
NRBC BLD AUTO-RTO: 0 /100 WBC (ref 0–0.2)
PLATELET # BLD AUTO: 273 10*3/MM3 (ref 140–450)
PMV BLD AUTO: 8.5 FL (ref 6–12)
POTASSIUM SERPL-SCNC: 4.2 MMOL/L (ref 3.5–5.2)
PROT SERPL-MCNC: 7.4 G/DL (ref 6–8.5)
RBC # BLD AUTO: 3.95 10*6/MM3 (ref 3.77–5.28)
SODIUM SERPL-SCNC: 139 MMOL/L (ref 136–145)
TROPONIN T NUMERIC DELTA: -1 NG/L
TROPONIN T SERPL HS-MCNC: 10 NG/L
WBC NRBC COR # BLD AUTO: 6.76 10*3/MM3 (ref 3.4–10.8)
WHOLE BLOOD HOLD COAG: NORMAL
WHOLE BLOOD HOLD SPECIMEN: NORMAL

## 2025-02-28 PROCEDURE — 99284 EMERGENCY DEPT VISIT MOD MDM: CPT

## 2025-02-28 PROCEDURE — 80053 COMPREHEN METABOLIC PANEL: CPT | Performed by: EMERGENCY MEDICINE

## 2025-02-28 PROCEDURE — 85025 COMPLETE CBC W/AUTO DIFF WBC: CPT

## 2025-02-28 PROCEDURE — 1125F AMNT PAIN NOTED PAIN PRSNT: CPT | Performed by: NURSE PRACTITIONER

## 2025-02-28 PROCEDURE — 99214 OFFICE O/P EST MOD 30 MIN: CPT | Performed by: NURSE PRACTITIONER

## 2025-02-28 PROCEDURE — 93005 ELECTROCARDIOGRAM TRACING: CPT | Performed by: EMERGENCY MEDICINE

## 2025-02-28 PROCEDURE — 3078F DIAST BP <80 MM HG: CPT | Performed by: NURSE PRACTITIONER

## 2025-02-28 PROCEDURE — 71045 X-RAY EXAM CHEST 1 VIEW: CPT

## 2025-02-28 PROCEDURE — 36415 COLL VENOUS BLD VENIPUNCTURE: CPT

## 2025-02-28 PROCEDURE — 70450 CT HEAD/BRAIN W/O DYE: CPT

## 2025-02-28 PROCEDURE — 93005 ELECTROCARDIOGRAM TRACING: CPT

## 2025-02-28 PROCEDURE — 93010 ELECTROCARDIOGRAM REPORT: CPT | Performed by: INTERNAL MEDICINE

## 2025-02-28 PROCEDURE — 84484 ASSAY OF TROPONIN QUANT: CPT | Performed by: EMERGENCY MEDICINE

## 2025-02-28 PROCEDURE — 3074F SYST BP LT 130 MM HG: CPT | Performed by: NURSE PRACTITIONER

## 2025-02-28 RX ORDER — ASPIRIN 325 MG
325 TABLET ORAL ONCE
Status: COMPLETED | OUTPATIENT
Start: 2025-02-28 | End: 2025-02-28

## 2025-02-28 RX ORDER — HYDROCODONE BITARTRATE AND ACETAMINOPHEN 10; 325 MG/1; MG/1
1 TABLET ORAL ONCE
Status: COMPLETED | OUTPATIENT
Start: 2025-02-28 | End: 2025-02-28

## 2025-02-28 RX ORDER — SODIUM CHLORIDE 0.9 % (FLUSH) 0.9 %
10 SYRINGE (ML) INJECTION AS NEEDED
Status: DISCONTINUED | OUTPATIENT
Start: 2025-02-28 | End: 2025-03-01 | Stop reason: HOSPADM

## 2025-02-28 RX ADMIN — HYDROCODONE BITARTRATE AND ACETAMINOPHEN 1 TABLET: 10; 325 TABLET ORAL at 22:02

## 2025-02-28 RX ADMIN — ASPIRIN 325 MG ORAL TABLET 325 MG: 325 PILL ORAL at 21:06

## 2025-02-28 NOTE — PROGRESS NOTES
"Chief Complaint  Cellulitis (Now on both legs ) and Chest Pain    Subjective        Chastity Salgado presents to [facility]  Cellulitis  Symptoms include chest pain.    Chest Pain       History of Present Illness  The patient presents for evaluation of facial drooping, numbness in her fingers, and cellulitis.    She has observed intermittent drooping of her eye, accompanied by numbness, which has occurred twice to date, including the present day. She reports difficulty in assessing any potential weakness on either side of her body due to a pre-existing shoulder condition. The sensation she experiences is reminiscent of a previous episode in 2023 when she was admitted to the emergency room and diagnosed with a stiff heart muscle. She does not perceive her face as drooping but describes a persistent numbness to the left side.  Additionally, she reports occasional slurring of speech and a sensation of fullness.    Upon awakening this morning, she experienced prolonged numbness in two fingers on the side she typically lies on, a position she finds most comfortable.    She has been previously evaluated for cellulitis and was prescribed antibiotics, which have not resulted in any improvement. She has also been taking diuretics.    Objective   Vital Signs:  /70 (BP Location: Left arm, Patient Position: Sitting, Cuff Size: Large Adult)   Pulse 111   Ht 160 cm (63\")   Wt 108 kg (237 lb)   SpO2 98%   BMI 41.98 kg/m²   Estimated body mass index is 41.98 kg/m² as calculated from the following:    Height as of this encounter: 160 cm (63\").    Weight as of this encounter: 108 kg (237 lb).               Physical Exam  Constitutional:       General: She is not in acute distress.     Appearance: She is well-developed. She is not diaphoretic.   Cardiovascular:      Rate and Rhythm: Normal rate and regular rhythm.      Heart sounds: Normal heart sounds. No murmur heard.     No friction rub. No " gallop.   Pulmonary:      Effort: Pulmonary effort is normal. No respiratory distress.      Breath sounds: Normal breath sounds. No wheezing or rales.   Musculoskeletal:      Cervical back: Neck supple.   Skin:     General: Skin is warm and dry.   Neurological:      Mental Status: She is alert and oriented to person, place, and time.      Cranial Nerves: Cranial nerve deficit present.      Sensory: No sensory deficit.      Motor: No weakness.      Coordination: Coordination normal.       Physical Exam  Neurologic exam was performed.    Result Review :          Results      Assessment & Plan  1. Potential transient ischemic attacks (TIAs).  Her symptoms, including facial drooping, numbness, and occasional slurring of speech, suggest the possibility of TIAs, which could be precursors to a stroke. Her diabetes increases her risk for these conditions. An ambulance will be called to transport her to the ER for further evaluation, including scans. She is advised not to drive.    2. Venous stasis dermatitis.  She presents with swelling and impaired circulation, indicative of venous stasis dermatitis. Blood work will be conducted at the ER to determine if treatment is necessary during her stay.    3. parasthesias  She reports numbness in her fingers, particularly when lying on her side. This symptom will be evaluated further in the ER.           Assessment and Plan     Diagnoses and all orders for this visit:    1. Facial droop (Primary)    2. Paresthesias    3. Venous stasis dermatitis    4. Chest pain, unspecified type    Patient left via EMS in stable condition, facial droop resolved         Follow Up     No follow-ups on file.  Patient was given instructions and counseling regarding her condition or for health maintenance advice. Please see specific information pulled into the AVS if appropriate.       Patient or patient representative verbalized consent for the use of Ambient Listening during the visit with  Marilyn MCCLURE  NEWTON Turner for chart documentation. 3/2/2025  18:01 EST

## 2025-02-28 NOTE — ED NOTES
Patient to ed via ems from dr office- call was for stroke rule out. Patient has had left facial tingling and left facial droop with chest pain intermittently since Sunday. She had one of these episodes in dr office and it resolved by the time ems got there.       NIH 0 at triage, patient has baseline ROM issues in right arm.

## 2025-03-01 LAB
QT INTERVAL: 378 MS
QTC INTERVAL: 442 MS

## 2025-03-01 NOTE — ED PROVIDER NOTES
EMERGENCY DEPARTMENT ENCOUNTER    Room Number:  27/27  PCP: Marilyn Turner APRN  Historian: Patient      HPI:  Chief Complaint: Stroke evaluation  A complete HPI/ROS/PMH/PSH/SH/FH are unobtainable due to: None  Context: Chastity Salgado is a 75 y.o. female who presents to the ED c/o sudden onset of intermittent episodes of left-sided facial drooping, left-sided facial paresthesias, as well as substernal chest discomfort.  She describes the discomfort as a moderate achy pain in the central portion of her chest, nonradiating.  She states that the symptoms have been intermittent now for the last several days.  She denies shortness of breath, nausea/vomiting, extremity pain, headache, or diaphoresis.  She was seen by her primary care provider earlier today and sent here to the ED today for evaluation.            PAST MEDICAL HISTORY  Active Ambulatory Problems     Diagnosis Date Noted    Allergic rhinitis 04/11/2016    Benign essential HTN 04/11/2016    Arthritis of shoulder region, degenerative 04/11/2016    Mild episode of recurrent major depressive disorder 04/11/2016    Fatigue 04/11/2016    Fibrositis 04/11/2016    Generalized osteoarthritis of hand 04/11/2016    Cannot sleep 04/11/2016    Pain in shoulder 04/11/2016    Adiposity 04/11/2016    Arthritis or polyarthritis, rheumatoid 04/11/2016    FOM (frequency of micturition) 04/11/2016    Vitamin D deficiency 04/11/2016    Screening for colon cancer 04/11/2016    Pharyngoesophageal dysphagia 08/23/2016    Hyperglycemia 02/27/2017    Type 2 diabetes mellitus with neurologic complication, without long-term current use of insulin 08/14/2017    Anxiety 08/14/2017    Dizziness 08/14/2017    Abnormal mammogram of left breast 08/15/2016    Breast cancer, right 07/18/2013    Chronic pain 05/07/2018    Disorder of electrolytes 05/07/2018    Overdose of drug 05/27/2014    Sepsis 12/15/2013    Urge incontinence 12/04/2017    Localized primary osteoarthritis of  left lower leg 03/07/2017    Mixed hyperlipidemia 05/07/2018    Dysthymia 05/07/2018    Constipation due to pain medication 05/07/2018    Encounter for monitoring opioid maintenance therapy 08/31/2018    Arthritis, multiple joint involvement 01/24/2019    Neck pain 07/16/2019    Fever 09/13/2019    Acute cystitis with hematuria 09/13/2019    Urinary frequency 09/13/2019    Menopausal symptoms 09/24/2019    Vision changes 01/28/2020    Cellulitis of right lower extremity 01/28/2020    Therapeutic opioid induced constipation 07/09/2020    Class 3 severe obesity due to excess calories with serious comorbidity and body mass index (BMI) of 40.0 to 44.9 in adult 08/25/2020    S/P laparoscopic hysterectomy 02/27/2020    High grade squamous intraepithelial cervical dysplasia 09/04/2019    Chronic left shoulder pain 02/19/2021    Essential tremor 04/22/2021    Nausea and vomiting 08/13/2021    Early satiety 08/13/2021    Epigastric pain 08/13/2021    Clinical diagnosis of COVID-19 12/20/2021    Chest pain, unspecified type 06/04/2023     Resolved Ambulatory Problems     Diagnosis Date Noted    Lower abdominal pain 04/11/2016    Acute maxillary sinusitis 04/11/2016    Acute mucoid otitis media of right ear 05/23/2016    Bronchitis 12/12/2016    Otitis 02/13/2017    Fracture of right humerus 05/26/2015    Sinus tachycardia 05/28/2014     Past Medical History:   Diagnosis Date    Abscess     Arthritis     Cancer 2013    Cellulitis of face     Degenerative disc disease, lumbar     Depression     DM (diabetes mellitus), type 2     Ear infection     Fibromyalgia, primary     History of dysphagia     History of dysuria     History of epistaxis     Hypertension     Intertrigo     MRSA (methicillin resistant Staphylococcus aureus)     Nose mucous membrane dryness     Osteoarthritis     Osteopenia 11/2024    Rosacea          PAST SURGICAL HISTORY  Past Surgical History:   Procedure Laterality Date    BREAST LUMPECTOMY Right 2013     COLONOSCOPY N/A 09/20/2021    Procedure: COLONOSCOPY FOR SCREENING;  Surgeon: Neeraj Jj MD;  Location: [facility];  Service: Gastroenterology;  Laterality: N/A;  diverticulosis, polyp, adequate prep, hemorrhoids, thickened ileocecal valve    ENDOSCOPY N/A 09/20/2021    Procedure: ESOPHAGOGASTRODUODENOSCOPY;  Surgeon: Neeraj Jj MD;  Location: [facility];  Service: Gastroenterology;  Laterality: N/A;  hiatal hernia, mild food retention, gastritis    EYE SURGERY Left 02/2022    EYE SURGERY Right 06/06/2022    HIP BIPOLAR REPLACEMENT Bilateral     left 1998 and right 2007    INCONTINENCE SURGERY  02/27/2020    LAPAROSCOPIC TOTAL HYSTERECTOMY  02/27/2020    NERVE BLOCK Left 02/25/2021    Procedure: left suprascapular nerve block;  Surgeon: John Padgett MD;  Location: [facility];  Service: Pain Management;  Laterality: Left;    REPLACEMENT TOTAL KNEE  2005    SHOULDER SURGERY Right     2000, 2002, 2010 X2, 2012    TOTAL KNEE ARTHROPLASTY REVISION Right 08/09/2023         FAMILY HISTORY  Family History   Problem Relation Age of Onset    Arthritis Other     Hypertension Other     Heart disease Other     Stroke Mother     Hypertension Mother     Diabetes Mother     Arthritis Mother     Parkinsonism Mother     Arthritis Father     Heart disease Sister     Diabetes Sister     Parkinsonism Sister     COPD Brother     Colon cancer Neg Hx          SOCIAL HISTORY  Social History     Socioeconomic History    Marital status: Single    Number of children: 3   Tobacco Use    Smoking status: Never     Passive exposure: Never    Smokeless tobacco: Never   Vaping Use    Vaping status: Never Used   Substance and Sexual Activity    Alcohol use: Yes     Comment: social alcohol use    Drug use: No    Sexual activity: Defer     Birth control/protection: Surgical         ALLERGIES  Adhesive tape; Shellfish-derived products; Latex; and Latex, natural rubber        REVIEW OF SYSTEMS  Review of Systems    Constitutional:  Negative for fever.   HENT:  Negative for sore throat.    Eyes: Negative.    Respiratory:  Negative for cough and shortness of breath.    Cardiovascular:  Positive for chest pain.   Gastrointestinal:  Negative for abdominal pain, diarrhea and vomiting.   Genitourinary:  Negative for dysuria.   Musculoskeletal:  Negative for neck pain.   Skin:  Negative for rash.   Allergic/Immunologic: Negative.    Neurological:  Positive for facial asymmetry and numbness. Negative for weakness and headaches.   Hematological: Negative.    Psychiatric/Behavioral: Negative.     All other systems reviewed and are negative.         PHYSICAL EXAM  ED Triage Vitals [02/28/25 1722]   Temp Heart Rate Resp BP SpO2   98.6 °F (37 °C) 94 16 149/80 98 %      Temp src Heart Rate Source Patient Position BP Location FiO2 (%)   Tympanic Monitor Lying Left arm --       Physical Exam  Constitutional:       General: She is not in acute distress.     Appearance: Normal appearance. She is not ill-appearing or toxic-appearing.   HENT:      Head: Normocephalic and atraumatic.   Eyes:      Extraocular Movements: Extraocular movements intact.      Pupils: Pupils are equal, round, and reactive to light.   Cardiovascular:      Rate and Rhythm: Normal rate and regular rhythm.      Heart sounds: No murmur heard.     No friction rub. No gallop.   Pulmonary:      Effort: Pulmonary effort is normal.      Breath sounds: Normal breath sounds.   Abdominal:      General: Abdomen is flat. There is no distension.      Palpations: Abdomen is soft.      Tenderness: There is no abdominal tenderness.   Musculoskeletal:         General: No swelling or tenderness. Normal range of motion.      Cervical back: Normal range of motion and neck supple.   Skin:     General: Skin is warm and dry.   Neurological:      General: No focal deficit present.      Mental Status: She is alert and oriented to person, place, and time.      Sensory: No sensory deficit.       Motor: No weakness.   Psychiatric:         Mood and Affect: Mood normal.         Behavior: Behavior normal.           Vital signs and nursing notes reviewed.          LAB RESULTS  Recent Results (from the past 24 hours)   ECG 12 Lead ED Triage Standing Order; Chest Pain    Collection Time: 02/28/25  5:31 PM   Result Value Ref Range    QT Interval 378 ms    QTC Interval 442 ms   Green Top (Gel)    Collection Time: 02/28/25  5:44 PM   Result Value Ref Range    Extra Tube Hold for add-ons.    Lavender Top    Collection Time: 02/28/25  5:44 PM   Result Value Ref Range    Extra Tube hold for add-on    Light Blue Top    Collection Time: 02/28/25  5:44 PM   Result Value Ref Range    Extra Tube Hold for add-ons.    Comprehensive Metabolic Panel    Collection Time: 02/28/25  7:06 PM    Specimen: Arm, Left; Blood   Result Value Ref Range    Glucose 202 (H) 65 - 99 mg/dL    BUN 9 8 - 23 mg/dL    Creatinine 0.88 0.57 - 1.00 mg/dL    Sodium 139 136 - 145 mmol/L    Potassium 4.2 3.5 - 5.2 mmol/L    Chloride 102 98 - 107 mmol/L    CO2 28.2 22.0 - 29.0 mmol/L    Calcium 9.4 8.6 - 10.5 mg/dL    Total Protein 7.4 6.0 - 8.5 g/dL    Albumin 3.3 (L) 3.5 - 5.2 g/dL    ALT (SGPT) 12 1 - 33 U/L    AST (SGOT) 20 1 - 32 U/L    Alkaline Phosphatase 95 39 - 117 U/L    Total Bilirubin 0.3 0.0 - 1.2 mg/dL    Globulin 4.1 gm/dL    A/G Ratio 0.8 g/dL    BUN/Creatinine Ratio 10.2 7.0 - 25.0    Anion Gap 8.8 5.0 - 15.0 mmol/L    eGFR 69.1 >60.0 mL/min/1.73   High Sensitivity Troponin T    Collection Time: 02/28/25  7:06 PM    Specimen: Arm, Left; Blood   Result Value Ref Range    HS Troponin T 10 <14 ng/L   Gold Top - SST    Collection Time: 02/28/25  7:06 PM   Result Value Ref Range    Extra Tube Hold for add-ons.    CBC Auto Differential    Collection Time: 02/28/25  7:06 PM    Specimen: Arm, Left; Blood   Result Value Ref Range    WBC 6.76 3.40 - 10.80 10*3/mm3    RBC 3.95 3.77 - 5.28 10*6/mm3    Hemoglobin 12.5 12.0 - 15.9 g/dL    Hematocrit  36.6 34.0 - 46.6 %    MCV 92.7 79.0 - 97.0 fL    MCH 31.6 26.6 - 33.0 pg    MCHC 34.2 31.5 - 35.7 g/dL    RDW 11.5 (L) 12.3 - 15.4 %    RDW-SD 38.8 37.0 - 54.0 fl    MPV 8.5 6.0 - 12.0 fL    Platelets 273 140 - 450 10*3/mm3    Neutrophil % 68.4 42.7 - 76.0 %    Lymphocyte % 23.1 19.6 - 45.3 %    Monocyte % 6.1 5.0 - 12.0 %    Eosinophil % 1.5 0.3 - 6.2 %    Basophil % 0.6 0.0 - 1.5 %    Immature Grans % 0.3 0.0 - 0.5 %    Neutrophils, Absolute 4.63 1.70 - 7.00 10*3/mm3    Lymphocytes, Absolute 1.56 0.70 - 3.10 10*3/mm3    Monocytes, Absolute 0.41 0.10 - 0.90 10*3/mm3    Eosinophils, Absolute 0.10 0.00 - 0.40 10*3/mm3    Basophils, Absolute 0.04 0.00 - 0.20 10*3/mm3    Immature Grans, Absolute 0.02 0.00 - 0.05 10*3/mm3    nRBC 0.0 0.0 - 0.2 /100 WBC   High Sensitivity Troponin T 1Hr    Collection Time: 02/28/25  8:47 PM    Specimen: Arm, Right; Blood   Result Value Ref Range    HS Troponin T 9 <14 ng/L    Troponin T Numeric Delta -1 Abnormal if >/=3 ng/L       Ordered the above labs and reviewed the results.        RADIOLOGY  CT Head Without Contrast    Result Date: 2/28/2025  CT OF THE HEAD WITHOUT CONTRAST  HISTORY: Facial droop  COMPARISON: June 12, 2017  TECHNIQUE: Axial CT imaging was obtained through the brain. No IV contrast was administered.  FINDINGS: No acute intracranial hemorrhage is seen. There is mild atrophy. There is periventricular and deep white matter microangiopathic change. There is no midline shift or mass effect. There are bilateral basal ganglia calcifications. Paranasal sinuses and mastoid air cells appear clear.      No acute intracranial findings.  Radiation dose reduction techniques were utilized, including automated exposure control and exposure modulation based on body size.   This report was finalized on 2/28/2025 9:58 PM by Dr. Andreina Neal M.D on Workstation: [id number]      XR Chest 1 View    Result Date: 2/28/2025  CHEST SINGLE VIEW  HISTORY: 75 years of age, Female.  Chest  Pain Triage Protocol  COMPARISON: CT angiogram chest 06/04/2023, PA chest 12/10/2021.  FINDINGS: Heart and mediastinal structures are not changed and appear within normal limits. Lungs appear clear and there is no evidence for pulmonary edema or pleural effusion. There is a right shoulder arthroplasty. Advanced arthritis of left glenohumeral joint.      No evidence for active disease in the chest.  This report was finalized on 2/28/2025 6:40 PM by Ruiz Fernandes M.D on Workstation: [id number]       Ordered the above noted radiological studies. Reviewed by me in PACS.            PROCEDURES  Procedures    EKG independently interpreted by myself as follows:    EKG          EKG time: 1731  Rhythm/Rate: NSR, 82  P waves and OR: nml  QRS, axis: nml, nml   ST and T waves: nml     Interpreted Contemporaneously by me, independently viewed  unchanged compared to prior 6/5/23      HEART SCORE    History Moderately suspicious (1)  ECG Normal (0)  Age > or = 65 (2)  Risk factors 1 or 2 (1)  Troponin < or = Normal limit (0)    This patient's HEART score is 4    HEART Score Key:  Scores 0-3: 0.9-1.7% risk of adverse cardiac event. In the HEART Score study, these patients were discharged (0.99% in the retrospective study, 1.7% in the prospective study)  Scores 4-6: 12-16.6% risk of adverse cardiac event. In the HEART Score study, these patients were admitted to the hospital. (11.6% retrospective, 16.6% prospective)  Scores >=7: 50-65% risk of adverse cardiac event. In the HEART Score study, these patients were candidates for early invasive measures. (65.2% retrospective, 50.1% prospective)        MEDICATIONS GIVEN IN ER  Medications   sodium chloride 0.9 % flush 10 mL (has no administration in time range)   aspirin tablet 325 mg (325 mg Oral Given 2/28/25 2106)   HYDROcodone-acetaminophen (NORCO)  MG per tablet 1 tablet (1 tablet Oral Given 2/28/25 2202)                   MEDICAL DECISION MAKING, PROGRESS, and  CONSULTS    All labs have been independently reviewed by me.  All radiology studies have been reviewed by me and I have also reviewed the radiology report.   EKG's independently viewed and interpreted by me.  Discussion below represents my analysis of pertinent findings related to patient's condition, differential diagnosis, treatment plan and final disposition.      Additional sources:  - Discussed/ obtained information from independent historians: History obtained from the patient herself at bedside.    - External (non-ED) record review: Medical records review, the patient was last seen and evaluated in the outpatient office of family medicine earlier today, 2/28/2025, for evaluation of facial drooping with paresthesia.    - Chronic or social conditions impacting care: Hypertension, type 2 diabetes mellitus    - Shared decision making: Discharge decision based on shared conversations have between myself as well as the patient at bedside.      Orders placed during this visit:  Orders Placed This Encounter   Procedures    XR Chest 1 View    CT Head Without Contrast    Arab Draw    Comprehensive Metabolic Panel    High Sensitivity Troponin T    CBC Auto Differential    High Sensitivity Troponin T 1Hr    NPO Diet NPO Type: Strict NPO    Undress & Gown    Continuous Pulse Oximetry    Oxygen Therapy- Nasal Cannula; Titrate 1-6 LPM Per SpO2; 90 - 95%    ECG 12 Lead ED Triage Standing Order; Chest Pain    ECG 12 Lead ED Triage Standing Order; Chest Pain    Insert Peripheral IV    CBC & Differential    Green Top (Gel)    Lavender Top    Gold Top - SST    Light Blue Top             Differential diagnosis includes but is not limited to:    Acute CVA, TIA, intracranial hemorrhage, intracranial mass effect, acute coronary syndrome, pleurisy, pericarditis, pneumonia, pneumothorax, or pulmonary embolism      Independent interpretation of labs, radiology studies, and discussions with consultants:    Chest x-ray independently  interpreted by myself with my interpretation showing no cardiomegaly nor air of edema, infiltrate, or pneumothorax.      ED Course as of 02/28/25 2243 Fri Feb 28, 2025 2240 On reevaluation, the patient is resting comfortably and without any further complaints.  I informed her that her head CT is unremarkable as well as EKG and 2 negative cardiac enzymes.  I did offer admission to the observation unit for both cardiology as well as neurology evaluation.  However, she states that she would like to go home as she does not want to leave her dog alone and will follow closely with her primary care provider and cardiologist as soon as possible for further evaluation.  Return instructions given and she was strongly encouraged to return should her symptoms return or worsen in any way.  She agrees with the plan and all questions answered. [BM]      ED Course User Index  [BM] David Yuen MD           DIAGNOSIS  Final diagnoses:   Facial paresthesia   Atypical chest pain         DISPOSITION  DISCHARGE    Patient discharged in stable condition.    Reviewed implications of results, diagnosis, meds, responsibility to follow up, warning signs and symptoms of possible worsening, potential complications and reasons to return to ER.    Patient/Family voiced understanding of above instructions.    Discussed plan for discharge, as there is no emergent indication for admission. Patient referred to primary care provider for BP management due to today's BP. Pt/family is agreeable and understands need for follow up and repeat testing.  Pt is aware that discharge does not mean that nothing is wrong but it indicates no emergency is present that requires admission and they must continue care with follow-up as given below or physician of their choice.     FOLLOW-UP  Marilyn Turner, APRN  [address]  [place] [id number] [phone]    Schedule an appointment as soon as possible for a visit       [facility]  [facility]  [address]  [address]  [facility] [address]  [phone]  Schedule an appointment as soon as possible for a visit            Medication List      No changes were made to your prescriptions during this visit.                   Latest Documented Vital Signs:  As of 22:43 EST  BP- 149/72 HR- 94 Temp- 98.6 °F (37 °C) (Tympanic) O2 sat- 96%              --    Please note that portions of this were completed with a voice recognition program.       Note Disclaimer: At [facility], we believe that sharing information builds trust and better relationships. You are receiving this note because you are receiving care at [facility] or recently visited. It is possible you will see health information before a provider has talked with you about it. This kind of information can be easy to misunderstand. To help you fully understand what it means for your health, we urge you to discuss this note with your provider.             David Yuen MD  02/28/25 1002

## 2025-03-10 DIAGNOSIS — M19.011 OSTEOARTHRITIS OF BOTH SHOULDERS, UNSPECIFIED OSTEOARTHRITIS TYPE: ICD-10-CM

## 2025-03-10 DIAGNOSIS — M19.012 OSTEOARTHRITIS OF BOTH SHOULDERS, UNSPECIFIED OSTEOARTHRITIS TYPE: ICD-10-CM

## 2025-03-10 DIAGNOSIS — M54.2 NECK PAIN: ICD-10-CM

## 2025-03-10 RX ORDER — HYDROCODONE BITARTRATE AND ACETAMINOPHEN 10; 325 MG/1; MG/1
1 TABLET ORAL
Qty: 150 TABLET | Refills: 0 | Status: SHIPPED | OUTPATIENT
Start: 2025-03-10

## 2025-03-10 NOTE — TELEPHONE ENCOUNTER
Caller: Chastity Salgado    Relationship: Self    Best call back number:     Requested Prescriptions:   Requested Prescriptions     Pending Prescriptions Disp Refills    HYDROcodone-acetaminophen (NORCO)  MG per tablet 150 tablet 0     Sig: Take 1 tablet by mouth 5 (Five) Times a Day As Needed for Severe Pain. 30 day supply. DNF 2/12/25        Pharmacy where request should be sent: [facility] [id number] [facility] [id number] [facility] [phone] [facility] [phone]      Last office visit with prescribing clinician: 1/24/2025   Last telemedicine visit with prescribing clinician: Visit date not found   Next office visit with prescribing clinician: 3/24/2025     Additional details provided by patient: PATIENT WAS IN HOSPITAL AND RECEIVED 1 PILL 2/28/25.    Does the patient have less than a 3 day supply:  [] Yes  [x] No    Would you like a call back once the refill request has been completed: [x] Yes [] No    If the office needs to give you a call back, can they leave a voicemail: [x] Yes [] No    Shawn Kwok Rep   03/10/25 10:26 EDT

## 2025-03-10 NOTE — TELEPHONE ENCOUNTER
Rx Refill Note  Requested Prescriptions     Pending Prescriptions Disp Refills    DULoxetine (CYMBALTA) 30 MG capsule [Pharmacy Med Name: DULoxetine HCL DR 30 MG CAPSULE] 30 capsule      Sig: TAKE 1 CAPSULE BY MOUTH DAILY      Last office visit with prescribing clinician: 2/28/2025   Last telemedicine visit with prescribing clinician: Visit date not found   Next office visit with prescribing clinician: Visit date not found                         Would you like a call back once the refill request has been completed: [] Yes [] No    If the office needs to give you a call back, can they leave a voicemail: [] Yes [] No    Jostin Cool MA  03/10/25, 07:46 EDT

## 2025-03-11 RX ORDER — DULOXETIN HYDROCHLORIDE 30 MG/1
30 CAPSULE, DELAYED RELEASE ORAL DAILY
Qty: 90 CAPSULE | Refills: 1 | Status: SHIPPED | OUTPATIENT
Start: 2025-03-11

## 2025-03-12 ENCOUNTER — TELEPHONE (OUTPATIENT)
Dept: FAMILY MEDICINE CLINIC | Facility: CLINIC | Age: 75
End: 2025-03-12

## 2025-03-12 NOTE — TELEPHONE ENCOUNTER
DELETE AFTER REVIEWING: Send this encounter to the appropriate pool. See your Call Action Grid or Workflows for direction.    Caller:  Chastity J    Relationship: Self    Best call back number: [phone]    What medication are you requesting: WHATEVER Marilyn Turner APRN RECOMMENDS    What are your current symptoms: NERVE PAIN FROM THE KNEE DOWN    How long have you been experiencing symptoms: ABOUT A MONTH    Have you had these symptoms before:    [x] Yes  [] No    Have you been treated for these symptoms before:   [] Yes  [x] No    If a prescription is needed, what is your preferred pharmacy and phone number:      [facility] [id number] - [place], [place] - [address] [facility] AT [facility] & [address] - [phone] [facility] [phone]  [phone]     Additional notes:    PATIENT VERBALIZES SHE AND Marilyn Turner APRN HAVE DISCUSSED THIS AT AN APPOINTMENT WITHIN THE PAST MONTH

## 2025-03-12 NOTE — TELEPHONE ENCOUNTER
One option would be gabapentin, however patient will need to follow up in office to discuss medication and sign contract agreement

## 2025-03-13 NOTE — TELEPHONE ENCOUNTER
HUB TO RELAY:  LVM informing pt.... per provider can try Gabapentin, however pt will need to schedule apt to discuss and sign contract agreement.

## 2025-03-27 ENCOUNTER — TELEPHONE (OUTPATIENT)
Dept: CARDIOLOGY | Age: 75
End: 2025-03-27

## 2025-03-27 NOTE — TELEPHONE ENCOUNTER
Caller: Chastity Salgado    Relationship to patient: Self    Best call back number:[phone]    Chief complaint: CHEST PAIN FACIAL DROOPING     Type of visit: HOSPITAL FU    Requested date: ASAP     If rescheduling, when is the original appointment: NA     Additional notes:NO TIME FRAME PER DISCHARGE NOTES, PATIENT WAS RUSHED BY AMBULANCE FROM PCP OFFICE FOR CHEST PAIN AND FACIAL DROOPING. STILL HAVING NUMBNESS IN FACE. PATIENT WAS TOLD TO FU WITH CARDIOLOGY. PLEASE CALL AND ADVISE.

## 2025-03-28 ENCOUNTER — OFFICE VISIT (OUTPATIENT)
Dept: PAIN MEDICINE | Facility: CLINIC | Age: 75
End: 2025-03-28
Payer: MEDICARE

## 2025-03-28 VITALS
DIASTOLIC BLOOD PRESSURE: 68 MMHG | WEIGHT: 224.2 LBS | TEMPERATURE: 97.5 F | RESPIRATION RATE: 20 BRPM | OXYGEN SATURATION: 97 % | HEART RATE: 89 BPM | SYSTOLIC BLOOD PRESSURE: 122 MMHG | HEIGHT: 61 IN | BODY MASS INDEX: 42.33 KG/M2

## 2025-03-28 DIAGNOSIS — Z51.81 ENCOUNTER FOR MONITORING OPIOID MAINTENANCE THERAPY: ICD-10-CM

## 2025-03-28 DIAGNOSIS — M54.2 NECK PAIN: ICD-10-CM

## 2025-03-28 DIAGNOSIS — M19.012 OSTEOARTHRITIS OF BOTH SHOULDERS, UNSPECIFIED OSTEOARTHRITIS TYPE: Primary | ICD-10-CM

## 2025-03-28 DIAGNOSIS — M54.50 ACUTE BILATERAL LOW BACK PAIN, UNSPECIFIED WHETHER SCIATICA PRESENT: ICD-10-CM

## 2025-03-28 DIAGNOSIS — M62.838 MUSCLE SPASM: ICD-10-CM

## 2025-03-28 DIAGNOSIS — M12.9 ARTHRITIS, MULTIPLE JOINT INVOLVEMENT: ICD-10-CM

## 2025-03-28 DIAGNOSIS — M19.011 OSTEOARTHRITIS OF BOTH SHOULDERS, UNSPECIFIED OSTEOARTHRITIS TYPE: Primary | ICD-10-CM

## 2025-03-28 DIAGNOSIS — Z79.891 ENCOUNTER FOR MONITORING OPIOID MAINTENANCE THERAPY: ICD-10-CM

## 2025-03-28 NOTE — PROGRESS NOTES
CHIEF COMPLAINT  Neck and shoulder pain     Subjective   Chastity Salgado is a 75 y.o. female  who presents for follow-up.  She has a history of chronic neck and joint pain. She reports that her pain has worsened since her last office visit.     Today pain is 8/10VAS in severity. Pain is located in her bilateral shoulders (left worse than right), knees, ankles, and neck. Describes this pain as an intermittent ache. She reports worsening numbness/tinging in bilateral feet. She feels that her neuropathy has worsened. Pain is worsened by certain movements, prolonged positions, climbing stairs, bending/twisting, and weather. Pain improves with rest, reposition, heat/ice, and medications.      She continues with Hydrocodone 10mg 5/day, Cymbalta 60mg, Celebrex 200mg daily (prescribed by PCP) and Flexeril 10mg at HS. Occasional constipation is managed with Metamucil and Linzess/Movantik. Denies any side effects from the regimen including somnolence. The regimen helps decrease pain by 75%. Notes improvement in activity and function with regimen. ADL's by self. Denies any bowel or bladder changes.      Celebrex - causes GI issues - takes every other day (history of gastroparesis)  Gabapentin - caused sleep walking     Continues to work with Dr. Tu Vásquez in regards to shoulder pain. She is contemplating surgery but is unsure at this time.     She is scheduled to her PCP on 4/2/25 and Cardiology on 4/8/25 - as recommended at ED discharge     Procedures:  2/25/21 - left suprascapular nerve block/steroid injection (posterior approach) - 50% relief x 1 month  9/22/20 - left suprascapular nerve block/steroid injection (posterior approach) - 70% relief x 1-2 months     Surgical History:  10/6/23 -  Right total knee revision - Dr. Gimenez.      Neck Pain   This is a chronic problem. The current episode started more than 1 year ago. The problem occurs intermittently. The problem has been gradually worsening. The pain is associated  with a sleep position. The pain is present in the left side and right side. The quality of the pain is described as aching. The pain is at a severity of 8/10. The symptoms are aggravated by twisting, position and bending. Stiffness is present In the morning. Associated symptoms include headaches, numbness (left hand) and weakness (left hand). Pertinent negatives include no chest pain or fever. She has tried ice, heat and oral narcotics for the symptoms.   Joint Pain  This is a chronic (bilateral knees, ankles, and shoulders) problem. The current episode started more than 1 year ago. The problem occurs intermittently. The problem has been waxing and waning (Rates pain 8/10VAS in severity - right shoulder pain has worsened). Associated symptoms include arthralgias (bilateral shoulders), headaches, nausea, neck pain, numbness (left hand) and weakness (left hand). Pertinent negatives include no abdominal pain, chest pain, chills, congestion, coughing, fatigue or fever. The symptoms are aggravated by walking, bending, exertion, twisting and standing (cold weather, stairs). She has tried oral narcotics, position changes, heat, ice and rest for the symptoms.     PEG Assessment   What number best describes your pain on average in the past week?8  What number best describes how, during the past week, pain has interfered with your enjoyment of life?6  What number best describes how, during the past week, pain has interfered with your general activity?  8    Review of Pertinent Medical Data ---  Reviewed ED note from Dr. David Yuen from 2/28/2025.  Patient presents with intermittent episodes of left-sided facial droop and left facial paralysis and some substernal chest discomfort that has been occuring intermittently for the last several days.  Denies shortness of breath, nausea vomiting, headache, and extremity pain.  She was seen by her PCP earlier today and sent her to the ED for evaluation via EMS.  CT of her head was  "obtained showing no acute findings.  EKG showed normal sinus rhythm.  Admission was offered for observation by both cardiology and neurology but patient declined at this time.  She stated she will follow-up with her PCP and cardiologist ASAP.  She was discharged home.               The following portions of the patient's history were reviewed and updated as appropriate: allergies, current medications, past family history, past medical history, past social history, past surgical history, and problem list.    Review of Systems   Constitutional:  Negative for activity change, chills, fatigue and fever.   HENT:  Negative for congestion.    Respiratory:  Negative for cough and chest tightness.    Cardiovascular:  Negative for chest pain.   Gastrointestinal:  Positive for nausea. Negative for abdominal pain, constipation and diarrhea.   Musculoskeletal:  Positive for arthralgias (bilateral shoulders), back pain and neck pain.   Neurological:  Positive for weakness (left hand), numbness (left hand) and headaches. Negative for dizziness and light-headedness.   Psychiatric/Behavioral:  Positive for agitation and sleep disturbance. Negative for suicidal ideas. The patient is nervous/anxious.      I have reviewed and confirmed the accuracy of the ROS as documented by the MA/LPN/RN NEWTON Abdul    Vitals:    03/28/25 1245   BP: 122/68   BP Location: Left arm   Patient Position: Sitting   Cuff Size: Large Adult   Pulse: 89   Resp: 20   Temp: 97.5 °F (36.4 °C)   TempSrc: Temporal   SpO2: 97%   Weight: 102 kg (224 lb 3.2 oz)   Height: 154.9 cm (61\")   PainSc: 8      Objective   Physical Exam  Constitutional:       Appearance: Normal appearance.   HENT:      Head: Normocephalic.   Cardiovascular:      Rate and Rhythm: Normal rate and regular rhythm.   Pulmonary:      Effort: Pulmonary effort is normal.      Breath sounds: Normal breath sounds.   Musculoskeletal:      Right shoulder: Tenderness present. Decreased range of " motion.      Left shoulder: Tenderness and crepitus present. Decreased range of motion.      Left elbow: Decreased range of motion. Tenderness present.      Left hand: Swelling and tenderness present.      Cervical back: Tenderness present. Pain with movement present. Decreased range of motion.      Lumbar back: Tenderness and bony tenderness present. Decreased range of motion. Negative right straight leg raise test and negative left straight leg raise test.      Right knee: Swelling present. Decreased range of motion. Tenderness present.      Comments: + lumbar facet loading/tenderness    Skin:     General: Skin is warm and dry.      Capillary Refill: Capillary refill takes less than 2 seconds.   Neurological:      General: No focal deficit present.      Mental Status: She is alert and oriented to person, place, and time.      Gait: Gait abnormal (slowed).   Psychiatric:         Mood and Affect: Mood normal.         Speech: Speech normal.         Behavior: Behavior normal.         Thought Content: Thought content normal.         Cognition and Memory: Cognition normal.       Assessment & Plan   Diagnoses and all orders for this visit:    1. Osteoarthritis of both shoulders, unspecified osteoarthritis type (Primary)    2. Neck pain    3. Acute bilateral low back pain, unspecified whether sciatica present    4. Muscle spasm    5. Arthritis, multiple joint involvement    6. Encounter for monitoring opioid maintenance therapy      Chastity OLIVARES Salgado reports a pain score of 8.  Given her pain assessment as noted, treatment options were discussed and the following options were decided upon as a follow-up plan to address the patient's pain: continuation of current treatment plan for pain.    --- The urine drug screen confirmation from 1/24/25 has been reviewed and the result is appropriate based on patient history and GAYE report  --- The patient signed an updated copy of the controlled substance agreement on 11/11/24  ---  Continue Flexeril. No refill needed at this time.   --- Continue Hydrocodone. No refill needed at this time. Patient appears stable with current regimen. No adverse effects. Regarding continuation of opioids, there is no evidence of aberrant behavior or any red flags.  The patient continues with appropriate response to opioid therapy. ADL's remain intact by self.    --- Offered referral to Neurology for further evaluation of neuropathy - patient wishes to discuss with her PCP first.  --- Follow-up 2 month or sooner if needed      GAYE REPORT  As part of the patient's treatment plan, I am prescribing controlled substances. The patient has been made aware of appropriate use of such medications, including potential risk of somnolence, limited ability to drive and/or work safely, and the potential for dependence or overdose. It has also been made clear that these medications are for use by this patient only, without concomitant use of alcohol or other substances unless prescribed.     Patient has completed prescribing agreement detailing terms of continued prescribing of controlled substances, including monitoring GAYE reports, urine drug screening, and pill counts if necessary. The patient is aware that inappropriate use will results in cessation of prescribing such medications.    As the clinician, I personally reviewed the GAYE from 3/28/25 while the patient was in the office today.    History and physical exam exhibit continued safe and appropriate use of controlled substances.    Dictated utilizing Dragon dictation.

## 2025-04-02 ENCOUNTER — TELEPHONE (OUTPATIENT)
Dept: FAMILY MEDICINE CLINIC | Facility: CLINIC | Age: 75
End: 2025-04-02

## 2025-04-02 NOTE — TELEPHONE ENCOUNTER
Caller: Chastity Salgado    Relationship: Self    Best call back number: [phone]       What was the call regarding: PATIENT IS SEEING HER CARDIOLOGIST 4/8.  PATIENT WANTS TO KNOW IF SHE STILL NEEDS TO BE SEEN.  PLEASE CALL TO FOLLOW UP

## 2025-04-03 NOTE — PROGRESS NOTES
RM:________     PCP: Johnny Marilyn ElinNEWTON white                                     Last EKG :  2025    : 1950  AGE: 75 y.o.    REASON FOR VISIT: __________________________________________    ____________________________________________________________                                                       Wt Readings from Last 3 Encounters:   25 102 kg (224 lb 3.2 oz)   25 105 kg (231 lb 9.6 oz)   25 108 kg (237 lb)      BP Readings from Last 3 Encounters:   25 122/68   25 149/72   25 110/70          WT: ____________ HT: ______ BP: __________ HR ______   02% _______                 Lipid Panel          10/16/2024    14:21   Lipid Panel   Total Cholesterol 157    Triglycerides 135    HDL Cholesterol 51    VLDL Cholesterol 24    LDL Cholesterol  82    LDL/HDL Ratio 1.55

## 2025-04-08 ENCOUNTER — OFFICE VISIT (OUTPATIENT)
Dept: CARDIOLOGY | Age: 75
End: 2025-04-08
Payer: MEDICARE

## 2025-04-08 VITALS
SYSTOLIC BLOOD PRESSURE: 148 MMHG | BODY MASS INDEX: 42.33 KG/M2 | WEIGHT: 224.2 LBS | OXYGEN SATURATION: 95 % | HEART RATE: 104 BPM | HEIGHT: 61 IN | DIASTOLIC BLOOD PRESSURE: 80 MMHG

## 2025-04-08 DIAGNOSIS — I10 BENIGN ESSENTIAL HTN: Primary | ICD-10-CM

## 2025-04-08 DIAGNOSIS — I51.89 DIASTOLIC DYSFUNCTION: ICD-10-CM

## 2025-04-08 DIAGNOSIS — E11.69 TYPE 2 DIABETES MELLITUS WITH OTHER SPECIFIED COMPLICATION, WITHOUT LONG-TERM CURRENT USE OF INSULIN: ICD-10-CM

## 2025-04-08 PROBLEM — E11.9 DIABETES MELLITUS: Status: ACTIVE | Noted: 2025-04-08

## 2025-04-08 NOTE — PROGRESS NOTES
CARDIOLOGY        Patient Name: Chastity Salgado  :1950  Age: 75 y.o.  Primary Cardiologist: Soraya Ricks MD  Encounter Provider:  NEWTON Wilson    Date of Service: 2025      CHIEF COMPLAINT / REASON FOR OFFICE VISIT     Hospital Follow Up Visit (ER follow-up), Chest Pain (resolved), and Facial Droop (resolved)      HISTORY OF PRESENT ILLNESS       HPI  Chastity Salgado is a 75 y.o. female who presents today for ED follow-up.     Pt has a  history significant for hyperlipidemia, hypertension, type 2 diabetes, breast cancer, DDD, osteoarthritis, chronic pain.    Patient had right breast cancer treated with lumpectomy and radiation in     Patient previously followed by our office in  for chest pain.  Treadmill stress test was ordered but canceled by the patient.    Patient was evaluated during a hospitalization in  for chest discomfort.  Patient presented to the ED with central chest pain radiating to the back.  Troponin was negative.  D-dimer negative.  ECG did not reveal any ischemic changes.  CTA of the chest was negative.  BP was noted to be elevated.  Patient was recommended to have a outpatient PET stress test and an echocardiogram.    Echocardiogram in 2023 revealed LVEF of 63% with grade 1 diastolic dysfunction.  Calculated RVSP 30 mmHg.    Myocardial perfusion PET stress test 2023 was negative for ischemia.  Impressions consistent with a low restudy.    Patient was recommended to follow-up in clinic for routine assessment however she never scheduled follow-up.    Medical record review reveals patient was evaluated in the emergency department 2025 for sudden onset of left facial drooping, left facial paresthesia as well as substernal chest discomfort.  ED evaluation revealed high-sensitivity troponin negative x 2.  CT of the head was negative.  ECG tracing was personally reviewed by me and does not reveal any ischemic changes.  Patient was offered  "admission to the observation unit for cardiology and neurology evaluation (possible brain MRI) but reported that she would like to be discharged with outpatient follow-up.    Patient reports today for follow up from the ED visit.  She has been evaluated by her PCP, a MRI has not yet been evaluated.  PCP requested that patient be evaluated by our clinic,  she is concerned about previous mention of grade 1 diastolic dysfunction.  She is asymptomatic.     The following portions of the patient's history were reviewed and updated as appropriate: allergies, current medications, past family history, past medical history, past social history, past surgical history and problem list.      VITAL SIGNS     Visit Vitals  /80 (BP Location: Left arm, Patient Position: Sitting, Cuff Size: Adult)   Pulse 104   Ht 154.9 cm (61\")   Wt 102 kg (224 lb 3.2 oz)   SpO2 95%   BMI 42.36 kg/m²         Wt Readings from Last 3 Encounters:   04/08/25 102 kg (224 lb 3.2 oz)   03/28/25 102 kg (224 lb 3.2 oz)   02/28/25 105 kg (231 lb 9.6 oz)     Body mass index is 42.36 kg/m².      REVIEW OF SYSTEMS     Review of Systems   Constitutional: Negative for chills, fever, weight gain and weight loss.   Cardiovascular:  Negative for leg swelling.   Respiratory:  Negative for cough, snoring and wheezing.    Hematologic/Lymphatic: Negative for bleeding problem. Does not bruise/bleed easily.   Skin:  Negative for color change.   Musculoskeletal:  Negative for falls, joint pain and myalgias.   Gastrointestinal:  Negative for melena.   Genitourinary:  Negative for hematuria.   Neurological:  Negative for excessive daytime sleepiness.   Psychiatric/Behavioral:  Negative for depression. The patient is not nervous/anxious.            PHYSICAL EXAMINATION     Vitals and nursing note reviewed.   Constitutional:       Appearance: Normal appearance. Well-developed.   Eyes:      Conjunctiva/sclera: Conjunctivae normal.   Neck:      Vascular: No carotid bruit. "   Pulmonary:      Breath sounds: Normal breath sounds.   Cardiovascular:      Normal rate. Regular rhythm. Normal S1 with normal intensity. Normal S2 with normal intensity.       Murmurs: There is no murmur.      No gallop.  No click. No rub.   Edema:     Peripheral edema absent.   Musculoskeletal: Normal range of motion. Skin:     General: Skin is warm and dry.   Neurological:      Mental Status: Alert and oriented to person, place, and time.      GCS: GCS eye subscore is 4. GCS verbal subscore is 5. GCS motor subscore is 6.   Psychiatric:         Speech: Speech normal.         Behavior: Behavior normal.         Thought Content: Thought content normal.         Judgment: Judgment normal.         REVIEWED DATA     Procedures        Lipid Panel          10/16/2024    14:21   Lipid Panel   Total Cholesterol 157    Triglycerides 135    HDL Cholesterol 51    VLDL Cholesterol 24    LDL Cholesterol  82    LDL/HDL Ratio 1.55        Lab Results   Component Value Date     02/28/2025     10/16/2024    K 4.2 02/28/2025    K 4.3 10/16/2024     02/28/2025     10/16/2024    CO2 28.2 02/28/2025    CO2 25.6 10/16/2024    BUN 9 02/28/2025    BUN 23 10/16/2024    CREATININE 0.88 02/28/2025    CREATININE 0.80 10/16/2024    EGFRIFNONA 103 09/21/2021    EGFRIFNONA 79 06/17/2021    EGFRIFAFRI >60 10/19/2022    EGFRIFAFRI 125 09/21/2021    GLUCOSE 202 (H) 02/28/2025    GLUCOSE 163 (H) 10/16/2024    CALCIUM 9.4 02/28/2025    CALCIUM 8.9 10/16/2024    ALBUMIN 3.3 (L) 02/28/2025    ALBUMIN 3.8 10/16/2024    BILITOT 0.3 02/28/2025    BILITOT 0.2 10/16/2024    AST 20 02/28/2025    AST 18 10/16/2024    ALT 12 02/28/2025    ALT 10 10/16/2024     Lab Results   Component Value Date    WBC 6.76 02/28/2025    WBC 8.64 10/16/2024    HGB 12.5 02/28/2025    HGB 12.9 10/16/2024    HCT 36.6 02/28/2025    HCT 39.3 10/16/2024    MCV 92.7 02/28/2025    MCV 95.6 10/16/2024     02/28/2025     10/16/2024     No results  "found for: \"PROBNP\", \"BNP\"  Lab Results   Component Value Date    TROPONINI <0.010 10/19/2022    TROPONINT 9 02/28/2025     Lab Results   Component Value Date    TSH 1.580 10/16/2024    TSH 1.550 01/03/2024         ASSESSMENT & PLAN     Diagnoses and all orders for this visit:    1. Benign essential HTN (Primary)  Overview:  Lisinopril 40 mg/day    Assessment & Plan:  Hypertension is stable and controlled  Continue current treatment regimen.  Dietary sodium restriction.  Regular aerobic exercise.  Ambulatory blood pressure monitoring.  Blood pressure will be reassessed in 6 months.      BP is able in clinic today.  Patient asymptomatic.  Encouraged her to start exercise, possibly water aerobics or walking laps in a pool.  She has chronic pain syndrome and exercises limited      2. Type 2 diabetes mellitus with other specified complication, without long-term current use of insulin    3. Diastolic dysfunction  Overview:  Echocardiogram in June 2023 revealed LVEF of 63% with grade 1 diastolic dysfunction.  Calculated RVSP 30 mmHg.    Assessment & Plan:  Asymptomatic  Denies dyspnea, dyspnea with exertion, volume overload          Return in about 6 months (around 10/8/2025) for Dr. Ricks.    Future Appointments         Provider Department Center    5/29/2025 1:20 PM Valeria Ramos APRN [facility] PAIN MANAGEMENT [facility]    10/8/2025 10:20 AM Soraya Ricks MD [facility] CARDIOLOGY [facility]              MEDICATIONS         Discharge Medications            Accurate as of April 8, 2025  4:37 PM. If you have any questions, ask your nurse or doctor.                Changes to Medications        Instructions Start Date   amoxicillin-clavulanate 875-125 MG per tablet  Commonly known as: AUGMENTIN  What changed: additional instructions   1 tablet, Oral, 2 Times Daily, With food and water             Continue These Medications        Instructions Start Date   Biotin 95680 MCG tablet   " 10,000 mcg, Daily      buPROPion  MG 24 hr tablet  Commonly known as: Wellbutrin XL   300 mg, Oral, Daily      celecoxib 200 MG capsule  Commonly known as: CeleBREX   200 mg, Oral, Daily      cyclobenzaprine 10 MG tablet  Commonly known as: FLEXERIL   10 mg, Oral, 3 Times Daily PRN      DULoxetine 30 MG capsule  Commonly known as: CYMBALTA   30 mg, Oral, Daily      furosemide 20 MG tablet  Commonly known as: LASIX   20 mg, Oral, Daily      HYDROcodone-acetaminophen  MG per tablet  Commonly known as: NORCO   1 tablet, Oral, 5 Times Daily PRN, 30 day supply. DNF 3/14/25      ketorolac 10 MG tablet  Commonly known as: TORADOL   10 mg, Oral, Every 6 Hours PRN      Klor-Con M20 20 MEQ CR tablet  Generic drug: potassium chloride   TAKE 1 TABLET BY MOUTH TWICE A DAY      lisinopril 40 MG tablet  Commonly known as: PRINIVIL,ZESTRIL   40 mg, Oral, Daily      meclizine 25 MG tablet  Commonly known as: ANTIVERT   25 mg, Oral, 3 Times Daily PRN      Naloxegol Oxalate 25 MG tablet  Commonly known as: Movantik   25 mg, Oral, Every Morning      neomycin-polymyxin-hydrocortisone 3.5-21281-9 otic solution  Commonly known as: CORTISPORIN   3 drops, Both Ears, 3 Times Daily      ondansetron 4 MG tablet  Commonly known as: ZOFRAN   TAKE 1 TABLET BY MOUTH EVERY 8 HOURS AS NEEDED FOR NAUSEA AND/OR VOMITING      Vitamin D3 25 MCG (1000 UT) capsule   1,000 Units, Daily                   **Dragon Disclaimer:   Much of this encounter note is an electronic transcription/translation of spoken language to printed text. The electronic translation of spoken language may permit erroneous, or at times, nonsensical words or phrases to be inadvertently transcribed. Although I have reviewed the note for such errors, some may still exist.

## 2025-04-08 NOTE — ASSESSMENT & PLAN NOTE
Hypertension is stable and controlled  Continue current treatment regimen.  Dietary sodium restriction.  Regular aerobic exercise.  Ambulatory blood pressure monitoring.  Blood pressure will be reassessed in 6 months.      BP is able in clinic today.  Patient asymptomatic.  Encouraged her to start exercise, possibly water aerobics or walking laps in a pool.  She has chronic pain syndrome and exercises limited

## 2025-04-09 ENCOUNTER — TELEPHONE (OUTPATIENT)
Dept: FAMILY MEDICINE CLINIC | Facility: CLINIC | Age: 75
End: 2025-04-09

## 2025-04-09 DIAGNOSIS — M19.012 OSTEOARTHRITIS OF BOTH SHOULDERS, UNSPECIFIED OSTEOARTHRITIS TYPE: ICD-10-CM

## 2025-04-09 DIAGNOSIS — M54.2 NECK PAIN: ICD-10-CM

## 2025-04-09 DIAGNOSIS — M19.011 OSTEOARTHRITIS OF BOTH SHOULDERS, UNSPECIFIED OSTEOARTHRITIS TYPE: ICD-10-CM

## 2025-04-09 RX ORDER — LISINOPRIL 40 MG/1
40 TABLET ORAL DAILY
Qty: 90 TABLET | Refills: 1 | Status: SHIPPED | OUTPATIENT
Start: 2025-04-09

## 2025-04-09 NOTE — TELEPHONE ENCOUNTER
Caller: Chastity Salgado    Relationship: Self    Best call back number: [phone]       What specialty or service is being requested: NEUROLOGIST       Any additional details: PATIENT WENT TO HER CARDIOLOGIST AND THE CARDIOLOGIST REPORTED THAT THE SYMPTOMS SHE WAS PRESENTING WERE NEUROLOGICAL.  PLEASE CALL TO ADVISE

## 2025-04-09 NOTE — TELEPHONE ENCOUNTER
Rx Refill Note  Requested Prescriptions     Pending Prescriptions Disp Refills    lisinopril (PRINIVIL,ZESTRIL) 40 MG tablet [Pharmacy Med Name: LISINOPRIL 40 MG TABLET] 90 tablet 1     Sig: TAKE 1 TABLET BY MOUTH DAILY      Last office visit with prescribing clinician: 2/28/2025   Last telemedicine visit with prescribing clinician: Visit date not found   Next office visit with prescribing clinician: Visit date not found                         Would you like a call back once the refill request has been completed: [] Yes [] No    If the office needs to give you a call back, can they leave a voicemail: [] Yes [] No    Jostin Cool MA  04/09/25, 09:40 EDT

## 2025-04-09 NOTE — TELEPHONE ENCOUNTER
Medication Refill Request    Date of phone call: 4/9/2025    Medication being requested: hydro/apap  mg sig: take 1 tab 5 times prn  Qty: 150    Date of last visit: 3/28/2025    Date of last refill: 3/15/2025    GAYE up to date?: yes    Next Follow up?: 5/29/2025    Any new pertinent information? (i.e, new medication allergies, new use of medications, change in patient's health or condition, non-compliance or inconsistency with prescribing agreement?):

## 2025-04-10 RX ORDER — HYDROCODONE BITARTRATE AND ACETAMINOPHEN 10; 325 MG/1; MG/1
1 TABLET ORAL
Qty: 150 TABLET | Refills: 0 | Status: SHIPPED | OUTPATIENT
Start: 2025-04-10

## 2025-04-15 DIAGNOSIS — R29.810 FACIAL DROOP: Primary | ICD-10-CM

## 2025-04-15 DIAGNOSIS — R20.2 PARESTHESIAS: ICD-10-CM

## 2025-04-17 DIAGNOSIS — R20.2 PARESTHESIAS: Primary | ICD-10-CM

## 2025-04-28 ENCOUNTER — TELEPHONE (OUTPATIENT)
Dept: FAMILY MEDICINE CLINIC | Facility: CLINIC | Age: 75
End: 2025-04-28

## 2025-04-28 NOTE — TELEPHONE ENCOUNTER
Caller: Chastity Salgado    Relationship: Self    Best call back number: [phone]    What medication are you requesting: MEDICATION FOR CLAUSTROPHOBIA    Have you had these symptoms before:    [x] Yes  [] No    Have you been treated for these symptoms before:   [x] Yes  [] No    If a prescription is needed, what is your preferred pharmacy and phone number: [facility] [id number] [place], [place] - [address] AT [facility] [phone] [facility] [phone] FX     Additional notes:  PATIENT STATES THAT SHE HAS AN MRI 4/30 AND SHE IS CLAUSTROPHOBIC.

## 2025-05-07 NOTE — TELEPHONE ENCOUNTER
Hub staff attempted to follow warm transfer process and was unsuccessful     Caller: Chastity Salgado    Relationship to patient: Self    Best call back number:     [phone]       Patient is needing: PATIENT CALLED BACK IN TO CHECK ON THE STATUS OF THIS. HAS NOT BEEN UPDATED AT ALL ABOUT WHAT SHE NEEDS TO DO FOR THIS MEDICATION.    PLEASE CALL TO DISCUSS, AND PLAN ACCORDINGLY.

## 2025-05-07 NOTE — TELEPHONE ENCOUNTER
Yarielm for pt to call back to schedule appt w/ Dr. Small [facility] in person [facility]

## 2025-05-08 DIAGNOSIS — M54.2 NECK PAIN: ICD-10-CM

## 2025-05-08 DIAGNOSIS — M19.011 OSTEOARTHRITIS OF BOTH SHOULDERS, UNSPECIFIED OSTEOARTHRITIS TYPE: ICD-10-CM

## 2025-05-08 DIAGNOSIS — M19.012 OSTEOARTHRITIS OF BOTH SHOULDERS, UNSPECIFIED OSTEOARTHRITIS TYPE: ICD-10-CM

## 2025-05-08 RX ORDER — HYDROCODONE BITARTRATE AND ACETAMINOPHEN 10; 325 MG/1; MG/1
1 TABLET ORAL
Qty: 150 TABLET | Refills: 0 | Status: SHIPPED | OUTPATIENT
Start: 2025-05-08

## 2025-05-08 NOTE — TELEPHONE ENCOUNTER
Reviewed UDS and GAYE. Both updated and appropriate. Refill appropriate.      Covering for Valeria GLEZ

## 2025-05-08 NOTE — TELEPHONE ENCOUNTER
Medication Refill Request    Date of phone call: 05/08/25    Medication being requested: hydrocodone  sig: Take 1 tablet by mouth 5 (Five) Times a Day As Needed for Severe Pain. 30 day supply.   Qty: 150    Date of last visit: 03/28/25    Date of last refill:     GAYE up to date?:     Next Follow up?: 05/29/25    Any new pertinent information? (i.e, new medication allergies, new use of medications, change in patient's health or condition, non-compliance or inconsistency with prescribing agreement?):  Can you please refill for Valeria since she is out of the office? Thank you

## 2025-05-15 ENCOUNTER — OFFICE VISIT (OUTPATIENT)
Dept: FAMILY MEDICINE CLINIC | Facility: CLINIC | Age: 75
End: 2025-05-15
Payer: MEDICARE

## 2025-05-15 VITALS
SYSTOLIC BLOOD PRESSURE: 128 MMHG | HEART RATE: 68 BPM | HEIGHT: 61 IN | OXYGEN SATURATION: 98 % | WEIGHT: 220 LBS | RESPIRATION RATE: 18 BRPM | DIASTOLIC BLOOD PRESSURE: 64 MMHG | BODY MASS INDEX: 41.54 KG/M2

## 2025-05-15 DIAGNOSIS — F41.8 SITUATIONAL ANXIETY: Primary | ICD-10-CM

## 2025-05-15 DIAGNOSIS — T36.95XA ANTIBIOTIC-INDUCED YEAST INFECTION: ICD-10-CM

## 2025-05-15 DIAGNOSIS — Z79.2 NEED FOR ANTIBIOTIC PROPHYLAXIS FOR DENTAL PROCEDURE: ICD-10-CM

## 2025-05-15 DIAGNOSIS — B37.9 ANTIBIOTIC-INDUCED YEAST INFECTION: ICD-10-CM

## 2025-05-15 RX ORDER — LORAZEPAM 0.5 MG/1
0.5 TABLET ORAL EVERY 12 HOURS PRN
Qty: 10 TABLET | Refills: 0 | Status: SHIPPED | OUTPATIENT
Start: 2025-05-15

## 2025-05-15 RX ORDER — FLUCONAZOLE 150 MG/1
150 TABLET ORAL
Qty: 4 TABLET | Refills: 1 | Status: SHIPPED | OUTPATIENT
Start: 2025-05-15

## 2025-05-15 NOTE — PROGRESS NOTES
"Chief Complaint  Chief Complaint   Patient presents with    New Med Request     Pt her for medication for MRI and medication for tooth pull        Subjective    History of Present Illness        Chastity Salgado presents to [facility] for   History of Present Illness  The patient presents for evaluation of anxiety, suspected stroke, and a request for medication.    She is scheduled for a cranial MRI next month, followed by a consultation with Dr. King. The MRI was recommended due to a previous hospital visit in February, during which she exhibited transient facial drooping and eye involvement, raising concerns of a potential stroke. During the hospital visit, the symptoms were intermittent. Despite the doctor's recommendation to stay for further evaluation, she opted to leave after six hours. She has a history of anxiety during MRI procedures, often feeling as though she is being buried. Despite attempts to manage this anxiety independently, she has been unsuccessful and has not previously used any pharmacological interventions. She also reports experiencing panic attacks in grocery stores.    She recently underwent a dental extraction and was prescribed an antibiotic by Marilyn due to her known heart murmur. She is currently under the care of a pain management specialist and has been informed that her pain medication may interact with antifungal treatments, necessitating a temporary discontinuation of the antifungal agent.     History of Present Illness      Objective   Vital Signs:   Visit Vitals  /64   Pulse 68   Resp 18   Ht 154.9 cm (61\")   Wt 99.8 kg (220 lb)   SpO2 98%   BMI 41.57 kg/m²          Class 3 Severe Obesity (BMI >=40). Obesity-related health conditions include the following: none. Obesity is unchanged. BMI is is above average; BMI management plan is completed. We discussed portion control and increasing exercise.     Physical Exam  Vitals reviewed. "   Constitutional:       Appearance: She is well-developed.   HENT:      Head: Normocephalic.      Right Ear: External ear normal.      Left Ear: External ear normal.      Nose: Nose normal.   Eyes:      Conjunctiva/sclera: Conjunctivae normal.   Cardiovascular:      Rate and Rhythm: Normal rate and regular rhythm.   Pulmonary:      Effort: Pulmonary effort is normal.      Breath sounds: Normal breath sounds.   Musculoskeletal:         General: Normal range of motion.      Cervical back: Normal range of motion and neck supple.   Skin:     General: Skin is warm and dry.      Capillary Refill: Capillary refill takes less than 2 seconds.   Neurological:      Mental Status: She is alert and oriented to person, place, and time.        Physical Exam  Respiratory: Clear to auscultation, no wheezing, rales or rhonchi         Result Review :  Results                            Assessment and Plan      Diagnoses and all orders for this visit:    1. Situational anxiety (Primary)  Assessment & Plan:  - Experiences significant anxiety during MRIs, describing it as feeling like being buried.  - No prior use of medication to manage MRI-related anxiety.  - Lorazepam 1 mg prescribed to be taken 1 hour prior to the MRI.  - Advised to have someone else drive her home after the procedure.    Orders:  -     LORazepam (ATIVAN) 0.5 MG tablet; Take 1 tablet by mouth Every 12 (Twelve) Hours As Needed for Anxiety. Use sparingly.  Dispense: 10 tablet; Refill: 0    2. Need for antibiotic prophylaxis for dental procedure  Assessment & Plan:  - Requires an antibiotic due to a recent tooth extraction and a heart murmur.  - Allergic to shellfish, no other allergies reported.  - Augmentin 875 mg prescribed, to be taken twice daily for 7 days.  - Medication sent to pharmacy.    Orders:  -     amoxicillin-clavulanate (AUGMENTIN) 875-125 MG per tablet; Take 1 tablet by mouth 2 (Two) Times a Day. With food and water  Dispense: 14 tablet; Refill: 0    3.  Antibiotic-induced yeast infection  Assessment & Plan:  - History of yeast infections with antibiotic use.  - No current yeast infection, but preventive measures discussed.  - Diflucan prescribed to manage any potential yeast infection that may arise from the antibiotic use.  - No interactions found between Diflucan and current pain management medications.    Orders:  -     fluconazole (Diflucan) 150 MG tablet; Take 1 tablet by mouth Every 72 (Seventy-Two) Hours As Needed (antibiotic induced yeast infection).  Dispense: 4 tablet; Refill: 1       Assessment & Plan               Follow Up   No follow-ups on file.  Patient was given instructions and counseling regarding her condition or for health maintenance advice. Please see specific information pulled into the AVS if appropriate.     Patient or patient representative verbalized consent for the use of Ambient Listening during the visit with  Tu Small Sr, MD for chart documentation. 5/18/2025  15:28 EDT

## 2025-05-18 PROBLEM — Z79.2 NEED FOR ANTIBIOTIC PROPHYLAXIS FOR DENTAL PROCEDURE: Status: ACTIVE | Noted: 2025-05-18

## 2025-05-18 PROBLEM — T36.95XA ANTIBIOTIC-INDUCED YEAST INFECTION: Status: ACTIVE | Noted: 2025-05-18

## 2025-05-18 PROBLEM — F41.8 SITUATIONAL ANXIETY: Status: ACTIVE | Noted: 2025-05-18

## 2025-05-18 PROBLEM — B37.9 ANTIBIOTIC-INDUCED YEAST INFECTION: Status: ACTIVE | Noted: 2025-05-18

## 2025-05-19 NOTE — ASSESSMENT & PLAN NOTE
- History of yeast infections with antibiotic use.  - No current yeast infection, but preventive measures discussed.  - Diflucan prescribed to manage any potential yeast infection that may arise from the antibiotic use.  - No interactions found between Diflucan and current pain management medications.

## 2025-05-19 NOTE — ASSESSMENT & PLAN NOTE
- Experiences significant anxiety during MRIs, describing it as feeling like being buried.  - No prior use of medication to manage MRI-related anxiety.  - Lorazepam 1 mg prescribed to be taken 1 hour prior to the MRI.  - Advised to have someone else drive her home after the procedure.

## 2025-05-19 NOTE — ASSESSMENT & PLAN NOTE
- Requires an antibiotic due to a recent tooth extraction and a heart murmur.  - Allergic to shellfish, no other allergies reported.  - Augmentin 875 mg prescribed, to be taken twice daily for 7 days.  - Medication sent to pharmacy.

## 2025-05-29 ENCOUNTER — TELEMEDICINE (OUTPATIENT)
Dept: PAIN MEDICINE | Facility: CLINIC | Age: 75
End: 2025-05-29
Payer: MEDICARE

## 2025-05-29 ENCOUNTER — TELEPHONE (OUTPATIENT)
Dept: PAIN MEDICINE | Facility: CLINIC | Age: 75
End: 2025-05-29

## 2025-05-29 DIAGNOSIS — M62.838 MUSCLE SPASM: ICD-10-CM

## 2025-05-29 DIAGNOSIS — Z79.891 ENCOUNTER FOR MONITORING OPIOID MAINTENANCE THERAPY: ICD-10-CM

## 2025-05-29 DIAGNOSIS — M12.9 ARTHRITIS, MULTIPLE JOINT INVOLVEMENT: ICD-10-CM

## 2025-05-29 DIAGNOSIS — M54.50 ACUTE BILATERAL LOW BACK PAIN, UNSPECIFIED WHETHER SCIATICA PRESENT: ICD-10-CM

## 2025-05-29 DIAGNOSIS — M19.012 OSTEOARTHRITIS OF BOTH SHOULDERS, UNSPECIFIED OSTEOARTHRITIS TYPE: Primary | ICD-10-CM

## 2025-05-29 DIAGNOSIS — M19.011 OSTEOARTHRITIS OF BOTH SHOULDERS, UNSPECIFIED OSTEOARTHRITIS TYPE: Primary | ICD-10-CM

## 2025-05-29 DIAGNOSIS — M54.2 NECK PAIN: ICD-10-CM

## 2025-05-29 DIAGNOSIS — Z51.81 ENCOUNTER FOR MONITORING OPIOID MAINTENANCE THERAPY: ICD-10-CM

## 2025-05-29 NOTE — PROGRESS NOTES
TELEMEDICINE - VIDEO VISIT    You have chosen to receive care through a telehealth visit.  Do you consent to use a video/audio connection for your medical care today? Yes    Location of patient:Home  Location of Provider: Clinic  Anyone else present: No  Type of Technology used: [employer] through use of Epic/[employer] visit    CHIEF COMPLAINT  Neck and shoulder pain     Subjective   Chastity Salgado is a 75 y.o. female  who presents for a video visit follow-up.She has a history of chronic neck and joint pain. She reports that her pain level has fluctuated since his last office visit. She is seen via telehealth visit today due to concerns of having COVID.    Today pain is 7/10VAS in severity. Pain is located in her bilateral shoulders (left worse than right), knees, ankles, and neck. Describes this pain as an intermittent ache. She reports worsening right ankle pain and would like to schedule an steroid injection with orthopedics for this issue soon. Pain is worsened by certain movements, prolonged positions, climbing stairs, bending/twisting, and weather. Pain improves with rest, reposition, heat/ice, and medications.      She continues with Hydrocodone 10mg 5/day, Cymbalta 60mg, Celebrex 200mg daily (prescribed by PCP) and Flexeril 10mg at HS. Occasional constipation is managed with Metamucil and Linzess/Movantik. Denies any side effects from the regimen including somnolence. The regimen helps decrease pain by 75%. Notes improvement in activity and function with regimen. ADL's by self. Denies any bowel or bladder changes.      Celebrex - causes GI issues - takes every other day (history of gastroparesis)  Gabapentin - caused sleep walking     Continues to work with Dr. Tu Vásquez in regards to shoulder pain. She is contemplating surgery but is unsure at this time.      Procedures:  2/25/21 - left suprascapular nerve block/steroid injection (posterior approach) - 50% relief x 1 month  9/22/20 - left suprascapular nerve  block/steroid injection (posterior approach) - 70% relief x 1-2 months     Surgical History:  10/6/23 -  Right total knee revision - Dr. Gimenez.     Neck Pain   This is a chronic problem. The current episode started more than 1 year ago. The problem occurs intermittently. The problem has been waxing and waning. The pain is associated with a sleep position. The pain is present in the left side and right side. The quality of the pain is described as aching. The pain is at a severity of 7/10. The symptoms are aggravated by twisting, position and bending. Stiffness is present In the morning. Associated symptoms include headaches, numbness (left hand) and weakness (left hand). Pertinent negatives include no chest pain or fever. She has tried ice, heat and oral narcotics for the symptoms.   Joint Pain  This is a chronic (bilateral knees, ankles, and shoulders) problem. The current episode started more than 1 year ago. The problem occurs intermittently. The problem has been waxing and waning (Rates pain 7/10VAS in severity - right shoulder pain has worsened). Associated symptoms include arthralgias (bilateral shoulders), headaches, nausea, neck pain, numbness (left hand) and weakness (left hand). Pertinent negatives include no abdominal pain, chest pain, chills, congestion, coughing, fatigue or fever. The symptoms are aggravated by walking, bending, exertion, twisting and standing (cold weather, stairs). She has tried oral narcotics, position changes, heat, ice and rest for the symptoms.      Review of Pertinent Medical Data ---  Reviewed office note from NEWTON Rasheed with cardiology from 4/8/25.  Patient presents for ED follow-up.  She has a history of hyperlipidemia, hypertension, diabetes, and chronic pain.  She was seen in the ED on 2/28/2025 for sudden onset of left facial drooping, left facial paresthesias and chest discomfort.  CT of her head was negative.  ECG revealed no ischemic changes.  She was offered  admission to the observation unit but declined.  Britany notes hypertension is stable and controlled on lisinopril 40 mg daily.  She encouraged her to continue with exercise and a restricted diet.  Will follow-up in 6 months.    The following portions of the patient's history were reviewed and updated as appropriate: allergies, current medications, past family history, past medical history, past social history, past surgical history, and problem list.    Review of Systems   Constitutional:  Negative for chills, fatigue and fever.   HENT:  Negative for congestion.    Respiratory:  Negative for cough.    Cardiovascular:  Negative for chest pain.   Gastrointestinal:  Positive for nausea. Negative for abdominal pain.   Musculoskeletal:  Positive for arthralgias (bilateral shoulders) and neck pain.   Neurological:  Positive for weakness (left hand), numbness (left hand) and headaches.     I have reviewed and confirmed the accuracy of the ROS as documented by the MA/LPN/RN NEWTON Abdul    Vitals:    05/29/25 1333   PainSc: 7    PainLoc: Neck     Objective   Physical Exam  Constitutional:       Appearance: Normal appearance.   HENT:      Head: Normocephalic.   Musculoskeletal:      Cervical back: Normal range of motion.   Neurological:      General: No focal deficit present.      Mental Status: She is alert and oriented to person, place, and time.   Psychiatric:         Mood and Affect: Mood normal.         Behavior: Behavior normal.         Thought Content: Thought content normal.         Cognition and Memory: Cognition normal.       Assessment & Plan   Diagnoses and all orders for this visit:    1. Osteoarthritis of both shoulders, unspecified osteoarthritis type (Primary)    2. Neck pain    3. Acute bilateral low back pain, unspecified whether sciatica present    4. Muscle spasm    5. Arthritis, multiple joint involvement    6. Encounter for monitoring opioid maintenance therapy      --- The urine drug screen  confirmation from 1/24/25 has been reviewed and the result is appropriate based on patient history and GAYE report  --- The patient signed an updated copy of the controlled substance agreement on 11/11/24  --- Continue Flexeril. No refill needed at this time.   --- Continue Hydrocodone. No refill needed at this time. Patient appears stable with current regimen. No adverse effects. Regarding continuation of opioids, there is no evidence of aberrant behavior or any red flags.  The patient continues with appropriate response to opioid therapy. ADL's remain intact by self.    --- Follow-up 2 month or sooner if needed      GAYE REPORT  As part of the patient's treatment plan, I am prescribing controlled substances. The patient has been made aware of appropriate use of such medications, including potential risk of somnolence, limited ability to drive and/or work safely, and the potential for dependence or overdose. It has also been made clear that these medications are for use by this patient only, without concomitant use of alcohol or other substances unless prescribed.     Patient has completed prescribing agreement detailing terms of continued prescribing of controlled substances, including monitoring GAYE reports, urine drug screening, and pill counts if necessary. The patient is aware that inappropriate use will results in cessation of prescribing such medications.    As the clinician, I personally reviewed the GAYE from 5/29/25 while the patient was in the office today.    History and physical exam exhibit continued safe and appropriate use of controlled substances.  -------  Dictated utilizing Dragon Dictation

## 2025-05-29 NOTE — TELEPHONE ENCOUNTER
Caller: Chastity Salgado    Relationship to patient: Self    Best call back number:     Chief complaint: BACK PAIN     Type of visit: FUP     Requested date: NA     If rescheduling, when is the original appointment: 5/30/25     Additional notes:PATIENT IS ILL AND GOING TO PCP TOMORROW IN SAME OFFICE.  SHE IS WORRIED SHE MAY HAVE COVID.  NEEDS TO RESCHEDULE BUT NEXT AVAILABLE WAS JUNE 27 PLEASE CONTACT

## 2025-05-30 ENCOUNTER — HOSPITAL ENCOUNTER (OUTPATIENT)
Dept: GENERAL RADIOLOGY | Facility: HOSPITAL | Age: 75
Discharge: HOME | End: 2025-05-30
Payer: MEDICARE

## 2025-05-30 ENCOUNTER — OFFICE VISIT (OUTPATIENT)
Dept: FAMILY MEDICINE CLINIC | Facility: CLINIC | Age: 75
End: 2025-05-30
Payer: MEDICARE

## 2025-05-30 ENCOUNTER — LAB (OUTPATIENT)
Dept: LAB | Facility: HOSPITAL | Age: 75
End: 2025-05-30
Payer: MEDICARE

## 2025-05-30 VITALS
TEMPERATURE: 98.1 F | DIASTOLIC BLOOD PRESSURE: 74 MMHG | OXYGEN SATURATION: 99 % | HEIGHT: 61 IN | SYSTOLIC BLOOD PRESSURE: 120 MMHG | HEART RATE: 95 BPM | RESPIRATION RATE: 16 BRPM | BODY MASS INDEX: 41.33 KG/M2 | WEIGHT: 218.9 LBS

## 2025-05-30 DIAGNOSIS — J20.9 ACUTE BRONCHITIS, UNSPECIFIED ORGANISM: Primary | ICD-10-CM

## 2025-05-30 DIAGNOSIS — E11.9 DIABETES MELLITUS TYPE 2, DIET-CONTROLLED: ICD-10-CM

## 2025-05-30 DIAGNOSIS — Z00.00 ROUTINE GENERAL MEDICAL EXAMINATION AT A HEALTH CARE FACILITY: ICD-10-CM

## 2025-05-30 DIAGNOSIS — E11.65 TYPE 2 DIABETES MELLITUS WITH HYPERGLYCEMIA, WITHOUT LONG-TERM CURRENT USE OF INSULIN: ICD-10-CM

## 2025-05-30 LAB
ALBUMIN SERPL-MCNC: 4 G/DL (ref 3.5–5.2)
ALBUMIN/GLOB SERPL: 1.1 G/DL
ALP SERPL-CCNC: 101 U/L (ref 39–117)
ALT SERPL W P-5'-P-CCNC: 10 U/L (ref 1–33)
ANION GAP SERPL CALCULATED.3IONS-SCNC: 9.3 MMOL/L (ref 5–15)
AST SERPL-CCNC: 20 U/L (ref 1–32)
BASOPHILS # BLD AUTO: 0.04 10*3/MM3 (ref 0–0.2)
BASOPHILS NFR BLD AUTO: 0.7 % (ref 0–1.5)
BILIRUB SERPL-MCNC: 0.3 MG/DL (ref 0–1.2)
BUN SERPL-MCNC: 8.1 MG/DL (ref 8–23)
BUN/CREAT SERPL: 11.7 (ref 7–25)
CALCIUM SPEC-SCNC: 9.2 MG/DL (ref 8.6–10.5)
CHLORIDE SERPL-SCNC: 100 MMOL/L (ref 98–107)
CO2 SERPL-SCNC: 29.7 MMOL/L (ref 22–29)
CREAT SERPL-MCNC: 0.69 MG/DL (ref 0.57–1)
DEPRECATED RDW RBC AUTO: 44.1 FL (ref 37–54)
EGFRCR SERPLBLD CKD-EPI 2021: 91.2 ML/MIN/1.73
EOSINOPHIL # BLD AUTO: 0.16 10*3/MM3 (ref 0–0.4)
EOSINOPHIL NFR BLD AUTO: 2.6 % (ref 0.3–6.2)
ERYTHROCYTE [DISTWIDTH] IN BLOOD BY AUTOMATED COUNT: 12.6 % (ref 12.3–15.4)
GLOBULIN UR ELPH-MCNC: 3.7 GM/DL
GLUCOSE SERPL-MCNC: 153 MG/DL (ref 65–99)
HBA1C MFR BLD: 8 % (ref 4.8–5.6)
HCT VFR BLD AUTO: 38.6 % (ref 34–46.6)
HGB BLD-MCNC: 12.9 G/DL (ref 12–15.9)
IMM GRANULOCYTES # BLD AUTO: 0.02 10*3/MM3 (ref 0–0.05)
IMM GRANULOCYTES NFR BLD AUTO: 0.3 % (ref 0–0.5)
LYMPHOCYTES # BLD AUTO: 1.73 10*3/MM3 (ref 0.7–3.1)
LYMPHOCYTES NFR BLD AUTO: 28.6 % (ref 19.6–45.3)
MCH RBC QN AUTO: 31.5 PG (ref 26.6–33)
MCHC RBC AUTO-ENTMCNC: 33.4 G/DL (ref 31.5–35.7)
MCV RBC AUTO: 94.4 FL (ref 79–97)
MONOCYTES # BLD AUTO: 0.45 10*3/MM3 (ref 0.1–0.9)
MONOCYTES NFR BLD AUTO: 7.4 % (ref 5–12)
NEUTROPHILS NFR BLD AUTO: 3.65 10*3/MM3 (ref 1.7–7)
NEUTROPHILS NFR BLD AUTO: 60.4 % (ref 42.7–76)
NRBC BLD AUTO-RTO: 0 /100 WBC (ref 0–0.2)
PLATELET # BLD AUTO: 224 10*3/MM3 (ref 140–450)
PMV BLD AUTO: 8.4 FL (ref 6–12)
POTASSIUM SERPL-SCNC: 4.2 MMOL/L (ref 3.5–5.2)
PROT SERPL-MCNC: 7.7 G/DL (ref 6–8.5)
RBC # BLD AUTO: 4.09 10*6/MM3 (ref 3.77–5.28)
SODIUM SERPL-SCNC: 139 MMOL/L (ref 136–145)
WBC NRBC COR # BLD AUTO: 6.05 10*3/MM3 (ref 3.4–10.8)

## 2025-05-30 PROCEDURE — 36415 COLL VENOUS BLD VENIPUNCTURE: CPT | Performed by: FAMILY MEDICINE

## 2025-05-30 PROCEDURE — 83036 HEMOGLOBIN GLYCOSYLATED A1C: CPT

## 2025-05-30 PROCEDURE — 80053 COMPREHEN METABOLIC PANEL: CPT

## 2025-05-30 PROCEDURE — 85025 COMPLETE CBC W/AUTO DIFF WBC: CPT | Performed by: FAMILY MEDICINE

## 2025-05-30 PROCEDURE — 71046 X-RAY EXAM CHEST 2 VIEWS: CPT

## 2025-05-30 RX ORDER — DOXYCYCLINE 100 MG/1
100 CAPSULE ORAL 2 TIMES DAILY
Qty: 20 CAPSULE | Refills: 0 | Status: SHIPPED | OUTPATIENT
Start: 2025-05-30

## 2025-05-30 RX ORDER — PREDNISONE 20 MG/1
TABLET ORAL
Qty: 10 TABLET | Refills: 0 | Status: SHIPPED | OUTPATIENT
Start: 2025-05-30 | End: 2025-06-05

## 2025-05-30 RX ORDER — FLUTICASONE PROPIONATE 50 MCG
2 SPRAY, SUSPENSION (ML) NASAL DAILY
Qty: 16 G | Refills: 0 | Status: SHIPPED | OUTPATIENT
Start: 2025-05-30

## 2025-06-05 ENCOUNTER — OFFICE VISIT (OUTPATIENT)
Dept: FAMILY MEDICINE CLINIC | Facility: CLINIC | Age: 75
End: 2025-06-05
Payer: MEDICARE

## 2025-06-05 VITALS
OXYGEN SATURATION: 96 % | HEIGHT: 61 IN | TEMPERATURE: 98.4 F | DIASTOLIC BLOOD PRESSURE: 74 MMHG | BODY MASS INDEX: 40.63 KG/M2 | WEIGHT: 215.2 LBS | SYSTOLIC BLOOD PRESSURE: 120 MMHG | RESPIRATION RATE: 16 BRPM | HEART RATE: 80 BPM

## 2025-06-05 DIAGNOSIS — J30.89 SEASONAL ALLERGIC RHINITIS DUE TO OTHER ALLERGIC TRIGGER: ICD-10-CM

## 2025-06-05 DIAGNOSIS — E11.65 TYPE 2 DIABETES MELLITUS WITH HYPERGLYCEMIA, WITHOUT LONG-TERM CURRENT USE OF INSULIN: Primary | ICD-10-CM

## 2025-06-05 DIAGNOSIS — M19.012 OSTEOARTHRITIS OF BOTH SHOULDERS, UNSPECIFIED OSTEOARTHRITIS TYPE: ICD-10-CM

## 2025-06-05 DIAGNOSIS — M19.011 OSTEOARTHRITIS OF BOTH SHOULDERS, UNSPECIFIED OSTEOARTHRITIS TYPE: ICD-10-CM

## 2025-06-05 DIAGNOSIS — J20.9 ACUTE BRONCHITIS, UNSPECIFIED ORGANISM: ICD-10-CM

## 2025-06-05 DIAGNOSIS — M62.838 MUSCLE SPASM: ICD-10-CM

## 2025-06-05 DIAGNOSIS — M54.2 NECK PAIN: ICD-10-CM

## 2025-06-05 PROCEDURE — 3078F DIAST BP <80 MM HG: CPT | Performed by: FAMILY MEDICINE

## 2025-06-05 PROCEDURE — 1125F AMNT PAIN NOTED PAIN PRSNT: CPT | Performed by: FAMILY MEDICINE

## 2025-06-05 PROCEDURE — 99214 OFFICE O/P EST MOD 30 MIN: CPT | Performed by: FAMILY MEDICINE

## 2025-06-05 PROCEDURE — 3052F HG A1C>EQUAL 8.0%<EQUAL 9.0%: CPT | Performed by: FAMILY MEDICINE

## 2025-06-05 PROCEDURE — 3074F SYST BP LT 130 MM HG: CPT | Performed by: FAMILY MEDICINE

## 2025-06-05 RX ORDER — AZELASTINE 1 MG/ML
2 SPRAY, METERED NASAL 2 TIMES DAILY
Qty: 30 ML | Refills: 3 | Status: SHIPPED | OUTPATIENT
Start: 2025-06-05

## 2025-06-05 RX ORDER — HYDROCODONE BITARTRATE AND ACETAMINOPHEN 10; 325 MG/1; MG/1
1 TABLET ORAL
Qty: 150 TABLET | Refills: 0 | Status: SHIPPED | OUTPATIENT
Start: 2025-06-05

## 2025-06-05 RX ORDER — CYCLOBENZAPRINE HCL 10 MG
10 TABLET ORAL 3 TIMES DAILY PRN
Qty: 90 TABLET | Refills: 2 | Status: SHIPPED | OUTPATIENT
Start: 2025-06-05

## 2025-06-05 NOTE — TELEPHONE ENCOUNTER
Medication Refill Request    Date of phone call: 25    Medication being requested: Norco  mg  si tab po five times a day   Qty: 150    Date of last visit: 25    Date of last refill:     GAYE up to date?:     Next Follow up?: none scheduled    Any new pertinent information? (i.e, new medication allergies, new use of medications, change in patient's health or condition, non-compliance or inconsistency with prescribing agreement?): Please refill a cyclobenzaprine

## 2025-06-12 ENCOUNTER — HOSPITAL ENCOUNTER (OUTPATIENT)
Dept: MRI IMAGING | Facility: HOSPITAL | Age: 75
Discharge: HOME | End: 2025-06-12
Admitting: NURSE PRACTITIONER
Payer: MEDICARE

## 2025-06-12 DIAGNOSIS — R20.2 PARESTHESIAS: ICD-10-CM

## 2025-06-12 DIAGNOSIS — R29.810 FACIAL DROOP: ICD-10-CM

## 2025-06-12 PROCEDURE — 25510000002 GADOBENATE DIMEGLUMINE 529 MG/ML SOLUTION: Performed by: NURSE PRACTITIONER

## 2025-06-12 PROCEDURE — 70553 MRI BRAIN STEM W/O & W/DYE: CPT

## 2025-06-12 PROCEDURE — A9577 INJ MULTIHANCE: HCPCS | Performed by: NURSE PRACTITIONER

## 2025-06-12 RX ADMIN — GADOBENATE DIMEGLUMINE 20 ML: 529 INJECTION, SOLUTION INTRAVENOUS at 15:35

## 2025-06-16 NOTE — PROGRESS NOTES
Chief Complaint  Cough (Productive. Started 9 days ago. Hasn't been swabbed), URI, Wheezing, Generalized Body Aches, Sore Throat (Started with one but it's gone now), and Chills    Subjective        Chastity Salgado presents to [facility]  History of Present Illness    History of Present Illness  The patient presents for evaluation of a sore throat, productive cough, and chills.    She reported the onset of a sore throat on 05/20/2025, which was accompanied by a productive cough with clear and yellow phlegm, wheezing, and general malaise. The wheezing is typically associated with coughing up phlegm. She also experienced chills and body aches but has not had any fever. She reports no known sick contacts. She has been self-medicating with Zyrtec and reports postnasal drainage. She does not experience any pain during deep inspiration. She has no history of smoking. She was on a 10-day course of Augmentin for a dental extraction due to her heart murmur, but it did not alleviate her symptoms. She has previously been prescribed prednisone and doxycycline, the latter of which was effective. She has a history of seasonal allergies, typically manifesting as upper respiratory infections or bronchitis during the spring months from February to May.    She has a history of diabetes, which she manages through dietary control. Her most recent A1c level was 7.2. She has never taken any medication for her diabetes. She has previously tried metformin extended release 500 mg once daily and Ozempic, both of which were discontinued due to gastrointestinal side effects.    She has rosacea and uses a cream prescribed for it.    She has a history of a stiff heart muscle and was sent to the hospital by ambulance in 02/2025 due to suspected heart attack symptoms, including facial drooping. She underwent extensive testing, all of which returned normal results. She has been under the care of a cardiologist and is  "scheduled for a follow-up visit in 6 months.    SOCIAL HISTORY  She has never smoked.     Objective   Vital Signs:  /74 (BP Location: Left arm, Patient Position: Sitting, Cuff Size: Adult)   Pulse 95   Temp 98.1 °F (36.7 °C) (Oral)   Resp 16   Ht 154.9 cm (61\")   Wt 99.3 kg (218 lb 14.4 oz)   SpO2 99%   BMI 41.36 kg/m²   Estimated body mass index is 41.36 kg/m² as calculated from the following:    Height as of this encounter: 154.9 cm (61\").    Weight as of this encounter: 99.3 kg (218 lb 14.4 oz).               Physical Exam   Result Review :                   Assessment and Plan   Diagnoses and all orders for this visit:    1. Acute bronchitis, unspecified organism (Primary)  -     doxycycline (VIBRAMYCIN) 100 MG capsule; Take 1 capsule by mouth 2 (Two) Times a Day.  Dispense: 20 capsule; Refill: 0  -     Discontinue: predniSONE (DELTASONE) 20 MG tablet; 2 po qd x5 dasy  Dispense: 10 tablet; Refill: 0  -     Cancel: CBC & Differential  -     XR Chest PA & Lateral  -     CBC & Differential    2. Diabetes mellitus type 2, diet-controlled    3. Type 2 diabetes mellitus with hyperglycemia, without long-term current use of insulin  -     Cancel: Hemoglobin A1c  -     Cancel: Comprehensive metabolic panel  -     Comprehensive metabolic panel  -     Hemoglobin A1c    Other orders  -     fluticasone (FLONASE) 50 MCG/ACT nasal spray; Administer 2 sprays into the nostril(s) as directed by provider Daily.  Dispense: 16 g; Refill: 0        Assessment & Plan  1.  Acute bronchitis  - Symptoms include coughing with phlegm, wheezing, chills, and generalized body aches. The patient has been experiencing these symptoms for over a week.  - Wheezing noted in the left lung upon physical examination. Phlegm is described as 50% clear and 50% yellow.  - A chest x-ray will be ordered to rule out pneumonia. The patient was previously on Augmentin without improvement.  - Doxycycline will be prescribed for 10 days, and " prednisone will be given for 5 days. Flonase nasal spray will also be prescribed. If symptoms worsen, she should seek immediate medical attention at an urgent care facility or the emergency room.    2. Diabetes mellitus.  - The patient's last A1c was 7.2. Prednisone may elevate blood sugar levels.  - A hemoglobin A1c test will be conducted today. Previous labs in 02/2025 showed a blood sugar of 202 and low albumin.  - Discussed the potential need for medication to control blood sugar, considering the patient's history of gastrointestinal side effects with metformin and Ozempic.  - If blood sugar levels remain high, metformin extended release will be considered. The patient is advised to schedule an appointment with Marilyn Turner  to discuss other medications including GLP-1 agonist.  This was my first encounter with patient    Follow-up  The patient will follow up in 1 week.          Follow Up   There are no Patient Instructions on file for this visit.   No follow-ups on file.  Patient was given instructions and counseling regarding her condition or for health maintenance advice. Please see specific information pulled into the AVS if appropriate.     Patient or patient representative verbalized consent for the use of Ambient Listening during the visit with  Alley Bolaños MD for chart documentation. 6/15/2025  23:53 EDT

## 2025-06-17 ENCOUNTER — TELEPHONE (OUTPATIENT)
Dept: NEUROLOGY | Facility: CLINIC | Age: 75
End: 2025-06-17
Payer: MEDICARE

## 2025-06-22 NOTE — PROGRESS NOTES
"Chief Complaint  URI (Feeling much better, still has a little coughing but not near as bad.) and Labs Only (Last time labs, A1c is elevated)    Subjective        Chastity Salgado presents to [facility]  [place]      came here for 1 week follow-up on acute bronchitis her cough has improved.  She is feeling better but is still complaining of some cough.  She is also here for follow-up on diabetes type 2 labs were done on her last visit.       Objective   Vital Signs:  /74 (BP Location: Left arm, Patient Position: Sitting, Cuff Size: Large Adult)   Pulse 80   Temp 98.4 °F (36.9 °C) (Oral)   Resp 16   Ht 154.9 cm (61\")   Wt 97.6 kg (215 lb 3.2 oz)   SpO2 96%   BMI 40.66 kg/m²   Estimated body mass index is 40.66 kg/m² as calculated from the following:    Height as of this encounter: 154.9 cm (61\").    Weight as of this encounter: 97.6 kg (215 lb 3.2 oz).               Physical Exam  Constitutional:       General: She is not in acute distress.     Appearance: Normal appearance. She is well-developed.   HENT:      Head: Normocephalic and atraumatic.      Right Ear: Tympanic membrane normal.      Left Ear: Tympanic membrane normal.      Mouth/Throat:      Mouth: Mucous membranes are moist.   Eyes:      General:         Right eye: No discharge.         Left eye: No discharge.      Extraocular Movements: Extraocular movements intact.      Pupils: Pupils are equal, round, and reactive to light.   Cardiovascular:      Rate and Rhythm: Normal rate and regular rhythm.      Pulses: Normal pulses.      Heart sounds: Normal heart sounds.   Pulmonary:      Effort: Pulmonary effort is normal.      Breath sounds: Normal breath sounds. No wheezing or rales.   Abdominal:      General: Bowel sounds are normal.      Palpations: Abdomen is soft. There is no mass.      Tenderness: There is no abdominal tenderness.   Musculoskeletal:      Cervical back: Normal range of motion and neck supple.      " Right lower leg: No edema.      Left lower leg: No edema.   Lymphadenopathy:      Cervical: No cervical adenopathy.   Neurological:      General: No focal deficit present.      Mental Status: She is alert and oriented to person, place, and time.        Result Review :    Common Labs   Common labs          10/16/2024    14:21 2/28/2025    19:06 5/30/2025    10:31   Common Labs   Glucose 163  202  153    BUN 23  9  8.1    Creatinine 0.80  0.88  0.69    Sodium 137  139  139    Potassium 4.3  4.2  4.2    Chloride 100  102  100    Calcium 8.9  9.4  9.2    Albumin 3.8  3.3  4.0    Total Bilirubin 0.2  0.3  0.3    Alkaline Phosphatase 88  95  101    AST (SGOT) 18  20  20    ALT (SGPT) 10  12  10    WBC 8.64  6.76  6.05    Hemoglobin 12.9  12.5  12.9    Hematocrit 39.3  36.6  38.6    Platelets 272  273  224    Total Cholesterol 157      Triglycerides 135      HDL Cholesterol 51      LDL Cholesterol  82      Hemoglobin A1C 7.20   8.00      A1C LAST 3   A1C Last 3 Results          10/16/2024    14:21 5/30/2025    10:31   HGBA1C Last 3 Results   Hemoglobin A1C 7.20  8.00                   Assessment and Plan   Diagnoses and all orders for this visit:    1. Type 2 diabetes mellitus with hyperglycemia, without long-term current use of insulin (Primary)  -     Tirzepatide 2.5 MG/0.5ML solution auto-injector; Inject 2.5 mg under the skin into the appropriate area as directed 1 (One) Time Per Week.  Dispense: 2 mL; Refill: 0    2. Acute bronchitis, unspecified organism    3. Seasonal allergic rhinitis due to other allergic trigger  -     azelastine (ASTELIN) 0.1 % nasal spray; Administer 2 sprays into the nostril(s) as directed by provider 2 (Two) Times a Day.  Dispense: 30 mL; Refill: 3      Chastity Salgado  Is a 75-year-old female patient seen today for follow-up on  Acute bronchitis, her symptoms is improved with still complaining of postnasal drainage I advised her to start taking Astelin nasal spray continue antihistamine  and Flonase nasal spray.  Diabetes type 2 lab results reviewed with patient hemoglobin A1c not at goal currently she is not taking any medication , she was controlling with her symptoms with diet only.  She was not able to tolerate metformin in the past start her on tirzepatide.  Low-carb diet also recommended to her.  Follow-up with her PCP            Follow Up   There are no Patient Instructions on file for this visit.   No follow-ups on file.  Patient was given instructions and counseling regarding her condition or for health maintenance advice. Please see specific information pulled into the AVS if appropriate.     Patient or patient representative verbalized consent for the use of Ambient Listening during the visit with  Alley Bolaños MD for chart documentation. 6/22/2025  00:49 EDT

## 2025-07-09 DIAGNOSIS — M54.2 NECK PAIN: ICD-10-CM

## 2025-07-09 DIAGNOSIS — M19.012 OSTEOARTHRITIS OF BOTH SHOULDERS, UNSPECIFIED OSTEOARTHRITIS TYPE: ICD-10-CM

## 2025-07-09 DIAGNOSIS — M19.011 OSTEOARTHRITIS OF BOTH SHOULDERS, UNSPECIFIED OSTEOARTHRITIS TYPE: ICD-10-CM

## 2025-07-10 ENCOUNTER — OFFICE VISIT (OUTPATIENT)
Dept: FAMILY MEDICINE CLINIC | Facility: CLINIC | Age: 75
End: 2025-07-10
Payer: MEDICARE

## 2025-07-10 VITALS
HEIGHT: 61 IN | HEART RATE: 103 BPM | SYSTOLIC BLOOD PRESSURE: 122 MMHG | DIASTOLIC BLOOD PRESSURE: 76 MMHG | WEIGHT: 207.1 LBS | OXYGEN SATURATION: 97 % | BODY MASS INDEX: 39.1 KG/M2

## 2025-07-10 DIAGNOSIS — Z00.00 MEDICARE ANNUAL WELLNESS VISIT, SUBSEQUENT: ICD-10-CM

## 2025-07-10 DIAGNOSIS — E78.2 MIXED HYPERLIPIDEMIA: ICD-10-CM

## 2025-07-10 DIAGNOSIS — M25.50 POLYARTHRALGIA: Primary | ICD-10-CM

## 2025-07-10 DIAGNOSIS — I10 BENIGN ESSENTIAL HTN: ICD-10-CM

## 2025-07-10 DIAGNOSIS — E11.65 TYPE 2 DIABETES MELLITUS WITH HYPERGLYCEMIA, WITHOUT LONG-TERM CURRENT USE OF INSULIN: ICD-10-CM

## 2025-07-10 DIAGNOSIS — M54.50 ACUTE BILATERAL LOW BACK PAIN, UNSPECIFIED WHETHER SCIATICA PRESENT: ICD-10-CM

## 2025-07-10 RX ORDER — HYDROCODONE BITARTRATE AND ACETAMINOPHEN 10; 325 MG/1; MG/1
1 TABLET ORAL
Qty: 150 TABLET | Refills: 0 | Status: SHIPPED | OUTPATIENT
Start: 2025-07-10

## 2025-07-10 NOTE — ASSESSMENT & PLAN NOTE
Lipid abnormalities are stable    Plan:  Continue same medication/s without change.      Discussed medication dosage, use, side effects, and goals of treatment in detail.    Counseled patient on lifestyle modifications to help control hyperlipidemia.     Patient Treatment Goals:   LDL goal is less than 70    Followup in 6 months.    Orders:    CBC & Differential; Future    Comprehensive Metabolic Panel; Future    Lipid Panel With LDL / HDL Ratio; Future

## 2025-07-10 NOTE — PROGRESS NOTES
Subjective   The ABCs of the Annual Wellness Visit  Medicare Wellness Visit      Chastity Salgado is a 75 y.o. patient who presents for a Medicare Wellness Visit.    The following portions of the patient's history were reviewed and   updated as appropriate: allergies, current medications, past family history, past medical history, past social history, past surgical history, and problem list.    Compared to one year ago, the patient's physical   health is the same.  Compared to one year ago, the patient's mental   health is better.    Recent Hospitalizations:  She was not admitted to the hospital during the last year.     Current Medical Providers:  Patient Care Team:  Marilyn Turner APRN as PCP - General (Family Medicine)  Lino Cullen MD as Consulting Physician (General Surgery)  Tu Vásquez MD as Consulting Physician (Orthopedic Surgery)  Tu Welch MD as Consulting Physician (Dermatology)  Niko Jacobsen MD as Consulting Physician (Medical Oncology)  Josseline Faria MD as Consulting Physician (Obstetrics and Gynecology)  Lino Cullen MD as Consulting Physician (General Surgery)  Mely Ramos APRN as Nurse Practitioner (Nurse Practitioner)  Neeraj Jj MD as Consulting Physician (Gastroenterology)  Zeyad Gimenez MD as Consulting Physician (Orthopedic Surgery)    Outpatient Medications Prior to Visit   Medication Sig Dispense Refill    buPROPion XL (Wellbutrin XL) 300 MG 24 hr tablet Take 1 tablet by mouth Daily. 30 tablet 5    celecoxib (CeleBREX) 200 MG capsule Take 1 capsule by mouth Daily. 90 capsule 1    cyclobenzaprine (FLEXERIL) 10 MG tablet Take 1 tablet by mouth 3 (Three) Times a Day As Needed for Muscle Spasms. 90 tablet 2    DULoxetine (CYMBALTA) 30 MG capsule TAKE 1 CAPSULE BY MOUTH DAILY 90 capsule 1    furosemide (LASIX) 20 MG tablet TAKE 1 TABLET BY MOUTH DAILY 90 tablet 1    HYDROcodone-acetaminophen (NORCO)  MG per tablet  Take 1 tablet by mouth 5 (Five) Times a Day As Needed for Severe Pain. 30 day supply. DNF 7/12/25 150 tablet 0    lisinopril (PRINIVIL,ZESTRIL) 40 MG tablet TAKE 1 TABLET BY MOUTH DAILY 90 tablet 1    meclizine (ANTIVERT) 25 MG tablet Take 1 tablet by mouth 3 (Three) Times a Day As Needed for Dizziness. 60 tablet 1    ondansetron (ZOFRAN) 4 MG tablet TAKE 1 TABLET BY MOUTH EVERY 8 HOURS AS NEEDED FOR NAUSEA AND/OR VOMITING 60 tablet 3    Tirzepatide 2.5 MG/0.5ML solution auto-injector Inject 2.5 mg under the skin into the appropriate area as directed 1 (One) Time Per Week. 2 mL 0    fluticasone (FLONASE) 50 MCG/ACT nasal spray Administer 2 sprays into the nostril(s) as directed by provider Daily. (Patient not taking: Reported on 7/10/2025) 16 g 0    KLOR-CON 20 MEQ CR tablet TAKE 1 TABLET BY MOUTH TWICE A DAY (Patient not taking: Reported on 7/10/2025) 90 tablet 1    amoxicillin-clavulanate (AUGMENTIN) 875-125 MG per tablet Take 1 tablet by mouth 2 (Two) Times a Day. With food and water 14 tablet 0    azelastine (ASTELIN) 0.1 % nasal spray Administer 2 sprays into the nostril(s) as directed by provider 2 (Two) Times a Day. (Patient not taking: Reported on 7/10/2025) 30 mL 3    doxycycline (VIBRAMYCIN) 100 MG capsule Take 1 capsule by mouth 2 (Two) Times a Day. 20 capsule 0    fluconazole (Diflucan) 150 MG tablet Take 1 tablet by mouth Every 72 (Seventy-Two) Hours As Needed (antibiotic induced yeast infection). 4 tablet 1    ketorolac (TORADOL) 10 MG tablet Take 1 tablet by mouth Every 6 (Six) Hours As Needed for Moderate Pain. (Patient not taking: Reported on 7/10/2025) 20 tablet 0    LORazepam (ATIVAN) 0.5 MG tablet Take 1 tablet by mouth Every 12 (Twelve) Hours As Needed for Anxiety. Use sparingly. 10 tablet 0     No facility-administered medications prior to visit.     Opioid medication/s are on active medication list.  and I have evaluated her active treatment plan and pain score trends (see table).  There were  no vitals filed for this visit.  I have reviewed the chart for potential of high risk medication and harmful drug interactions in the elderly.        Aspirin is not on active medication list.  Aspirin use is not indicated based on review of current medical condition/s. Risk of harm outweighs potential benefits.  .    Patient Active Problem List   Diagnosis    Allergic rhinitis    Benign essential HTN    Arthritis of shoulder region, degenerative    Mild episode of recurrent major depressive disorder    Fatigue    Fibrositis    Generalized osteoarthritis of hand    Cannot sleep    Pain in shoulder    Adiposity    Arthritis or polyarthritis, rheumatoid    FOM (frequency of micturition)    Vitamin D deficiency    Screening for colon cancer    Pharyngoesophageal dysphagia    Hyperglycemia    Type 2 diabetes mellitus with neurologic complication, without long-term current use of insulin    Anxiety    Dizziness    Abnormal mammogram of left breast    Breast cancer, right    Chronic pain    Disorder of electrolytes    Overdose of drug    Sepsis    Urge incontinence    Localized primary osteoarthritis of left lower leg    Mixed hyperlipidemia    Dysthymia    Constipation due to pain medication    Encounter for monitoring opioid maintenance therapy    Arthritis, multiple joint involvement    Neck pain    Fever    Acute cystitis with hematuria    Urinary frequency    Menopausal symptoms    Vision changes    Cellulitis of right lower extremity    Therapeutic opioid induced constipation    Class 3 severe obesity due to excess calories with serious comorbidity and body mass index (BMI) of 40.0 to 44.9 in adult    S/P laparoscopic hysterectomy    High grade squamous intraepithelial cervical dysplasia    Chronic left shoulder pain    Essential tremor    Nausea and vomiting    Early satiety    Epigastric pain    Clinical diagnosis of COVID-19    Chest pain, unspecified type    Diabetes mellitus    Diastolic dysfunction     "Antibiotic-induced yeast infection    Situational anxiety    Need for antibiotic prophylaxis for dental procedure     Advance Care Planning Advance Directive is not on file.  ACP discussion was held with the patient during this visit. Patient has an advance directive (not in EMR), copy requested.            Objective   Vitals:    07/10/25 1146   BP: 122/76   BP Location: Right arm   Patient Position: Sitting   Cuff Size: Large Adult   Pulse: 103   SpO2: 97%   Weight: 93.9 kg (207 lb 1.6 oz)   Height: 154.9 cm (61\")       Estimated body mass index is 39.13 kg/m² as calculated from the following:    Height as of this encounter: 154.9 cm (61\").    Weight as of this encounter: 93.9 kg (207 lb 1.6 oz).                Does the patient have evidence of cognitive impairment? No  Lab Results   Component Value Date    HGBA1C 8.00 (H) 2025                                                                                               Health  Risk Assessment    Smoking Status:  Social History     Tobacco Use   Smoking Status Never    Passive exposure: Never   Smokeless Tobacco Never     Alcohol Consumption:  Social History     Substance and Sexual Activity   Alcohol Use Yes    Comment: social alcohol use       Fall Risk Screen  STEADI Fall Risk Assessment was completed, and patient is at LOW risk for falls.Assessment completed on:7/10/2025    Depression Screening   Little interest or pleasure in doing things? Not at all   Feeling down, depressed, or hopeless? Not at all   PHQ-2 Total Score 0      Health Habits and Functional and Cognitive Screenin/10/2025    11:00 AM   Functional & Cognitive Status   Do you have difficulty preparing food and eating? No   Do you have difficulty bathing yourself, getting dressed or grooming yourself? No   Do you have difficulty using the toilet? No   Do you have difficulty moving around from place to place? No   Do you have trouble with steps or getting out of a bed or a chair? No "   Current Diet Well Balanced Diet   Dental Exam Up to date   Eye Exam Up to date   Exercise (times per week) 4 times per week   Current Exercises Include Walking   Do you need help using the phone?  No   Are you deaf or do you have serious difficulty hearing?  No   Do you need help to go to places out of walking distance? No   Do you need help shopping? No   Do you need help preparing meals?  No   Do you need help with housework?  No   Do you need help with laundry? No   Do you need help taking your medications? No   Do you need help managing money? No   Do you ever drive or ride in a car without wearing a seat belt? Yes   Have you felt unusual fatigue (could be tiredness), stress, anger or loneliness in the last month? Yes   Who do you live with? Alone   If you need help, do you have trouble finding someone available to you? No   Have you been bothered in the last four weeks by sexual problems? No   Do you have difficulty concentrating, remembering or making decisions? No           Age-appropriate Screening Schedule:  Refer to the list below for future screening recommendations based on patient's age, sex and/or medical conditions. Orders for these recommended tests are listed in the plan section. The patient has been provided with a written plan.    Health Maintenance List  Health Maintenance   Topic Date Due    ZOSTER VACCINE (1 of 2) Never done    DIABETIC EYE EXAM  06/20/2023    URINE MICROALBUMIN-CREATININE RATIO (uACR)  03/27/2025    COVID-19 Vaccine (2 - 2024-25 season) 01/10/2026 (Originally 9/1/2024)    INFLUENZA VACCINE  10/01/2025    LIPID PANEL  10/16/2025    HEMOGLOBIN A1C  11/30/2025    ANNUAL WELLNESS VISIT  07/10/2026    DIABETIC FOOT EXAM  07/10/2026    MAMMOGRAM  10/28/2026    DXA SCAN  10/28/2026    TDAP/TD VACCINES (2 - Td or Tdap) 04/03/2034    HEPATITIS C SCREENING  Completed    Pneumococcal Vaccine 50+  Discontinued    COLORECTAL CANCER SCREENING  Discontinued                Physical  Exam  Vitals reviewed.   Constitutional:       General: She is not in acute distress.     Appearance: She is well-developed. She is not diaphoretic.   HENT:      Head: Normocephalic and atraumatic.      Right Ear: Tympanic membrane, ear canal and external ear normal.      Left Ear: Tympanic membrane, ear canal and external ear normal.      Nose: Nose normal.      Mouth/Throat:      Mouth: Mucous membranes are moist.      Pharynx: Oropharynx is clear. Uvula midline. No oropharyngeal exudate.   Eyes:      Conjunctiva/sclera: Conjunctivae normal.      Pupils: Pupils are equal, round, and reactive to light.   Cardiovascular:      Rate and Rhythm: Normal rate and regular rhythm.      Heart sounds: Normal heart sounds. No murmur heard.     No friction rub. No gallop.   Pulmonary:      Effort: Pulmonary effort is normal. No respiratory distress.      Breath sounds: Normal breath sounds. No wheezing or rales.   Abdominal:      General: Bowel sounds are normal. There is no distension.      Palpations: Abdomen is soft.      Tenderness: There is no abdominal tenderness.   Musculoskeletal:      Cervical back: Neck supple.   Lymphadenopathy:      Cervical: No cervical adenopathy.   Skin:     General: Skin is warm and dry.   Neurological:      Mental Status: She is alert and oriented to person, place, and time.   Psychiatric:         Mood and Affect: Mood normal.                                                                                                                                       CMS Preventative Services Quick Reference  Risk Factors Identified During Encounter  Immunizations Discussed/Encouraged: Capvaxive (Pneumococcal conjugate - PCV21)    The above risks/problems have been discussed with the patient.  Pertinent information has been shared with the patient in the After Visit Summary.  An After Visit Summary and PPPS were made available to the patient.    Follow Up:   Next Medicare Wellness visit to be scheduled  in 1 year.     Assessment & Plan  Polyarthralgia    Orders:    Ibuprofen 3 %, Gabapentin 10 %, Baclofen 2 %, lidocaine 4 %, Ketamine HCl 4 %; Apply 1-2 g topically to the appropriate area as directed 3 (Three) to 4 (Four) times daily.    Type 2 diabetes mellitus with hyperglycemia, without long-term current use of insulin      Orders:    CBC & Differential; Future    Comprehensive Metabolic Panel; Future    Hemoglobin A1c; Future    Microalbumin / Creatinine Urine Ratio - Urine, Clean Catch; Future    Benign essential HTN      Orders:    CBC & Differential; Future    Comprehensive Metabolic Panel; Future    TSH Rfx On Abnormal To Free T4; Future    Mixed hyperlipidemia   Lipid abnormalities are stable    Plan:  Continue same medication/s without change.      Discussed medication dosage, use, side effects, and goals of treatment in detail.    Counseled patient on lifestyle modifications to help control hyperlipidemia.     Patient Treatment Goals:   LDL goal is less than 70    Followup in 6 months.    Orders:    CBC & Differential; Future    Comprehensive Metabolic Panel; Future    Lipid Panel With LDL / HDL Ratio; Future    Medicare annual wellness visit, subsequent              Follow Up:   No follow-ups on file.

## 2025-07-10 NOTE — ASSESSMENT & PLAN NOTE
{Hypertension is (optional):[id number]}    Orders:    CBC & Differential; Future    Comprehensive Metabolic Panel; Future    TSH Rfx On Abnormal To Free T4; Future

## 2025-07-10 NOTE — ASSESSMENT & PLAN NOTE
{Diabetes (Optional):[id number]}    Orders:    CBC & Differential; Future    Comprehensive Metabolic Panel; Future    Hemoglobin A1c; Future    Microalbumin / Creatinine Urine Ratio - Urine, Clean Catch; Future

## 2025-07-11 LAB
ALBUMIN SERPL-MCNC: 4.1 G/DL (ref 3.8–4.8)
ALP SERPL-CCNC: 88 IU/L (ref 44–121)
ALT SERPL-CCNC: 22 IU/L (ref 0–32)
AST SERPL-CCNC: 24 IU/L (ref 0–40)
BASOPHILS # BLD AUTO: 0.1 X10E3/UL (ref 0–0.2)
BASOPHILS NFR BLD AUTO: 1 %
BILIRUB SERPL-MCNC: 0.6 MG/DL (ref 0–1.2)
BUN SERPL-MCNC: 18 MG/DL (ref 8–27)
BUN/CREAT SERPL: 18 (ref 12–28)
CALCIUM SERPL-MCNC: 11.5 MG/DL (ref 8.7–10.3)
CHLORIDE SERPL-SCNC: 96 MMOL/L (ref 96–106)
CHOLEST SERPL-MCNC: 194 MG/DL (ref 100–199)
CO2 SERPL-SCNC: 21 MMOL/L (ref 20–29)
CREAT SERPL-MCNC: 0.98 MG/DL (ref 0.57–1)
EGFRCR SERPLBLD CKD-EPI 2021: 61 ML/MIN/1.73
EOSINOPHIL # BLD AUTO: 0 X10E3/UL (ref 0–0.4)
EOSINOPHIL NFR BLD AUTO: 0 %
ERYTHROCYTE [DISTWIDTH] IN BLOOD BY AUTOMATED COUNT: 12.5 % (ref 11.7–15.4)
GLOBULIN SER CALC-MCNC: 3.2 G/DL (ref 1.5–4.5)
GLUCOSE SERPL-MCNC: 186 MG/DL (ref 70–99)
HBA1C MFR BLD: 8.8 % (ref 4.8–5.6)
HCT VFR BLD AUTO: 42.5 % (ref 34–46.6)
HDLC SERPL-MCNC: 57 MG/DL
HGB BLD-MCNC: 13.9 G/DL (ref 11.1–15.9)
IMM GRANULOCYTES # BLD AUTO: 0 X10E3/UL (ref 0–0.1)
IMM GRANULOCYTES NFR BLD AUTO: 0 %
LDLC SERPL CALC-MCNC: 121 MG/DL (ref 0–99)
LDLC/HDLC SERPL: 2.1 RATIO (ref 0–3.2)
LYMPHOCYTES # BLD AUTO: 2.7 X10E3/UL (ref 0.7–3.1)
LYMPHOCYTES NFR BLD AUTO: 30 %
MCH RBC QN AUTO: 31.4 PG (ref 26.6–33)
MCHC RBC AUTO-ENTMCNC: 32.7 G/DL (ref 31.5–35.7)
MCV RBC AUTO: 96 FL (ref 79–97)
MONOCYTES # BLD AUTO: 0.7 X10E3/UL (ref 0.1–0.9)
MONOCYTES NFR BLD AUTO: 8 %
NEUTROPHILS # BLD AUTO: 5.5 X10E3/UL (ref 1.4–7)
NEUTROPHILS NFR BLD AUTO: 61 %
PLATELET # BLD AUTO: 300 X10E3/UL (ref 150–450)
POTASSIUM SERPL-SCNC: 4.8 MMOL/L (ref 3.5–5.2)
PROT SERPL-MCNC: 7.3 G/DL (ref 6–8.5)
RBC # BLD AUTO: 4.43 X10E6/UL (ref 3.77–5.28)
SODIUM SERPL-SCNC: 136 MMOL/L (ref 134–144)
TRIGL SERPL-MCNC: 86 MG/DL (ref 0–149)
TSH SERPL DL<=0.005 MIU/L-ACNC: 0.83 UIU/ML (ref 0.45–4.5)
UNABLE TO VOID: NORMAL
VLDLC SERPL CALC-MCNC: 16 MG/DL (ref 5–40)
WBC # BLD AUTO: 9 X10E3/UL (ref 3.4–10.8)

## 2025-07-28 ENCOUNTER — OFFICE VISIT (OUTPATIENT)
Dept: PAIN MEDICINE | Facility: CLINIC | Age: 75
End: 2025-07-28
Payer: MEDICARE

## 2025-07-28 VITALS
BODY MASS INDEX: 40.33 KG/M2 | HEIGHT: 61 IN | TEMPERATURE: 98.4 F | OXYGEN SATURATION: 96 % | WEIGHT: 213.6 LBS | SYSTOLIC BLOOD PRESSURE: 111 MMHG | HEART RATE: 106 BPM | DIASTOLIC BLOOD PRESSURE: 76 MMHG

## 2025-07-28 DIAGNOSIS — Z51.81 ENCOUNTER FOR MONITORING OPIOID MAINTENANCE THERAPY: ICD-10-CM

## 2025-07-28 DIAGNOSIS — M54.2 NECK PAIN: ICD-10-CM

## 2025-07-28 DIAGNOSIS — M25.572 CHRONIC PAIN OF LEFT ANKLE: ICD-10-CM

## 2025-07-28 DIAGNOSIS — M62.838 MUSCLE SPASM: ICD-10-CM

## 2025-07-28 DIAGNOSIS — G89.29 CHRONIC PAIN OF LEFT ANKLE: ICD-10-CM

## 2025-07-28 DIAGNOSIS — M19.011 OSTEOARTHRITIS OF BOTH SHOULDERS, UNSPECIFIED OSTEOARTHRITIS TYPE: Primary | ICD-10-CM

## 2025-07-28 DIAGNOSIS — Z79.891 ENCOUNTER FOR MONITORING OPIOID MAINTENANCE THERAPY: ICD-10-CM

## 2025-07-28 DIAGNOSIS — M19.012 OSTEOARTHRITIS OF BOTH SHOULDERS, UNSPECIFIED OSTEOARTHRITIS TYPE: Primary | ICD-10-CM

## 2025-07-28 LAB
POC AMPHETAMINES: NEGATIVE
POC BARBITURATES: NEGATIVE
POC BENZODIAZEPHINES: NEGATIVE
POC BUPRENORPHINE: NEGATIVE
POC COCAINE: NEGATIVE
POC METHADONE: NEGATIVE
POC METHAMPHETAMINE SCREEN URINE: NEGATIVE
POC OPIATES: POSITIVE
POC OXYCODONE: NEGATIVE
POC PHENCYCLIDINE: NEGATIVE
POC PROPOXYPHENE: NEGATIVE
POC THC: NEGATIVE
POC TRICYCLIC ANTIDEPRESSANTS: NEGATIVE

## 2025-07-28 PROCEDURE — 99214 OFFICE O/P EST MOD 30 MIN: CPT

## 2025-07-28 PROCEDURE — 1159F MED LIST DOCD IN RCRD: CPT

## 2025-07-28 PROCEDURE — 3078F DIAST BP <80 MM HG: CPT

## 2025-07-28 PROCEDURE — 1160F RVW MEDS BY RX/DR IN RCRD: CPT

## 2025-07-28 PROCEDURE — 3074F SYST BP LT 130 MM HG: CPT

## 2025-07-28 PROCEDURE — 80305 DRUG TEST PRSMV DIR OPT OBS: CPT

## 2025-07-28 PROCEDURE — 1125F AMNT PAIN NOTED PAIN PRSNT: CPT

## 2025-07-28 RX ORDER — HYDROCODONE BITARTRATE AND ACETAMINOPHEN 10; 325 MG/1; MG/1
1 TABLET ORAL
Qty: 150 TABLET | Refills: 0 | Status: SHIPPED | OUTPATIENT
Start: 2025-07-28

## 2025-07-28 RX ORDER — CYCLOBENZAPRINE HCL 10 MG
10 TABLET ORAL 3 TIMES DAILY PRN
Qty: 90 TABLET | Refills: 2 | Status: SHIPPED | OUTPATIENT
Start: 2025-07-28

## 2025-07-28 NOTE — PROGRESS NOTES
CHIEF COMPLAINT  Neck and joint pain     Subjective   Chastity Salgado is a 75 y.o. female  who presents for follow-up.  She has a history of chronic neck and joint pain. She reports that her pain has worsened since her last office visit due to increased physical activity.    Today pain is 8/10VAS in severity. She continues to complain of pain in her bilateral shoulders (left worse than right), knees, left ankle, and neck. Describes this pain as an intermittent ache. Pain is worse at night and worsens with certain movements, prolonged positions, climbing stairs, bending/twisting, and weather. Pain improves with rest, reposition, heat/ice, and medications.      She continues with Hydrocodone 10mg 5/day, Cymbalta 60mg, Celebrex 200mg daily (prescribed by PCP), compound pain cream, and Flexeril 10mg at HS. Occasional constipation is managed with Metamucil and Linzess/Movantik. Denies any side effects from the regimen including somnolence. The regimen helps decrease pain by 75%. Notes improvement in activity and function with regimen. ADL's by self. Denies any bowel or bladder changes.      Celebrex - causes GI issues - takes every other day (history of gastroparesis)  Gabapentin - caused sleep walking     Continues to work with Dr. Tu Vásquez in regards to shoulder pain. She is contemplating surgery but is unsure at this time. She received a steroid injection to her left ankle in June that offered no relief.     MRI Brain performed on 6/12/25 - scheduled to see Dr. Baptiste on 9/23/25      Procedures:  2/25/21 - left suprascapular nerve block/steroid injection (posterior approach) - 50% relief x 1 month  9/22/20 - left suprascapular nerve block/steroid injection (posterior approach) - 70% relief x 1-2 months     Surgical History:  10/6/23 -  Right total knee revision - Dr. Gimenez.     Neck Pain   This is a chronic problem. The current episode started more than 1 year ago. The problem occurs intermittently. The problem  has been gradually worsening. The pain is associated with a sleep position. The pain is present in the left side and right side. The quality of the pain is described as aching. The pain is at a severity of 8/10. The symptoms are aggravated by twisting, position and bending. Stiffness is present In the morning. Associated symptoms include numbness (fingers) and weakness. Pertinent negatives include no chest pain, fever or headaches. She has tried ice, heat and oral narcotics for the symptoms.   Joint Pain  Chronicity:  Chronic (bilateral knees, ankles, and shoulders)  Onset:  More than 1 year ago  Frequency:  Intermittently  Progression since onset:  Worse (Rates pain 8/10VAS in severity - right shoulder pain has worsened)  Symptoms: fatigue, nausea, neck pain, numbness (fingers) and weakness    Symptoms: no abdominal pain, no chest pain, no chills, no congestion, no cough, no fever, no headaches and no dysuria    Aggravating factors:  Walking, bending, exertion, twisting and standing (cold weather, stairs)  Treatments tried:  Oral narcotics, position changes, heat, ice and rest    PEG Assessment   What number best describes your pain on average in the past week?7  What number best describes how, during the past week, pain has interfered with your enjoyment of life?7  What number best describes how, during the past week, pain has interfered with your general activity?  8    The following portions of the patient's history were reviewed and updated as appropriate: allergies, current medications, past family history, past medical history, past social history, past surgical history, and problem list.    Review of Systems   Constitutional:  Positive for fatigue. Negative for activity change, chills and fever.   HENT:  Negative for congestion.    Eyes:  Negative for visual disturbance.   Respiratory:  Negative for cough, chest tightness and shortness of breath.    Cardiovascular:  Negative for chest pain.  "  Gastrointestinal:  Positive for constipation, diarrhea and nausea. Negative for abdominal pain.   Genitourinary:  Negative for difficulty urinating, dyspareunia and dysuria.   Musculoskeletal:  Positive for arthralgias, neck pain and neck stiffness. Negative for back pain.   Neurological:  Positive for weakness and numbness (fingers). Negative for dizziness, light-headedness and headaches.   Psychiatric/Behavioral:  Positive for sleep disturbance. Negative for agitation, self-injury and suicidal ideas. The patient is not nervous/anxious.      I have reviewed and confirmed the accuracy of the ROS as documented by the MA/LPN/RN NEWTON Abdul    Vitals:    07/28/25 0938   BP: 111/76   Pulse: 106   Temp: 98.4 °F (36.9 °C)   SpO2: 96%   Weight: 96.9 kg (213 lb 9.6 oz)   Height: 154.9 cm (61\")   PainSc: 8    PainLoc: Shoulder  Comment: bilateral and left ankle     Objective   Physical Exam  Constitutional:       Appearance: Normal appearance.   HENT:      Head: Normocephalic.   Cardiovascular:      Rate and Rhythm: Normal rate and regular rhythm.   Pulmonary:      Effort: Pulmonary effort is normal.      Breath sounds: Normal breath sounds.   Musculoskeletal:      Right shoulder: Tenderness present. Decreased range of motion.      Left shoulder: Tenderness and crepitus present. Decreased range of motion.      Left elbow: Decreased range of motion. Tenderness present.      Left hand: Swelling and tenderness present.      Cervical back: Tenderness present. Pain with movement present. Decreased range of motion.      Lumbar back: Tenderness and bony tenderness present. Decreased range of motion. Negative right straight leg raise test and negative left straight leg raise test.      Right knee: Swelling present. Decreased range of motion. Tenderness present.      Comments: + lumbar facet loading/tenderness    Skin:     General: Skin is warm and dry.      Capillary Refill: Capillary refill takes less than 2 seconds. "   Neurological:      General: No focal deficit present.      Mental Status: She is alert and oriented to person, place, and time.      Gait: Gait abnormal (slowed).   Psychiatric:         Mood and Affect: Mood normal.         Speech: Speech normal.         Behavior: Behavior normal.         Thought Content: Thought content normal.         Cognition and Memory: Cognition normal.       Assessment & Plan   Diagnoses and all orders for this visit:    1. Osteoarthritis of both shoulders, unspecified osteoarthritis type (Primary)  -     HYDROcodone-acetaminophen (NORCO)  MG per tablet; Take 1 tablet by mouth 5 (Five) Times a Day As Needed for Severe Pain. 30 day supply. DNF 8/11/25  Dispense: 150 tablet; Refill: 0    2. Neck pain  -     HYDROcodone-acetaminophen (NORCO)  MG per tablet; Take 1 tablet by mouth 5 (Five) Times a Day As Needed for Severe Pain. 30 day supply. DNF 8/11/25  Dispense: 150 tablet; Refill: 0    3. Muscle spasm  -     cyclobenzaprine (FLEXERIL) 10 MG tablet; Take 1 tablet by mouth 3 (Three) Times a Day As Needed for Muscle Spasms.  Dispense: 90 tablet; Refill: 2    4. Chronic pain of left ankle    5. Encounter for monitoring opioid maintenance therapy      Chastity Salgado reports a pain score of 8.  Given her pain assessment as noted, treatment options were discussed and the following options were decided upon as a follow-up plan to address the patient's pain: continuation of current treatment plan for pain, prescription for non-opiod analgesics, and prescription for opiod analgesics.    --- Routine UDS in office today as part of monitoring requirements for controlled substances.  The specimen was viewed and the immunoassay result reviewed and is +OPI.  This specimen will be sent to [facility] for confirmation.     -- The patient signed an updated copy of the controlled substance agreement on 11/11/24  --- Continue Flexeril. Refill sent to pharmacy.   --- Continue Hydrocodone.  DNF 8/11/25. Patient appears stable with current regimen. No adverse effects. Regarding continuation of opioids, there is no evidence of aberrant behavior or any red flags.  The patient continues with appropriate response to opioid therapy. ADL's remain intact by self.    --- Follow-up 2 month or sooner if needed        GAYE REPORT  As part of the patient's treatment plan, I am prescribing controlled substances. The patient has been made aware of appropriate use of such medications, including potential risk of somnolence, limited ability to drive and/or work safely, and the potential for dependence or overdose. It has also been made clear that these medications are for use by this patient only, without concomitant use of alcohol or other substances unless prescribed.     Patient has completed prescribing agreement detailing terms of continued prescribing of controlled substances, including monitoring GAYE reports, urine drug screening, and pill counts if necessary. The patient is aware that inappropriate use will results in cessation of prescribing such medications.    As the clinician, I personally reviewed the GAYE from 7/28/25 while the patient was in the office today.    History and physical exam exhibit continued safe and appropriate use of controlled substances.    Dictated utilizing Dragon dictation.

## 2025-08-06 ENCOUNTER — TELEPHONE (OUTPATIENT)
Dept: FAMILY MEDICINE CLINIC | Facility: CLINIC | Age: 75
End: 2025-08-06
Payer: MEDICARE

## 2025-08-06 DIAGNOSIS — R11.2 NAUSEA AND VOMITING, UNSPECIFIED VOMITING TYPE: ICD-10-CM

## 2025-08-06 RX ORDER — ONDANSETRON 4 MG/1
TABLET, FILM COATED ORAL
Qty: 60 TABLET | Refills: 3 | Status: CANCELLED | OUTPATIENT
Start: 2025-08-06

## 2025-08-06 RX ORDER — ONDANSETRON 4 MG/1
4 TABLET, FILM COATED ORAL EVERY 8 HOURS PRN
Qty: 20 TABLET | Refills: 3 | Status: SHIPPED | OUTPATIENT
Start: 2025-08-06

## 2025-08-20 ENCOUNTER — OFFICE VISIT (OUTPATIENT)
Dept: FAMILY MEDICINE CLINIC | Facility: CLINIC | Age: 75
End: 2025-08-20
Payer: MEDICARE

## 2025-08-20 VITALS
DIASTOLIC BLOOD PRESSURE: 64 MMHG | SYSTOLIC BLOOD PRESSURE: 114 MMHG | HEIGHT: 61 IN | WEIGHT: 216.4 LBS | BODY MASS INDEX: 40.86 KG/M2 | HEART RATE: 78 BPM | OXYGEN SATURATION: 94 %

## 2025-08-20 DIAGNOSIS — K59.04 CHRONIC IDIOPATHIC CONSTIPATION: Primary | ICD-10-CM

## 2025-08-20 DIAGNOSIS — J01.00 ACUTE NON-RECURRENT MAXILLARY SINUSITIS: ICD-10-CM

## 2025-08-20 DIAGNOSIS — H00.022 HORDEOLUM INTERNUM OF RIGHT LOWER EYELID: ICD-10-CM

## 2025-08-20 PROCEDURE — 3052F HG A1C>EQUAL 8.0%<EQUAL 9.0%: CPT | Performed by: NURSE PRACTITIONER

## 2025-08-20 PROCEDURE — 1125F AMNT PAIN NOTED PAIN PRSNT: CPT | Performed by: NURSE PRACTITIONER

## 2025-08-20 PROCEDURE — 99213 OFFICE O/P EST LOW 20 MIN: CPT | Performed by: NURSE PRACTITIONER

## 2025-08-20 PROCEDURE — 3078F DIAST BP <80 MM HG: CPT | Performed by: NURSE PRACTITIONER

## 2025-08-20 PROCEDURE — 3074F SYST BP LT 130 MM HG: CPT | Performed by: NURSE PRACTITIONER

## 2025-08-20 RX ORDER — AMOXICILLIN 875 MG/1
875 TABLET, COATED ORAL EVERY 12 HOURS SCHEDULED
Qty: 14 TABLET | Refills: 0 | Status: SHIPPED | OUTPATIENT
Start: 2025-08-20

## 2025-08-20 RX ORDER — ERYTHROMYCIN 5 MG/G
OINTMENT OPHTHALMIC NIGHTLY
Qty: 3.5 G | Refills: 0 | Status: SHIPPED | OUTPATIENT
Start: 2025-08-20

## (undated) DEVICE — GOWN ISOL W/THUMB UNIV BLU BX/15

## (undated) DEVICE — Device

## (undated) DEVICE — MSK ENDO PORT O2 POM ELITE CURAPLEX A/

## (undated) DEVICE — FLEX ADVANTAGE 1500CC: Brand: FLEX ADVANTAGE

## (undated) DEVICE — GLV SURG TRIUMPH PF LTX 7.5 STRL

## (undated) DEVICE — EPIDURAL TRAY: Brand: MEDLINE INDUSTRIES, INC.

## (undated) DEVICE — NDL SPINE 22G 31/2IN BLK

## (undated) DEVICE — VIAL FORMLN CAP 10PCT 20ML

## (undated) DEVICE — SINGLE-USE BIOPSY FORCEPS: Brand: RADIAL JAW 4

## (undated) DEVICE — KT ORCA ORCAPOD DISP STRL

## (undated) DEVICE — GOWN PROC ENDOARMOR GI LVL3 HY/SHLD UNIV

## (undated) DEVICE — BITEBLOCK OMNI BLOC